# Patient Record
Sex: FEMALE | Race: WHITE | Employment: OTHER | URBAN - NONMETROPOLITAN AREA
[De-identification: names, ages, dates, MRNs, and addresses within clinical notes are randomized per-mention and may not be internally consistent; named-entity substitution may affect disease eponyms.]

---

## 2017-01-16 ENCOUNTER — OFFICE VISIT (OUTPATIENT)
Dept: PRIMARY CARE CLINIC | Age: 42
End: 2017-01-16
Payer: MEDICARE

## 2017-01-16 ENCOUNTER — HOSPITAL ENCOUNTER (OUTPATIENT)
Dept: GENERAL RADIOLOGY | Age: 42
Discharge: HOME | End: 2017-01-16
Payer: MEDICARE

## 2017-01-16 VITALS
OXYGEN SATURATION: 95 % | SYSTOLIC BLOOD PRESSURE: 110 MMHG | HEIGHT: 63 IN | TEMPERATURE: 97.2 F | DIASTOLIC BLOOD PRESSURE: 80 MMHG | HEART RATE: 78 BPM | WEIGHT: 125 LBS | BODY MASS INDEX: 22.15 KG/M2

## 2017-01-16 DIAGNOSIS — G89.29 CHRONIC PAIN OF RIGHT KNEE: ICD-10-CM

## 2017-01-16 DIAGNOSIS — F41.9 ANXIETY: ICD-10-CM

## 2017-01-16 DIAGNOSIS — M17.31 POST-TRAUMATIC OSTEOARTHRITIS OF RIGHT KNEE: ICD-10-CM

## 2017-01-16 DIAGNOSIS — M25.561 CHRONIC PAIN OF RIGHT KNEE: ICD-10-CM

## 2017-01-16 DIAGNOSIS — G81.90 HEMIPLEGIA (HCC): ICD-10-CM

## 2017-01-16 DIAGNOSIS — M19.112 POST-TRAUMATIC OSTEOARTHRITIS OF LEFT SHOULDER: ICD-10-CM

## 2017-01-16 DIAGNOSIS — R10.84 GENERALIZED ABDOMINAL PAIN: ICD-10-CM

## 2017-01-16 DIAGNOSIS — B37.31 YEAST VAGINITIS: Primary | ICD-10-CM

## 2017-01-16 DIAGNOSIS — H92.02 OTALGIA OF LEFT EAR: ICD-10-CM

## 2017-01-16 DIAGNOSIS — F51.01 PRIMARY INSOMNIA: ICD-10-CM

## 2017-01-16 PROCEDURE — G8484 FLU IMMUNIZE NO ADMIN: HCPCS | Performed by: PEDIATRICS

## 2017-01-16 PROCEDURE — G8420 CALC BMI NORM PARAMETERS: HCPCS | Performed by: PEDIATRICS

## 2017-01-16 PROCEDURE — 73560 X-RAY EXAM OF KNEE 1 OR 2: CPT

## 2017-01-16 PROCEDURE — G8427 DOCREV CUR MEDS BY ELIG CLIN: HCPCS | Performed by: PEDIATRICS

## 2017-01-16 PROCEDURE — 99214 OFFICE O/P EST MOD 30 MIN: CPT | Performed by: PEDIATRICS

## 2017-01-16 PROCEDURE — 1036F TOBACCO NON-USER: CPT | Performed by: PEDIATRICS

## 2017-01-16 RX ORDER — FLUCONAZOLE 150 MG/1
150 TABLET ORAL DAILY
Qty: 7 TABLET | Refills: 0 | Status: SHIPPED | OUTPATIENT
Start: 2017-01-16 | End: 2017-01-23

## 2017-01-16 RX ORDER — HYDROCODONE BITARTRATE AND ACETAMINOPHEN 7.5; 325 MG/1; MG/1
1 TABLET ORAL EVERY 6 HOURS PRN
COMMUNITY
End: 2017-01-16 | Stop reason: SDUPTHER

## 2017-01-16 RX ORDER — LORAZEPAM 1 MG/1
1 TABLET ORAL EVERY 8 HOURS PRN
Qty: 30 TABLET | Refills: 0 | Status: SHIPPED | OUTPATIENT
Start: 2017-01-16 | End: 2017-02-16 | Stop reason: SDUPTHER

## 2017-01-16 RX ORDER — HYDROCODONE BITARTRATE AND ACETAMINOPHEN 7.5; 325 MG/1; MG/1
1 TABLET ORAL EVERY 8 HOURS PRN
Qty: 90 TABLET | Refills: 0 | Status: SHIPPED | OUTPATIENT
Start: 2017-01-16 | End: 2017-02-16 | Stop reason: SDUPTHER

## 2017-01-16 RX ORDER — FLUCONAZOLE 100 MG/1
150 TABLET ORAL DAILY
Qty: 5 TABLET | Refills: 0 | Status: CANCELLED | OUTPATIENT
Start: 2017-01-16 | End: 2017-01-19

## 2017-01-16 ASSESSMENT — ENCOUNTER SYMPTOMS
ABDOMINAL PAIN: 1
COUGH: 0
WHEEZING: 0
SHORTNESS OF BREATH: 0
VOMITING: 0
CONSTIPATION: 0
VOICE CHANGE: 0
EYE DISCHARGE: 0
BACK PAIN: 0
EYE PAIN: 0
NAUSEA: 1
DIARRHEA: 0
SORE THROAT: 0
SINUS PRESSURE: 0

## 2017-01-17 ENCOUNTER — TELEPHONE (OUTPATIENT)
Dept: PRIMARY CARE CLINIC | Age: 42
End: 2017-01-17

## 2017-01-17 DIAGNOSIS — G89.29 CHRONIC KNEE PAIN, UNSPECIFIED LATERALITY: Primary | ICD-10-CM

## 2017-01-17 DIAGNOSIS — M25.569 CHRONIC KNEE PAIN, UNSPECIFIED LATERALITY: Primary | ICD-10-CM

## 2017-01-23 RX ORDER — TRIAMCINOLONE ACETONIDE 1 MG/G
CREAM TOPICAL
Qty: 90 G | Refills: 3 | Status: SHIPPED | OUTPATIENT
Start: 2017-01-23 | End: 2017-01-24 | Stop reason: SDUPTHER

## 2017-01-24 DIAGNOSIS — L30.4 INTERTRIGO: ICD-10-CM

## 2017-01-24 DIAGNOSIS — T78.2XXA ANAPHYLAXIS, INITIAL ENCOUNTER: ICD-10-CM

## 2017-01-24 DIAGNOSIS — L30.9 DERMATITIS: ICD-10-CM

## 2017-01-24 RX ORDER — OMEPRAZOLE 20 MG/1
20 CAPSULE, DELAYED RELEASE ORAL DAILY
Qty: 90 CAPSULE | Refills: 5
Start: 2017-01-24 | End: 2017-07-06 | Stop reason: SDUPTHER

## 2017-01-24 RX ORDER — POLYETHYLENE GLYCOL 3350 17 G/17G
17 POWDER, FOR SOLUTION ORAL DAILY
Qty: 527 G | Refills: 5
Start: 2017-01-24 | End: 2018-12-19

## 2017-01-24 RX ORDER — EPINEPHRINE 0.3 MG/.3ML
0.3 INJECTION SUBCUTANEOUS ONCE
Qty: 2 EACH | Refills: 5
Start: 2017-01-24 | End: 2020-12-21 | Stop reason: SDUPTHER

## 2017-01-24 RX ORDER — LAMOTRIGINE 200 MG/1
200 TABLET ORAL 2 TIMES DAILY
Qty: 180 TABLET | Refills: 5
Start: 2017-01-24 | End: 2017-06-14 | Stop reason: SDUPTHER

## 2017-01-24 RX ORDER — CLOTRIMAZOLE 1 %
CREAM (GRAM) TOPICAL 2 TIMES DAILY
Qty: 1 TUBE | Refills: 5
Start: 2017-01-24 | End: 2017-05-17 | Stop reason: ALTCHOICE

## 2017-01-24 RX ORDER — TRIAMCINOLONE ACETONIDE 1 MG/G
CREAM TOPICAL 2 TIMES DAILY
Qty: 90 G | Refills: 3
Start: 2017-01-24 | End: 2018-06-05

## 2017-01-24 RX ORDER — RANITIDINE 150 MG/1
150 TABLET ORAL 2 TIMES DAILY
Qty: 180 TABLET | Refills: 5
Start: 2017-01-24 | End: 2017-05-18 | Stop reason: SDUPTHER

## 2017-01-24 RX ORDER — CLOTRIMAZOLE AND BETAMETHASONE DIPROPIONATE 10; .64 MG/G; MG/G
CREAM TOPICAL 2 TIMES DAILY
Qty: 1 TUBE | Refills: 5
Start: 2017-01-24 | End: 2018-06-05

## 2017-02-02 ENCOUNTER — HOSPITAL ENCOUNTER (EMERGENCY)
Age: 42
Discharge: HOME | End: 2017-02-03
Payer: MEDICARE

## 2017-02-02 VITALS
SYSTOLIC BLOOD PRESSURE: 146 MMHG | HEART RATE: 102 BPM | HEIGHT: 64 IN | DIASTOLIC BLOOD PRESSURE: 90 MMHG | BODY MASS INDEX: 22.2 KG/M2 | OXYGEN SATURATION: 100 % | RESPIRATION RATE: 20 BRPM | WEIGHT: 130 LBS | TEMPERATURE: 98.6 F

## 2017-02-02 DIAGNOSIS — H92.02 OTALGIA, LEFT: Primary | ICD-10-CM

## 2017-02-02 PROCEDURE — 99282 EMERGENCY DEPT VISIT SF MDM: CPT

## 2017-02-02 PROCEDURE — 6370000000 HC RX 637 (ALT 250 FOR IP): Performed by: NURSE PRACTITIONER

## 2017-02-02 RX ORDER — IBUPROFEN 200 MG
400 TABLET ORAL ONCE
Status: COMPLETED | OUTPATIENT
Start: 2017-02-02 | End: 2017-02-02

## 2017-02-02 RX ADMIN — IBUPROFEN 400 MG: 200 TABLET, FILM COATED ORAL at 23:51

## 2017-02-02 ASSESSMENT — PAIN DESCRIPTION - LOCATION: LOCATION: EAR

## 2017-02-02 ASSESSMENT — PAIN DESCRIPTION - ORIENTATION: ORIENTATION: LEFT

## 2017-02-02 ASSESSMENT — PAIN SCALES - GENERAL: PAINLEVEL_OUTOF10: 8

## 2017-02-03 PROCEDURE — 99282 EMERGENCY DEPT VISIT SF MDM: CPT | Performed by: NURSE PRACTITIONER

## 2017-02-03 ASSESSMENT — ENCOUNTER SYMPTOMS
RHINORRHEA: 0
SORE THROAT: 0
COUGH: 0
TROUBLE SWALLOWING: 0

## 2017-02-06 RX ORDER — RANITIDINE 150 MG/1
150 TABLET ORAL 2 TIMES DAILY
Qty: 180 TABLET | Refills: 3 | Status: SHIPPED | OUTPATIENT
Start: 2017-02-06 | End: 2017-02-16

## 2017-02-16 ENCOUNTER — OFFICE VISIT (OUTPATIENT)
Dept: PRIMARY CARE CLINIC | Age: 42
End: 2017-02-16
Payer: MEDICARE

## 2017-02-16 VITALS
SYSTOLIC BLOOD PRESSURE: 102 MMHG | TEMPERATURE: 97 F | WEIGHT: 121 LBS | OXYGEN SATURATION: 98 % | BODY MASS INDEX: 20.77 KG/M2 | DIASTOLIC BLOOD PRESSURE: 70 MMHG | HEART RATE: 91 BPM

## 2017-02-16 DIAGNOSIS — M17.31 POST-TRAUMATIC OSTEOARTHRITIS OF RIGHT KNEE: ICD-10-CM

## 2017-02-16 DIAGNOSIS — F51.01 PRIMARY INSOMNIA: ICD-10-CM

## 2017-02-16 DIAGNOSIS — F41.9 ANXIETY: ICD-10-CM

## 2017-02-16 DIAGNOSIS — M19.112 POST-TRAUMATIC OSTEOARTHRITIS OF LEFT SHOULDER: ICD-10-CM

## 2017-02-16 PROCEDURE — G8484 FLU IMMUNIZE NO ADMIN: HCPCS | Performed by: PEDIATRICS

## 2017-02-16 PROCEDURE — 1036F TOBACCO NON-USER: CPT | Performed by: PEDIATRICS

## 2017-02-16 PROCEDURE — G8420 CALC BMI NORM PARAMETERS: HCPCS | Performed by: PEDIATRICS

## 2017-02-16 PROCEDURE — 99213 OFFICE O/P EST LOW 20 MIN: CPT | Performed by: PEDIATRICS

## 2017-02-16 PROCEDURE — G8427 DOCREV CUR MEDS BY ELIG CLIN: HCPCS | Performed by: PEDIATRICS

## 2017-02-16 RX ORDER — LORAZEPAM 1 MG/1
1 TABLET ORAL EVERY 8 HOURS PRN
Qty: 30 TABLET | Refills: 0 | Status: SHIPPED | OUTPATIENT
Start: 2017-02-16 | End: 2017-03-16 | Stop reason: SDUPTHER

## 2017-02-16 RX ORDER — HYDROCODONE BITARTRATE AND ACETAMINOPHEN 7.5; 325 MG/1; MG/1
1 TABLET ORAL EVERY 8 HOURS PRN
Qty: 90 TABLET | Refills: 0 | Status: SHIPPED | OUTPATIENT
Start: 2017-02-16 | End: 2017-03-16 | Stop reason: SDUPTHER

## 2017-02-16 ASSESSMENT — ENCOUNTER SYMPTOMS
SORE THROAT: 0
EYE PAIN: 0
EYE DISCHARGE: 0
DIARRHEA: 0
SHORTNESS OF BREATH: 0
SINUS PRESSURE: 0
WHEEZING: 0
CONSTIPATION: 0
VOMITING: 0
BACK PAIN: 0
ABDOMINAL PAIN: 0
NAUSEA: 0
VOICE CHANGE: 0
COUGH: 0

## 2017-02-28 ENCOUNTER — CARE COORDINATION (OUTPATIENT)
Dept: CARE COORDINATION | Age: 42
End: 2017-02-28

## 2017-03-16 ENCOUNTER — OFFICE VISIT (OUTPATIENT)
Dept: PRIMARY CARE CLINIC | Age: 42
End: 2017-03-16
Payer: MEDICARE

## 2017-03-16 VITALS
OXYGEN SATURATION: 95 % | WEIGHT: 136.4 LBS | HEART RATE: 85 BPM | BODY MASS INDEX: 21.92 KG/M2 | TEMPERATURE: 97.1 F | DIASTOLIC BLOOD PRESSURE: 80 MMHG | SYSTOLIC BLOOD PRESSURE: 118 MMHG | HEIGHT: 66 IN

## 2017-03-16 DIAGNOSIS — F41.9 ANXIETY: Primary | ICD-10-CM

## 2017-03-16 DIAGNOSIS — G81.90 HEMIPLEGIA (HCC): ICD-10-CM

## 2017-03-16 DIAGNOSIS — F51.01 PRIMARY INSOMNIA: ICD-10-CM

## 2017-03-16 DIAGNOSIS — M19.112 POST-TRAUMATIC OSTEOARTHRITIS OF LEFT SHOULDER: ICD-10-CM

## 2017-03-16 DIAGNOSIS — R21 FACIAL RASH: ICD-10-CM

## 2017-03-16 DIAGNOSIS — M17.31 POST-TRAUMATIC OSTEOARTHRITIS OF RIGHT KNEE: ICD-10-CM

## 2017-03-16 PROCEDURE — 1036F TOBACCO NON-USER: CPT | Performed by: PEDIATRICS

## 2017-03-16 PROCEDURE — G8420 CALC BMI NORM PARAMETERS: HCPCS | Performed by: PEDIATRICS

## 2017-03-16 PROCEDURE — G8427 DOCREV CUR MEDS BY ELIG CLIN: HCPCS | Performed by: PEDIATRICS

## 2017-03-16 PROCEDURE — G8484 FLU IMMUNIZE NO ADMIN: HCPCS | Performed by: PEDIATRICS

## 2017-03-16 PROCEDURE — 99213 OFFICE O/P EST LOW 20 MIN: CPT | Performed by: PEDIATRICS

## 2017-03-16 RX ORDER — LORAZEPAM 1 MG/1
1 TABLET ORAL EVERY 8 HOURS PRN
Qty: 30 TABLET | Refills: 0 | Status: SHIPPED | OUTPATIENT
Start: 2017-03-16 | End: 2017-06-14 | Stop reason: SDUPTHER

## 2017-03-16 RX ORDER — HYDROCODONE BITARTRATE AND ACETAMINOPHEN 7.5; 325 MG/1; MG/1
1 TABLET ORAL EVERY 8 HOURS PRN
Qty: 90 TABLET | Refills: 0 | Status: SHIPPED | OUTPATIENT
Start: 2017-03-16 | End: 2017-03-23

## 2017-03-16 ASSESSMENT — ENCOUNTER SYMPTOMS
DIARRHEA: 0
SORE THROAT: 0
COUGH: 0
EYE DISCHARGE: 0
SINUS PRESSURE: 0
WHEEZING: 0
VOICE CHANGE: 0
EYE PAIN: 0
CONSTIPATION: 0
SHORTNESS OF BREATH: 0
BACK PAIN: 0
NAUSEA: 0
VOMITING: 0
ABDOMINAL PAIN: 0

## 2017-03-17 DIAGNOSIS — Z99.3 WHEELCHAIR BOUND: Primary | ICD-10-CM

## 2017-03-20 ENCOUNTER — TELEPHONE (OUTPATIENT)
Dept: PRIMARY CARE CLINIC | Age: 42
End: 2017-03-20

## 2017-03-20 DIAGNOSIS — G81.90 HEMIPLEGIA (HCC): Primary | ICD-10-CM

## 2017-03-21 RX ORDER — CUSHION
EACH MISCELLANEOUS
Qty: 1 EACH | Refills: 0
Start: 2017-03-21 | End: 2018-06-05

## 2017-03-22 ENCOUNTER — TELEPHONE (OUTPATIENT)
Dept: PRIMARY CARE CLINIC | Age: 42
End: 2017-03-22

## 2017-04-13 ENCOUNTER — OFFICE VISIT (OUTPATIENT)
Dept: PRIMARY CARE CLINIC | Age: 42
End: 2017-04-13
Payer: MEDICARE

## 2017-04-13 VITALS
OXYGEN SATURATION: 95 % | HEART RATE: 72 BPM | WEIGHT: 133.8 LBS | HEIGHT: 63 IN | DIASTOLIC BLOOD PRESSURE: 72 MMHG | SYSTOLIC BLOOD PRESSURE: 110 MMHG | BODY MASS INDEX: 23.71 KG/M2 | TEMPERATURE: 97.9 F

## 2017-04-13 DIAGNOSIS — F51.01 PRIMARY INSOMNIA: ICD-10-CM

## 2017-04-13 DIAGNOSIS — K59.09 CHRONIC CONSTIPATION: ICD-10-CM

## 2017-04-13 DIAGNOSIS — F41.9 ANXIETY: ICD-10-CM

## 2017-04-13 DIAGNOSIS — L30.4 INTERTRIGO: ICD-10-CM

## 2017-04-13 DIAGNOSIS — R56.9 SEIZURES (HCC): ICD-10-CM

## 2017-04-13 DIAGNOSIS — L30.9 DERMATITIS: ICD-10-CM

## 2017-04-13 DIAGNOSIS — Z30.9 ENCOUNTER FOR CONTRACEPTIVE MANAGEMENT, UNSPECIFIED CONTRACEPTIVE ENCOUNTER TYPE: ICD-10-CM

## 2017-04-13 DIAGNOSIS — G89.4 CHRONIC PAIN SYNDROME: Primary | ICD-10-CM

## 2017-04-13 LAB
CONTROL: NORMAL
PREGNANCY TEST URINE, POC: NORMAL

## 2017-04-13 PROCEDURE — 1036F TOBACCO NON-USER: CPT | Performed by: PEDIATRICS

## 2017-04-13 PROCEDURE — 99214 OFFICE O/P EST MOD 30 MIN: CPT | Performed by: PEDIATRICS

## 2017-04-13 PROCEDURE — 96372 THER/PROPH/DIAG INJ SC/IM: CPT | Performed by: PEDIATRICS

## 2017-04-13 PROCEDURE — G8427 DOCREV CUR MEDS BY ELIG CLIN: HCPCS | Performed by: PEDIATRICS

## 2017-04-13 PROCEDURE — G8420 CALC BMI NORM PARAMETERS: HCPCS | Performed by: PEDIATRICS

## 2017-04-13 PROCEDURE — 81025 URINE PREGNANCY TEST: CPT | Performed by: PEDIATRICS

## 2017-04-13 RX ORDER — HYDROCODONE BITARTRATE AND ACETAMINOPHEN 7.5; 325 MG/1; MG/1
1 TABLET ORAL EVERY 6 HOURS PRN
Refills: 0 | Status: CANCELLED | OUTPATIENT
Start: 2017-04-13

## 2017-04-13 RX ORDER — LORAZEPAM 1 MG/1
1 TABLET ORAL EVERY 8 HOURS PRN
Qty: 30 TABLET | Refills: 0 | Status: CANCELLED | OUTPATIENT
Start: 2017-04-13

## 2017-04-13 RX ORDER — HYDROCODONE BITARTRATE AND ACETAMINOPHEN 7.5; 325 MG/1; MG/1
1 TABLET ORAL EVERY 6 HOURS PRN
COMMUNITY
End: 2017-04-13 | Stop reason: SDUPTHER

## 2017-04-13 RX ORDER — MEDROXYPROGESTERONE ACETATE 150 MG/ML
150 INJECTION, SUSPENSION INTRAMUSCULAR ONCE
Status: COMPLETED | OUTPATIENT
Start: 2017-04-13 | End: 2017-04-13

## 2017-04-13 RX ORDER — HYDROCODONE BITARTRATE AND ACETAMINOPHEN 7.5; 325 MG/1; MG/1
1 TABLET ORAL EVERY 6 HOURS PRN
Qty: 90 TABLET | Refills: 0 | Status: SHIPPED | OUTPATIENT
Start: 2017-04-13 | End: 2017-05-17 | Stop reason: SDUPTHER

## 2017-04-13 RX ADMIN — MEDROXYPROGESTERONE ACETATE 150 MG: 150 INJECTION, SUSPENSION INTRAMUSCULAR at 14:06

## 2017-04-13 ASSESSMENT — ENCOUNTER SYMPTOMS
ABDOMINAL PAIN: 0
SORE THROAT: 0
EYE DISCHARGE: 0
CONSTIPATION: 0
SINUS PRESSURE: 0
VOICE CHANGE: 0
DIARRHEA: 0
BACK PAIN: 0
COUGH: 0
NAUSEA: 0
SHORTNESS OF BREATH: 0
VOMITING: 0
EYE PAIN: 0
WHEEZING: 0

## 2017-05-17 ENCOUNTER — OFFICE VISIT (OUTPATIENT)
Dept: PRIMARY CARE CLINIC | Age: 42
End: 2017-05-17
Payer: MEDICARE

## 2017-05-17 VITALS
OXYGEN SATURATION: 96 % | DIASTOLIC BLOOD PRESSURE: 68 MMHG | BODY MASS INDEX: 22.1 KG/M2 | SYSTOLIC BLOOD PRESSURE: 130 MMHG | WEIGHT: 124.75 LBS | HEART RATE: 88 BPM | TEMPERATURE: 96.9 F

## 2017-05-17 DIAGNOSIS — M25.561 CHRONIC PAIN OF RIGHT KNEE: ICD-10-CM

## 2017-05-17 DIAGNOSIS — G89.29 CHRONIC PAIN OF RIGHT KNEE: ICD-10-CM

## 2017-05-17 DIAGNOSIS — G89.4 CHRONIC PAIN SYNDROME: ICD-10-CM

## 2017-05-17 DIAGNOSIS — J30.2 SEASONAL ALLERGIC RHINITIS, UNSPECIFIED ALLERGIC RHINITIS TRIGGER: Primary | ICD-10-CM

## 2017-05-17 DIAGNOSIS — H26.9 CATARACTS, BILATERAL: ICD-10-CM

## 2017-05-17 DIAGNOSIS — H92.03 OTALGIA OF BOTH EARS: ICD-10-CM

## 2017-05-17 DIAGNOSIS — F51.01 PRIMARY INSOMNIA: ICD-10-CM

## 2017-05-17 PROCEDURE — G8427 DOCREV CUR MEDS BY ELIG CLIN: HCPCS | Performed by: PEDIATRICS

## 2017-05-17 PROCEDURE — 99213 OFFICE O/P EST LOW 20 MIN: CPT | Performed by: PEDIATRICS

## 2017-05-17 PROCEDURE — G8420 CALC BMI NORM PARAMETERS: HCPCS | Performed by: PEDIATRICS

## 2017-05-17 PROCEDURE — 1036F TOBACCO NON-USER: CPT | Performed by: PEDIATRICS

## 2017-05-17 RX ORDER — FLUTICASONE PROPIONATE 50 MCG
2 SPRAY, SUSPENSION (ML) NASAL DAILY
Qty: 1 BOTTLE | Refills: 3 | Status: SHIPPED | OUTPATIENT
Start: 2017-05-17 | End: 2017-12-12 | Stop reason: SDUPTHER

## 2017-05-17 RX ORDER — LORAZEPAM 1 MG/1
1 TABLET ORAL EVERY 8 HOURS PRN
Qty: 30 TABLET | Refills: 0 | Status: CANCELLED | OUTPATIENT
Start: 2017-05-17

## 2017-05-17 RX ORDER — DESLORATADINE 5 MG/1
5 TABLET ORAL DAILY
Qty: 30 TABLET | Refills: 5 | Status: SHIPPED | OUTPATIENT
Start: 2017-05-17 | End: 2018-07-27 | Stop reason: SDUPTHER

## 2017-05-17 RX ORDER — AZITHROMYCIN 250 MG/1
TABLET, FILM COATED ORAL
Qty: 6 TABLET | Refills: 0 | Status: CANCELLED | OUTPATIENT
Start: 2017-05-17

## 2017-05-17 RX ORDER — HYDROCODONE BITARTRATE AND ACETAMINOPHEN 7.5; 325 MG/1; MG/1
1 TABLET ORAL EVERY 6 HOURS PRN
Qty: 90 TABLET | Refills: 0 | Status: SHIPPED | OUTPATIENT
Start: 2017-05-17 | End: 2017-06-14 | Stop reason: SDUPTHER

## 2017-05-17 ASSESSMENT — ENCOUNTER SYMPTOMS
DIARRHEA: 0
COUGH: 0
ABDOMINAL PAIN: 0
CONSTIPATION: 0
SORE THROAT: 0
VOMITING: 0
SINUS PRESSURE: 0
NAUSEA: 0
SHORTNESS OF BREATH: 0
VOICE CHANGE: 0
WHEEZING: 0
BACK PAIN: 0
EYE DISCHARGE: 0
EYE PAIN: 0

## 2017-05-19 RX ORDER — RANITIDINE 150 MG/1
TABLET ORAL
Qty: 180 TABLET | Refills: 3 | Status: SHIPPED | OUTPATIENT
Start: 2017-05-19 | End: 2018-11-07 | Stop reason: SDUPTHER

## 2017-05-26 RX ORDER — AZITHROMYCIN 250 MG/1
TABLET, FILM COATED ORAL
Qty: 1 PACKET | Refills: 0 | Status: SHIPPED | OUTPATIENT
Start: 2017-05-26 | End: 2017-06-05

## 2017-06-12 ENCOUNTER — ANESTHESIA EVENT (OUTPATIENT)
Dept: OPERATING ROOM | Age: 42
End: 2017-06-12

## 2017-06-14 ENCOUNTER — OFFICE VISIT (OUTPATIENT)
Dept: PRIMARY CARE CLINIC | Age: 42
End: 2017-06-14
Payer: MEDICARE

## 2017-06-14 VITALS
HEART RATE: 115 BPM | HEIGHT: 63 IN | DIASTOLIC BLOOD PRESSURE: 72 MMHG | TEMPERATURE: 98.7 F | OXYGEN SATURATION: 99 % | BODY MASS INDEX: 23.39 KG/M2 | WEIGHT: 132 LBS | SYSTOLIC BLOOD PRESSURE: 112 MMHG

## 2017-06-14 DIAGNOSIS — Z01.818 PRE-OPERATIVE CLEARANCE: Primary | ICD-10-CM

## 2017-06-14 DIAGNOSIS — F41.9 ANXIETY: ICD-10-CM

## 2017-06-14 DIAGNOSIS — H26.9 CATARACT: ICD-10-CM

## 2017-06-14 DIAGNOSIS — G89.4 CHRONIC PAIN SYNDROME: ICD-10-CM

## 2017-06-14 DIAGNOSIS — R56.9 SEIZURES (HCC): ICD-10-CM

## 2017-06-14 DIAGNOSIS — F51.01 PRIMARY INSOMNIA: ICD-10-CM

## 2017-06-14 PROCEDURE — G8427 DOCREV CUR MEDS BY ELIG CLIN: HCPCS | Performed by: PEDIATRICS

## 2017-06-14 PROCEDURE — 93000 ELECTROCARDIOGRAM COMPLETE: CPT | Performed by: PEDIATRICS

## 2017-06-14 PROCEDURE — 1036F TOBACCO NON-USER: CPT | Performed by: PEDIATRICS

## 2017-06-14 PROCEDURE — 99213 OFFICE O/P EST LOW 20 MIN: CPT | Performed by: PEDIATRICS

## 2017-06-14 PROCEDURE — G8420 CALC BMI NORM PARAMETERS: HCPCS | Performed by: PEDIATRICS

## 2017-06-14 RX ORDER — LORAZEPAM 1 MG/1
1 TABLET ORAL EVERY 8 HOURS PRN
Qty: 30 TABLET | Refills: 0 | Status: SHIPPED | OUTPATIENT
Start: 2017-06-14 | End: 2017-07-13 | Stop reason: SDUPTHER

## 2017-06-14 RX ORDER — LAMOTRIGINE 200 MG/1
200 TABLET ORAL 2 TIMES DAILY
Qty: 180 TABLET | Refills: 5 | Status: SHIPPED | OUTPATIENT
Start: 2017-06-14 | End: 2017-06-14 | Stop reason: SDUPTHER

## 2017-06-14 RX ORDER — HYDROCODONE BITARTRATE AND ACETAMINOPHEN 7.5; 325 MG/1; MG/1
1 TABLET ORAL EVERY 6 HOURS PRN
Qty: 90 TABLET | Refills: 0 | Status: SHIPPED | OUTPATIENT
Start: 2017-06-14 | End: 2017-07-13 | Stop reason: SDUPTHER

## 2017-06-14 RX ORDER — LAMOTRIGINE 200 MG/1
200 TABLET ORAL 2 TIMES DAILY
Qty: 180 TABLET | Refills: 5 | Status: SHIPPED | OUTPATIENT
Start: 2017-06-14 | End: 2018-06-30 | Stop reason: SDUPTHER

## 2017-06-14 ASSESSMENT — ENCOUNTER SYMPTOMS
ABDOMINAL DISTENTION: 0
VOMITING: 0
NAUSEA: 0
SHORTNESS OF BREATH: 0
COUGH: 0
ABDOMINAL PAIN: 0
CHOKING: 0
CHEST TIGHTNESS: 0
BACK PAIN: 1
SINUS PRESSURE: 0
TROUBLE SWALLOWING: 0
DIARRHEA: 0
SORE THROAT: 0
CONSTIPATION: 0
VOICE CHANGE: 0
WHEEZING: 0

## 2017-06-15 ENCOUNTER — HOSPITAL ENCOUNTER (OUTPATIENT)
Age: 42
Setting detail: OUTPATIENT SURGERY
Discharge: HOME | End: 2017-06-15
Attending: OPHTHALMOLOGY | Admitting: OPHTHALMOLOGY

## 2017-06-15 ENCOUNTER — ANESTHESIA (OUTPATIENT)
Dept: OPERATING ROOM | Age: 42
End: 2017-06-15
Payer: MEDICARE

## 2017-06-15 VITALS
HEART RATE: 79 BPM | SYSTOLIC BLOOD PRESSURE: 135 MMHG | BODY MASS INDEX: 23.21 KG/M2 | RESPIRATION RATE: 18 BRPM | OXYGEN SATURATION: 100 % | TEMPERATURE: 97 F | HEIGHT: 63 IN | WEIGHT: 131 LBS | DIASTOLIC BLOOD PRESSURE: 97 MMHG

## 2017-06-15 VITALS
RESPIRATION RATE: 1 BRPM | DIASTOLIC BLOOD PRESSURE: 83 MMHG | SYSTOLIC BLOOD PRESSURE: 123 MMHG | OXYGEN SATURATION: 99 %

## 2017-06-15 PROCEDURE — 66984 XCAPSL CTRC RMVL W/O ECP: CPT

## 2017-06-15 PROCEDURE — G8907 PT DOC NO EVENTS ON DISCHARG: HCPCS

## 2017-06-15 PROCEDURE — V2632 POST CHMBR INTRAOCULAR LENS: HCPCS | Performed by: OPHTHALMOLOGY

## 2017-06-15 PROCEDURE — 00142 ANES PX ON EYE LENS SURGERY: CPT | Performed by: NURSE ANESTHETIST, CERTIFIED REGISTERED

## 2017-06-15 PROCEDURE — G8918 PT W/O PREOP ORDER IV AB PRO: HCPCS

## 2017-06-15 DEVICE — IMPLANTABLE DEVICE: Type: IMPLANTABLE DEVICE | Site: EYE | Status: FUNCTIONAL

## 2017-06-15 RX ORDER — OXYCODONE HYDROCHLORIDE AND ACETAMINOPHEN 5; 325 MG/1; MG/1
2 TABLET ORAL PRN
Status: DISCONTINUED | OUTPATIENT
Start: 2017-06-15 | End: 2017-06-15 | Stop reason: HOSPADM

## 2017-06-15 RX ORDER — FENTANYL CITRATE 50 UG/ML
INJECTION, SOLUTION INTRAMUSCULAR; INTRAVENOUS PRN
Status: DISCONTINUED | OUTPATIENT
Start: 2017-06-15 | End: 2017-06-15 | Stop reason: SDUPTHER

## 2017-06-15 RX ORDER — LABETALOL HYDROCHLORIDE 5 MG/ML
5 INJECTION, SOLUTION INTRAVENOUS EVERY 10 MIN PRN
Status: DISCONTINUED | OUTPATIENT
Start: 2017-06-15 | End: 2017-06-15 | Stop reason: HOSPADM

## 2017-06-15 RX ORDER — ONDANSETRON 2 MG/ML
4 INJECTION INTRAMUSCULAR; INTRAVENOUS
Status: DISCONTINUED | OUTPATIENT
Start: 2017-06-15 | End: 2017-06-15 | Stop reason: HOSPADM

## 2017-06-15 RX ORDER — FENTANYL CITRATE 50 UG/ML
50 INJECTION, SOLUTION INTRAMUSCULAR; INTRAVENOUS EVERY 5 MIN PRN
Status: DISCONTINUED | OUTPATIENT
Start: 2017-06-15 | End: 2017-06-15 | Stop reason: HOSPADM

## 2017-06-15 RX ORDER — HYDROMORPHONE HCL 110MG/55ML
0.25 PATIENT CONTROLLED ANALGESIA SYRINGE INTRAVENOUS EVERY 5 MIN PRN
Status: DISCONTINUED | OUTPATIENT
Start: 2017-06-15 | End: 2017-06-15 | Stop reason: HOSPADM

## 2017-06-15 RX ORDER — DIPHENHYDRAMINE HYDROCHLORIDE 50 MG/ML
12.5 INJECTION INTRAMUSCULAR; INTRAVENOUS
Status: DISCONTINUED | OUTPATIENT
Start: 2017-06-15 | End: 2017-06-15 | Stop reason: HOSPADM

## 2017-06-15 RX ORDER — OXYCODONE HYDROCHLORIDE AND ACETAMINOPHEN 5; 325 MG/1; MG/1
1 TABLET ORAL PRN
Status: DISCONTINUED | OUTPATIENT
Start: 2017-06-15 | End: 2017-06-15 | Stop reason: HOSPADM

## 2017-06-15 RX ORDER — SODIUM CHLORIDE, SODIUM LACTATE, POTASSIUM CHLORIDE, CALCIUM CHLORIDE 600; 310; 30; 20 MG/100ML; MG/100ML; MG/100ML; MG/100ML
INJECTION, SOLUTION INTRAVENOUS CONTINUOUS
Status: DISCONTINUED | OUTPATIENT
Start: 2017-06-15 | End: 2017-06-15 | Stop reason: HOSPADM

## 2017-06-15 RX ORDER — HYDROMORPHONE HCL 110MG/55ML
0.5 PATIENT CONTROLLED ANALGESIA SYRINGE INTRAVENOUS EVERY 5 MIN PRN
Status: DISCONTINUED | OUTPATIENT
Start: 2017-06-15 | End: 2017-06-15 | Stop reason: HOSPADM

## 2017-06-15 RX ORDER — MEPERIDINE HYDROCHLORIDE 25 MG/ML
12.5 INJECTION INTRAMUSCULAR; INTRAVENOUS; SUBCUTANEOUS EVERY 5 MIN PRN
Status: DISCONTINUED | OUTPATIENT
Start: 2017-06-15 | End: 2017-06-15 | Stop reason: HOSPADM

## 2017-06-15 RX ORDER — PROPOFOL 10 MG/ML
INJECTION, EMULSION INTRAVENOUS PRN
Status: DISCONTINUED | OUTPATIENT
Start: 2017-06-15 | End: 2017-06-15 | Stop reason: SDUPTHER

## 2017-06-15 RX ORDER — PROMETHAZINE HYDROCHLORIDE 25 MG/ML
12.5 INJECTION, SOLUTION INTRAMUSCULAR; INTRAVENOUS
Status: DISCONTINUED | OUTPATIENT
Start: 2017-06-15 | End: 2017-06-15 | Stop reason: HOSPADM

## 2017-06-15 RX ORDER — TETRACAINE HYDROCHLORIDE 5 MG/ML
SOLUTION OPHTHALMIC PRN
Status: DISCONTINUED | OUTPATIENT
Start: 2017-06-15 | End: 2017-06-15 | Stop reason: HOSPADM

## 2017-06-15 RX ORDER — MIDAZOLAM HYDROCHLORIDE 1 MG/ML
INJECTION INTRAMUSCULAR; INTRAVENOUS PRN
Status: DISCONTINUED | OUTPATIENT
Start: 2017-06-15 | End: 2017-06-15 | Stop reason: SDUPTHER

## 2017-06-15 RX ORDER — HYDRALAZINE HYDROCHLORIDE 20 MG/ML
5 INJECTION INTRAMUSCULAR; INTRAVENOUS EVERY 10 MIN PRN
Status: DISCONTINUED | OUTPATIENT
Start: 2017-06-15 | End: 2017-06-15 | Stop reason: HOSPADM

## 2017-06-15 RX ADMIN — PROPOFOL 50 MG: 10 INJECTION, EMULSION INTRAVENOUS at 07:34

## 2017-06-15 RX ADMIN — FENTANYL CITRATE 50 MCG: 50 INJECTION, SOLUTION INTRAMUSCULAR; INTRAVENOUS at 07:30

## 2017-06-15 RX ADMIN — SODIUM CHLORIDE, SODIUM LACTATE, POTASSIUM CHLORIDE, CALCIUM CHLORIDE: 600; 310; 30; 20 INJECTION, SOLUTION INTRAVENOUS at 07:22

## 2017-06-15 RX ADMIN — MIDAZOLAM HYDROCHLORIDE 1 MG: 1 INJECTION INTRAMUSCULAR; INTRAVENOUS at 07:33

## 2017-06-15 RX ADMIN — PROPOFOL 100 MG: 10 INJECTION, EMULSION INTRAVENOUS at 07:35

## 2017-07-06 RX ORDER — OMEPRAZOLE 20 MG/1
20 CAPSULE, DELAYED RELEASE ORAL DAILY
Qty: 90 CAPSULE | Refills: 3 | OUTPATIENT
Start: 2017-07-06 | End: 2018-08-17 | Stop reason: SDUPTHER

## 2017-07-12 DIAGNOSIS — Z30.42 DEPOT CONTRACEPTION: ICD-10-CM

## 2017-07-13 ENCOUNTER — OFFICE VISIT (OUTPATIENT)
Dept: PRIMARY CARE CLINIC | Age: 42
End: 2017-07-13
Payer: MEDICARE

## 2017-07-13 VITALS
DIASTOLIC BLOOD PRESSURE: 76 MMHG | BODY MASS INDEX: 22.72 KG/M2 | OXYGEN SATURATION: 94 % | TEMPERATURE: 97.7 F | SYSTOLIC BLOOD PRESSURE: 118 MMHG | WEIGHT: 128.25 LBS | HEART RATE: 87 BPM

## 2017-07-13 DIAGNOSIS — M17.11 PRIMARY OSTEOARTHRITIS OF RIGHT KNEE: ICD-10-CM

## 2017-07-13 DIAGNOSIS — M54.50 CHRONIC MIDLINE LOW BACK PAIN WITHOUT SCIATICA: Primary | ICD-10-CM

## 2017-07-13 DIAGNOSIS — G89.29 CHRONIC MIDLINE LOW BACK PAIN WITHOUT SCIATICA: Primary | ICD-10-CM

## 2017-07-13 DIAGNOSIS — G89.4 CHRONIC PAIN SYNDROME: ICD-10-CM

## 2017-07-13 DIAGNOSIS — F51.01 PRIMARY INSOMNIA: ICD-10-CM

## 2017-07-13 DIAGNOSIS — M17.12 PRIMARY OSTEOARTHRITIS OF LEFT KNEE: ICD-10-CM

## 2017-07-13 DIAGNOSIS — F41.9 ANXIETY: ICD-10-CM

## 2017-07-13 PROCEDURE — 20610 DRAIN/INJ JOINT/BURSA W/O US: CPT | Performed by: PEDIATRICS

## 2017-07-13 PROCEDURE — 99214 OFFICE O/P EST MOD 30 MIN: CPT | Performed by: PEDIATRICS

## 2017-07-13 PROCEDURE — G8420 CALC BMI NORM PARAMETERS: HCPCS | Performed by: PEDIATRICS

## 2017-07-13 PROCEDURE — G8427 DOCREV CUR MEDS BY ELIG CLIN: HCPCS | Performed by: PEDIATRICS

## 2017-07-13 PROCEDURE — 1036F TOBACCO NON-USER: CPT | Performed by: PEDIATRICS

## 2017-07-13 RX ORDER — METHYLPREDNISOLONE ACETATE 80 MG/ML
80 INJECTION, SUSPENSION INTRA-ARTICULAR; INTRALESIONAL; INTRAMUSCULAR; SOFT TISSUE ONCE
Qty: 1 ML | Refills: 0
Start: 2017-07-13 | End: 2017-07-13

## 2017-07-13 RX ORDER — LORAZEPAM 1 MG/1
1 TABLET ORAL EVERY 8 HOURS PRN
Qty: 30 TABLET | Refills: 0 | Status: SHIPPED | OUTPATIENT
Start: 2017-07-13 | End: 2017-08-15 | Stop reason: SDUPTHER

## 2017-07-13 RX ORDER — HYDROCODONE BITARTRATE AND ACETAMINOPHEN 7.5; 325 MG/1; MG/1
1 TABLET ORAL EVERY 6 HOURS PRN
Qty: 90 TABLET | Refills: 0 | Status: SHIPPED | OUTPATIENT
Start: 2017-07-13 | End: 2017-08-15 | Stop reason: SDUPTHER

## 2017-07-13 RX ORDER — TRIAMCINOLONE ACETONIDE 40 MG/ML
40 INJECTION, SUSPENSION INTRA-ARTICULAR; INTRAMUSCULAR ONCE
Qty: 1 ML | Refills: 0
Start: 2017-07-13 | End: 2017-07-13

## 2017-07-13 RX ORDER — MEDROXYPROGESTERONE ACETATE 150 MG/ML
INJECTION, SUSPENSION INTRAMUSCULAR
Qty: 1 ML | Refills: 11 | Status: SHIPPED | OUTPATIENT
Start: 2017-07-13 | End: 2017-07-20 | Stop reason: SDUPTHER

## 2017-07-13 RX ORDER — MEDICAL SUPPLY, MISCELLANEOUS
EACH MISCELLANEOUS
Qty: 1 EACH | Refills: 0 | Status: SHIPPED | OUTPATIENT
Start: 2017-07-13 | End: 2018-06-05

## 2017-07-13 ASSESSMENT — ENCOUNTER SYMPTOMS
VOICE CHANGE: 0
SHORTNESS OF BREATH: 0
COUGH: 0
NAUSEA: 0
ABDOMINAL DISTENTION: 0
SORE THROAT: 0
SINUS PRESSURE: 0
DIARRHEA: 0
ABDOMINAL PAIN: 0
WHEEZING: 0
CONSTIPATION: 0
VOMITING: 0
CHEST TIGHTNESS: 0
CHOKING: 0
BACK PAIN: 0
TROUBLE SWALLOWING: 0

## 2017-07-20 DIAGNOSIS — Z30.42 DEPOT CONTRACEPTION: ICD-10-CM

## 2017-07-20 RX ORDER — MEDROXYPROGESTERONE ACETATE 150 MG/ML
150 INJECTION, SUSPENSION INTRAMUSCULAR ONCE
Qty: 1 ML | Refills: 3 | Status: SHIPPED | OUTPATIENT
Start: 2017-07-20 | End: 2018-02-13 | Stop reason: SDUPTHER

## 2017-07-26 ENCOUNTER — PROCEDURE VISIT (OUTPATIENT)
Dept: PRIMARY CARE CLINIC | Age: 42
End: 2017-07-26
Payer: MEDICARE

## 2017-07-26 DIAGNOSIS — Z30.8 ENCOUNTER FOR OTHER CONTRACEPTIVE MANAGEMENT: Primary | ICD-10-CM

## 2017-07-26 PROCEDURE — 96372 THER/PROPH/DIAG INJ SC/IM: CPT | Performed by: PEDIATRICS

## 2017-07-26 RX ORDER — MEDROXYPROGESTERONE ACETATE 150 MG/ML
150 INJECTION, SUSPENSION INTRAMUSCULAR ONCE
Status: COMPLETED | OUTPATIENT
Start: 2017-07-26 | End: 2017-07-26

## 2017-07-26 RX ADMIN — MEDROXYPROGESTERONE ACETATE 150 MG: 150 INJECTION, SUSPENSION INTRAMUSCULAR at 12:26

## 2017-07-28 ENCOUNTER — ANESTHESIA EVENT (OUTPATIENT)
Dept: OPERATING ROOM | Age: 42
End: 2017-07-28

## 2017-08-04 ENCOUNTER — ANESTHESIA (OUTPATIENT)
Dept: OPERATING ROOM | Age: 42
End: 2017-08-04
Payer: MEDICARE

## 2017-08-04 ENCOUNTER — HOSPITAL ENCOUNTER (OUTPATIENT)
Age: 42
Setting detail: OUTPATIENT SURGERY
Discharge: HOME | End: 2017-08-04
Attending: OPHTHALMOLOGY | Admitting: OPHTHALMOLOGY

## 2017-08-04 VITALS
SYSTOLIC BLOOD PRESSURE: 97 MMHG | RESPIRATION RATE: 1 BRPM | DIASTOLIC BLOOD PRESSURE: 56 MMHG | OXYGEN SATURATION: 98 %

## 2017-08-04 VITALS
DIASTOLIC BLOOD PRESSURE: 87 MMHG | TEMPERATURE: 96.8 F | OXYGEN SATURATION: 99 % | HEIGHT: 63 IN | SYSTOLIC BLOOD PRESSURE: 145 MMHG | HEART RATE: 96 BPM | RESPIRATION RATE: 18 BRPM | BODY MASS INDEX: 22.5 KG/M2 | WEIGHT: 127 LBS

## 2017-08-04 PROCEDURE — G8918 PT W/O PREOP ORDER IV AB PRO: HCPCS | Performed by: NURSE PRACTITIONER

## 2017-08-04 PROCEDURE — 00142 ANES PX ON EYE LENS SURGERY: CPT | Performed by: NURSE ANESTHETIST, CERTIFIED REGISTERED

## 2017-08-04 PROCEDURE — 66984 XCAPSL CTRC RMVL W/O ECP: CPT

## 2017-08-04 PROCEDURE — G8907 PT DOC NO EVENTS ON DISCHARG: HCPCS

## 2017-08-04 PROCEDURE — G8918 PT W/O PREOP ORDER IV AB PRO: HCPCS

## 2017-08-04 PROCEDURE — V2632 POST CHMBR INTRAOCULAR LENS: HCPCS | Performed by: OPHTHALMOLOGY

## 2017-08-04 PROCEDURE — G8907 PT DOC NO EVENTS ON DISCHARG: HCPCS | Performed by: NURSE PRACTITIONER

## 2017-08-04 DEVICE — IMPLANTABLE DEVICE: Type: IMPLANTABLE DEVICE | Status: FUNCTIONAL

## 2017-08-04 RX ORDER — PROMETHAZINE HYDROCHLORIDE 25 MG/ML
12.5 INJECTION, SOLUTION INTRAMUSCULAR; INTRAVENOUS
Status: DISCONTINUED | OUTPATIENT
Start: 2017-08-04 | End: 2017-08-04 | Stop reason: HOSPADM

## 2017-08-04 RX ORDER — MIDAZOLAM HYDROCHLORIDE 1 MG/ML
INJECTION INTRAMUSCULAR; INTRAVENOUS PRN
Status: DISCONTINUED | OUTPATIENT
Start: 2017-08-04 | End: 2017-08-04 | Stop reason: SDUPTHER

## 2017-08-04 RX ORDER — HYDRALAZINE HYDROCHLORIDE 20 MG/ML
5 INJECTION INTRAMUSCULAR; INTRAVENOUS EVERY 10 MIN PRN
Status: DISCONTINUED | OUTPATIENT
Start: 2017-08-04 | End: 2017-08-04 | Stop reason: HOSPADM

## 2017-08-04 RX ORDER — HYDROMORPHONE HCL 110MG/55ML
0.25 PATIENT CONTROLLED ANALGESIA SYRINGE INTRAVENOUS EVERY 5 MIN PRN
Status: DISCONTINUED | OUTPATIENT
Start: 2017-08-04 | End: 2017-08-04 | Stop reason: HOSPADM

## 2017-08-04 RX ORDER — SODIUM CHLORIDE, SODIUM LACTATE, POTASSIUM CHLORIDE, CALCIUM CHLORIDE 600; 310; 30; 20 MG/100ML; MG/100ML; MG/100ML; MG/100ML
INJECTION, SOLUTION INTRAVENOUS CONTINUOUS
Status: DISCONTINUED | OUTPATIENT
Start: 2017-08-04 | End: 2017-08-04 | Stop reason: HOSPADM

## 2017-08-04 RX ORDER — TETRACAINE HYDROCHLORIDE 5 MG/ML
SOLUTION OPHTHALMIC PRN
Status: DISCONTINUED | OUTPATIENT
Start: 2017-08-04 | End: 2017-08-04 | Stop reason: HOSPADM

## 2017-08-04 RX ORDER — HYDROMORPHONE HCL 110MG/55ML
0.5 PATIENT CONTROLLED ANALGESIA SYRINGE INTRAVENOUS EVERY 5 MIN PRN
Status: DISCONTINUED | OUTPATIENT
Start: 2017-08-04 | End: 2017-08-04 | Stop reason: HOSPADM

## 2017-08-04 RX ORDER — OXYCODONE HYDROCHLORIDE AND ACETAMINOPHEN 5; 325 MG/1; MG/1
2 TABLET ORAL PRN
Status: DISCONTINUED | OUTPATIENT
Start: 2017-08-04 | End: 2017-08-04 | Stop reason: HOSPADM

## 2017-08-04 RX ORDER — MEPERIDINE HYDROCHLORIDE 25 MG/ML
12.5 INJECTION INTRAMUSCULAR; INTRAVENOUS; SUBCUTANEOUS EVERY 5 MIN PRN
Status: DISCONTINUED | OUTPATIENT
Start: 2017-08-04 | End: 2017-08-04 | Stop reason: HOSPADM

## 2017-08-04 RX ORDER — ONDANSETRON 2 MG/ML
4 INJECTION INTRAMUSCULAR; INTRAVENOUS
Status: DISCONTINUED | OUTPATIENT
Start: 2017-08-04 | End: 2017-08-04 | Stop reason: HOSPADM

## 2017-08-04 RX ORDER — DIPHENHYDRAMINE HYDROCHLORIDE 50 MG/ML
12.5 INJECTION INTRAMUSCULAR; INTRAVENOUS
Status: DISCONTINUED | OUTPATIENT
Start: 2017-08-04 | End: 2017-08-04 | Stop reason: HOSPADM

## 2017-08-04 RX ORDER — FENTANYL CITRATE 50 UG/ML
INJECTION, SOLUTION INTRAMUSCULAR; INTRAVENOUS PRN
Status: DISCONTINUED | OUTPATIENT
Start: 2017-08-04 | End: 2017-08-04 | Stop reason: SDUPTHER

## 2017-08-04 RX ORDER — PREDNISOLONE ACETATE 10 MG/ML
SUSPENSION/ DROPS OPHTHALMIC PRN
Status: DISCONTINUED | OUTPATIENT
Start: 2017-08-04 | End: 2017-08-04 | Stop reason: HOSPADM

## 2017-08-04 RX ORDER — GLIMEPIRIDE 2 MG/1
TABLET ORAL PRN
Status: DISCONTINUED | OUTPATIENT
Start: 2017-08-04 | End: 2017-08-04 | Stop reason: HOSPADM

## 2017-08-04 RX ORDER — PROPOFOL 10 MG/ML
INJECTION, EMULSION INTRAVENOUS PRN
Status: DISCONTINUED | OUTPATIENT
Start: 2017-08-04 | End: 2017-08-04 | Stop reason: SDUPTHER

## 2017-08-04 RX ORDER — LABETALOL HYDROCHLORIDE 5 MG/ML
5 INJECTION, SOLUTION INTRAVENOUS EVERY 10 MIN PRN
Status: DISCONTINUED | OUTPATIENT
Start: 2017-08-04 | End: 2017-08-04 | Stop reason: HOSPADM

## 2017-08-04 RX ORDER — LIDOCAINE HYDROCHLORIDE 10 MG/ML
1 INJECTION, SOLUTION EPIDURAL; INFILTRATION; INTRACAUDAL; PERINEURAL
Status: DISCONTINUED | OUTPATIENT
Start: 2017-08-04 | End: 2017-08-04 | Stop reason: HOSPADM

## 2017-08-04 RX ORDER — TOBRAMYCIN AND DEXAMETHASONE 3; 1 MG/ML; MG/ML
SUSPENSION/ DROPS OPHTHALMIC PRN
Status: DISCONTINUED | OUTPATIENT
Start: 2017-08-04 | End: 2017-08-04 | Stop reason: HOSPADM

## 2017-08-04 RX ORDER — OXYCODONE HYDROCHLORIDE AND ACETAMINOPHEN 5; 325 MG/1; MG/1
1 TABLET ORAL PRN
Status: DISCONTINUED | OUTPATIENT
Start: 2017-08-04 | End: 2017-08-04 | Stop reason: HOSPADM

## 2017-08-04 RX ORDER — FENTANYL CITRATE 50 UG/ML
50 INJECTION, SOLUTION INTRAMUSCULAR; INTRAVENOUS EVERY 5 MIN PRN
Status: DISCONTINUED | OUTPATIENT
Start: 2017-08-04 | End: 2017-08-04 | Stop reason: HOSPADM

## 2017-08-04 RX ADMIN — FENTANYL CITRATE 50 MCG: 50 INJECTION, SOLUTION INTRAMUSCULAR; INTRAVENOUS at 07:15

## 2017-08-04 RX ADMIN — PROPOFOL 150 MG: 10 INJECTION, EMULSION INTRAVENOUS at 07:18

## 2017-08-04 RX ADMIN — MIDAZOLAM HYDROCHLORIDE 2 MG: 1 INJECTION INTRAMUSCULAR; INTRAVENOUS at 07:13

## 2017-08-04 RX ADMIN — SODIUM CHLORIDE, SODIUM LACTATE, POTASSIUM CHLORIDE, CALCIUM CHLORIDE: 600; 310; 30; 20 INJECTION, SOLUTION INTRAVENOUS at 07:13

## 2017-08-15 ENCOUNTER — TELEPHONE (OUTPATIENT)
Dept: PRIMARY CARE CLINIC | Age: 42
End: 2017-08-15

## 2017-08-15 ENCOUNTER — OFFICE VISIT (OUTPATIENT)
Dept: PRIMARY CARE CLINIC | Age: 42
End: 2017-08-15
Payer: MEDICARE

## 2017-08-15 VITALS
HEART RATE: 86 BPM | WEIGHT: 122 LBS | TEMPERATURE: 98.6 F | OXYGEN SATURATION: 94 % | SYSTOLIC BLOOD PRESSURE: 118 MMHG | BODY MASS INDEX: 21.61 KG/M2 | DIASTOLIC BLOOD PRESSURE: 80 MMHG

## 2017-08-15 DIAGNOSIS — F51.01 PRIMARY INSOMNIA: Primary | ICD-10-CM

## 2017-08-15 DIAGNOSIS — F41.9 ANXIETY: ICD-10-CM

## 2017-08-15 DIAGNOSIS — R23.3 EASY BRUISING: ICD-10-CM

## 2017-08-15 DIAGNOSIS — G89.4 CHRONIC PAIN SYNDROME: ICD-10-CM

## 2017-08-15 LAB
BASOPHILS ABSOLUTE: 0.1 K/UL (ref 0–0.2)
BASOPHILS RELATIVE PERCENT: 0.5 % (ref 0–1)
EOSINOPHILS ABSOLUTE: 0.1 K/UL (ref 0–0.6)
EOSINOPHILS RELATIVE PERCENT: 1.3 % (ref 0–5)
HCT VFR BLD CALC: 40.9 % (ref 37–47)
HEMOGLOBIN: 13.6 G/DL (ref 12–16)
LYMPHOCYTES ABSOLUTE: 2.8 K/UL (ref 1.1–4.5)
LYMPHOCYTES RELATIVE PERCENT: 29.8 % (ref 20–40)
MCH RBC QN AUTO: 34.5 PG (ref 27–31)
MCHC RBC AUTO-ENTMCNC: 33.3 G/DL (ref 33–37)
MCV RBC AUTO: 103.8 FL (ref 81–99)
MONOCYTES ABSOLUTE: 0.5 K/UL (ref 0–0.9)
MONOCYTES RELATIVE PERCENT: 4.7 % (ref 0–10)
NEUTROPHILS ABSOLUTE: 6 K/UL (ref 1.5–7.5)
NEUTROPHILS RELATIVE PERCENT: 62.8 % (ref 50–65)
PDW BLD-RTO: 14.2 % (ref 11.5–14.5)
PLATELET # BLD: 235 K/UL (ref 130–400)
PMV BLD AUTO: 9.4 FL (ref 9.4–12.3)
RBC # BLD: 3.94 M/UL (ref 4.2–5.4)
WBC # BLD: 9.5 K/UL (ref 4.8–10.8)

## 2017-08-15 PROCEDURE — G8427 DOCREV CUR MEDS BY ELIG CLIN: HCPCS | Performed by: NURSE PRACTITIONER

## 2017-08-15 PROCEDURE — 1036F TOBACCO NON-USER: CPT | Performed by: NURSE PRACTITIONER

## 2017-08-15 PROCEDURE — 99213 OFFICE O/P EST LOW 20 MIN: CPT | Performed by: NURSE PRACTITIONER

## 2017-08-15 PROCEDURE — G8420 CALC BMI NORM PARAMETERS: HCPCS | Performed by: NURSE PRACTITIONER

## 2017-08-15 RX ORDER — LORAZEPAM 1 MG/1
1 TABLET ORAL EVERY 8 HOURS PRN
Qty: 30 TABLET | Refills: 0 | Status: SHIPPED | OUTPATIENT
Start: 2017-08-15 | End: 2017-09-14 | Stop reason: SDUPTHER

## 2017-08-15 RX ORDER — HYDROCODONE BITARTRATE AND ACETAMINOPHEN 7.5; 325 MG/1; MG/1
1 TABLET ORAL EVERY 6 HOURS PRN
Qty: 90 TABLET | Refills: 0 | Status: SHIPPED | OUTPATIENT
Start: 2017-08-15 | End: 2017-09-14 | Stop reason: SDUPTHER

## 2017-08-15 ASSESSMENT — ENCOUNTER SYMPTOMS: ROS SKIN COMMENTS: BRUISING ON LEGS

## 2017-08-17 RX ORDER — ASPIRIN 81 MG
1 TABLET, DELAYED RELEASE (ENTERIC COATED) ORAL 2 TIMES DAILY
Qty: 60 TABLET | Refills: 11 | Status: SHIPPED | OUTPATIENT
Start: 2017-08-17 | End: 2018-09-05 | Stop reason: SDUPTHER

## 2017-09-14 ENCOUNTER — OFFICE VISIT (OUTPATIENT)
Dept: PRIMARY CARE CLINIC | Age: 42
End: 2017-09-14
Payer: MEDICARE

## 2017-09-14 VITALS
OXYGEN SATURATION: 91 % | TEMPERATURE: 96.9 F | HEIGHT: 63 IN | BODY MASS INDEX: 21.62 KG/M2 | SYSTOLIC BLOOD PRESSURE: 132 MMHG | DIASTOLIC BLOOD PRESSURE: 98 MMHG | WEIGHT: 122 LBS | HEART RATE: 87 BPM

## 2017-09-14 DIAGNOSIS — F41.9 ANXIETY: ICD-10-CM

## 2017-09-14 DIAGNOSIS — F51.01 PRIMARY INSOMNIA: ICD-10-CM

## 2017-09-14 DIAGNOSIS — T07.XXXA ABRASIONS OF MULTIPLE SITES: Primary | ICD-10-CM

## 2017-09-14 DIAGNOSIS — G89.4 CHRONIC PAIN SYNDROME: ICD-10-CM

## 2017-09-14 DIAGNOSIS — M15.9 PRIMARY OSTEOARTHRITIS INVOLVING MULTIPLE JOINTS: ICD-10-CM

## 2017-09-14 PROCEDURE — G8427 DOCREV CUR MEDS BY ELIG CLIN: HCPCS | Performed by: PEDIATRICS

## 2017-09-14 PROCEDURE — 1036F TOBACCO NON-USER: CPT | Performed by: PEDIATRICS

## 2017-09-14 PROCEDURE — G8420 CALC BMI NORM PARAMETERS: HCPCS | Performed by: PEDIATRICS

## 2017-09-14 PROCEDURE — 99214 OFFICE O/P EST MOD 30 MIN: CPT | Performed by: PEDIATRICS

## 2017-09-14 RX ORDER — LORAZEPAM 1 MG/1
1 TABLET ORAL EVERY 8 HOURS PRN
Qty: 30 TABLET | Refills: 0 | Status: SHIPPED | OUTPATIENT
Start: 2017-09-14 | End: 2017-10-17 | Stop reason: SDUPTHER

## 2017-09-14 RX ORDER — NAPROXEN 375 MG/1
375 TABLET ORAL 2 TIMES DAILY WITH MEALS
Qty: 60 TABLET | Refills: 3 | Status: SHIPPED | OUTPATIENT
Start: 2017-09-14 | End: 2018-01-07 | Stop reason: SDUPTHER

## 2017-09-14 RX ORDER — HYDROCODONE BITARTRATE AND ACETAMINOPHEN 7.5; 325 MG/1; MG/1
1 TABLET ORAL EVERY 6 HOURS PRN
Qty: 90 TABLET | Refills: 0 | Status: SHIPPED | OUTPATIENT
Start: 2017-09-14 | End: 2017-10-17 | Stop reason: SDUPTHER

## 2017-09-14 ASSESSMENT — ENCOUNTER SYMPTOMS
VOMITING: 0
DIARRHEA: 0
SHORTNESS OF BREATH: 0
SORE THROAT: 1
EYE PAIN: 0
TROUBLE SWALLOWING: 1
WHEEZING: 0
COUGH: 0
NAUSEA: 0
BACK PAIN: 0
SINUS PRESSURE: 0
ABDOMINAL PAIN: 0

## 2017-10-05 ENCOUNTER — TELEPHONE (OUTPATIENT)
Dept: PRIMARY CARE CLINIC | Age: 42
End: 2017-10-05

## 2017-10-05 NOTE — TELEPHONE ENCOUNTER
[facility] [address] office called, they need orders for incontience supplies. Depends, gloves, wipes, and bed pads.  Faxed to [phone] through [facility]

## 2017-10-09 ENCOUNTER — TELEPHONE (OUTPATIENT)
Dept: PRIMARY CARE CLINIC | Age: 42
End: 2017-10-09

## 2017-10-10 NOTE — TELEPHONE ENCOUNTER
Please fax BPT10 to the [address] in [address] [place] [address]  Fax [phone]

## 2017-10-15 DIAGNOSIS — R11.0 NAUSEA: ICD-10-CM

## 2017-10-15 RX ORDER — ONDANSETRON 4 MG/1
4 TABLET, ORALLY DISINTEGRATING ORAL EVERY 8 HOURS PRN
Qty: 20 TABLET | Refills: 0 | Status: SHIPPED | OUTPATIENT
Start: 2017-10-15 | End: 2018-02-22 | Stop reason: SDUPTHER

## 2017-10-17 ENCOUNTER — OFFICE VISIT (OUTPATIENT)
Dept: PRIMARY CARE CLINIC | Age: 42
End: 2017-10-17
Payer: MEDICARE

## 2017-10-17 VITALS
BODY MASS INDEX: 21.62 KG/M2 | DIASTOLIC BLOOD PRESSURE: 70 MMHG | OXYGEN SATURATION: 95 % | TEMPERATURE: 97.7 F | HEIGHT: 63 IN | SYSTOLIC BLOOD PRESSURE: 110 MMHG | HEART RATE: 100 BPM | WEIGHT: 122 LBS

## 2017-10-17 DIAGNOSIS — Z87.440 HISTORY OF UTI: ICD-10-CM

## 2017-10-17 DIAGNOSIS — Z23 NEED FOR INFLUENZA VACCINATION: ICD-10-CM

## 2017-10-17 DIAGNOSIS — R30.9 URINARY PAIN: ICD-10-CM

## 2017-10-17 DIAGNOSIS — F51.01 PRIMARY INSOMNIA: ICD-10-CM

## 2017-10-17 DIAGNOSIS — G89.4 CHRONIC PAIN SYNDROME: Primary | ICD-10-CM

## 2017-10-17 DIAGNOSIS — F41.9 ANXIETY: ICD-10-CM

## 2017-10-17 LAB
APPEARANCE FLUID: NORMAL
BILIRUBIN, POC: NORMAL
BLOOD URINE, POC: NORMAL
CLARITY, POC: CLEAR
COLOR, POC: YELLOW
GLUCOSE URINE, POC: NORMAL
KETONES, POC: NORMAL
LEUKOCYTE EST, POC: NORMAL
NITRITE, POC: NORMAL
PH, POC: 6.5
PROTEIN, POC: NORMAL
SPECIFIC GRAVITY, POC: 1.02
UROBILINOGEN, POC: 0.2

## 2017-10-17 PROCEDURE — 90686 IIV4 VACC NO PRSV 0.5 ML IM: CPT | Performed by: NURSE PRACTITIONER

## 2017-10-17 PROCEDURE — 81002 URINALYSIS NONAUTO W/O SCOPE: CPT | Performed by: NURSE PRACTITIONER

## 2017-10-17 PROCEDURE — G0008 ADMIN INFLUENZA VIRUS VAC: HCPCS | Performed by: NURSE PRACTITIONER

## 2017-10-17 PROCEDURE — G8427 DOCREV CUR MEDS BY ELIG CLIN: HCPCS | Performed by: NURSE PRACTITIONER

## 2017-10-17 PROCEDURE — 99213 OFFICE O/P EST LOW 20 MIN: CPT | Performed by: NURSE PRACTITIONER

## 2017-10-17 PROCEDURE — G8420 CALC BMI NORM PARAMETERS: HCPCS | Performed by: NURSE PRACTITIONER

## 2017-10-17 PROCEDURE — G8484 FLU IMMUNIZE NO ADMIN: HCPCS | Performed by: NURSE PRACTITIONER

## 2017-10-17 PROCEDURE — 1036F TOBACCO NON-USER: CPT | Performed by: NURSE PRACTITIONER

## 2017-10-17 RX ORDER — SULFAMETHOXAZOLE AND TRIMETHOPRIM 800; 160 MG/1; MG/1
TABLET ORAL
Refills: 0 | COMMUNITY
Start: 2017-10-11 | End: 2017-10-17 | Stop reason: SINTOL

## 2017-10-17 RX ORDER — HYDROCODONE BITARTRATE AND ACETAMINOPHEN 7.5; 325 MG/1; MG/1
1 TABLET ORAL EVERY 8 HOURS PRN
Qty: 90 TABLET | Refills: 0 | Status: SHIPPED | OUTPATIENT
Start: 2017-10-17 | End: 2017-11-15 | Stop reason: SDUPTHER

## 2017-10-17 RX ORDER — HYDROXYZINE HYDROCHLORIDE 25 MG/1
TABLET, FILM COATED ORAL
Refills: 3 | COMMUNITY
Start: 2017-08-09 | End: 2018-12-21 | Stop reason: SDUPTHER

## 2017-10-17 RX ORDER — LORAZEPAM 1 MG/1
1 TABLET ORAL EVERY 8 HOURS PRN
Qty: 30 TABLET | Refills: 0 | Status: SHIPPED | OUTPATIENT
Start: 2017-10-17 | End: 2017-11-15 | Stop reason: SDUPTHER

## 2017-10-17 ASSESSMENT — ENCOUNTER SYMPTOMS
ABDOMINAL PAIN: 0
SINUS PRESSURE: 0
RHINORRHEA: 0
TROUBLE SWALLOWING: 0
VOMITING: 0
SHORTNESS OF BREATH: 0
NAUSEA: 0
COUGH: 0
CONSTIPATION: 0
SORE THROAT: 0
DIARRHEA: 0

## 2017-10-17 NOTE — PROGRESS NOTES
After obtaining consent, and per orders of ANJANA MURRY, injection of FLUZONE given in Right arm by Liane Castro. Patient tolerated well.

## 2017-10-17 NOTE — PROGRESS NOTES
Monocytes # 08/15/2017 0.50     Eosinophils # 08/15/2017 0.10     Basophils # 08/15/2017 0.10      Copies of these are in the chart. Prior to Visit Medications    Medication Sig Taking? Authorizing Provider   HYDROcodone-acetaminophen (NORCO) 7.5-325 MG per tablet Take 1 tablet by mouth every 8 hours as needed for Pain . Yes Sallye Bence Fiessinger, APRN   LORazepam (ATIVAN) 1 MG tablet Take 1 tablet by mouth every 8 hours as needed for Anxiety Yes LOVELY Turner   ondansetron (ZOFRAN ODT) 4 MG disintegrating tablet Take 1 tablet by mouth every 8 hours as needed for Nausea or Vomiting Yes LOVELY Fischer   Incontinence Supplies MISC DEPENDS, WIPES, GLOVES, AND BED PADS. Disp 1month supply. DX: R32 Yes ALYSSA Davies DO   naproxen (NAPROSYN) 375 MG tablet Take 1 tablet by mouth 2 times daily (with meals) Yes ALYSSA Davies DO   SENNA PLUS 8.6-50 MG per tablet Take 1 tablet by mouth 2 times daily Yes ALYSSA Davies DO   Heating Pads (HEATING PAD MOIST/DRY) PADS Use as directed Yes ALYSSA Davies DO   Blood Pressure Monitoring (B-D ASSURE BPM/DELUXE ARM CUFF) MISC Take bp daily Yes ALYSSA Davies DO   omeprazole (PRILOSEC) 20 MG delayed release capsule Take 1 capsule by mouth Daily Yes ALYSSA Davies DO   LAMICTAL 200 MG tablet Take 1 tablet by mouth 2 times daily TAKE 1 TABLET BY MOUTH TWICE A DAY Yes ALYSSA Davies DO   ranitidine (ZANTAC) 150 MG tablet TAKE 1 TABLET TWICE A DAY FOR STOMACH-REFLUX TROUBLE Yes ALYSSA Davies DO   desloratadine (CLARINEX) 5 MG tablet Take 1 tablet by mouth daily Yes ALYSSA Davies DO   fluticasone (FLONASE) 50 MCG/ACT nasal spray 2 sprays by Nasal route daily Yes ALYSSA Davies DO   diclofenac sodium (VOLTAREN) 1 % GEL Apply 2 g topically 4 times daily Yes LOVELY Wolff   hydrocortisone 2.5 % cream Apply topically 2 times daily Apply topically 2 times daily.  Yes Cherelle Shirley, DO Misc. Devices (WHEELCHAIR CUSHION) MISC ROHO cushion DX:hemepligia Yes B Kathy Fraction, DO   triamcinolone (KENALOG) 0.1 % cream Apply topically 2 times daily Apply to affected area twice daily Yes B Kathy Fraction, DO   polyethylene glycol (GLYCOLAX) packet Take 17 g by mouth daily 1-2 capfuls per day titrate up or down to have a soft bowel movement daily. Yes B Kathy Fraction, DO   clotrimazole-betamethasone (LOTRISONE) 1-0.05 % cream Apply topically 2 times daily Apply topically 2 times daily. Yes B Kathy Fraction, DO   EPINEPHrine (EPIPEN 2-ADAN) 0.3 MG/0.3ML SOAJ injection Inject 0.3 mLs into the muscle once for 1 dose Use as directed for allergic reaction Yes B Kathy Fraction, DO   Incontinence Supplies MISC Incontinence supplies    Adult pull-ups Large  Wipes  Underpads, disposable and washable    DX: incontinence Yes B Kathy Fraction, DO   hydrOXYzine (ATARAX) 25 MG tablet TK 1 T PO TID PRF ITCHING  Historical Provider, MD   medroxyPROGESTERone (DEPO-PROVERA) 150 MG/ML injection Inject 150 mg into the muscle once for 1 dose  LOVELY Quintanilla       Allergies: Ciprofloxacin; Depakote [divalproex sodium]; Dilantin [phenytoin sodium extended]; Keflex [cephalexin]; Pcn [penicillins]; Primaxin [imipenem];  Unasyn [ampicillin-sulbactam sodium]; and Wasp venom    Past Medical History:   Diagnosis Date    Arthritis     GERD (gastroesophageal reflux disease)     History of blood transfusion     Incontinence     Seizures ([facility] [address]        Past Surgical History:   Procedure Laterality Date    APPENDECTOMY      CHOLECYSTECTOMY      CSF SHUNT Right     IL REMV CATARACT EXTRACAP,INSERT LENS Left 6/15/2017    EYE CATARACT EMULSIFICATION IOL IMPLANT performed by Osmin Mancilla MD at [facility]. [address] Right 8/4/2017    CATARCAT EXTRACTION EYE WITH IOL performed by Osmin Mancilla MD at [facility] 1390 History   Substance Use Topics    Smoking LORazepam (ATIVAN) 1 MG tablet   4. Urinary pain R30.9 788.1 POCT Urinalysis no Micro   5. History of UTI Z87.440 V13.02 POCT Urinalysis no Micro   6. Need for influenza vaccination Z23 V04.81 INFLUENZA, QUADV, 3 YRS AND OLDER, IM, PF, PREFILL SYR OR SDV, 0.5ML (FLUZONE QUADV, PF)         PLAN  1. Chronic pain syndrome  Continue current medication regimen.     - HYDROcodone-acetaminophen (NORCO) 7.5-325 MG per tablet; Take 1 tablet by mouth every 8 hours as needed for Pain . Dispense: 90 tablet; Refill: 0    2. Anxiety  Continue current medication regimen.     - LORazepam (ATIVAN) 1 MG tablet; Take 1 tablet by mouth every 8 hours as needed for Anxiety  Dispense: 30 tablet; Refill: 0    3. Primary insomnia  Continue current medication regimen.     - LORazepam (ATIVAN) 1 MG tablet; Take 1 tablet by mouth every 8 hours as needed for Anxiety  Dispense: 30 tablet; Refill: 0    4. Urinary pain    - POCT Urinalysis no Micro    5. History of UTI  Symptoms have improved. Encourage fluids  - POCT Urinalysis no Micro    6. Need for influenza vaccination    - INFLUENZA, QUADV, 3 YRS AND OLDER, IM, PF, PREFILL SYR OR SDV, 0.5ML (FLUZONE QUADV, PF)      Orders Placed This Encounter   Procedures    INFLUENZA, QUADV, 3 YRS AND OLDER, IM, PF, PREFILL SYR OR SDV, 0.5ML (FLUZONE QUADV, PF)    POCT Urinalysis no Micro        Return in about 4 weeks (around 11/14/2017). There are no Patient Instructions on file for this visit. Controlled Substances Monitoring: Attestation: The Prescription Monitoring Report for this patient was reviewed today.  (Braeden Hennessy, LOVELY)  Documentation: Possible medication side effects, risk of tolerance and/or dependence, and alternative treatments discussed., No signs of potential drug abuse or diversion identified. ([id number]) (Braeden Hennessy, APRN)        Additional Instructions: As always, patient is advised to bring in medication bottles in order to correctly reconcile with our current list.    Mikael Hester received counseling on the following healthy behaviors: medication adherence    Patient given educational materials on dx    I have instructed Mikael Hester to complete a self tracking handout on n/a and instructed them to bring it with them to her next appointment. Discussed use, benefit, and side effects of prescribed medications. Barriers to medication compliance addressed. All patient questions answered. Pt voiced understanding.      LOVELY Monroe

## 2017-11-14 ENCOUNTER — TELEPHONE (OUTPATIENT)
Dept: PRIMARY CARE CLINIC | Age: 42
End: 2017-11-14

## 2017-11-14 DIAGNOSIS — Z87.440 HISTORY OF UTI: Primary | ICD-10-CM

## 2017-11-14 RX ORDER — NITROFURANTOIN 25; 75 MG/1; MG/1
100 CAPSULE ORAL 2 TIMES DAILY
Qty: 20 CAPSULE | Refills: 0 | Status: SHIPPED | OUTPATIENT
Start: 2017-11-14 | End: 2017-11-24

## 2017-11-14 NOTE — TELEPHONE ENCOUNTER
Pt is seeing you tomorrow but father called saying pt has a UTI and in pain.  Wants to know if we can call in [facility] [address] and pyridium today instead of bringing her in

## 2017-11-14 NOTE — TELEPHONE ENCOUNTER
Call returned to pts dad to let him know that we will send rx to pharmacy for pt but need her to stop taking AZO and no pyridium. Will test urine at apt tomorrow. Pts dad aware.      Requested Prescriptions     Signed Prescriptions Disp Refills    nitrofurantoin, macrocrystal-monohydrate, (MACROBID) 100 MG capsule 20 capsule 0     Sig: Take 1 capsule by mouth 2 times daily for 10 days     Authorizing Provider: Samra Jordan     Ordering User: Rian Kussmaul

## 2017-11-15 ENCOUNTER — OFFICE VISIT (OUTPATIENT)
Dept: PRIMARY CARE CLINIC | Age: 42
End: 2017-11-15
Payer: MEDICARE

## 2017-11-15 VITALS
TEMPERATURE: 96.3 F | HEART RATE: 100 BPM | WEIGHT: 122.25 LBS | OXYGEN SATURATION: 98 % | SYSTOLIC BLOOD PRESSURE: 122 MMHG | DIASTOLIC BLOOD PRESSURE: 76 MMHG | BODY MASS INDEX: 21.66 KG/M2

## 2017-11-15 DIAGNOSIS — R30.0 DYSURIA: Primary | ICD-10-CM

## 2017-11-15 DIAGNOSIS — R30.0 DYSURIA: ICD-10-CM

## 2017-11-15 DIAGNOSIS — F51.01 PRIMARY INSOMNIA: ICD-10-CM

## 2017-11-15 DIAGNOSIS — Z30.9 ENCOUNTER FOR CONTRACEPTIVE MANAGEMENT, UNSPECIFIED TYPE: ICD-10-CM

## 2017-11-15 DIAGNOSIS — G89.4 CHRONIC PAIN SYNDROME: ICD-10-CM

## 2017-11-15 DIAGNOSIS — F41.9 ANXIETY: ICD-10-CM

## 2017-11-15 LAB
CONTROL: NORMAL
PREGNANCY TEST URINE, POC: NORMAL

## 2017-11-15 PROCEDURE — 99213 OFFICE O/P EST LOW 20 MIN: CPT | Performed by: NURSE PRACTITIONER

## 2017-11-15 PROCEDURE — G8427 DOCREV CUR MEDS BY ELIG CLIN: HCPCS | Performed by: NURSE PRACTITIONER

## 2017-11-15 PROCEDURE — G8420 CALC BMI NORM PARAMETERS: HCPCS | Performed by: NURSE PRACTITIONER

## 2017-11-15 PROCEDURE — G8484 FLU IMMUNIZE NO ADMIN: HCPCS | Performed by: NURSE PRACTITIONER

## 2017-11-15 PROCEDURE — 1036F TOBACCO NON-USER: CPT | Performed by: NURSE PRACTITIONER

## 2017-11-15 PROCEDURE — 81025 URINE PREGNANCY TEST: CPT | Performed by: NURSE PRACTITIONER

## 2017-11-15 RX ORDER — MEDROXYPROGESTERONE ACETATE 150 MG/ML
150 INJECTION, SUSPENSION INTRAMUSCULAR ONCE
Status: COMPLETED | OUTPATIENT
Start: 2017-11-15 | End: 2017-11-15

## 2017-11-15 RX ORDER — HYDROCODONE BITARTRATE AND ACETAMINOPHEN 7.5; 325 MG/1; MG/1
1 TABLET ORAL EVERY 8 HOURS PRN
Qty: 90 TABLET | Refills: 0 | Status: SHIPPED | OUTPATIENT
Start: 2017-11-15 | End: 2017-12-14 | Stop reason: SDUPTHER

## 2017-11-15 RX ORDER — LORAZEPAM 1 MG/1
1 TABLET ORAL EVERY 8 HOURS PRN
Qty: 30 TABLET | Refills: 0 | Status: SHIPPED | OUTPATIENT
Start: 2017-11-15 | End: 2017-12-14 | Stop reason: SDUPTHER

## 2017-11-15 RX ADMIN — MEDROXYPROGESTERONE ACETATE 150 MG: 150 INJECTION, SUSPENSION INTRAMUSCULAR at 10:51

## 2017-11-15 ASSESSMENT — ENCOUNTER SYMPTOMS
NAUSEA: 0
SINUS PRESSURE: 0
TROUBLE SWALLOWING: 0
SHORTNESS OF BREATH: 0
COUGH: 0
RHINORRHEA: 0
VOMITING: 0
DIARRHEA: 0
SORE THROAT: 0
ABDOMINAL PAIN: 0
CONSTIPATION: 0

## 2017-11-15 NOTE — PROGRESS NOTES
After obtaining consent from Michael Whittaker, gave patient medroxyprogesterone 150mg  injection in Left upper quad. gluteus, patient tolerated well. Medication was supplied by the patient.

## 2017-11-15 NOTE — PROGRESS NOTES
[address]  [place], [address]  Phone [phone]   Fax [phone]      OFFICE VISIT: 11/15/2017    Sandra Chase- : 1975        Reason For Visit:  Ashley Milan is a 38 y.o. female who is here for Follow-up (here for follow up. doing ok.); Injections (need depo injection); and Dysuria         HPI    Pt is here for f/u     Need depo shot    Dysuria  Onset 3-4 days. Been taking azo and started on macrobid yesterday. Pt states it isn't hurting anymore. No fever    Chronic pain  On norco 3 times a day  Pain is controlled on medicine. No signs of diversion    Anxiety  Takes as needed  Anxiety is controlled on medicine. weight is 122 lb 4 oz (55.5 kg). Her temporal temperature is 96.3 °F (35.7 °C). Her blood pressure is 122/76 and her pulse is 100. Her oxygen saturation is 98%. Body mass index is 21.66 kg/m². Results for orders placed or performed in visit on 11/15/17   POCT urine pregnancy   Result Value Ref Range    Preg Test, Ur neg     Control         I have reviewed the following with the Ms. Shah   Lab Review   Office Visit on 10/17/2017   Component Date Value    Color, UA 10/17/2017 yellow     Clarity, UA 10/17/2017 clear     Glucose, UA POC 10/17/2017 neg     Bilirubin, UA 10/17/2017 small     Ketones, UA 10/17/2017 neg     Spec Grav, UA 10/17/2017 1.025     Blood, UA POC 10/17/2017 small     pH, UA 10/17/2017 6.5     Protein, UA POC 10/17/2017 neg     Urobilinogen, UA 10/17/2017 0.2     Leukocytes, UA 10/17/2017 neg     Nitrite, UA 10/17/2017 neg    Orders Only on 08/15/2017   Component Date Value    WBC 08/15/2017 9.5     RBC 08/15/2017 3.94*    Hemoglobin 08/15/2017 13.6     Hematocrit 08/15/2017 40.9     MCV 08/15/2017 103.8*    MCH 08/15/2017 34.5*    MCHC 08/15/2017 33.3     RDW 08/15/2017 14.2     Platelets  235     MPV 08/15/2017 9.4     Neutrophils % 08/15/2017 62.8     Lymphocytes % 08/15/2017 29.8     Monocytes % 08/15/2017 4. 7     Eosinophils % 08/15/2017 1.3     Basophils % 08/15/2017 0.5     Neutrophils # 08/15/2017 6.0     Lymphocytes # 08/15/2017 2.8     Monocytes # 08/15/2017 0.50     Eosinophils # 08/15/2017 0.10     Basophils # 08/15/2017 0.10      Copies of these are in the chart. Prior to Visit Medications    Medication Sig Taking? Authorizing Provider   HYDROcodone-acetaminophen (NORCO) 7.5-325 MG per tablet Take 1 tablet by mouth every 8 hours as needed for Pain . Yes LOVELY Araiza   LORazepam (ATIVAN) 1 MG tablet Take 1 tablet by mouth every 8 hours as needed for Anxiety . Yes LOVELY Araiza   nitrofurantoin, macrocrystal-monohydrate, (MACROBID) 100 MG capsule Take 1 capsule by mouth 2 times daily for 10 days Yes LOVELY Santos   hydrOXYzine (ATARAX) 25 MG tablet TK 1 T PO TID PRF ITCHING Yes Historical Provider, MD   ondansetron (ZOFRAN ODT) 4 MG disintegrating tablet Take 1 tablet by mouth every 8 hours as needed for Nausea or Vomiting Yes LOVELY Denson   Incontinence Supplies MISC DEPENDS, WIPES, GLOVES, AND BED PADS. Disp 1month supply.  DX: R32 Yes ALYSSA Navas DO   naproxen (NAPROSYN) 375 MG tablet Take 1 tablet by mouth 2 times daily (with meals) Yes ALYSSA Navas DO   SENNA PLUS 8.6-50 MG per tablet Take 1 tablet by mouth 2 times daily Yes ALYSSA Navas DO   medroxyPROGESTERone (DEPO-PROVERA) 150 MG/ML injection Inject 150 mg into the muscle once for 1 dose Yes LOVELY Santos   Heating Pads (HEATING PAD MOIST/DRY) PADS Use as directed Yes ALYSSA Navas DO   Blood Pressure Monitoring (B-D ASSURE BPM/DELUXE ARM CUFF) MISC Take bp daily Yes ALYSSA Navas DO   omeprazole (PRILOSEC) 20 MG delayed release capsule Take 1 capsule by mouth Daily Yes ALYSSA Navas DO   LAMICTAL 200 MG tablet Take 1 tablet by mouth 2 times daily TAKE 1 TABLET BY MOUTH TWICE A DAY Yes ALYSSA Navas DO ranitidine (ZANTAC) 150 MG tablet TAKE 1 TABLET TWICE A DAY FOR STOMACH-REFLUX TROUBLE Yes ALYSSA Viera DO   desloratadine (CLARINEX) 5 MG tablet Take 1 tablet by mouth daily Yes ALYSSA Viera DO   fluticasone (FLONASE) 50 MCG/ACT nasal spray 2 sprays by Nasal route daily Yes ALYSSA Viera DO   diclofenac sodium (VOLTAREN) 1 % GEL Apply 2 g topically 4 times daily Yes LOVELY Wolff   hydrocortisone 2.5 % cream Apply topically 2 times daily Apply topically 2 times daily. Yes ALYSSA Viera DO   Misc. Devices (WHEELCHAIR CUSHION) MISC ROHO cushion DX:hemepligia Yes ALYSSA Viera DO   triamcinolone (KENALOG) 0.1 % cream Apply topically 2 times daily Apply to affected area twice daily Yes ALYSSA Viera DO   polyethylene glycol (GLYCOLAX) packet Take 17 g by mouth daily 1-2 capfuls per day titrate up or down to have a soft bowel movement daily. Yes ALYSSA Viera DO   clotrimazole-betamethasone (LOTRISONE) 1-0.05 % cream Apply topically 2 times daily Apply topically 2 times daily. Yes ALYSSA Viera, DO   EPINEPHrine (EPIPEN 2-ADAN) 0.3 MG/0.3ML SOAJ injection Inject 0.3 mLs into the muscle once for 1 dose Use as directed for allergic reaction Yes ALYSSA Viera DO   Incontinence Supplies MISC Incontinence supplies    Adult pull-ups Large  Wipes  Underpads, disposable and washable    DX: incontinence Yes ALYSSA Viera DO       Allergies: Ciprofloxacin; Depakote [divalproex sodium]; Dilantin [phenytoin sodium extended]; Keflex [cephalexin]; Pcn [penicillins]; Primaxin [imipenem];  Unasyn [ampicillin-sulbactam sodium]; and Wasp venom    Past Medical History:   Diagnosis Date    Arthritis     GERD (gastroesophageal reflux disease)     History of blood transfusion     Incontinence     Seizures ([facility] [address]        Past Surgical History:   Procedure Laterality Date    APPENDECTOMY      CHOLECYSTECTOMY      CSF SHUNT Right     FL REMV CATARACT EXTRACAP,INSERT LENS Left 6/15/2017    EYE CATARACT EMULSIFICATION IOL IMPLANT performed by Yury Eaton MD at [address] [place] [address] CATARACT EXTRACAP,INSERT LENS Right 8/4/2017    CATARCAT EXTRACTION EYE WITH IOL performed [facility]ury Eaton MD at [facility]       Social History   Substance Use Topics    Smoking status: Former Smoker     Packs/day: 1.00     Years: 19.00     Types: Cigarettes     Quit date: 2/14/2015    Smokeless tobacco: Never Used    Alcohol use No       Review of Systems   Constitutional: Negative for activity change, appetite change, fatigue, fever and unexpected weight change. HENT: Negative for congestion, hearing loss, rhinorrhea, sinus pressure, sore throat and trouble swallowing. Eyes: Negative for visual disturbance. Respiratory: Negative for cough and shortness of breath. Cardiovascular: Negative for chest pain, palpitations and leg swelling. Gastrointestinal: Negative for abdominal pain, constipation, diarrhea, nausea and vomiting. Endocrine: Negative for cold intolerance and heat intolerance. Genitourinary: Positive for dysuria. Negative for flank pain, menstrual problem, pelvic pain, urgency and vaginal discharge. Musculoskeletal: Negative for arthralgias. Skin: Negative for rash. Neurological: Negative for headaches. Psychiatric/Behavioral: Negative for dysphoric mood and sleep disturbance. The patient is not nervous/anxious. Physical Exam   Constitutional: She is oriented to person, place, and time. She appears well-developed and well-nourished. HENT:   Head: Normocephalic and atraumatic. Right Ear: Tympanic membrane, external ear and ear canal normal.   Left Ear: Tympanic membrane, external ear and ear canal normal.   Nose: Nose normal.   Mouth/Throat: Oropharynx is clear and moist and mucous membranes are normal.   Eyes: Conjunctivae are normal. Pupils are equal, round, and reactive to light. No scleral icterus.    Neck: Normal range of motion. Neck supple. Cardiovascular: Normal rate, regular rhythm, normal heart sounds and intact distal pulses. No murmur heard. Pulmonary/Chest: Effort normal and breath sounds normal.   Abdominal: Soft. Bowel sounds are normal. She exhibits no distension. There is tenderness in the suprapubic area. There is no rebound. Musculoskeletal: Normal range of motion. She exhibits no edema. Neurological: She is alert and oriented to person, place, and time. Skin: Skin is warm, dry and intact. No lesion and no rash noted. Psychiatric: She has a normal mood and affect. Her speech is normal and behavior is normal. Judgment and thought content normal.   Vitals reviewed. ASSESSMENT      ICD-10-CM ICD-9-CM    1. Dysuria R30.0 788. 1 Urine Culture      CANCELED: POCT Urinalysis no Micro   2. Encounter for contraceptive management, unspecified type Z30.9 V25.9 POCT urine pregnancy   3. Chronic pain syndrome G89.4 338.4 HYDROcodone-acetaminophen (NORCO) 7.5-325 MG per tablet   4. Anxiety F41.9 300.00 LORazepam (ATIVAN) 1 MG tablet   5. Primary insomnia F51.01 307.42 LORazepam (ATIVAN) 1 MG tablet         PLAN  1. Dysuria  Continue antibiotic until culture complete  Increase fluids    2. Encounter for contraceptive management, unspecified type    - POCT urine pregnancy    3. Chronic pain syndrome  The current medical regimen is effective;  continue present plan and medications.    - HYDROcodone-acetaminophen (NORCO) 7.5-325 MG per tablet; Take 1 tablet by mouth every 8 hours as needed for Pain . Dispense: 90 tablet; Refill: 0    4. Anxiety  The current medical regimen is effective;  continue present plan and medications. - LORazepam (ATIVAN) 1 MG tablet; Take 1 tablet by mouth every 8 hours as needed for Anxiety . Dispense: 30 tablet; Refill: 0    5. Primary insomnia  The current medical regimen is effective;  continue present plan and medications. - LORazepam (ATIVAN) 1 MG tablet;  Take 1 tablet by mouth every 8 hours as needed for Anxiety . Dispense: 30 tablet; Refill: 0      Orders Placed This Encounter   Procedures    Urine Culture    POCT urine pregnancy        No Follow-up on file. There are no Patient Instructions on file for this visit. Controlled Substances Monitoring: Attestation: The Prescription Monitoring Report for this patient was reviewed today. (Rakesh Rojas, LOVELY)  Documentation: Possible medication side effects, risk of tolerance and/or dependence, and alternative treatments discussed., No signs of potential drug abuse or diversion identified. ([id number]) Rakesh Rojas, LOVELY)        Additional Instructions: As always, patient is advised to bring in medication bottles in order to correctly reconcile with our current list.    Pushpa Shields received counseling on the following healthy behaviors: medication adherence    Patient given educational materials on dx    I have instructed Pushpa Shields to complete a self tracking handout on n/a and instructed them to bring it with them to her next appointment. Discussed use, benefit, and side effects of prescribed medications. Barriers to medication compliance addressed. All patient questions answered. Pt voiced understanding.      LOVELY Cuellar

## 2017-11-17 LAB — URINE CULTURE, ROUTINE: NORMAL

## 2017-11-20 ENCOUNTER — TELEPHONE (OUTPATIENT)
Dept: PRIMARY CARE CLINIC | Age: 42
End: 2017-11-20

## 2017-11-20 NOTE — TELEPHONE ENCOUNTER
----- Message from LOVELY Conner sent at 11/17/2017 12:03 PM CST -----  Please inform patient results are normal urine culture

## 2017-12-12 DIAGNOSIS — J30.2 SEASONAL ALLERGIC RHINITIS: ICD-10-CM

## 2017-12-12 RX ORDER — FLUTICASONE PROPIONATE 50 MCG
2 SPRAY, SUSPENSION (ML) NASAL DAILY
Qty: 1 BOTTLE | Refills: 5 | Status: SHIPPED | OUTPATIENT
Start: 2017-12-12 | End: 2018-06-11 | Stop reason: SDUPTHER

## 2017-12-12 NOTE — TELEPHONE ENCOUNTER
Pt seen 11/15/17      Requested Prescriptions     Pending Prescriptions Disp Refills    fluticasone (FLONASE) 50 MCG/ACT nasal spray [Pharmacy Med Name: FLUTICASONE 50MCG NASAL SP (120) RX]  0     Sig: INSTILL 2 SPRAYS IN EACH NOSTRIL EVERY DAY

## 2017-12-14 ENCOUNTER — OFFICE VISIT (OUTPATIENT)
Dept: PRIMARY CARE CLINIC | Age: 42
End: 2017-12-14
Payer: MEDICARE

## 2017-12-14 VITALS
TEMPERATURE: 97.7 F | BODY MASS INDEX: 21.61 KG/M2 | OXYGEN SATURATION: 96 % | DIASTOLIC BLOOD PRESSURE: 70 MMHG | HEART RATE: 90 BPM | WEIGHT: 122 LBS | SYSTOLIC BLOOD PRESSURE: 110 MMHG

## 2017-12-14 DIAGNOSIS — F41.9 ANXIETY: ICD-10-CM

## 2017-12-14 DIAGNOSIS — M17.12 PRIMARY OSTEOARTHRITIS OF LEFT KNEE: ICD-10-CM

## 2017-12-14 DIAGNOSIS — H60.392 OTHER INFECTIVE CHRONIC OTITIS EXTERNA OF LEFT EAR: Primary | ICD-10-CM

## 2017-12-14 DIAGNOSIS — G89.4 CHRONIC PAIN SYNDROME: ICD-10-CM

## 2017-12-14 DIAGNOSIS — F51.01 PRIMARY INSOMNIA: ICD-10-CM

## 2017-12-14 PROCEDURE — 99213 OFFICE O/P EST LOW 20 MIN: CPT | Performed by: PEDIATRICS

## 2017-12-14 PROCEDURE — G8427 DOCREV CUR MEDS BY ELIG CLIN: HCPCS | Performed by: PEDIATRICS

## 2017-12-14 PROCEDURE — G8484 FLU IMMUNIZE NO ADMIN: HCPCS | Performed by: PEDIATRICS

## 2017-12-14 PROCEDURE — 20610 DRAIN/INJ JOINT/BURSA W/O US: CPT | Performed by: PEDIATRICS

## 2017-12-14 PROCEDURE — 1036F TOBACCO NON-USER: CPT | Performed by: PEDIATRICS

## 2017-12-14 PROCEDURE — 4130F TOPICAL PREP RX AOE: CPT | Performed by: PEDIATRICS

## 2017-12-14 PROCEDURE — G8420 CALC BMI NORM PARAMETERS: HCPCS | Performed by: PEDIATRICS

## 2017-12-14 RX ORDER — TRIAMCINOLONE ACETONIDE 40 MG/ML
40 INJECTION, SUSPENSION INTRA-ARTICULAR; INTRAMUSCULAR ONCE
Qty: 1 ML | Refills: 0
Start: 2017-12-14 | End: 2017-12-14

## 2017-12-14 RX ORDER — LORAZEPAM 1 MG/1
1 TABLET ORAL EVERY 8 HOURS PRN
Qty: 30 TABLET | Refills: 0 | Status: SHIPPED | OUTPATIENT
Start: 2017-12-14 | End: 2018-01-29 | Stop reason: SDUPTHER

## 2017-12-14 RX ORDER — HYDROCODONE BITARTRATE AND ACETAMINOPHEN 7.5; 325 MG/1; MG/1
1 TABLET ORAL EVERY 8 HOURS PRN
Qty: 90 TABLET | Refills: 0 | Status: SHIPPED | OUTPATIENT
Start: 2017-12-14 | End: 2018-01-29 | Stop reason: SDUPTHER

## 2017-12-14 RX ORDER — METHYLPREDNISOLONE ACETATE 80 MG/ML
80 INJECTION, SUSPENSION INTRA-ARTICULAR; INTRALESIONAL; INTRAMUSCULAR; SOFT TISSUE ONCE
Qty: 1 ML | Refills: 0
Start: 2017-12-14 | End: 2017-12-14

## 2017-12-14 ASSESSMENT — ENCOUNTER SYMPTOMS
COUGH: 0
WHEEZING: 0
SHORTNESS OF BREATH: 0
EYE PAIN: 0
VOMITING: 0
NAUSEA: 1
BACK PAIN: 0
SORE THROAT: 0
ABDOMINAL PAIN: 0
DIARRHEA: 0
SINUS PRESSURE: 0

## 2017-12-14 NOTE — PROGRESS NOTES
10/17/2017 clear     Glucose, UA POC 10/17/2017 neg     Bilirubin, UA 10/17/2017 small     Ketones, UA 10/17/2017 neg     Spec Grav, UA 10/17/2017 1.025     Blood, UA POC 10/17/2017 small     pH, UA 10/17/2017 6.5     Protein, UA POC 10/17/2017 neg     Urobilinogen, UA 10/17/2017 0.2     Leukocytes, UA 10/17/2017 neg     Nitrite, UA 10/17/2017 neg    Orders Only on 08/15/2017   Component Date Value    WBC 08/15/2017 9.5     RBC 08/15/2017 3.94*    Hemoglobin 08/15/2017 13.6     Hematocrit 08/15/2017 40.9     MCV 08/15/2017 103.8*    MCH 08/15/2017 34.5*    MCHC 08/15/2017 33.3     RDW 08/15/2017 14.2     Platelets 23/67/4312 235     MPV 08/15/2017 9.4     Neutrophils % 08/15/2017 62.8     Lymphocytes % 08/15/2017 29.8     Monocytes % 08/15/2017 4.7     Eosinophils % 08/15/2017 1.3     Basophils % 08/15/2017 0.5     Neutrophils # 08/15/2017 6.0     Lymphocytes # 08/15/2017 2.8     Monocytes # 08/15/2017 0.50     Eosinophils # 08/15/2017 0.10     Basophils # 08/15/2017 0.10      Copies of these are in the chart. Current Outpatient Prescriptions   Medication Sig Dispense Refill    LORazepam (ATIVAN) 1 MG tablet Take 1 tablet by mouth every 8 hours as needed for Anxiety . 30 tablet 0    HYDROcodone-acetaminophen (NORCO) 7.5-325 MG per tablet Take 1 tablet by mouth every 8 hours as needed for Pain .  90 tablet 0    neomycin-polymyxin-hydrocortisone (CORTISPORIN) 3.5-67046-4 otic solution Place 3 drops in ear(s) 3 times daily for 10 days 1 each 0    triamcinolone acetonide (KENALOG-40) 40 MG/ML injection Inject 1 mL into the articular space once for 1 dose 1 mL 0    methylPREDNISolone acetate (DEPO-MEDROL) 80 MG/ML injection Inject 1 mL into the articular space once for 1 dose 1 mL 0    fluticasone (FLONASE) 50 MCG/ACT nasal spray 2 sprays by Nasal route daily 1 Bottle 5    hydrOXYzine (ATARAX) 25 MG tablet TK 1 T PO TID PRF ITCHING  3    ondansetron (ZOFRAN ODT) 4 MG disintegrating tablet Take 1 tablet by mouth every 8 hours as needed for Nausea or Vomiting 20 tablet 0    Incontinence Supplies MISC DEPENDS, WIPES, GLOVES, AND BED PADS. Disp 1month supply. DX: R32 100 each 11    naproxen (NAPROSYN) 375 MG tablet Take 1 tablet by mouth 2 times daily (with meals) 60 tablet 3    SENNA PLUS 8.6-50 MG per tablet Take 1 tablet by mouth 2 times daily 60 tablet 11    Heating Pads (HEATING PAD MOIST/DRY) PADS Use as directed 1 each 0    Blood Pressure Monitoring (B-D ASSURE BPM/DELUXE ARM CUFF) MISC Take bp daily 1 each 0    omeprazole (PRILOSEC) 20 MG delayed release capsule Take 1 capsule by mouth Daily 90 capsule 3    LAMICTAL 200 MG tablet Take 1 tablet by mouth 2 times daily TAKE 1 TABLET BY MOUTH TWICE A  tablet 5    ranitidine (ZANTAC) 150 MG tablet TAKE 1 TABLET TWICE A DAY FOR STOMACH-REFLUX TROUBLE 180 tablet 3    desloratadine (CLARINEX) 5 MG tablet Take 1 tablet by mouth daily 30 tablet 5    diclofenac sodium (VOLTAREN) 1 % GEL Apply 2 g topically 4 times daily 100 g 5    hydrocortisone 2.5 % cream Apply topically 2 times daily Apply topically 2 times daily. 28 g 5    Misc. Devices (WHEELCHAIR CUSHION) MISC ROHO cushion DX:hemepligia 1 each 0    triamcinolone (KENALOG) 0.1 % cream Apply topically 2 times daily Apply to affected area twice daily 90 g 3    polyethylene glycol (GLYCOLAX) packet Take 17 g by mouth daily 1-2 capfuls per day titrate up or down to have a soft bowel movement daily. 527 g 5    clotrimazole-betamethasone (LOTRISONE) 1-0.05 % cream Apply topically 2 times daily Apply topically 2 times daily.  1 Tube 5    Incontinence Supplies MISC Incontinence supplies    Adult pull-ups Large  Wipes  Underpads, disposable and washable    DX: incontinence 100 each 0    medroxyPROGESTERone (DEPO-PROVERA) 150 MG/ML injection Inject 150 mg into the muscle once for 1 dose 1 mL 3    EPINEPHrine (EPIPEN 2-ADAN) 0.3 MG/0.3ML SOAJ injection Inject 0.3 mLs into the muscle once for 1 dose Use as directed for allergic reaction 2 each 5     No current facility-administered medications for this visit. Allergies: Ciprofloxacin; Depakote [divalproex sodium]; Dilantin [phenytoin sodium extended]; Keflex [cephalexin]; Pcn [penicillins]; Primaxin [imipenem]; Unasyn [ampicillin-sulbactam sodium]; and Wasp venom    Past Medical History:   Diagnosis Date    Arthritis     GERD (gastroesophageal reflux disease)     History of blood transfusion     Incontinence     Seizures ([address]        Past Surgical History:   Procedure Laterality Date    APPENDECTOMY      CHOLECYSTECTOMY      CSF SHUNT Right     [place] REMV CATARACT EXTRACAP,INSERT LENS Left 6/15/2017    EYE CATARACT EMULSIFICATION IOL IMPLANT performed by Christopher Fields MD at [facility]. [address] Right 8/4/2017    CATARCAT EXTRACTION EYE WITH IOL performed by [facility] at [facility]       Social History   Substance Use Topics    Smoking status: Former Smoker     Packs/day: 1.00     Years: 19.00     Types: Cigarettes     Quit date: 2/14/2015    Smokeless tobacco: Never Used    Alcohol use No       Review of Systems   Constitutional: Negative for fatigue and unexpected weight change. HENT: Positive for ear pain. Negative for congestion, sinus pressure and sore throat. Eyes: Negative for pain and visual disturbance. Respiratory: Negative for cough, shortness of breath and wheezing. Cardiovascular: Negative for chest pain, palpitations and leg swelling. Gastrointestinal: Positive for nausea. Negative for abdominal pain, diarrhea and vomiting. Endocrine: Negative for polyuria. Genitourinary: Negative for dysuria, frequency, hematuria and urgency. Musculoskeletal: Negative for back pain and neck pain. Skin: Negative for rash. Neurological: Negative for dizziness and headaches. Psychiatric/Behavioral: Negative for self-injury. The patient is not nervous/anxious. Physical Exam   Constitutional: She is oriented to person, place, and time. She appears well-developed and well-nourished. She is cooperative. Non-toxic appearance. No distress. Body habitus is normal   HENT:   Head: Normocephalic and atraumatic. Right Ear: Hearing, tympanic membrane, external ear and ear canal normal.   Left Ear: Hearing, tympanic membrane, external ear and ear canal normal.   Nose: Nose normal.   Mouth/Throat: Mucous membranes are normal. Posterior oropharyngeal edema (with cobblestoning) and posterior oropharyngeal erythema present. Eyes: Conjunctivae, EOM and lids are normal. Pupils are equal, round, and reactive to light. Amblyoplia R eye (OD)   Neck: Phonation normal. Neck supple. No JVD present. Carotid bruit is not present. No thyromegaly present. Cardiovascular: Normal rate, regular rhythm and normal heart sounds. No extrasystoles are present. PMI is not displaced. Exam reveals no gallop and no friction rub. No murmur heard. Pulmonary/Chest: Effort normal and breath sounds normal. No respiratory distress. She has no wheezes. She has no rhonchi. She has no rales. Abdominal: Soft. Bowel sounds are normal. She exhibits no distension ( ) and no mass. There is no hepatosplenomegaly. There is no tenderness. There is no rebound, no guarding and no CVA tenderness. Genitourinary:   Genitourinary Comments: Examination deferred   Musculoskeletal: Normal range of motion. She exhibits edema (trace fluid in the R knee to ballot). She exhibits no tenderness. Joint examination reveals no acute arthritis or synovitis. Contracted L  UE  She has atrophy of her L LE with contracture of her knee as well. There is mild edema of Right knee and tenderness to palpation over knee. Lymphadenopathy:     She has no cervical adenopathy. Neurological: She is alert and oriented to person, place, and time. She has normal strength. She displays no atrophy and no tremor.  No cranial nerve deficit (by gross examination) or sensory deficit. Gait normal.   No focal deficits appreciated   Skin: Skin is warm and dry. No rash noted. Psychiatric: She has a normal mood and affect. Her speech is normal and behavior is normal.   Vitals reviewed. ASSESSMENT      ICD-10-CM ICD-9-CM    1. Other infective chronic otitis externa of left ear H60.392 380.16 neomycin-polymyxin-hydrocortisone (CORTISPORIN) 3.5-58384-6 otic solution   2. Anxiety F41.9 300.00 LORazepam (ATIVAN) 1 MG tablet   3. Primary insomnia F51.01 307.42 LORazepam (ATIVAN) 1 MG tablet   4. Chronic pain syndrome G89.4 338.4 HYDROcodone-acetaminophen (NORCO) 7.5-325 MG per tablet   5. Primary osteoarthritis of left knee M17.12 715.16 triamcinolone acetonide (KENALOG-40) 40 MG/ML injection      methylPREDNISolone acetate (DEPO-MEDROL) 80 MG/ML injection      IL TRIAMCINOLONE ACETONIDE INJ      IL METHYLPREDNISOLONE 80 MG INJ      IL DRAIN/INJECT LARGE JOINT/BURSA       PLAN      ICD-10-CM ICD-9-CM    1. Other infective chronic otitis externa of left ear H60.392 380.16 neomycin-polymyxin-hydrocortisone (CORTISPORIN) 3.5-37049-0 otic solution   2. Anxiety F41.9 300.00 LORazepam (ATIVAN) 1 MG tablet   3. Primary insomnia F51.01 307.42 LORazepam (ATIVAN) 1 MG tablet   4. Chronic pain syndrome G89.4 338.4 HYDROcodone-acetaminophen (NORCO) 7.5-325 MG per tablet   5. Primary osteoarthritis of left knee M17.12 715.16 triamcinolone acetonide (KENALOG-40) 40 MG/ML injection      methylPREDNISolone acetate (DEPO-MEDROL) 80 MG/ML injection      IL TRIAMCINOLONE ACETONIDE INJ      IL METHYLPREDNISOLONE 80 MG INJ      IL DRAIN/INJECT LARGE JOINT/BURSA       Orders Placed This Encounter   Procedures    IL TRIAMCINOLONE ACETONIDE INJ    IL METHYLPREDNISOLONE 80 MG INJ    [place] DRAIN/INJECT LARGE JOINT/BURSA        Return in about 1 month (around 1/14/2018). There are no Patient Instructions on file for this visit.                 Additional Instructions: As always, patient is advised to bring in medication bottles in order to correctly reconcile with our current list.    Ashlee Swanson received counseling on the following healthy behaviors: try to keep bowels moving. Patient given educational materials on safe opiate use. Discussed use, benefit, and side effects of prescribed medications. Barriers to medication compliance addressed. All patient questions answered. Pt voiced understanding. If you need to reach us for an appointment or any other urgent   scheduling issue,   please press the (*) sign at the beginning of the phone tree prompt after   Dialing the normal office number.     Aggie Romero, DO

## 2017-12-14 NOTE — PROGRESS NOTES
Knee Arthrocentesis with Injection Procedure Note    Pre-operative Diagnosis: left knee djd with pain    Post-operative Diagnosis: same    Indications: Symptom relief from osteoarthritis    Anesthesia: not required     Procedure Details     Verbal consent was obtained for the procedure. The joint was prepped with chlorhexadine. The area was sprayed with Gebauer's Ethyl Chloride as a topical anesthetic and then a 22 gauge needle was inserted into the superior aspect of the joint from a lateral approach. 2 ml 1% lidocaine and 2 ml of DepoMedrol (40mg/ml) and 1ml Kenalog (40mg/ml)  was then injected into the joint. The needle was removed and the area cleansed and dressed. Complications:  None; patient tolerated the procedure well.

## 2017-12-27 RX ORDER — ALBUTEROL SULFATE 2.5 MG/3ML
2.5 SOLUTION RESPIRATORY (INHALATION) EVERY 6 HOURS PRN
Qty: 120 EACH | Refills: 5 | Status: SHIPPED | OUTPATIENT
Start: 2017-12-27 | End: 2018-12-17 | Stop reason: SDUPTHER

## 2017-12-27 NOTE — TELEPHONE ENCOUNTER
Albuterol 0.83% nebulizer solution  Nebulizer treatment times 1 every 6 hours when necessary cough and wheezing  dispence #120 with 5 refills

## 2018-01-07 DIAGNOSIS — M15.9 PRIMARY OSTEOARTHRITIS INVOLVING MULTIPLE JOINTS: ICD-10-CM

## 2018-01-08 RX ORDER — NAPROXEN 375 MG/1
375 TABLET ORAL 2 TIMES DAILY WITH MEALS
Qty: 60 TABLET | Refills: 2 | Status: SHIPPED | OUTPATIENT
Start: 2018-01-08 | End: 2018-03-28

## 2018-01-08 NOTE — TELEPHONE ENCOUNTER
Pt seen 12/14/17      Requested Prescriptions     Pending Prescriptions Disp Refills    naproxen (NAPROSYN) 375 MG tablet [Pharmacy Med Name: NAPROXEN 375MG TABLETS] 60 tablet 0     Sig: TAKE 1 TABLET BY MOUTH TWICE DAILY WITH MEALS

## 2018-01-29 ENCOUNTER — OFFICE VISIT (OUTPATIENT)
Dept: PRIMARY CARE CLINIC | Age: 43
End: 2018-01-29
Payer: MEDICARE

## 2018-01-29 VITALS
HEART RATE: 79 BPM | OXYGEN SATURATION: 98 % | SYSTOLIC BLOOD PRESSURE: 104 MMHG | BODY MASS INDEX: 22.85 KG/M2 | DIASTOLIC BLOOD PRESSURE: 68 MMHG | TEMPERATURE: 97.7 F | WEIGHT: 129 LBS

## 2018-01-29 DIAGNOSIS — G81.94 HEMIPLEGIA OF LEFT NONDOMINANT SIDE DUE TO NONCEREBROVASCULAR ETIOLOGY, UNSPECIFIED HEMIPLEGIA TYPE (HCC): ICD-10-CM

## 2018-01-29 DIAGNOSIS — Z30.9 ENCOUNTER FOR CONTRACEPTIVE MANAGEMENT, UNSPECIFIED TYPE: ICD-10-CM

## 2018-01-29 DIAGNOSIS — G81.94 HEMIPLEGIA AFFECTING LEFT NONDOMINANT SIDE, UNSPECIFIED ETIOLOGY, UNSPECIFIED HEMIPLEGIA TYPE (HCC): ICD-10-CM

## 2018-01-29 DIAGNOSIS — Z30.42 ENCOUNTER FOR SURVEILLANCE OF INJECTABLE CONTRACEPTIVE: ICD-10-CM

## 2018-01-29 DIAGNOSIS — F51.01 PRIMARY INSOMNIA: ICD-10-CM

## 2018-01-29 DIAGNOSIS — F41.9 ANXIETY: ICD-10-CM

## 2018-01-29 DIAGNOSIS — R56.9 SEIZURE (HCC): ICD-10-CM

## 2018-01-29 DIAGNOSIS — G89.4 CHRONIC PAIN SYNDROME: Primary | ICD-10-CM

## 2018-01-29 PROCEDURE — 1036F TOBACCO NON-USER: CPT | Performed by: NURSE PRACTITIONER

## 2018-01-29 PROCEDURE — 81025 URINE PREGNANCY TEST: CPT | Performed by: NURSE PRACTITIONER

## 2018-01-29 PROCEDURE — G8427 DOCREV CUR MEDS BY ELIG CLIN: HCPCS | Performed by: NURSE PRACTITIONER

## 2018-01-29 PROCEDURE — 99213 OFFICE O/P EST LOW 20 MIN: CPT | Performed by: NURSE PRACTITIONER

## 2018-01-29 PROCEDURE — G8484 FLU IMMUNIZE NO ADMIN: HCPCS | Performed by: NURSE PRACTITIONER

## 2018-01-29 PROCEDURE — G8420 CALC BMI NORM PARAMETERS: HCPCS | Performed by: NURSE PRACTITIONER

## 2018-01-29 RX ORDER — MEDROXYPROGESTERONE ACETATE 150 MG/ML
150 INJECTION, SUSPENSION INTRAMUSCULAR ONCE
Status: COMPLETED | OUTPATIENT
Start: 2018-01-29 | End: 2018-01-29

## 2018-01-29 RX ORDER — HYDROCODONE BITARTRATE AND ACETAMINOPHEN 7.5; 325 MG/1; MG/1
1 TABLET ORAL EVERY 8 HOURS PRN
Qty: 90 TABLET | Refills: 0 | Status: SHIPPED | OUTPATIENT
Start: 2018-01-29 | End: 2018-02-13 | Stop reason: SDUPTHER

## 2018-01-29 RX ORDER — LORAZEPAM 1 MG/1
1 TABLET ORAL EVERY 8 HOURS PRN
Qty: 30 TABLET | Refills: 0 | Status: SHIPPED | OUTPATIENT
Start: 2018-01-29 | End: 2018-02-13 | Stop reason: SDUPTHER

## 2018-01-29 RX ADMIN — MEDROXYPROGESTERONE ACETATE 150 MG: 150 INJECTION, SUSPENSION INTRAMUSCULAR at 15:01

## 2018-01-29 ASSESSMENT — ENCOUNTER SYMPTOMS
ABDOMINAL PAIN: 0
VOMITING: 0
NAUSEA: 0
DIARRHEA: 0
RHINORRHEA: 0
TROUBLE SWALLOWING: 0
SORE THROAT: 0
SHORTNESS OF BREATH: 0
CONSTIPATION: 0
SINUS PRESSURE: 0
COUGH: 0

## 2018-01-29 NOTE — PATIENT INSTRUCTIONS
flexible. · Try heat, cold packs, and massage. · Get enough sleep. Chronic pain can make you tired and drain your energy. Talk with your doctor if you have trouble sleeping because of pain. · Think positive. Your thoughts can affect your pain level. Do things that you enjoy to distract yourself when you have pain instead of focusing on the pain. See a movie, read a book, listen to music, or spend time with a friend. · If you think you are depressed, talk to your doctor about treatment. · Keep a daily pain diary. Record how your moods, thoughts, sleep patterns, activities, and medicine affect your pain. You may find that your pain is worse during or after certain activities or when you are feeling a certain emotion. Having a record of your pain can help you and your doctor find the best ways to treat your pain. · Take pain medicines exactly as directed. ¨ If the doctor gave you a prescription medicine for pain, take it as prescribed. ¨ If you are not taking a prescription pain medicine, ask your doctor if you can take an over-the-counter medicine. Reducing constipation caused by pain medicine  · Include fruits, vegetables, beans, and whole grains in your diet each day. These foods are high in fiber. · Drink plenty of fluids, enough so that your urine is light yellow or clear like water. If you have kidney, heart, or liver disease and have to limit fluids, talk with your doctor before you increase the amount of fluids you drink. · If your doctor recommends it, get more exercise. Walking is a good choice. Bit by bit, increase the amount you walk every day. Try for at least 30 minutes on most days of the week. · Schedule time each day for a bowel movement. A daily routine may help. Take your time and do not strain when having a bowel movement. When should you call for help? Call your doctor now or seek immediate medical care if:  ? · Your pain gets worse or is out of control.    ? · You feel down or blue, or you do not enjoy things like you once did. You may be depressed, which is common in people with chronic pain. Depression can be treated. ? · You have vomiting or cramps for more than 2 hours. ? Watch closely for changes in your health, and be sure to contact your doctor if:  ? · You cannot sleep because of pain. ? · You are very worried or anxious about your pain. ? · You have trouble taking your pain medicine. ? · You have any concerns about your pain medicine. ? · You have trouble with bowel movements, such as:  ¨ No bowel movement in 3 days. ¨ Blood in the anal area, in your stool, or on the toilet paper. ¨ Diarrhea for more than 24 hours. Where can you learn more? Go to https://[employer]. org and sign in to your [employer] account. Enter N004 in the Yatedo box to learn more about \"Chronic Pain: Care Instructions. \"     If you do not have an account, please click on the \"Sign Up Now\" link. Current as of: October 14, 2016  Content Version: 11.5  © 3447-8965 [employer]. Care instructions adapted under license by [facility] ([facility]). If you have questions about a medical condition or this instruction, always ask your healthcare professional. Tai Jack any warranty or liability for your use of this information.

## 2018-01-29 NOTE — PROGRESS NOTES
[imipenem]; Unasyn [ampicillin-sulbactam sodium]; and Wasp venom    Past Medical History:   Diagnosis Date    Arthritis     GERD (gastroesophageal reflux disease)     History of blood transfusion     Incontinence     Seizures ([address]        Past Surgical History:   Procedure Laterality Date    APPENDECTOMY      CHOLECYSTECTOMY      CSF SHUNT Right     [place] REMV CATARACT EXTRACAP,INSERT LENS Left 6/15/2017    EYE CATARACT EMULSIFICATION IOL IMPLANT performed by Rosario Lora MD at [facility]. [address] Right 8/4/2017    CATARCAT EXTRACTION EYE WITH IOL performed by Rosario Lora MD at [facility]       Social History   Substance Use Topics    Smoking status: Former Smoker     Packs/day: 1.00     Years: 19.00     Types: Cigarettes     Quit date: 2/14/2015    Smokeless tobacco: Never Used    Alcohol use No       Review of Systems   Constitutional: Negative for activity change, appetite change, fatigue, fever and unexpected weight change. HENT: Negative for congestion, hearing loss, rhinorrhea, sinus pressure, sore throat and trouble swallowing. Eyes: Negative for visual disturbance. Respiratory: Negative for cough and shortness of breath. Cardiovascular: Negative for chest pain, palpitations and leg swelling. Gastrointestinal: Negative for abdominal pain, constipation, diarrhea, nausea and vomiting. Endocrine: Negative for cold intolerance and heat intolerance. Genitourinary: Negative for flank pain, menstrual problem, pelvic pain, urgency and vaginal discharge. Musculoskeletal: Negative for arthralgias. Chronic pain   Skin: Negative for rash. Neurological: Negative for headaches. Psychiatric/Behavioral: Negative for dysphoric mood and sleep disturbance. The patient is nervous/anxious. Physical Exam   Constitutional: She is oriented to person, place, and time. She appears well-developed and well-nourished.    HENT:   Head: Normocephalic and Anxiety for up to 30 days. Dispense: 30 tablet; Refill: 0    4. Encounter for surveillance of injectable contraceptive  The current medical regimen is effective;  continue present plan and medications. - POCT urine pregnancy      Orders Placed This Encounter   Procedures    POCT urine pregnancy        Return in about 4 weeks (around 2/26/2018). Patient Instructions       Patient Education        Chronic Pain: Care Instructions  Your Care Instructions    Chronic pain is pain that lasts a long time (months or even years) and may or may not have a clear cause. It is different from acute pain, which usually does have a clear cause-like an injury or illness-and gets better over time. Chronic pain:  · Lasts over time but may vary from day to day. · Does not go away despite efforts to end it. · May disrupt your sleep and lead to fatigue. · May cause depression or anxiety. · May make your muscles tense, causing more pain. · Can disrupt your work, hobbies, home life, and relationships with friends and family. Chronic pain is a very real condition. It is not just in your head. Treatment can help and usually includes several methods used together, such as medicines, physical therapy, exercise, and other treatments. Learning how to relax and changing negative thought patterns can also help you cope. Chronic pain is complex. Taking an active role in your treatment will help you better manage your pain. Tell your doctor if you have trouble dealing with your pain. You may have to try several things before you find what works best for you. Follow-up care is a key part of your treatment and safety. Be sure to make and go to all appointments, and call your doctor if you are having problems. It's also a good idea to know your test results and keep a list of the medicines you take. How can you care for yourself at home? · Pace yourself. Break up large jobs into smaller tasks.  Save harder tasks for days when you have

## 2018-02-08 ENCOUNTER — TELEPHONE (OUTPATIENT)
Dept: PRIMARY CARE CLINIC | Age: 43
End: 2018-02-08

## 2018-02-09 ENCOUNTER — OFFICE VISIT (OUTPATIENT)
Dept: PRIMARY CARE CLINIC | Age: 43
End: 2018-02-09
Payer: MEDICARE

## 2018-02-09 VITALS
HEART RATE: 80 BPM | SYSTOLIC BLOOD PRESSURE: 110 MMHG | OXYGEN SATURATION: 96 % | TEMPERATURE: 97.9 F | DIASTOLIC BLOOD PRESSURE: 80 MMHG

## 2018-02-09 DIAGNOSIS — G89.4 CHRONIC PAIN SYNDROME: ICD-10-CM

## 2018-02-09 DIAGNOSIS — R10.9 SIDE PAIN: ICD-10-CM

## 2018-02-09 DIAGNOSIS — K59.03 DRUG-INDUCED CONSTIPATION: ICD-10-CM

## 2018-02-09 DIAGNOSIS — S29.011A MUSCLE STRAIN OF CHEST WALL, INITIAL ENCOUNTER: Primary | ICD-10-CM

## 2018-02-09 LAB
APPEARANCE FLUID: NORMAL
BILIRUBIN, POC: NORMAL
BLOOD URINE, POC: NORMAL
CLARITY, POC: CLEAR
COLOR, POC: NORMAL
GLUCOSE URINE, POC: 100
KETONES, POC: NORMAL
LEUKOCYTE EST, POC: NORMAL
NITRITE, POC: NORMAL
PH, POC: 6
PROTEIN, POC: 100
SPECIFIC GRAVITY, POC: 1.02
UROBILINOGEN, POC: 8

## 2018-02-09 PROCEDURE — G8484 FLU IMMUNIZE NO ADMIN: HCPCS | Performed by: NURSE PRACTITIONER

## 2018-02-09 PROCEDURE — 1036F TOBACCO NON-USER: CPT | Performed by: NURSE PRACTITIONER

## 2018-02-09 PROCEDURE — G8427 DOCREV CUR MEDS BY ELIG CLIN: HCPCS | Performed by: NURSE PRACTITIONER

## 2018-02-09 PROCEDURE — G8420 CALC BMI NORM PARAMETERS: HCPCS | Performed by: NURSE PRACTITIONER

## 2018-02-09 PROCEDURE — 99213 OFFICE O/P EST LOW 20 MIN: CPT | Performed by: NURSE PRACTITIONER

## 2018-02-09 RX ORDER — NITROFURANTOIN 25; 75 MG/1; MG/1
CAPSULE ORAL
Refills: 0 | COMMUNITY
Start: 2018-01-14 | End: 2018-02-09

## 2018-02-09 RX ORDER — NITROFURANTOIN 25; 75 MG/1; MG/1
100 CAPSULE ORAL 2 TIMES DAILY
Qty: 20 CAPSULE | Refills: 0 | Status: SHIPPED | OUTPATIENT
Start: 2018-02-09 | End: 2018-02-19

## 2018-02-09 ASSESSMENT — ENCOUNTER SYMPTOMS
EYE REDNESS: 0
SHORTNESS OF BREATH: 0
RHINORRHEA: 0
DIARRHEA: 0
SORE THROAT: 0
COUGH: 0
CONSTIPATION: 1
VOMITING: 0

## 2018-02-11 LAB — URINE CULTURE, ROUTINE: NORMAL

## 2018-02-12 ENCOUNTER — TELEPHONE (OUTPATIENT)
Dept: PRIMARY CARE CLINIC | Age: 43
End: 2018-02-12

## 2018-02-13 ENCOUNTER — OFFICE VISIT (OUTPATIENT)
Dept: PRIMARY CARE CLINIC | Age: 43
End: 2018-02-13
Payer: MEDICARE

## 2018-02-13 VITALS
BODY MASS INDEX: 21.97 KG/M2 | TEMPERATURE: 96.6 F | HEART RATE: 64 BPM | DIASTOLIC BLOOD PRESSURE: 70 MMHG | OXYGEN SATURATION: 94 % | HEIGHT: 63 IN | WEIGHT: 124 LBS | SYSTOLIC BLOOD PRESSURE: 110 MMHG

## 2018-02-13 DIAGNOSIS — Z79.899 MEDICATION MANAGEMENT: Primary | ICD-10-CM

## 2018-02-13 DIAGNOSIS — Z30.42 DEPOT CONTRACEPTION: ICD-10-CM

## 2018-02-13 DIAGNOSIS — F51.01 PRIMARY INSOMNIA: ICD-10-CM

## 2018-02-13 DIAGNOSIS — Z98.2 S/P VENTRICULOPERITONEAL SHUNT: ICD-10-CM

## 2018-02-13 DIAGNOSIS — B37.0 ORAL THRUSH: ICD-10-CM

## 2018-02-13 DIAGNOSIS — G81.94 HEMIPLEGIA AFFECTING LEFT NONDOMINANT SIDE, UNSPECIFIED ETIOLOGY, UNSPECIFIED HEMIPLEGIA TYPE (HCC): ICD-10-CM

## 2018-02-13 DIAGNOSIS — H66.92 OTITIS OF LEFT EAR: ICD-10-CM

## 2018-02-13 DIAGNOSIS — Z96.89 S/P DEEP BRAIN STIMULATOR PLACEMENT: ICD-10-CM

## 2018-02-13 DIAGNOSIS — G44.89 OTHER HEADACHE SYNDROME: ICD-10-CM

## 2018-02-13 DIAGNOSIS — F41.9 ANXIETY: ICD-10-CM

## 2018-02-13 DIAGNOSIS — R56.9 SEIZURES (HCC): ICD-10-CM

## 2018-02-13 DIAGNOSIS — J02.9 PHARYNGITIS, UNSPECIFIED ETIOLOGY: ICD-10-CM

## 2018-02-13 DIAGNOSIS — G89.4 CHRONIC PAIN SYNDROME: ICD-10-CM

## 2018-02-13 PROCEDURE — G8484 FLU IMMUNIZE NO ADMIN: HCPCS | Performed by: PEDIATRICS

## 2018-02-13 PROCEDURE — G8420 CALC BMI NORM PARAMETERS: HCPCS | Performed by: PEDIATRICS

## 2018-02-13 PROCEDURE — 99214 OFFICE O/P EST MOD 30 MIN: CPT | Performed by: PEDIATRICS

## 2018-02-13 PROCEDURE — G8427 DOCREV CUR MEDS BY ELIG CLIN: HCPCS | Performed by: PEDIATRICS

## 2018-02-13 PROCEDURE — 1036F TOBACCO NON-USER: CPT | Performed by: PEDIATRICS

## 2018-02-13 RX ORDER — MEDROXYPROGESTERONE ACETATE 150 MG/ML
150 INJECTION, SUSPENSION INTRAMUSCULAR ONCE
Qty: 1 ML | Refills: 3 | Status: SHIPPED | OUTPATIENT
Start: 2018-02-13 | End: 2018-06-05

## 2018-02-13 RX ORDER — HYDROCODONE BITARTRATE AND ACETAMINOPHEN 7.5; 325 MG/1; MG/1
1 TABLET ORAL EVERY 8 HOURS PRN
Qty: 90 TABLET | Refills: 0 | Status: SHIPPED | OUTPATIENT
Start: 2018-02-13 | End: 2018-03-28 | Stop reason: SDUPTHER

## 2018-02-13 RX ORDER — LORAZEPAM 1 MG/1
1 TABLET ORAL EVERY 8 HOURS PRN
Qty: 30 TABLET | Refills: 0 | Status: SHIPPED | OUTPATIENT
Start: 2018-02-13 | End: 2018-03-28 | Stop reason: SDUPTHER

## 2018-02-13 ASSESSMENT — ENCOUNTER SYMPTOMS
ABDOMINAL PAIN: 0
VOMITING: 0
SINUS PRESSURE: 0
SORE THROAT: 1
WHEEZING: 0
BACK PAIN: 0
DIARRHEA: 0
COUGH: 0
NAUSEA: 0
SHORTNESS OF BREATH: 0
EYE PAIN: 0

## 2018-02-13 NOTE — PROGRESS NOTES
[address] [facility], [address]  Phone [phone]   Fax [phone]      OFFICE VISIT: 2018    Poppy Alexandra- : 1975      HPI  Reason For Visit:  King Leavitt is a 38 y.o. Health Maintenance utd  Date of Most Recent Physical:  Over a year  Medicare Health Risk Assessment Form completed and in chart?  no    The patient presents today for one month follow up  She also complains of sore throat and left ear pain  This has been a long term issue for her. She does have a shunt on that same side. She does have a chronic head pain on that side. She complains of some sores in her mouth that she would like looked at today. They also need a letter in regards to her overall health and course of events over the past yr. This is a requirement for the local  who is presently withholding her funds at his disgression. She needs a refill of her Depo-Provera. She will be due for her hydrocodone and lorazepam in approximately 2 weeks' time. RORY was reviewed today per office protocol. Report shows No discrepancies. Fill pattern is consistent from single provider(s) at single pharmacy(s). Request #[id number]  Controlled Substances Monitoring: The Prescription Monitoring Report for this patient was reviewed today. (ALYSSA Pyle DO)    Possible medication side effects, risk of tolerance/dependence & alternative treatments discussed., No signs of potential drug abuse or diversion identified. (ALYSSA Pyle DO)    Severe pain not adequately treated with lower dose. (ALYSSA Pyle DO)    Functional status reviewed - continues with improved or maintaining ADL's. (ALYSSA Pyle DO)    Existing medication contract. Raphael Pyle DO)         height is 5' 3\" (1.6 m) and weight is 124 lb (56.2 kg). Her temporal temperature is 96.6 °F (35.9 °C). Her blood pressure is 110/70 and her pulse is 64. Her oxygen saturation is 94%.      Body LAMICTAL 200 MG tablet Take 1 tablet by mouth 2 times daily TAKE 1 TABLET BY MOUTH TWICE A  tablet 5    ranitidine (ZANTAC) 150 MG tablet TAKE 1 TABLET TWICE A DAY FOR STOMACH-REFLUX TROUBLE 180 tablet 3    desloratadine (CLARINEX) 5 MG tablet Take 1 tablet by mouth daily 30 tablet 5    hydrocortisone 2.5 % cream Apply topically 2 times daily Apply topically 2 times daily. 28 g 5    Misc. Devices (WHEELCHAIR CUSHION) MISC ROHO cushion DX:hemepligia 1 each 0    triamcinolone (KENALOG) 0.1 % cream Apply topically 2 times daily Apply to affected area twice daily 90 g 3    polyethylene glycol (GLYCOLAX) packet Take 17 g by mouth daily 1-2 capfuls per day titrate up or down to have a soft bowel movement daily. 527 g 5    clotrimazole-betamethasone (LOTRISONE) 1-0.05 % cream Apply topically 2 times daily Apply topically 2 times daily. 1 Tube 5    Incontinence Supplies MISC Incontinence supplies    Adult pull-ups Large  Wipes  Underpads, disposable and washable    DX: incontinence 100 each 0    EPINEPHrine (EPIPEN 2-ADAN) 0.3 MG/0.3ML SOAJ injection Inject 0.3 mLs into the muscle once for 1 dose Use as directed for allergic reaction 2 each 5     No current facility-administered medications for this visit. Allergies: Ciprofloxacin; Depakote [divalproex sodium]; Dilantin [phenytoin sodium extended]; Keflex [cephalexin]; Pcn [penicillins]; Primaxin [imipenem];  Unasyn [ampicillin-sulbactam sodium]; and Wasp venom    Past Medical History:   Diagnosis Date    Arthritis     GERD (gastroesophageal reflux disease)     History of blood transfusion     Incontinence     Seizures ([facility] [address]        Past Surgical History:   Procedure Laterality Date    APPENDECTOMY      CHOLECYSTECTOMY      CSF SHUNT Right     PA REMV CATARACT EXTRACAP,INSERT LENS Left 6/15/2017    EYE CATARACT EMULSIFICATION IOL IMPLANT performed by Patt Martinez MD at [facility] [address] Right 8/4/2017 CATARCAT EXTRACTION EYE WITH IOL performed by Wilmer Jarquin MD at [facility]       Social History   Substance Use Topics    Smoking status: Former Smoker     Packs/day: 1.00     Years: 19.00     Types: Cigarettes     Quit date: 2/14/2015    Smokeless tobacco: Never Used    Alcohol use No       Review of Systems   Constitutional: Negative for fatigue and unexpected weight change. HENT: Positive for ear pain and sore throat. Negative for congestion and sinus pressure. Eyes: Negative for pain and visual disturbance. Respiratory: Negative for cough, shortness of breath and wheezing. Cardiovascular: Negative for chest pain, palpitations and leg swelling. Gastrointestinal: Negative for abdominal pain, diarrhea, nausea and vomiting. Endocrine: Negative for polyuria. Genitourinary: Negative for dysuria, frequency, hematuria and urgency. Musculoskeletal: Negative for back pain and neck pain. Skin: Negative for rash. Neurological: Negative for dizziness and headaches. Psychiatric/Behavioral: Negative for self-injury. The patient is not nervous/anxious. Physical Exam   Constitutional: She is oriented to person, place, and time. She appears well-developed and well-nourished. She is cooperative. Non-toxic appearance. No distress. Body habitus is normal   HENT:   Head: Normocephalic and atraumatic. Right Ear: Hearing, tympanic membrane, external ear and ear canal normal.   Left Ear: Hearing, tympanic membrane, external ear and ear canal normal.   Nose: Nose normal.   Mouth/Throat: Mucous membranes are normal. Posterior oropharyngeal edema (with cobblestoning) and posterior oropharyngeal erythema present. There is redness at the posterior margin of her dentures. Eyes: Conjunctivae, EOM and lids are normal. Pupils are equal, round, and reactive to light. Amblyoplia R eye (OD)   Neck: Phonation normal. Neck supple. No JVD present. Carotid bruit is not present. No thyromegaly present. medicine might cause. The doctor has checked you carefully, but problems can develop later. If you notice any problems or new symptoms,  get medical treatment right away. Follow-up care is a key part of your treatment and safety. Be sure to make and go to all appointments, and call your doctor if you are having problems. It's also a good idea to know your test results and keep a list of the medicines you take. How can you care for yourself at home? · If you need to take opioids to manage your pain, remember these safety tips. ¨ Follow directions carefully. It's easy to misuse opioids if you take a dose other than what's prescribed by your doctor. This can lead to overdose and even death. Even sharing them with someone they weren't meant for is misuse. ¨ Be cautious. Opioids may affect your judgment and decision making. Do not drive or operate machinery until you can think clearly. Talk with your doctor about when it is safe to drive. ¨ Reduce the risk of drug interactions. Opioids can be dangerous if you take them with alcohol or with certain drugs like sleeping pills and muscle relaxers. Make sure your doctor knows about all the other medicines you take, including over-the-counter medicines. Don't start any new medicines before you talk to your doctor or pharmacist.  Kamilah Copeland Keep others safe. Store opioids in a safe and secure place. Make sure that pets, children, friends, and family can't get to them. When you're done using opioids, make sure to properly dispose of them. You can either use a community drug take-back program or your drugstore's mail-back program. If one of these programs isn't available, you can flush opioid skin patches and unused opioid pills down the toilet. ¨ Reduce the risk of overdose. Misuse of opioids can be very dangerous. Protect yourself by asking your doctor about a naloxone rescue kit. It can help you-and even save your life-if you take too much of an opioid.   · Try other ways to reduce pain. ¨ Relax, and reduce stress. Relaxation techniques such as deep breathing or meditation can help. ¨ Keep moving. Gentle, daily exercise can help reduce pain over the long run. Try low- or no-impact exercises such as walking, swimming, and stationary biking. Do stretches to stay flexible. ¨ Try heat, cold packs, and massage. ¨ Get enough sleep. Pain can make you tired and drain your energy. Talk with your doctor if you have trouble sleeping because of pain. ¨ Think positive. Your thoughts can affect your pain level. Do things that you enjoy to distract yourself when you have pain instead of focusing on the pain. See a movie, read a book, listen to music, or spend time with a friend. · If you are not taking a prescription pain medicine, ask your doctor if you can take an over-the-counter medicine. When should you call for help? Call your doctor now or seek immediate medical care if:  ? · You have a new kind of pain. ? · You have new symptoms, such as a fever or rash, along with the pain. ? Watch closely for changes in your health, and be sure to contact your doctor if:  ? · You think you might be using too much pain medicine, and you need help to use less or stop. ? · Your pain gets worse. ? · You would like a referral to a doctor or clinic that specializes in pain management. Where can you learn more? Go to [email or web address] and sign in to your [employer] account. Enter R108 in the [facility] box to learn more about \"Safe Use of Opioid Pain Medicine: Care Instructions. \"     If you do not have an account, please click on the \"Sign Up Now\" link. Current as of: October 14, 2016  Content Version: 11.5  © 0913-2512 [employer], [employer]. Care instructions adapted under license by [facility] ([facility]).  If you have questions about a medical condition or this instruction, always ask your healthcare professional. Yulia Cuellar disclaims any warranty or

## 2018-02-13 NOTE — PATIENT INSTRUCTIONS
have questions about a medical condition or this instruction, always ask your healthcare professional. [place] [address] any warranty or liability for your use of this information.

## 2018-02-19 ENCOUNTER — TELEPHONE (OUTPATIENT)
Dept: PRIMARY CARE CLINIC | Age: 43
End: 2018-02-19

## 2018-02-22 DIAGNOSIS — R11.0 NAUSEA: ICD-10-CM

## 2018-02-22 RX ORDER — ONDANSETRON 4 MG/1
TABLET, ORALLY DISINTEGRATING ORAL
Qty: 20 TABLET | Refills: 0 | Status: SHIPPED | OUTPATIENT
Start: 2018-02-22 | End: 2018-11-07 | Stop reason: SDUPTHER

## 2018-02-27 ENCOUNTER — TELEPHONE (OUTPATIENT)
Dept: PRIMARY CARE CLINIC | Age: 43
End: 2018-02-27

## 2018-03-28 ENCOUNTER — OFFICE VISIT (OUTPATIENT)
Dept: PRIMARY CARE CLINIC | Age: 43
End: 2018-03-28
Payer: MEDICARE

## 2018-03-28 ENCOUNTER — TELEPHONE (OUTPATIENT)
Dept: PRIMARY CARE CLINIC | Age: 43
End: 2018-03-28

## 2018-03-28 VITALS
SYSTOLIC BLOOD PRESSURE: 110 MMHG | HEART RATE: 97 BPM | BODY MASS INDEX: 21.83 KG/M2 | OXYGEN SATURATION: 100 % | TEMPERATURE: 97.6 F | WEIGHT: 123.25 LBS | DIASTOLIC BLOOD PRESSURE: 72 MMHG

## 2018-03-28 DIAGNOSIS — F51.01 PRIMARY INSOMNIA: ICD-10-CM

## 2018-03-28 DIAGNOSIS — M17.11 PRIMARY OSTEOARTHRITIS OF RIGHT KNEE: ICD-10-CM

## 2018-03-28 DIAGNOSIS — M17.12 PRIMARY OSTEOARTHRITIS OF LEFT KNEE: Primary | ICD-10-CM

## 2018-03-28 DIAGNOSIS — F41.9 ANXIETY: ICD-10-CM

## 2018-03-28 DIAGNOSIS — R56.9 SEIZURES (HCC): ICD-10-CM

## 2018-03-28 DIAGNOSIS — G89.4 CHRONIC PAIN SYNDROME: ICD-10-CM

## 2018-03-28 DIAGNOSIS — Z79.899 MEDICATION MANAGEMENT: ICD-10-CM

## 2018-03-28 DIAGNOSIS — Z91.09 ENVIRONMENTAL ALLERGIES: ICD-10-CM

## 2018-03-28 PROCEDURE — 20610 DRAIN/INJ JOINT/BURSA W/O US: CPT | Performed by: PEDIATRICS

## 2018-03-28 PROCEDURE — G8482 FLU IMMUNIZE ORDER/ADMIN: HCPCS | Performed by: PEDIATRICS

## 2018-03-28 PROCEDURE — 99213 OFFICE O/P EST LOW 20 MIN: CPT | Performed by: PEDIATRICS

## 2018-03-28 PROCEDURE — G8427 DOCREV CUR MEDS BY ELIG CLIN: HCPCS | Performed by: PEDIATRICS

## 2018-03-28 PROCEDURE — G8420 CALC BMI NORM PARAMETERS: HCPCS | Performed by: PEDIATRICS

## 2018-03-28 PROCEDURE — 1036F TOBACCO NON-USER: CPT | Performed by: PEDIATRICS

## 2018-03-28 RX ORDER — METHYLPREDNISOLONE ACETATE 80 MG/ML
80 INJECTION, SUSPENSION INTRA-ARTICULAR; INTRALESIONAL; INTRAMUSCULAR; SOFT TISSUE ONCE
Status: COMPLETED | OUTPATIENT
Start: 2018-03-28 | End: 2018-03-28

## 2018-03-28 RX ORDER — TRIAMCINOLONE ACETONIDE 40 MG/ML
40 INJECTION, SUSPENSION INTRA-ARTICULAR; INTRAMUSCULAR ONCE
Status: COMPLETED | OUTPATIENT
Start: 2018-03-28 | End: 2018-03-28

## 2018-03-28 RX ORDER — LORATADINE 10 MG/1
10 CAPSULE, LIQUID FILLED ORAL DAILY
Qty: 30 CAPSULE | Refills: 11 | Status: SHIPPED | OUTPATIENT
Start: 2018-03-28 | End: 2018-07-27 | Stop reason: ALTCHOICE

## 2018-03-28 RX ORDER — HYDROCODONE BITARTRATE AND ACETAMINOPHEN 7.5; 325 MG/1; MG/1
1 TABLET ORAL EVERY 8 HOURS PRN
Qty: 90 TABLET | Refills: 0 | Status: SHIPPED | OUTPATIENT
Start: 2018-03-28 | End: 2018-04-24 | Stop reason: SDUPTHER

## 2018-03-28 RX ORDER — LORAZEPAM 1 MG/1
1 TABLET ORAL EVERY 8 HOURS PRN
Qty: 30 TABLET | Refills: 0 | Status: SHIPPED | OUTPATIENT
Start: 2018-03-28 | End: 2018-04-24 | Stop reason: SDUPTHER

## 2018-03-28 RX ORDER — NEOMYCIN SULFATE, POLYMYXIN B SULFATE, AND DEXAMETHASONE 3.5; 10000; 1 MG/G; [USP'U]/G; MG/G
OINTMENT OPHTHALMIC 2 TIMES DAILY
Refills: 0 | COMMUNITY
Start: 2018-03-21 | End: 2018-07-27 | Stop reason: ALTCHOICE

## 2018-03-28 RX ADMIN — TRIAMCINOLONE ACETONIDE 40 MG: 40 INJECTION, SUSPENSION INTRA-ARTICULAR; INTRAMUSCULAR at 12:09

## 2018-03-28 RX ADMIN — TRIAMCINOLONE ACETONIDE 40 MG: 40 INJECTION, SUSPENSION INTRA-ARTICULAR; INTRAMUSCULAR at 12:11

## 2018-03-28 RX ADMIN — METHYLPREDNISOLONE ACETATE 80 MG: 80 INJECTION, SUSPENSION INTRA-ARTICULAR; INTRALESIONAL; INTRAMUSCULAR; SOFT TISSUE at 12:10

## 2018-03-28 RX ADMIN — METHYLPREDNISOLONE ACETATE 80 MG: 80 INJECTION, SUSPENSION INTRA-ARTICULAR; INTRALESIONAL; INTRAMUSCULAR; SOFT TISSUE at 12:08

## 2018-03-28 ASSESSMENT — ENCOUNTER SYMPTOMS
SINUS PRESSURE: 0
VOMITING: 0
DIARRHEA: 0
EYE PAIN: 0
SHORTNESS OF BREATH: 0
WHEEZING: 0
COUGH: 0
NAUSEA: 0
BACK PAIN: 0
ABDOMINAL PAIN: 0
SORE THROAT: 1

## 2018-03-28 NOTE — PROGRESS NOTES
diclofenac sodium (VOLTAREN) 1 % GEL Apply 2 g topically 4 times daily 100 g 5    albuterol (PROVENTIL) (2.5 MG/3ML) 0.083% nebulizer solution Take 3 mLs by nebulization every 6 hours as needed for Wheezing (coughing) 120 each 5    fluticasone (FLONASE) 50 MCG/ACT nasal spray 2 sprays by Nasal route daily (Patient taking differently: 2 sprays by Nasal route daily as needed ) 1 Bottle 5    hydrOXYzine (ATARAX) 25 MG tablet TK 1 T PO TID PRF ITCHING PRN  3    Incontinence Supplies MISC DEPENDS, WIPES, GLOVES, AND BED PADS. Disp 1month supply. DX: R32 100 each 11    SENNA PLUS 8.6-50 MG per tablet Take 1 tablet by mouth 2 times daily 60 tablet 11    Heating Pads (HEATING PAD MOIST/DRY) PADS Use as directed 1 each 0    Blood Pressure Monitoring (B-D ASSURE BPM/DELUXE ARM CUFF) MISC Take bp daily 1 each 0    omeprazole (PRILOSEC) 20 MG delayed release capsule Take 1 capsule by mouth Daily 90 capsule 3    LAMICTAL 200 MG tablet Take 1 tablet by mouth 2 times daily TAKE 1 TABLET BY MOUTH TWICE A  tablet 5    ranitidine (ZANTAC) 150 MG tablet TAKE 1 TABLET TWICE A DAY FOR STOMACH-REFLUX TROUBLE 180 tablet 3    hydrocortisone 2.5 % cream Apply topically 2 times daily Apply topically 2 times daily. 28 g 5    Misc. Devices (WHEELCHAIR CUSHION) MISC ROHO cushion DX:hemepligia 1 each 0    triamcinolone (KENALOG) 0.1 % cream Apply topically 2 times daily Apply to affected area twice daily 90 g 3    polyethylene glycol (GLYCOLAX) packet Take 17 g by mouth daily 1-2 capfuls per day titrate up or down to have a soft bowel movement daily. 527 g 5    clotrimazole-betamethasone (LOTRISONE) 1-0.05 % cream Apply topically 2 times daily Apply topically 2 times daily.  1 Tube 5    EPINEPHrine (EPIPEN 2-ADAN) 0.3 MG/0.3ML SOAJ injection Inject 0.3 mLs into the muscle once for 1 dose Use as directed for allergic reaction 2 each 5    Incontinence Supplies MISC Incontinence supplies    Adult pull-ups Large  Wipes  Underpads, disposable and washable    DX: incontinence 100 each 0    medroxyPROGESTERone (DEPO-PROVERA) 150 MG/ML injection Inject 150 mg into the muscle once for 1 dose 1 mL 3    desloratadine (CLARINEX) 5 MG tablet Take 1 tablet by mouth daily 30 tablet 5     No current facility-administered medications for this visit. Allergies: Ciprofloxacin; Depakote [divalproex sodium]; Dilantin [phenytoin sodium extended]; Keflex [cephalexin]; Pcn [penicillins]; Primaxin [imipenem]; Unasyn [ampicillin-sulbactam sodium]; and Wasp venom     Past Medical History:   Diagnosis Date    Arthritis     GERD (gastroesophageal reflux disease)     History of blood transfusion     Incontinence     Seizures ([facility] [address]        Past Surgical History:   Procedure Laterality Date    APPENDECTOMY      CHOLECYSTECTOMY      CSF SHUNT Right     MD REMV CATARACT EXTRACAP,INSERT LENS Left 6/15/2017    EYE CATARACT EMULSIFICATION IOL IMPLANT performed by Ramiro Valenzuela MD at [facility]. [address] Right 8/4/2017    CATARCAT EXTRACTION EYE WITH IOL performed by Ramiro Valenzuela MD at [facility]       Social History   Substance Use Topics    Smoking status: Former Smoker     Packs/day: 1.00     Years: 19.00     Types: Cigarettes     Quit date: 2/14/2015    Smokeless tobacco: Never Used    Alcohol use No        Review of Systems   Constitutional: Negative for fatigue and unexpected weight change. HENT: Positive for ear pain and sore throat. Negative for congestion and sinus pressure. Eyes: Negative for pain and visual disturbance. Respiratory: Negative for cough, shortness of breath and wheezing. Cardiovascular: Negative for chest pain, palpitations and leg swelling. Gastrointestinal: Negative for abdominal pain, diarrhea, nausea and vomiting. Endocrine: Negative for polyuria. Genitourinary: Negative for dysuria, frequency, hematuria and urgency.    Musculoskeletal: Negative for back pain and neck pain. Skin: Negative for rash. Neurological: Negative for dizziness and headaches. Psychiatric/Behavioral: Negative for self-injury. The patient is not nervous/anxious. Physical Exam   Constitutional: She is oriented to person, place, and time. She appears well-developed and well-nourished. She is cooperative. Non-toxic appearance. No distress. Body habitus is normal   HENT:   Head: Normocephalic and atraumatic. Right Ear: Hearing, tympanic membrane, external ear and ear canal normal.   Left Ear: Hearing, tympanic membrane, external ear and ear canal normal.   Nose: Nose normal.   Mouth/Throat: Mucous membranes are normal. Posterior oropharyngeal edema (with cobblestoning) and posterior oropharyngeal erythema present. There is redness at the posterior margin of her dentures. Eyes: Conjunctivae, EOM and lids are normal. Pupils are equal, round, and reactive to light. Amblyoplia R eye (OD)   Neck: Phonation normal. Neck supple. No JVD present. Carotid bruit is not present. No thyromegaly present. Cardiovascular: Normal rate, regular rhythm and normal heart sounds. No extrasystoles are present. PMI is not displaced. Exam reveals no gallop and no friction rub. No murmur heard. Pulmonary/Chest: Effort normal and breath sounds normal. No respiratory distress. She has no wheezes. She has no rhonchi. She has no rales. Abdominal: Soft. Bowel sounds are normal. She exhibits no distension ( ) and no mass. There is no hepatosplenomegaly. There is no tenderness. There is no rebound, no guarding and no CVA tenderness. Genitourinary:   Genitourinary Comments: Examination deferred   Musculoskeletal: Normal range of motion. She exhibits edema (trace fluid in the R knee to ballot). She exhibits no tenderness. Joint examination reveals no acute arthritis or synovitis.     Contracted L  UE  She has atrophy of her L LE  There is mild edema of Left knee and tenderness to palpation

## 2018-03-29 ENCOUNTER — TELEPHONE (OUTPATIENT)
Dept: PRIMARY CARE CLINIC | Age: 43
End: 2018-03-29

## 2018-04-24 ENCOUNTER — OFFICE VISIT (OUTPATIENT)
Dept: PRIMARY CARE CLINIC | Age: 43
End: 2018-04-24
Payer: MEDICARE

## 2018-04-24 VITALS
HEIGHT: 63 IN | DIASTOLIC BLOOD PRESSURE: 74 MMHG | TEMPERATURE: 97.9 F | SYSTOLIC BLOOD PRESSURE: 122 MMHG | OXYGEN SATURATION: 98 % | HEART RATE: 92 BPM | BODY MASS INDEX: 20.94 KG/M2 | WEIGHT: 118.2 LBS

## 2018-04-24 DIAGNOSIS — G89.4 CHRONIC PAIN SYNDROME: ICD-10-CM

## 2018-04-24 DIAGNOSIS — F51.01 PRIMARY INSOMNIA: ICD-10-CM

## 2018-04-24 DIAGNOSIS — F41.9 ANXIETY: ICD-10-CM

## 2018-04-24 DIAGNOSIS — Z30.9 ENCOUNTER FOR CONTRACEPTIVE MANAGEMENT, UNSPECIFIED TYPE: ICD-10-CM

## 2018-04-24 DIAGNOSIS — Z79.899 MEDICATION MANAGEMENT: Primary | ICD-10-CM

## 2018-04-24 DIAGNOSIS — R56.9 SEIZURES (HCC): ICD-10-CM

## 2018-04-24 PROCEDURE — 1036F TOBACCO NON-USER: CPT | Performed by: PEDIATRICS

## 2018-04-24 PROCEDURE — G8427 DOCREV CUR MEDS BY ELIG CLIN: HCPCS | Performed by: PEDIATRICS

## 2018-04-24 PROCEDURE — 96372 THER/PROPH/DIAG INJ SC/IM: CPT | Performed by: PEDIATRICS

## 2018-04-24 PROCEDURE — G8420 CALC BMI NORM PARAMETERS: HCPCS | Performed by: PEDIATRICS

## 2018-04-24 PROCEDURE — 99213 OFFICE O/P EST LOW 20 MIN: CPT | Performed by: PEDIATRICS

## 2018-04-24 RX ORDER — MEDROXYPROGESTERONE ACETATE 150 MG/ML
150 INJECTION, SUSPENSION INTRAMUSCULAR ONCE
Status: COMPLETED | OUTPATIENT
Start: 2018-04-24 | End: 2018-04-24

## 2018-04-24 RX ORDER — LORAZEPAM 1 MG/1
1 TABLET ORAL EVERY 8 HOURS PRN
Qty: 30 TABLET | Refills: 0 | Status: SHIPPED | OUTPATIENT
Start: 2018-04-24 | End: 2018-05-31 | Stop reason: SDUPTHER

## 2018-04-24 RX ORDER — HYDROCODONE BITARTRATE AND ACETAMINOPHEN 7.5; 325 MG/1; MG/1
1 TABLET ORAL EVERY 8 HOURS PRN
Qty: 90 TABLET | Refills: 0 | Status: SHIPPED | OUTPATIENT
Start: 2018-04-24 | End: 2018-05-31 | Stop reason: SDUPTHER

## 2018-04-24 RX ADMIN — MEDROXYPROGESTERONE ACETATE 150 MG: 150 INJECTION, SUSPENSION INTRAMUSCULAR at 12:20

## 2018-04-24 ASSESSMENT — ENCOUNTER SYMPTOMS
EYE PAIN: 0
DIARRHEA: 0
ABDOMINAL PAIN: 0
WHEEZING: 0
NAUSEA: 0
SINUS PRESSURE: 0
VOMITING: 0
BACK PAIN: 0
COUGH: 0
SHORTNESS OF BREATH: 0
SORE THROAT: 1

## 2018-04-24 ASSESSMENT — PATIENT HEALTH QUESTIONNAIRE - PHQ9
2. FEELING DOWN, DEPRESSED OR HOPELESS: 1
SUM OF ALL RESPONSES TO PHQ QUESTIONS 1-9: 1
SUM OF ALL RESPONSES TO PHQ9 QUESTIONS 1 & 2: 1
1. LITTLE INTEREST OR PLEASURE IN DOING THINGS: 0

## 2018-05-01 ENCOUNTER — TELEPHONE (OUTPATIENT)
Dept: PRIMARY CARE CLINIC | Age: 43
End: 2018-05-01

## 2018-05-25 ENCOUNTER — TELEPHONE (OUTPATIENT)
Dept: PRIMARY CARE CLINIC | Age: 43
End: 2018-05-25

## 2018-05-27 DIAGNOSIS — M15.9 PRIMARY OSTEOARTHRITIS INVOLVING MULTIPLE JOINTS: ICD-10-CM

## 2018-05-29 RX ORDER — NAPROXEN 375 MG/1
TABLET ORAL
Qty: 60 TABLET | Refills: 0 | OUTPATIENT
Start: 2018-05-29

## 2018-05-31 ENCOUNTER — OFFICE VISIT (OUTPATIENT)
Dept: PRIMARY CARE CLINIC | Age: 43
End: 2018-05-31
Payer: MEDICARE

## 2018-05-31 VITALS
TEMPERATURE: 98 F | BODY MASS INDEX: 20.91 KG/M2 | HEIGHT: 63 IN | WEIGHT: 118 LBS | OXYGEN SATURATION: 97 % | HEART RATE: 116 BPM | DIASTOLIC BLOOD PRESSURE: 76 MMHG | SYSTOLIC BLOOD PRESSURE: 118 MMHG

## 2018-05-31 DIAGNOSIS — L30.4 INTERTRIGO: ICD-10-CM

## 2018-05-31 DIAGNOSIS — Z79.899 MEDICATION MANAGEMENT: Primary | ICD-10-CM

## 2018-05-31 DIAGNOSIS — F41.9 ANXIETY: ICD-10-CM

## 2018-05-31 DIAGNOSIS — M15.9 PRIMARY OSTEOARTHRITIS INVOLVING MULTIPLE JOINTS: ICD-10-CM

## 2018-05-31 DIAGNOSIS — R56.9 SEIZURES (HCC): ICD-10-CM

## 2018-05-31 DIAGNOSIS — F51.01 PRIMARY INSOMNIA: ICD-10-CM

## 2018-05-31 DIAGNOSIS — G81.94 HEMIPLEGIA AFFECTING LEFT NONDOMINANT SIDE, UNSPECIFIED ETIOLOGY, UNSPECIFIED HEMIPLEGIA TYPE (HCC): ICD-10-CM

## 2018-05-31 DIAGNOSIS — M24.50 FLEXION CONTRACTURES: ICD-10-CM

## 2018-05-31 DIAGNOSIS — G89.4 CHRONIC PAIN SYNDROME: ICD-10-CM

## 2018-05-31 PROCEDURE — G8427 DOCREV CUR MEDS BY ELIG CLIN: HCPCS | Performed by: PEDIATRICS

## 2018-05-31 PROCEDURE — 1036F TOBACCO NON-USER: CPT | Performed by: PEDIATRICS

## 2018-05-31 PROCEDURE — 99214 OFFICE O/P EST MOD 30 MIN: CPT | Performed by: PEDIATRICS

## 2018-05-31 PROCEDURE — G8420 CALC BMI NORM PARAMETERS: HCPCS | Performed by: PEDIATRICS

## 2018-05-31 RX ORDER — TRIAMCINOLONE ACETONIDE 1 MG/G
CREAM TOPICAL
Qty: 80 G | Refills: 5 | Status: SHIPPED | OUTPATIENT
Start: 2018-05-31 | End: 2018-06-05

## 2018-05-31 RX ORDER — CELECOXIB 200 MG/1
200 CAPSULE ORAL DAILY
Qty: 60 CAPSULE | Refills: 3 | Status: SHIPPED | OUTPATIENT
Start: 2018-05-31 | End: 2019-01-27 | Stop reason: SDUPTHER

## 2018-05-31 RX ORDER — LORAZEPAM 1 MG/1
1 TABLET ORAL EVERY 8 HOURS PRN
Qty: 30 TABLET | Refills: 0 | Status: SHIPPED | OUTPATIENT
Start: 2018-05-31 | End: 2018-06-20 | Stop reason: SDUPTHER

## 2018-05-31 RX ORDER — CLOTRIMAZOLE 1 %
CREAM (GRAM) TOPICAL
Qty: 30 G | Refills: 5 | Status: SHIPPED | OUTPATIENT
Start: 2018-05-31 | End: 2019-11-12 | Stop reason: SDUPTHER

## 2018-05-31 RX ORDER — HYDROCODONE BITARTRATE AND ACETAMINOPHEN 7.5; 325 MG/1; MG/1
1 TABLET ORAL EVERY 8 HOURS PRN
Qty: 90 TABLET | Refills: 0 | Status: SHIPPED | OUTPATIENT
Start: 2018-05-31 | End: 2018-06-20 | Stop reason: SDUPTHER

## 2018-05-31 ASSESSMENT — ENCOUNTER SYMPTOMS
COUGH: 0
SORE THROAT: 1
BACK PAIN: 0
VOMITING: 0
NAUSEA: 0
SHORTNESS OF BREATH: 0
DIARRHEA: 0
SINUS PRESSURE: 0
WHEEZING: 0
EYE PAIN: 0
ABDOMINAL PAIN: 0

## 2018-06-05 ENCOUNTER — OFFICE VISIT (OUTPATIENT)
Dept: PRIMARY CARE CLINIC | Age: 43
End: 2018-06-05
Payer: MEDICARE

## 2018-06-05 VITALS
SYSTOLIC BLOOD PRESSURE: 118 MMHG | TEMPERATURE: 97.5 F | WEIGHT: 151.4 LBS | OXYGEN SATURATION: 98 % | HEART RATE: 62 BPM | BODY MASS INDEX: 26.82 KG/M2 | DIASTOLIC BLOOD PRESSURE: 62 MMHG | HEIGHT: 63 IN

## 2018-06-05 DIAGNOSIS — B35.4 RINGWORM OF BODY: Primary | ICD-10-CM

## 2018-06-05 PROCEDURE — G8419 CALC BMI OUT NRM PARAM NOF/U: HCPCS | Performed by: NURSE PRACTITIONER

## 2018-06-05 PROCEDURE — G8427 DOCREV CUR MEDS BY ELIG CLIN: HCPCS | Performed by: NURSE PRACTITIONER

## 2018-06-05 PROCEDURE — 99213 OFFICE O/P EST LOW 20 MIN: CPT | Performed by: NURSE PRACTITIONER

## 2018-06-05 PROCEDURE — 1036F TOBACCO NON-USER: CPT | Performed by: NURSE PRACTITIONER

## 2018-06-05 RX ORDER — CLOTRIMAZOLE AND BETAMETHASONE DIPROPIONATE 10; .64 MG/G; MG/G
CREAM TOPICAL
Qty: 1 TUBE | Refills: 1 | Status: SHIPPED | OUTPATIENT
Start: 2018-06-05 | End: 2018-12-21 | Stop reason: ALTCHOICE

## 2018-06-05 ASSESSMENT — ENCOUNTER SYMPTOMS
DIARRHEA: 0
COUGH: 0
EYE REDNESS: 0
VOMITING: 0
SORE THROAT: 0
SHORTNESS OF BREATH: 0
RHINORRHEA: 0
CONSTIPATION: 0

## 2018-06-06 ENCOUNTER — OFFICE VISIT (OUTPATIENT)
Dept: PRIMARY CARE CLINIC | Age: 43
End: 2018-06-06
Payer: MEDICARE

## 2018-06-06 ENCOUNTER — HOSPITAL ENCOUNTER (OUTPATIENT)
Dept: GENERAL RADIOLOGY | Age: 43
Discharge: HOME | End: 2018-06-06
Payer: MEDICARE

## 2018-06-06 ENCOUNTER — TELEPHONE (OUTPATIENT)
Dept: PRIMARY CARE CLINIC | Age: 43
End: 2018-06-06

## 2018-06-06 VITALS
TEMPERATURE: 98.4 F | BODY MASS INDEX: 26.75 KG/M2 | HEIGHT: 63 IN | SYSTOLIC BLOOD PRESSURE: 106 MMHG | HEART RATE: 90 BPM | OXYGEN SATURATION: 98 % | DIASTOLIC BLOOD PRESSURE: 70 MMHG | WEIGHT: 151 LBS

## 2018-06-06 DIAGNOSIS — D72.829 LEUKOCYTOSIS, UNSPECIFIED TYPE: ICD-10-CM

## 2018-06-06 DIAGNOSIS — M25.562 LEFT KNEE PAIN, UNSPECIFIED CHRONICITY: Primary | ICD-10-CM

## 2018-06-06 DIAGNOSIS — R09.89 OTHER SPECIFIED SYMPTOMS AND SIGNS INVOLVING THE CIRCULATORY AND RESPIRATORY SYSTEMS: ICD-10-CM

## 2018-06-06 DIAGNOSIS — R23.0 PERIPHERAL CYANOSIS: ICD-10-CM

## 2018-06-06 DIAGNOSIS — M17.0 PRIMARY OSTEOARTHRITIS OF BOTH KNEES: Primary | ICD-10-CM

## 2018-06-06 DIAGNOSIS — M17.0 PRIMARY OSTEOARTHRITIS OF BOTH KNEES: ICD-10-CM

## 2018-06-06 DIAGNOSIS — R23.3 EASY BRUISING: ICD-10-CM

## 2018-06-06 DIAGNOSIS — M79.605 LEG PAIN, LEFT: ICD-10-CM

## 2018-06-06 DIAGNOSIS — M79.605 LEFT LEG PAIN: ICD-10-CM

## 2018-06-06 DIAGNOSIS — E87.6 LOW BLOOD POTASSIUM: Primary | ICD-10-CM

## 2018-06-06 LAB
ALBUMIN SERPL-MCNC: 4.8 G/DL (ref 3.5–5.2)
ALP BLD-CCNC: 150 U/L (ref 35–104)
ALT SERPL-CCNC: 24 U/L (ref 5–33)
ANION GAP SERPL CALCULATED.3IONS-SCNC: 22 MMOL/L (ref 7–19)
AST SERPL-CCNC: 12 U/L (ref 5–32)
BASOPHILS ABSOLUTE: 0.1 K/UL (ref 0–0.2)
BASOPHILS RELATIVE PERCENT: 0.7 % (ref 0–1)
BILIRUB SERPL-MCNC: 0.5 MG/DL (ref 0.2–1.2)
BUN BLDV-MCNC: 6 MG/DL (ref 6–20)
CALCIUM SERPL-MCNC: 10 MG/DL (ref 8.6–10)
CHLORIDE BLD-SCNC: 99 MMOL/L (ref 98–111)
CO2: 24 MMOL/L (ref 22–29)
CREAT SERPL-MCNC: 0.6 MG/DL (ref 0.5–0.9)
EOSINOPHILS ABSOLUTE: 0.1 K/UL (ref 0–0.6)
EOSINOPHILS RELATIVE PERCENT: 1 % (ref 0–5)
GFR NON-AFRICAN AMERICAN: >60
GLUCOSE BLD-MCNC: 84 MG/DL (ref 74–109)
HCT VFR BLD CALC: 43.9 % (ref 37–47)
HEMOGLOBIN: 14.2 G/DL (ref 12–16)
LYMPHOCYTES ABSOLUTE: 3.8 K/UL (ref 1.1–4.5)
LYMPHOCYTES RELATIVE PERCENT: 30.6 % (ref 20–40)
MCH RBC QN AUTO: 33.4 PG (ref 27–31)
MCHC RBC AUTO-ENTMCNC: 32.3 G/DL (ref 33–37)
MCV RBC AUTO: 103.3 FL (ref 81–99)
MONOCYTES ABSOLUTE: 0.5 K/UL (ref 0–0.9)
MONOCYTES RELATIVE PERCENT: 4.3 % (ref 0–10)
NEUTROPHILS ABSOLUTE: 7.8 K/UL (ref 1.5–7.5)
NEUTROPHILS RELATIVE PERCENT: 62.2 % (ref 50–65)
PDW BLD-RTO: 14.6 % (ref 11.5–14.5)
PLATELET # BLD: 317 K/UL (ref 130–400)
PMV BLD AUTO: 9 FL (ref 9.4–12.3)
POTASSIUM SERPL-SCNC: 2.9 MMOL/L (ref 3.5–5)
RBC # BLD: 4.25 M/UL (ref 4.2–5.4)
SODIUM BLD-SCNC: 145 MMOL/L (ref 136–145)
TOTAL PROTEIN: 8.5 G/DL (ref 6.6–8.7)
WBC # BLD: 12.5 K/UL (ref 4.8–10.8)

## 2018-06-06 PROCEDURE — G8419 CALC BMI OUT NRM PARAM NOF/U: HCPCS | Performed by: NURSE PRACTITIONER

## 2018-06-06 PROCEDURE — G8427 DOCREV CUR MEDS BY ELIG CLIN: HCPCS | Performed by: NURSE PRACTITIONER

## 2018-06-06 PROCEDURE — 93971 EXTREMITY STUDY: CPT

## 2018-06-06 PROCEDURE — 99213 OFFICE O/P EST LOW 20 MIN: CPT | Performed by: NURSE PRACTITIONER

## 2018-06-06 PROCEDURE — 1036F TOBACCO NON-USER: CPT | Performed by: NURSE PRACTITIONER

## 2018-06-06 PROCEDURE — 73562 X-RAY EXAM OF KNEE 3: CPT

## 2018-06-06 RX ORDER — POTASSIUM CHLORIDE 20 MEQ/1
40 TABLET, EXTENDED RELEASE ORAL DAILY
Qty: 8 TABLET | Refills: 0 | Status: SHIPPED | OUTPATIENT
Start: 2018-06-06 | End: 2018-06-26 | Stop reason: CLARIF

## 2018-06-06 ASSESSMENT — ENCOUNTER SYMPTOMS
SHORTNESS OF BREATH: 0
CONSTIPATION: 0
NAUSEA: 0
RHINORRHEA: 0
DIARRHEA: 0
COUGH: 0
VOMITING: 0
SINUS PRESSURE: 0
SORE THROAT: 0
ABDOMINAL PAIN: 0
TROUBLE SWALLOWING: 0

## 2018-06-08 ENCOUNTER — TELEPHONE (OUTPATIENT)
Dept: PRIMARY CARE CLINIC | Age: 43
End: 2018-06-08

## 2018-06-08 ENCOUNTER — HOSPITAL ENCOUNTER (OUTPATIENT)
Dept: VASCULAR LAB | Age: 43
Discharge: HOME | End: 2018-06-08
Payer: MEDICARE

## 2018-06-08 DIAGNOSIS — I73.9 PVD (PERIPHERAL VASCULAR DISEASE) (HCC): ICD-10-CM

## 2018-06-08 DIAGNOSIS — R23.0 CYANOSIS: ICD-10-CM

## 2018-06-08 DIAGNOSIS — I73.9 PVD (PERIPHERAL VASCULAR DISEASE) (HCC): Primary | ICD-10-CM

## 2018-06-08 PROCEDURE — 93923 UPR/LXTR ART STDY 3+ LVLS: CPT

## 2018-06-11 ENCOUNTER — TELEPHONE (OUTPATIENT)
Dept: PRIMARY CARE CLINIC | Age: 43
End: 2018-06-11

## 2018-06-11 DIAGNOSIS — I73.9 PAD (PERIPHERAL ARTERY DISEASE) (HCC): Primary | ICD-10-CM

## 2018-06-11 DIAGNOSIS — J30.2 SEASONAL ALLERGIC RHINITIS: ICD-10-CM

## 2018-06-11 RX ORDER — FLUTICASONE PROPIONATE 50 MCG
SPRAY, SUSPENSION (ML) NASAL
Qty: 1 BOTTLE | Refills: 11 | Status: SHIPPED | OUTPATIENT
Start: 2018-06-11 | End: 2018-12-19

## 2018-06-12 ENCOUNTER — TELEPHONE (OUTPATIENT)
Dept: PRIMARY CARE CLINIC | Age: 43
End: 2018-06-12

## 2018-06-12 DIAGNOSIS — D72.829 LEUKOCYTOSIS, UNSPECIFIED TYPE: ICD-10-CM

## 2018-06-12 DIAGNOSIS — E87.6 LOW BLOOD POTASSIUM: ICD-10-CM

## 2018-06-12 LAB
ANION GAP SERPL CALCULATED.3IONS-SCNC: 19 MMOL/L (ref 7–19)
BASOPHILS ABSOLUTE: 0.1 K/UL (ref 0–0.2)
BASOPHILS RELATIVE PERCENT: 0.8 % (ref 0–1)
BUN BLDV-MCNC: 14 MG/DL (ref 6–20)
CALCIUM SERPL-MCNC: 10 MG/DL (ref 8.6–10)
CHLORIDE BLD-SCNC: 98 MMOL/L (ref 98–111)
CO2: 24 MMOL/L (ref 22–29)
CREAT SERPL-MCNC: 0.7 MG/DL (ref 0.5–0.9)
EOSINOPHILS ABSOLUTE: 0.1 K/UL (ref 0–0.6)
EOSINOPHILS RELATIVE PERCENT: 0.8 % (ref 0–5)
GFR NON-AFRICAN AMERICAN: >60
GLUCOSE BLD-MCNC: 95 MG/DL (ref 74–109)
HCT VFR BLD CALC: 41 % (ref 37–47)
HEMOGLOBIN: 13.2 G/DL (ref 12–16)
LYMPHOCYTES ABSOLUTE: 3.1 K/UL (ref 1.1–4.5)
LYMPHOCYTES RELATIVE PERCENT: 26.3 % (ref 20–40)
MCH RBC QN AUTO: 33.7 PG (ref 27–31)
MCHC RBC AUTO-ENTMCNC: 32.2 G/DL (ref 33–37)
MCV RBC AUTO: 104.6 FL (ref 81–99)
MONOCYTES ABSOLUTE: 0.6 K/UL (ref 0–0.9)
MONOCYTES RELATIVE PERCENT: 4.7 % (ref 0–10)
NEUTROPHILS ABSOLUTE: 7.8 K/UL (ref 1.5–7.5)
NEUTROPHILS RELATIVE PERCENT: 66.3 % (ref 50–65)
PDW BLD-RTO: 14.7 % (ref 11.5–14.5)
PLATELET # BLD: 286 K/UL (ref 130–400)
PMV BLD AUTO: 9.2 FL (ref 9.4–12.3)
POTASSIUM SERPL-SCNC: 4.2 MMOL/L (ref 3.5–5)
RBC # BLD: 3.92 M/UL (ref 4.2–5.4)
SODIUM BLD-SCNC: 141 MMOL/L (ref 136–145)
WBC # BLD: 11.8 K/UL (ref 4.8–10.8)

## 2018-06-20 ENCOUNTER — OFFICE VISIT (OUTPATIENT)
Dept: PRIMARY CARE CLINIC | Age: 43
End: 2018-06-20
Payer: MEDICARE

## 2018-06-20 ENCOUNTER — HOSPITAL ENCOUNTER (OUTPATIENT)
Dept: GENERAL RADIOLOGY | Age: 43
Discharge: HOME | End: 2018-06-20
Payer: MEDICARE

## 2018-06-20 VITALS
HEIGHT: 63 IN | TEMPERATURE: 97.9 F | HEART RATE: 80 BPM | WEIGHT: 129.75 LBS | OXYGEN SATURATION: 95 % | SYSTOLIC BLOOD PRESSURE: 106 MMHG | BODY MASS INDEX: 22.99 KG/M2 | DIASTOLIC BLOOD PRESSURE: 70 MMHG

## 2018-06-20 DIAGNOSIS — H00.014 HORDEOLUM EXTERNUM OF LEFT UPPER EYELID: ICD-10-CM

## 2018-06-20 DIAGNOSIS — R10.9 CHRONIC ABDOMINAL PAIN: ICD-10-CM

## 2018-06-20 DIAGNOSIS — E78.2 MIXED HYPERLIPIDEMIA: ICD-10-CM

## 2018-06-20 DIAGNOSIS — G89.4 CHRONIC PAIN SYNDROME: ICD-10-CM

## 2018-06-20 DIAGNOSIS — M25.561 CHRONIC PAIN OF RIGHT KNEE: ICD-10-CM

## 2018-06-20 DIAGNOSIS — Z79.899 MEDICATION MANAGEMENT: Primary | ICD-10-CM

## 2018-06-20 DIAGNOSIS — H00.011 HORDEOLUM EXTERNUM OF RIGHT UPPER EYELID: ICD-10-CM

## 2018-06-20 DIAGNOSIS — I99.8 VASCULAR INSUFFICIENCY: ICD-10-CM

## 2018-06-20 DIAGNOSIS — L30.4 INTERTRIGO: ICD-10-CM

## 2018-06-20 DIAGNOSIS — G89.29 CHRONIC ABDOMINAL PAIN: ICD-10-CM

## 2018-06-20 DIAGNOSIS — F41.9 ANXIETY: ICD-10-CM

## 2018-06-20 DIAGNOSIS — L30.9 DERMATITIS: ICD-10-CM

## 2018-06-20 DIAGNOSIS — K21.9 GASTROESOPHAGEAL REFLUX DISEASE WITHOUT ESOPHAGITIS: ICD-10-CM

## 2018-06-20 DIAGNOSIS — G89.29 CHRONIC PAIN OF RIGHT KNEE: ICD-10-CM

## 2018-06-20 DIAGNOSIS — R10.9 ABDOMINAL WALL PAIN: ICD-10-CM

## 2018-06-20 DIAGNOSIS — D72.829 LEUKOCYTOSIS, UNSPECIFIED TYPE: ICD-10-CM

## 2018-06-20 DIAGNOSIS — D75.89 MACROCYTOSIS: ICD-10-CM

## 2018-06-20 PROCEDURE — 1036F TOBACCO NON-USER: CPT | Performed by: PEDIATRICS

## 2018-06-20 PROCEDURE — 74176 CT ABD & PELVIS W/O CONTRAST: CPT

## 2018-06-20 PROCEDURE — 99214 OFFICE O/P EST MOD 30 MIN: CPT | Performed by: PEDIATRICS

## 2018-06-20 PROCEDURE — G8420 CALC BMI NORM PARAMETERS: HCPCS | Performed by: PEDIATRICS

## 2018-06-20 PROCEDURE — G8427 DOCREV CUR MEDS BY ELIG CLIN: HCPCS | Performed by: PEDIATRICS

## 2018-06-20 RX ORDER — LORAZEPAM 1 MG/1
1 TABLET ORAL EVERY 8 HOURS PRN
Qty: 30 TABLET | Refills: 0 | Status: SHIPPED | OUTPATIENT
Start: 2018-06-20 | End: 2018-07-27 | Stop reason: SDUPTHER

## 2018-06-20 RX ORDER — HYDROCODONE BITARTRATE AND ACETAMINOPHEN 7.5; 325 MG/1; MG/1
1 TABLET ORAL EVERY 8 HOURS PRN
Qty: 90 TABLET | Refills: 0 | Status: SHIPPED | OUTPATIENT
Start: 2018-06-20 | End: 2018-07-27 | Stop reason: SDUPTHER

## 2018-06-20 ASSESSMENT — ENCOUNTER SYMPTOMS
EYE PAIN: 0
WHEEZING: 0
VOMITING: 0
BACK PAIN: 0
NAUSEA: 0
SORE THROAT: 1
DIARRHEA: 0
SHORTNESS OF BREATH: 0
COUGH: 0
SINUS PRESSURE: 0
ABDOMINAL PAIN: 0

## 2018-06-21 ENCOUNTER — TELEPHONE (OUTPATIENT)
Dept: PRIMARY CARE CLINIC | Age: 43
End: 2018-06-21

## 2018-06-22 ENCOUNTER — TELEPHONE (OUTPATIENT)
Dept: PRIMARY CARE CLINIC | Age: 43
End: 2018-06-22

## 2018-06-22 DIAGNOSIS — M87.052 AVASCULAR NECROSIS OF BONE OF LEFT HIP (HCC): Primary | ICD-10-CM

## 2018-06-25 RX ORDER — FLUCONAZOLE 150 MG/1
150 TABLET ORAL ONCE
Qty: 2 TABLET | Refills: 0 | Status: SHIPPED | OUTPATIENT
Start: 2018-06-25 | End: 2018-06-25

## 2018-06-25 RX ORDER — FLUCONAZOLE 100 MG/1
150 TABLET ORAL DAILY
Qty: 1 TABLET | Refills: 0 | Status: SHIPPED | OUTPATIENT
Start: 2018-06-25 | End: 2018-06-25

## 2018-06-26 ENCOUNTER — OFFICE VISIT (OUTPATIENT)
Dept: VASCULAR SURGERY | Age: 43
End: 2018-06-26
Payer: MEDICARE

## 2018-06-26 VITALS
RESPIRATION RATE: 18 BRPM | HEART RATE: 93 BPM | TEMPERATURE: 96.1 F | SYSTOLIC BLOOD PRESSURE: 109 MMHG | DIASTOLIC BLOOD PRESSURE: 78 MMHG

## 2018-06-26 DIAGNOSIS — M25.552 PAIN OF LEFT HIP JOINT: ICD-10-CM

## 2018-06-26 DIAGNOSIS — G81.94 HEMIPLEGIA AFFECTING LEFT NONDOMINANT SIDE, UNSPECIFIED ETIOLOGY, UNSPECIFIED HEMIPLEGIA TYPE (HCC): ICD-10-CM

## 2018-06-26 DIAGNOSIS — Z99.3 WHEELCHAIR BOUND: ICD-10-CM

## 2018-06-26 DIAGNOSIS — M25.562 CHRONIC PAIN OF LEFT KNEE: ICD-10-CM

## 2018-06-26 DIAGNOSIS — G89.29 CHRONIC PAIN OF LEFT KNEE: ICD-10-CM

## 2018-06-26 DIAGNOSIS — I73.9 PVD (PERIPHERAL VASCULAR DISEASE) (HCC): Primary | ICD-10-CM

## 2018-06-26 PROCEDURE — G8427 DOCREV CUR MEDS BY ELIG CLIN: HCPCS | Performed by: PHYSICIAN ASSISTANT

## 2018-06-26 PROCEDURE — 99203 OFFICE O/P NEW LOW 30 MIN: CPT | Performed by: PHYSICIAN ASSISTANT

## 2018-06-26 PROCEDURE — 1036F TOBACCO NON-USER: CPT | Performed by: PHYSICIAN ASSISTANT

## 2018-06-26 PROCEDURE — G8420 CALC BMI NORM PARAMETERS: HCPCS | Performed by: PHYSICIAN ASSISTANT

## 2018-06-29 PROBLEM — I73.9 PVD (PERIPHERAL VASCULAR DISEASE) (HCC): Status: ACTIVE | Noted: 2018-06-29

## 2018-06-30 DIAGNOSIS — R56.9 SEIZURES (HCC): ICD-10-CM

## 2018-07-02 ENCOUNTER — TELEPHONE (OUTPATIENT)
Dept: PRIMARY CARE CLINIC | Age: 43
End: 2018-07-02

## 2018-07-02 RX ORDER — AZITHROMYCIN 250 MG/1
TABLET, FILM COATED ORAL
Qty: 1 PACKET | Refills: 0 | Status: SHIPPED | OUTPATIENT
Start: 2018-07-02 | End: 2018-07-06

## 2018-07-02 RX ORDER — LAMOTRIGINE 200 MG/1
200 TABLET ORAL 2 TIMES DAILY
Qty: 180 TABLET | Refills: 3 | Status: SHIPPED | OUTPATIENT
Start: 2018-07-02 | End: 2019-07-09 | Stop reason: SDUPTHER

## 2018-07-02 NOTE — TELEPHONE ENCOUNTER
Pt went to eye doc today in [facility] for bilateral stye in eyes. They wanted to put her on bactrim even though she is allergic.  Mother req that you please change this

## 2018-07-02 NOTE — TELEPHONE ENCOUNTER
Pt mother aware and voiced understanding. Informed patient of any recommendations from providers. Will call with any further questions.

## 2018-07-27 ENCOUNTER — OFFICE VISIT (OUTPATIENT)
Dept: PRIMARY CARE CLINIC | Age: 43
End: 2018-07-27
Payer: MEDICARE

## 2018-07-27 VITALS
WEIGHT: 119.5 LBS | OXYGEN SATURATION: 95 % | HEIGHT: 63 IN | HEART RATE: 72 BPM | TEMPERATURE: 97.7 F | BODY MASS INDEX: 21.17 KG/M2 | SYSTOLIC BLOOD PRESSURE: 118 MMHG | DIASTOLIC BLOOD PRESSURE: 68 MMHG

## 2018-07-27 DIAGNOSIS — G81.94 HEMIPLEGIA AFFECTING LEFT NONDOMINANT SIDE, UNSPECIFIED ETIOLOGY, UNSPECIFIED HEMIPLEGIA TYPE (HCC): ICD-10-CM

## 2018-07-27 DIAGNOSIS — Z79.899 MEDICATION MANAGEMENT: ICD-10-CM

## 2018-07-27 DIAGNOSIS — G89.4 CHRONIC PAIN SYNDROME: Primary | ICD-10-CM

## 2018-07-27 DIAGNOSIS — J30.2 CHRONIC SEASONAL ALLERGIC RHINITIS, UNSPECIFIED TRIGGER: ICD-10-CM

## 2018-07-27 DIAGNOSIS — Z99.3 WHEELCHAIR BOUND: ICD-10-CM

## 2018-07-27 DIAGNOSIS — F41.9 ANXIETY: ICD-10-CM

## 2018-07-27 PROCEDURE — G8420 CALC BMI NORM PARAMETERS: HCPCS | Performed by: NURSE PRACTITIONER

## 2018-07-27 PROCEDURE — 96372 THER/PROPH/DIAG INJ SC/IM: CPT | Performed by: NURSE PRACTITIONER

## 2018-07-27 PROCEDURE — 1036F TOBACCO NON-USER: CPT | Performed by: NURSE PRACTITIONER

## 2018-07-27 PROCEDURE — G8427 DOCREV CUR MEDS BY ELIG CLIN: HCPCS | Performed by: NURSE PRACTITIONER

## 2018-07-27 PROCEDURE — 99214 OFFICE O/P EST MOD 30 MIN: CPT | Performed by: NURSE PRACTITIONER

## 2018-07-27 RX ORDER — DESLORATADINE 5 MG/1
5 TABLET ORAL DAILY
Qty: 30 TABLET | Refills: 5 | Status: SHIPPED | OUTPATIENT
Start: 2018-07-27 | End: 2018-12-21 | Stop reason: SDUPTHER

## 2018-07-27 RX ORDER — HYDROCODONE BITARTRATE AND ACETAMINOPHEN 7.5; 325 MG/1; MG/1
1 TABLET ORAL EVERY 8 HOURS PRN
Qty: 90 TABLET | Refills: 0 | Status: SHIPPED | OUTPATIENT
Start: 2018-07-27 | End: 2018-08-20 | Stop reason: SDUPTHER

## 2018-07-27 RX ORDER — MEDROXYPROGESTERONE ACETATE 150 MG/ML
150 INJECTION, SUSPENSION INTRAMUSCULAR ONCE
Status: COMPLETED | OUTPATIENT
Start: 2018-07-27 | End: 2018-07-27

## 2018-07-27 RX ORDER — LORAZEPAM 1 MG/1
1 TABLET ORAL EVERY 8 HOURS PRN
Qty: 30 TABLET | Refills: 0 | Status: SHIPPED | OUTPATIENT
Start: 2018-07-27 | End: 2018-08-20 | Stop reason: SDUPTHER

## 2018-07-27 RX ADMIN — MEDROXYPROGESTERONE ACETATE 150 MG: 150 INJECTION, SUSPENSION INTRAMUSCULAR at 11:13

## 2018-07-27 ASSESSMENT — ENCOUNTER SYMPTOMS
BACK PAIN: 1
SHORTNESS OF BREATH: 0
RHINORRHEA: 1
EYES NEGATIVE: 1
SORE THROAT: 0
COUGH: 0
TROUBLE SWALLOWING: 0
WHEEZING: 0

## 2018-07-27 NOTE — PROGRESS NOTES
Differential   Result Value Ref Range    WBC 11.8 (H) 4.8 - 10.8 K/uL    RBC 3.92 (L) 4.20 - 5.40 M/uL    Hemoglobin 13.2 12.0 - 16.0 g/dL    Hematocrit 41.0 37.0 - 47.0 %    .6 (H) 81.0 - 99.0 fL    MCH 33.7 (H) 27.0 - 31.0 pg    MCHC 32.2 (L) 33.0 - 37.0 g/dL    RDW 14.7 (H) 11.5 - 14.5 %    Platelets 596 974 - 741 K/uL    MPV 9.2 (L) 9.4 - 12.3 fL    Neutrophils % 66.3 (H) 50.0 - 65.0 %    Lymphocytes % 26.3 20.0 - 40.0 %    Monocytes % 4.7 0.0 - 10.0 %    Eosinophils % 0.8 0.0 - 5.0 %    Basophils % 0.8 0.0 - 1.0 %    Neutrophils # 7.8 (H) 1.5 - 7.5 K/uL    Lymphocytes # 3.1 1.1 - 4.5 K/uL    Monocytes # 0.60 0.00 - 0.90 K/uL    Eosinophils # 0.10 0.00 - 0.60 K/uL    Basophils # 0.10 0.00 - 0.20 K/uL       I have reviewed the following with the Ms. Shah   Lab Review   Orders Only on 06/12/2018   Component Date Value    Sodium 06/12/2018 141     Potassium 06/12/2018 4.2     Chloride 06/12/2018 98     CO2 06/12/2018 24     Anion Gap 06/12/2018 19     Glucose 06/12/2018 95     BUN 06/12/2018 14     CREATININE 06/12/2018 0.7     GFR Non- 06/12/2018 >60     Calcium 06/12/2018 10.0     WBC 06/12/2018 11.8*    RBC 06/12/2018 3.92*    Hemoglobin 06/12/2018 13.2     Hematocrit 06/12/2018 41.0     MCV 06/12/2018 104.6*    MCH 06/12/2018 33.7*    MCHC 06/12/2018 32.2*    RDW 06/12/2018 14.7*    Platelets 68/67/9009 286     MPV 06/12/2018 9.2*    Neutrophils % 06/12/2018 66.3*    Lymphocytes % 06/12/2018 26.3     Monocytes % 06/12/2018 4.7     Eosinophils % 06/12/2018 0.8     Basophils % 06/12/2018 0.8     Neutrophils # 06/12/2018 7.8*    Lymphocytes # 06/12/2018 3.1     Monocytes # 06/12/2018 0.60     Eosinophils # 06/12/2018 0.10     Basophils # 06/12/2018 0.10    Orders Only on 06/06/2018   Component Date Value    WBC 06/06/2018 12.5*    RBC 06/06/2018 4.25     Hemoglobin 06/06/2018 14.2     Hematocrit 06/06/2018 43.9     MCV 06/06/2018 103.3*    MCH 06/06/2018 33.4*    MCHC 06/06/2018 32.3*    RDW 06/06/2018 14.6*    Platelets 07/57/2269 317     MPV 06/06/2018 9.0*    Neutrophils % 06/06/2018 62.2     Lymphocytes % 06/06/2018 30.6     Monocytes % 06/06/2018 4.3     Eosinophils % 06/06/2018 1.0     Basophils % 06/06/2018 0.7     Neutrophils # 06/06/2018 7.8*    Lymphocytes # 06/06/2018 3.8     Monocytes # 06/06/2018 0.50     Eosinophils # 06/06/2018 0.10     Basophils # 06/06/2018 0.10     Sodium 06/06/2018 145     Potassium 06/06/2018 2.9*    Chloride 06/06/2018 99     CO2 06/06/2018 24     Anion Gap 06/06/2018 22*    Glucose 06/06/2018 84     BUN 06/06/2018 6     CREATININE 06/06/2018 0.6     GFR Non- 06/06/2018 >60     Calcium 06/06/2018 10.0     Total Protein 06/06/2018 8.5     Alb 06/06/2018 4.8     Total Bilirubin 06/06/2018 0.5     Alkaline Phosphatase 06/06/2018 150*    ALT 06/06/2018 24     AST 06/06/2018 12    Office Visit on 02/09/2018   Component Date Value    Urine Culture, Routine 02/09/2018 <50,000 CFU/ml mixed skin/urogenital jasper. No further workup     Color, UA 02/09/2018 orange     Clarity, UA 02/09/2018 clear     Glucose, UA POC 02/09/2018 100     Bilirubin, UA 02/09/2018 moderate     Ketones, UA 02/09/2018 trace     Spec Grav, UA 02/09/2018 1.020     Blood, UA POC 02/09/2018 neg     pH, UA 02/09/2018 6.0     Protein, UA POC 02/09/2018 100     Urobilinogen, UA 02/09/2018 8.0     Leukocytes, UA 02/09/2018 neg     Nitrite, UA 02/09/2018 pos      Copies of these are in the chart. Prior to Visit Medications    Medication Sig Taking? Authorizing Provider   HYDROcodone-acetaminophen (NORCO) 7.5-325 MG per tablet Take 1 tablet by mouth every 8 hours as needed for Pain for up to 30 days. Nicole Balloon, APRN   LORazepam (ATIVAN) 1 MG tablet Take 1 tablet by mouth every 8 hours as needed for Anxiety for up to 30 days. LOVELY Karimi   desloratadine (CLARINEX) 5 MG tablet Take 1 tablet by mouth daily Yes LOVELY Duran   LAMICTAL 200 MG tablet Take 1 tablet by mouth 2 times daily Yes B Daryle Chasten, DO   hydrocortisone 2.5 % cream APPLY EXTERNALLY TO THE AFFECTED AREA TWICE DAILY AS DIRECTED Yes LOVELY Wolff   fluticasone (FLONASE) 50 MCG/ACT nasal spray SHAKE LIQUID AND USE 2 SPRAYS IN EACH NOSTRIL DAILY Yes LOVELY Duran   clotrimazole-betamethasone (LOTRISONE) 1-0.05 % cream Apply topically 2 times daily. Yes LOVELY Wolff   celecoxib (CELEBREX) 200 MG capsule Take 1 capsule by mouth daily Yes B Daryle Chasten, DO   ondansetron (ZOFRAN-ODT) 4 MG disintegrating tablet DISSOLVE 1 TABLET ON THE TONGUE EVERY 8 HOURS AS NEEDED FOR NAUSEA OR VOMITING Yes LOVELY Duran   diclofenac sodium (VOLTAREN) 1 % GEL Apply 2 g topically 4 times daily Yes LOVELY Wolff   albuterol (PROVENTIL) (2.5 MG/3ML) 0.083% nebulizer solution Take 3 mLs by nebulization every 6 hours as needed for Wheezing (coughing) Yes B Daryle Chasten, DO   hydrOXYzine (ATARAX) 25 MG tablet TK 1 T PO TID PRF ITCHING PRN Yes Historical Provider, MD   SENNA PLUS 8.6-50 MG per tablet Take 1 tablet by mouth 2 times daily Yes B Daryle Chasten, DO   omeprazole (PRILOSEC) 20 MG delayed release capsule Take 1 capsule by mouth Daily Yes B Daryle Chasten, DO   ranitidine (ZANTAC) 150 MG tablet TAKE 1 TABLET TWICE A DAY FOR STOMACH-REFLUX TROUBLE Yes B Daryle Chasten, DO   polyethylene glycol (GLYCOLAX) packet Take 17 g by mouth daily 1-2 capfuls per day titrate up or down to have a soft bowel movement daily. Yes B Daryle Chasten, DO   EPINEPHrine (EPIPEN 2-ADAN) 0.3 MG/0.3ML SOAJ injection Inject 0.3 mLs into the muscle once for 1 dose Use as directed for allergic reaction Yes B Daryle Chasten, DO       Allergies: Bactrim [sulfamethoxazole-trimethoprim]; Ciprofloxacin; Ciprofloxacin hcl; Depakote [divalproex sodium];  Dilantin [phenytoin sodium extended]; Keflex [cephalexin]; Pcn [penicillins]; Primaxin [imipenem]; Unasyn [ampicillin-sulbactam sodium]; and Wasp venom    Past Medical History:   Diagnosis Date    Arthritis     GERD (gastroesophageal reflux disease)     History of blood transfusion     Incontinence     Seizures ([address]        Past Surgical History:   Procedure Laterality Date    APPENDECTOMY      CHOLECYSTECTOMY      CSF SHUNT Right     ND REMV CATARACT EXTRACAP,INSERT LENS Left 6/15/2017    EYE CATARACT EMULSIFICATION IOL IMPLANT performed by Gretchen Beltran MD at [facility]. [address] Right 8/4/2017    CATARCAT EXTRACTION EYE WITH IOL performed [facility]chen [facility]an [facility] at [facility]       Social History   Substance Use Topics    Smoking status: Former Smoker     Packs/day: 1.00     Years: 19.00     Types: Cigarettes     Quit date: 2/14/2015    Smokeless tobacco: Never Used      Comment: quit smoking  6 yrs ago    Alcohol use No       Review of Systems   Constitutional: Negative for activity change, appetite change, fatigue and fever. HENT: Positive for congestion, postnasal drip and rhinorrhea. Negative for sore throat and trouble swallowing. Eyes: Negative. Respiratory: Negative for cough, shortness of breath and wheezing. Genitourinary: Negative for difficulty urinating. Musculoskeletal: Positive for arthralgias (left hip worse ) and back pain. Norco tid as needed refill   Skin: Negative for rash. Neurological:        Quadraplasia     Psychiatric/Behavioral: The patient is nervous/anxious (ativan as needed). Physical Exam   Constitutional: She is oriented to person, place, and time. She appears well-developed and well-nourished. Wheelchair     HENT:   Head: Normocephalic. Left Ear: External ear normal.   Clear post nasal drip     Eyes: Right eye exhibits no discharge. Left eye exhibits no discharge.    Cardiovascular: Normal rate, regular rhythm, normal heart sounds and intact day.  · Does not go away despite efforts to end it. · May disrupt your sleep and lead to fatigue. · May cause depression or anxiety. · May make your muscles tense, causing more pain. · Can disrupt your work, hobbies, home life, and relationships with friends and family. Chronic pain is a very real condition. It is not just in your head. Treatment can help and usually includes several methods used together, such as medicines, physical therapy, exercise, and other treatments. Learning how to relax and changing negative thought patterns can also help you cope. Chronic pain is complex. Taking an active role in your treatment will help you better manage your pain. Tell your doctor if you have trouble dealing with your pain. You may have to try several things before you find what works best for you. Follow-up care is a key part of your treatment and safety. Be sure to make and go to all appointments, and call your doctor if you are having problems. It's also a good idea to know your test results and keep a list of the medicines you take. How can you care for yourself at home? · Pace yourself. Break up large jobs into smaller tasks. Save harder tasks for days when you have less pain, or go back and forth between hard tasks and easier ones. Take rest breaks. · Relax, and reduce stress. Relaxation techniques such as deep breathing or meditation can help. · Keep moving. Gentle, daily exercise can help reduce pain over the long run. Try low- or no-impact exercises such as walking, swimming, and stationary biking. Do stretches to stay flexible. · Try heat, cold packs, and massage. · Get enough sleep. Chronic pain can make you tired and drain your energy. Talk with your doctor if you have trouble sleeping because of pain. · Think positive. Your thoughts can affect your pain level. Do things that you enjoy to distract yourself when you have pain instead of focusing on the pain.  See a movie, read a book, listen to have trouble taking your pain medicine.     · You have any concerns about your pain medicine.     · You have trouble with bowel movements, such as:  ¨ No bowel movement in 3 days. ¨ Blood in the anal area, in your stool, or on the toilet paper. ¨ Diarrhea for more than 24 hours. Where can you learn more? Go to https://[email or web address].Lamppost. org and sign in to your AppSamet account. Enter N004 in the Sqrl box to learn more about \"Chronic Pain: Care Instructions. \"     If you do not have an account, please click on the \"Sign Up Now\" link. Current as of: October 9, 2017  Content Version: 11.6  © 5200-0238 [employer]. Care instructions adapted under license by [facility] [place] ([facility]). If you have questions about a medical condition or this instruction, always ask your healthcare professional. [place] [address] any warranty or liability for your use of this information. Controlled Substances Monitoring:     Attestation: The Prescription Monitoring Report for this patient was reviewed today. LOVELY Mcmahon)  Documentation: Obtaining appropriate analgesic effect of treatment., No signs of potential drug abuse or diversion identified. , Possible medication side effects, risk of tolerance/dependence & alternative treatments discussed. ([id number]) LOVELY Mcmahon)  Acute Pain Prescriptions: Severe pain not adequately treated with lower dose. LOVELY Mcmahon)  Chronic Pain: Treatment objectives documented - patient is progressing appropriately. , Functional status reviewed - continues with improved or maintaining ADL's. LOVELY Mcmahon)            Additional Instructions: As always, patient is advised to bring in medication bottles in order to correctly reconcile with our current list.      Latesha Neal received counseling on the following healthy behaviors: none    Patient given educational materials on plan of care    I have instructed Alicia Dean to complete a self tracking handout on  none and instructed them to bring it with them to her next appointment. Discussed use, benefit, and side effects of prescribed medications. Barriers to medication compliance addressed. All patient questions answered. Pt voiced understanding.      Anita Sierra, LOVELY

## 2018-07-27 NOTE — PATIENT INSTRUCTIONS
Patient Education        Seasonal Allergies: Care Instructions  Your Care Instructions  Allergies occur when your body's defense system (immune system) overreacts to certain substances. The immune system treats a harmless substance as if it were a harmful germ or virus. Many things can cause this to happen. Examples include pollens, medicine, food, dust, animal dander, and mold. Your allergies are seasonal if you have symptoms just at certain times of the year. In that case, you are probably allergic to pollens from certain trees, grasses, or weeds. Allergies can be mild or severe. Over-the-counter allergy medicine may help with some symptoms. Read and follow all instructions on the label. Managing your allergies is an important part of staying healthy. Your doctor may suggest that you have tests to help find the cause of your allergies. When you know what things trigger your symptoms, you can avoid them. This can prevent allergy symptoms and other health problems. In some cases, immunotherapy might help. For this treatment, you get shots or use pills that have a small amount of certain allergens in them. Your body \"gets used to\" the allergen, so you react less to it over time. This kind of treatment may help prevent or reduce some allergy symptoms. Follow-up care is a key part of your treatment and safety. Be sure to make and go to all appointments, and call your doctor if you are having problems. It's also a good idea to know your test results and keep a list of the medicines you take. How can you care for yourself at home? · Be safe with medicines. Take your medicines exactly as prescribed. Call your doctor if you think you are having a problem with your medicine. · During your allergy season, keep windows closed. If you need to use air-conditioning, change or clean all filters every month. Take a shower and change your clothes after you have been outside. · Stay inside when pollen counts are high. Vacuum once or twice a week. Use a vacuum  with a HEPA filter or a double-thickness filter. When should you call for help? Give an epinephrine shot if:    · You think you are having a severe allergic reaction.    After giving an epinephrine shot, call 911, even if you feel better.   Call 911 if:    · You have symptoms of a severe allergic reaction. These may include:  ¨ Sudden raised, red areas (hives) all over your body. ¨ Swelling of the throat, mouth, lips, or tongue. ¨ Trouble breathing. ¨ Passing out (losing consciousness). Or you may feel very lightheaded or suddenly feel weak, confused, or restless.     · You have been given an epinephrine shot, even if you feel better.    Call your doctor now or seek immediate medical care if:    · You have symptoms of an allergic reaction, such as:  ¨ A rash or hives (raised, red areas on the skin). ¨ Itching. ¨ Swelling. ¨ Belly pain, nausea, or vomiting.    Watch closely for changes in your health, and be sure to contact your doctor if:    · You do not get better as expected. Where can you learn more? Go to https://MogiMepeProteus Biomedical.Browster. org and sign in to your FiTeq account. Enter J912 in the [facility] box to learn more about \"Seasonal Allergies: Care Instructions. \"     If you do not have an account, please click on the \"Sign Up Now\" link. Current as of: October 6, 2017  Content Version: 11.6  © 6978-4160 [employer]. Care instructions adapted under license by [facility] ([facility]). If you have questions about a medical condition or this instruction, always ask your healthcare professional. [place] [address] any warranty or liability for your use of this information. Patient Education        Chronic Pain: Care Instructions  Your Care Instructions    Chronic pain is pain that lasts a long time (months or even years) and may or may not have a clear cause.  It is different from acute pain, which usually does have a clear cause-like an injury or illness-and gets better over time. Chronic pain:  · Lasts over time but may vary from day to day. · Does not go away despite efforts to end it. · May disrupt your sleep and lead to fatigue. · May cause depression or anxiety. · May make your muscles tense, causing more pain. · Can disrupt your work, hobbies, home life, and relationships with friends and family. Chronic pain is a very real condition. It is not just in your head. Treatment can help and usually includes several methods used together, such as medicines, physical therapy, exercise, and other treatments. Learning how to relax and changing negative thought patterns can also help you cope. Chronic pain is complex. Taking an active role in your treatment will help you better manage your pain. Tell your doctor if you have trouble dealing with your pain. You may have to try several things before you find what works best for you. Follow-up care is a key part of your treatment and safety. Be sure to make and go to all appointments, and call your doctor if you are having problems. It's also a good idea to know your test results and keep a list of the medicines you take. How can you care for yourself at home? · Pace yourself. Break up large jobs into smaller tasks. Save harder tasks for days when you have less pain, or go back and forth between hard tasks and easier ones. Take rest breaks. · Relax, and reduce stress. Relaxation techniques such as deep breathing or meditation can help. · Keep moving. Gentle, daily exercise can help reduce pain over the long run. Try low- or no-impact exercises such as walking, swimming, and stationary biking. Do stretches to stay flexible. · Try heat, cold packs, and massage. · Get enough sleep. Chronic pain can make you tired and drain your energy. Talk with your doctor if you have trouble sleeping because of pain. · Think positive. Your thoughts can affect your pain level.

## 2018-08-13 ENCOUNTER — TELEPHONE (OUTPATIENT)
Dept: PRIMARY CARE CLINIC | Age: 43
End: 2018-08-13

## 2018-08-17 ENCOUNTER — ANESTHESIA EVENT (OUTPATIENT)
Dept: OPERATING ROOM | Age: 43
End: 2018-08-17

## 2018-08-17 ENCOUNTER — TELEPHONE (OUTPATIENT)
Dept: PRIMARY CARE CLINIC | Age: 43
End: 2018-08-17

## 2018-08-17 DIAGNOSIS — K21.9 GASTROESOPHAGEAL REFLUX DISEASE WITHOUT ESOPHAGITIS: Primary | ICD-10-CM

## 2018-08-17 RX ORDER — FLUCONAZOLE 150 MG/1
150 TABLET ORAL DAILY
Qty: 3 TABLET | Refills: 0 | Status: SHIPPED | OUTPATIENT
Start: 2018-08-17 | End: 2018-09-27

## 2018-08-17 RX ORDER — OMEPRAZOLE 20 MG/1
20 CAPSULE, DELAYED RELEASE ORAL DAILY
Qty: 90 CAPSULE | Refills: 3 | Status: SHIPPED | OUTPATIENT
Start: 2018-08-17 | End: 2019-08-14 | Stop reason: SDUPTHER

## 2018-08-20 ENCOUNTER — HOSPITAL ENCOUNTER (OUTPATIENT)
Dept: PREADMISSION TESTING | Age: 43
Setting detail: OUTPATIENT SURGERY
Discharge: HOME | End: 2018-08-24

## 2018-08-20 ENCOUNTER — HOSPITAL ENCOUNTER (OUTPATIENT)
Dept: LAB | Age: 43
Discharge: HOME | End: 2018-08-20
Payer: MEDICARE

## 2018-08-20 ENCOUNTER — OFFICE VISIT (OUTPATIENT)
Dept: PRIMARY CARE CLINIC | Age: 43
End: 2018-08-20
Payer: MEDICARE

## 2018-08-20 VITALS
HEIGHT: 63 IN | OXYGEN SATURATION: 98 % | DIASTOLIC BLOOD PRESSURE: 72 MMHG | TEMPERATURE: 98 F | HEART RATE: 76 BPM | WEIGHT: 124.32 LBS | SYSTOLIC BLOOD PRESSURE: 114 MMHG | BODY MASS INDEX: 22.03 KG/M2

## 2018-08-20 DIAGNOSIS — G89.4 CHRONIC PAIN SYNDROME: Primary | ICD-10-CM

## 2018-08-20 DIAGNOSIS — M87.00 AVASCULAR NECROSIS (HCC): ICD-10-CM

## 2018-08-20 DIAGNOSIS — F41.9 ANXIETY: ICD-10-CM

## 2018-08-20 DIAGNOSIS — Z79.899 MEDICATION MANAGEMENT: ICD-10-CM

## 2018-08-20 DIAGNOSIS — M17.11 PRIMARY OSTEOARTHRITIS OF RIGHT KNEE: ICD-10-CM

## 2018-08-20 DIAGNOSIS — H00.011 HORDEOLUM EXTERNUM OF RIGHT UPPER EYELID: ICD-10-CM

## 2018-08-20 LAB
ANION GAP SERPL CALCULATED.3IONS-SCNC: 15 MMOL/L (ref 7–19)
BUN BLDV-MCNC: 10 MG/DL (ref 6–20)
CALCIUM SERPL-MCNC: 9.6 MG/DL (ref 8.6–10)
CHLORIDE BLD-SCNC: 102 MMOL/L (ref 98–111)
CO2: 25 MMOL/L (ref 22–29)
CREAT SERPL-MCNC: 0.6 MG/DL (ref 0.5–0.9)
GFR NON-AFRICAN AMERICAN: >60
GLUCOSE BLD-MCNC: 80 MG/DL (ref 74–109)
POTASSIUM SERPL-SCNC: 3.7 MMOL/L (ref 3.5–5)
SODIUM BLD-SCNC: 142 MMOL/L (ref 136–145)

## 2018-08-20 PROCEDURE — G8427 DOCREV CUR MEDS BY ELIG CLIN: HCPCS | Performed by: PEDIATRICS

## 2018-08-20 PROCEDURE — G8420 CALC BMI NORM PARAMETERS: HCPCS | Performed by: PEDIATRICS

## 2018-08-20 PROCEDURE — 1036F TOBACCO NON-USER: CPT | Performed by: PEDIATRICS

## 2018-08-20 PROCEDURE — 99214 OFFICE O/P EST MOD 30 MIN: CPT | Performed by: PEDIATRICS

## 2018-08-20 RX ORDER — HYDROCODONE BITARTRATE AND ACETAMINOPHEN 7.5; 325 MG/1; MG/1
1 TABLET ORAL EVERY 8 HOURS PRN
Qty: 90 TABLET | Refills: 0 | Status: SHIPPED | OUTPATIENT
Start: 2018-08-20 | End: 2018-09-27 | Stop reason: SDUPTHER

## 2018-08-20 RX ORDER — LORAZEPAM 1 MG/1
1 TABLET ORAL EVERY 8 HOURS PRN
Qty: 30 TABLET | Refills: 0 | Status: SHIPPED | OUTPATIENT
Start: 2018-08-20 | End: 2018-10-23 | Stop reason: SDUPTHER

## 2018-08-20 ASSESSMENT — ENCOUNTER SYMPTOMS
DIARRHEA: 0
SINUS PRESSURE: 0
NAUSEA: 0
WHEEZING: 0
SORE THROAT: 0
BACK PAIN: 0
ABDOMINAL PAIN: 0
SHORTNESS OF BREATH: 0
EYE PAIN: 0
VOMITING: 0
COUGH: 0

## 2018-08-20 NOTE — PROGRESS NOTES
# 06/06/2018 3.8     Monocytes # 06/06/2018 0.50     Eosinophils # 06/06/2018 0.10     Basophils # 06/06/2018 0.10     Sodium 06/06/2018 145     Potassium 06/06/2018 2.9*    Chloride 06/06/2018 99     CO2 06/06/2018 24     Anion Gap 06/06/2018 22*    Glucose 06/06/2018 84     BUN 06/06/2018 6     CREATININE 06/06/2018 0.6     GFR Non- 06/06/2018 >60     Calcium 06/06/2018 10.0     Total Protein 06/06/2018 8.5     Alb 06/06/2018 4.8     Total Bilirubin 06/06/2018 0.5     Alkaline Phosphatase 06/06/2018 150*    ALT 06/06/2018 24     AST 06/06/2018 12      Copies of these are in the chart. Current Outpatient Prescriptions   Medication Sig Dispense Refill    HYDROcodone-acetaminophen (NORCO) 7.5-325 MG per tablet Take 1 tablet by mouth every 8 hours as needed for Pain for up to 30 days. . 90 tablet 0    LORazepam (ATIVAN) 1 MG tablet Take 1 tablet by mouth every 8 hours as needed for Anxiety for up to 30 days. . 30 tablet 0    omeprazole (PRILOSEC) 20 MG delayed release capsule Take 1 capsule by mouth Daily 90 capsule 3    fluconazole (DIFLUCAN) 150 MG tablet Take 1 tablet by mouth daily 3 tablet 0    desloratadine (CLARINEX) 5 MG tablet Take 1 tablet by mouth daily 30 tablet 5    LAMICTAL 200 MG tablet Take 1 tablet by mouth 2 times daily 180 tablet 3    hydrocortisone 2.5 % cream APPLY EXTERNALLY TO THE AFFECTED AREA TWICE DAILY AS DIRECTED 30 g 0    fluticasone (FLONASE) 50 MCG/ACT nasal spray SHAKE LIQUID AND USE 2 SPRAYS IN EACH NOSTRIL DAILY 1 Bottle 11    clotrimazole-betamethasone (LOTRISONE) 1-0.05 % cream Apply topically 2 times daily.  1 Tube 1    celecoxib (CELEBREX) 200 MG capsule Take 1 capsule by mouth daily 60 capsule 3    ondansetron (ZOFRAN-ODT) 4 MG disintegrating tablet DISSOLVE 1 TABLET ON THE TONGUE EVERY 8 HOURS AS NEEDED FOR NAUSEA OR VOMITING 20 tablet 0    diclofenac sodium (VOLTAREN) 1 % GEL Apply 2 g topically 4 times daily 100 g 5    albuterol exhibits no distension ( ) and no mass. There is no hepatosplenomegaly. There is no tenderness. There is no rebound, no guarding and no CVA tenderness. Genitourinary:   Genitourinary Comments: Examination deferred   Musculoskeletal: Normal range of motion. She exhibits no edema or tenderness. Joint examination reveals no acute arthritis or synovitis. Contracted L  UE  She has atrophy of her L LE   Lymphadenopathy:     She has no cervical adenopathy. Neurological: She is alert and oriented to person, place, and time. She has normal strength. She displays no atrophy and no tremor. No cranial nerve deficit (by gross examination) or sensory deficit. Gait normal.   No focal deficits appreciated   Skin: Skin is warm and dry. No rash noted. Psychiatric: She has a normal mood and affect. Her speech is normal and behavior is normal.   Vitals reviewed. ASSESSMENT      ICD-10-CM ICD-9-CM    1. Chronic pain syndrome G89.4 338.4 HYDROcodone-acetaminophen (NORCO) 7.5-325 MG per tablet   2. Medication management Z79.899 V58.69 HYDROcodone-acetaminophen (NORCO) 7.5-325 MG per tablet      LORazepam (ATIVAN) 1 MG tablet   3. Anxiety F41.9 300.00 LORazepam (ATIVAN) 1 MG tablet   4. Avascular necrosis ([address] M87.00 733.40 External Referral To Orthopedic Surgery   5. Primary osteoarthritis of right knee M17.11 715.16    6. Hordeolum externum of right upper eyelid H00.011 373.11        PLAN      ICD-10-CM ICD-9-CM    1. Chronic pain syndrome G89.4 338.4 HYDROcodone-acetaminophen (NORCO) 7.5-325 MG per tablet   2. Medication management Z79.899 V58.69 HYDROcodone-acetaminophen (NORCO) 7.5-325 MG per tablet      LORazepam (ATIVAN) 1 MG tablet   3. Anxiety F41.9 300.00 LORazepam (ATIVAN) 1 MG tablet   4. Avascular necrosis ([address] M87.00 733.40 External Referral To Orthopedic Surgery   5. Primary osteoarthritis of right knee M17.11 715.16 Follow with ortho.    6. Hordeolum externum of right upper eyelid H00.011 373.11 This is going to be excised with opththalmology       Orders Placed This Encounter   Procedures    External Referral To Orthopedic Surgery        Return in about 1 month (around 9/20/2018).

## 2018-08-23 ENCOUNTER — HOSPITAL ENCOUNTER (OUTPATIENT)
Age: 43
Setting detail: OUTPATIENT SURGERY
Discharge: HOME | End: 2018-08-23
Attending: OPHTHALMOLOGY | Admitting: OPHTHALMOLOGY

## 2018-08-23 ENCOUNTER — ANESTHESIA (OUTPATIENT)
Dept: OPERATING ROOM | Age: 43
End: 2018-08-23

## 2018-08-23 VITALS
HEIGHT: 63 IN | DIASTOLIC BLOOD PRESSURE: 90 MMHG | HEART RATE: 108 BPM | OXYGEN SATURATION: 98 % | SYSTOLIC BLOOD PRESSURE: 122 MMHG | BODY MASS INDEX: 21.62 KG/M2 | WEIGHT: 122 LBS | RESPIRATION RATE: 16 BRPM

## 2018-08-23 VITALS
SYSTOLIC BLOOD PRESSURE: 85 MMHG | DIASTOLIC BLOOD PRESSURE: 54 MMHG | OXYGEN SATURATION: 97 % | RESPIRATION RATE: 8 BRPM

## 2018-08-23 PROCEDURE — G8907 PT DOC NO EVENTS ON DISCHARG: HCPCS

## 2018-08-23 PROCEDURE — 67800 REMOVE EYELID LESION: CPT

## 2018-08-23 PROCEDURE — G8918 PT W/O PREOP ORDER IV AB PRO: HCPCS

## 2018-08-23 RX ORDER — LABETALOL HYDROCHLORIDE 5 MG/ML
5 INJECTION, SOLUTION INTRAVENOUS EVERY 10 MIN PRN
Status: DISCONTINUED | OUTPATIENT
Start: 2018-08-23 | End: 2018-08-23 | Stop reason: HOSPADM

## 2018-08-23 RX ORDER — FENTANYL CITRATE 50 UG/ML
INJECTION, SOLUTION INTRAMUSCULAR; INTRAVENOUS PRN
Status: DISCONTINUED | OUTPATIENT
Start: 2018-08-23 | End: 2018-08-23 | Stop reason: SDUPTHER

## 2018-08-23 RX ORDER — PROPOFOL 10 MG/ML
INJECTION, EMULSION INTRAVENOUS PRN
Status: DISCONTINUED | OUTPATIENT
Start: 2018-08-23 | End: 2018-08-23 | Stop reason: SDUPTHER

## 2018-08-23 RX ORDER — DIPHENHYDRAMINE HYDROCHLORIDE 50 MG/ML
12.5 INJECTION INTRAMUSCULAR; INTRAVENOUS
Status: DISCONTINUED | OUTPATIENT
Start: 2018-08-23 | End: 2018-08-23 | Stop reason: HOSPADM

## 2018-08-23 RX ORDER — MIDAZOLAM HYDROCHLORIDE 1 MG/ML
INJECTION INTRAMUSCULAR; INTRAVENOUS PRN
Status: DISCONTINUED | OUTPATIENT
Start: 2018-08-23 | End: 2018-08-23 | Stop reason: SDUPTHER

## 2018-08-23 RX ORDER — PROMETHAZINE HYDROCHLORIDE 25 MG/ML
12.5 INJECTION, SOLUTION INTRAMUSCULAR; INTRAVENOUS
Status: DISCONTINUED | OUTPATIENT
Start: 2018-08-23 | End: 2018-08-23 | Stop reason: HOSPADM

## 2018-08-23 RX ORDER — MEPERIDINE HYDROCHLORIDE 25 MG/ML
12.5 INJECTION INTRAMUSCULAR; INTRAVENOUS; SUBCUTANEOUS EVERY 5 MIN PRN
Status: DISCONTINUED | OUTPATIENT
Start: 2018-08-23 | End: 2018-08-23 | Stop reason: HOSPADM

## 2018-08-23 RX ORDER — LIDOCAINE HYDROCHLORIDE AND EPINEPHRINE 5; 5 MG/ML; UG/ML
INJECTION, SOLUTION INFILTRATION; PERINEURAL PRN
Status: DISCONTINUED | OUTPATIENT
Start: 2018-08-23 | End: 2018-08-23 | Stop reason: HOSPADM

## 2018-08-23 RX ORDER — LIDOCAINE HYDROCHLORIDE 10 MG/ML
1 INJECTION, SOLUTION EPIDURAL; INFILTRATION; INTRACAUDAL; PERINEURAL
Status: DISCONTINUED | OUTPATIENT
Start: 2018-08-23 | End: 2018-08-23 | Stop reason: HOSPADM

## 2018-08-23 RX ORDER — FENTANYL CITRATE 50 UG/ML
50 INJECTION, SOLUTION INTRAMUSCULAR; INTRAVENOUS EVERY 5 MIN PRN
Status: DISCONTINUED | OUTPATIENT
Start: 2018-08-23 | End: 2018-08-23 | Stop reason: HOSPADM

## 2018-08-23 RX ORDER — ONDANSETRON 2 MG/ML
INJECTION INTRAMUSCULAR; INTRAVENOUS PRN
Status: DISCONTINUED | OUTPATIENT
Start: 2018-08-23 | End: 2018-08-23 | Stop reason: SDUPTHER

## 2018-08-23 RX ORDER — SODIUM CHLORIDE, SODIUM LACTATE, POTASSIUM CHLORIDE, CALCIUM CHLORIDE 600; 310; 30; 20 MG/100ML; MG/100ML; MG/100ML; MG/100ML
INJECTION, SOLUTION INTRAVENOUS CONTINUOUS
Status: DISCONTINUED | OUTPATIENT
Start: 2018-08-23 | End: 2018-08-23 | Stop reason: HOSPADM

## 2018-08-23 RX ORDER — HYDRALAZINE HYDROCHLORIDE 20 MG/ML
5 INJECTION INTRAMUSCULAR; INTRAVENOUS EVERY 10 MIN PRN
Status: DISCONTINUED | OUTPATIENT
Start: 2018-08-23 | End: 2018-08-23 | Stop reason: HOSPADM

## 2018-08-23 RX ORDER — ONDANSETRON 2 MG/ML
4 INJECTION INTRAMUSCULAR; INTRAVENOUS
Status: DISCONTINUED | OUTPATIENT
Start: 2018-08-23 | End: 2018-08-23 | Stop reason: HOSPADM

## 2018-08-23 RX ORDER — ERYTHROMYCIN 5 MG/G
OINTMENT OPHTHALMIC PRN
Status: DISCONTINUED | OUTPATIENT
Start: 2018-08-23 | End: 2018-08-23 | Stop reason: HOSPADM

## 2018-08-23 RX ADMIN — FENTANYL CITRATE 50 MCG: 50 INJECTION, SOLUTION INTRAMUSCULAR; INTRAVENOUS at 06:54

## 2018-08-23 RX ADMIN — ONDANSETRON 4 MG: 2 INJECTION INTRAMUSCULAR; INTRAVENOUS at 07:05

## 2018-08-23 RX ADMIN — MIDAZOLAM HYDROCHLORIDE 1 MG: 1 INJECTION INTRAMUSCULAR; INTRAVENOUS at 06:54

## 2018-08-23 RX ADMIN — PROPOFOL 70 MG: 10 INJECTION, EMULSION INTRAVENOUS at 06:55

## 2018-08-23 RX ADMIN — PROPOFOL 80 MG: 10 INJECTION, EMULSION INTRAVENOUS at 06:56

## 2018-08-23 RX ADMIN — SODIUM CHLORIDE, SODIUM LACTATE, POTASSIUM CHLORIDE, CALCIUM CHLORIDE: 600; 310; 30; 20 INJECTION, SOLUTION INTRAVENOUS at 06:32

## 2018-08-23 NOTE — H&P
Pt seen in preop area. No interval changes in history and physical. Plan to proceed with chalazion surgery of right upper eyelid.

## 2018-08-23 NOTE — ANESTHESIA POSTPROCEDURE EVALUATION
Department of Anesthesiology  Postprocedure Note    Patient: Akanksha Jaimes  MRN: [id number]  YOB: 1975  Date of evaluation: 8/23/2018  Time:  7:27 AM     Procedure Summary     Date:  08/23/18 Room / Location:  [facility] OR  / [facility] OR    Anesthesia Start:  4799 Anesthesia Stop:  7934    Procedure:  EYE CHALAZION EXCISION (Right Eye) Diagnosis:  ( )    Surgeon:  Benny Sung MD Responsible Provider: LOVELY Akhtar CRNA    Anesthesia Type:  general ASA Status:  3          Anesthesia Type: general    John Phase I: John Score: 8    John Phase II:      Last vitals: Reviewed and per EMR flowsheets.        Anesthesia Post Evaluation    Patient location during evaluation: PACU  Patient participation: waiting for patient participation  Level of consciousness: responsive to physical stimuli  Airway patency: patent  Nausea & Vomiting: no nausea  Complications: no  Cardiovascular status: blood pressure returned to baseline  Respiratory status: acceptable, face mask, spontaneous ventilation and oral airway  Hydration status: euvolemic

## 2018-08-23 NOTE — ANESTHESIA PRE PROCEDURE
GEL Apply 2 g topically 4 times daily 2/9/18  Yes LOVELY Wolff   albuterol (PROVENTIL) (2.5 MG/3ML) 0.083% nebulizer solution Take 3 mLs by nebulization every 6 hours as needed for Wheezing (coughing) 12/27/17  Yes ALYSSA Flor DO   hydrOXYzine (ATARAX) 25 MG tablet TK 1 T PO TID PRF ITCHING PRN 8/9/17  Yes Historical Provider, MD   SENNA PLUS 8.6-50 MG per tablet Take 1 tablet by mouth 2 times daily 8/17/17  Yes ALYSSA Flor DO   ranitidine (ZANTAC) 150 MG tablet TAKE 1 TABLET TWICE A DAY FOR STOMACH-REFLUX TROUBLE 5/19/17  Yes ALYSSA Flor DO   polyethylene glycol (GLYCOLAX) packet Take 17 g by mouth daily 1-2 capfuls per day titrate up or down to have a soft bowel movement daily. 1/24/17  Yes ALYSSA Flor DO   EPINEPHrine (EPIPEN 2-ADAN) 0.3 MG/0.3ML SOAJ injection Inject 0.3 mLs into the muscle once for 1 dose Use as directed for allergic reaction 1/24/17 8/20/18  ALYSSA Flor DO       Current medications:    Current Facility-Administered Medications   Medication Dose Route Frequency Provider Last Rate Last Dose    lactated ringers infusion   Intravenous Continuous LOVELY Gan CRNA        lidocaine PF 1 % injection 1 mL  1 mL Intradermal Once PRN LOVELY Gan CRNA           Allergies:     Allergies   Allergen Reactions    Bactrim [Sulfamethoxazole-Trimethoprim]     Ciprofloxacin     Ciprofloxacin Hcl     Depakote [Divalproex Sodium]     Dilantin [Phenytoin Sodium Extended]     Dye [Iodides] Other (See Comments)     Skin peeled off    Keflex [Cephalexin]     Pcn [Penicillins]     Primaxin [Imipenem]     Unasyn [Ampicillin-Sulbactam Sodium]     Wasp Venom Swelling       Problem List:    Patient Active Problem List   Diagnosis Code    Seizures (HCC) R56.9    GERD (gastroesophageal reflux disease) K21.9    Incontinence R32    Wheelchair bound Z99.3    Hemiplegia ([address] G81.90    Chronic pain syndrome G89.4    Anxiety F41.9    PVD (peripheral vascular disease) ([facility]) I73.9    Chronic seasonal allergic rhinitis J30.2    Avascular necrosis ([facility]) M87.00       Past Medical History:        Diagnosis Date    Arthritis     GERD (gastroesophageal reflux disease)     History of blood transfusion     Incontinence     Seizures ([address]        Past Surgical History:        Procedure Laterality Date    APPENDECTOMY      CHOLECYSTECTOMY      CSF SHUNT Right     DEEP BRAIN STIMULATOR PLACEMENT      tremor control it is off now    LA REMV CATARACT EXTRACAP,INSERT LENS Left 6/15/2017    EYE CATARACT EMULSIFICATION IOL IMPLANT performed by Lina Wharton MD at [facility]. [address] Right 8/4/2017    CATARCAT EXTRACTION EYE WITH IOL performed by Lina Wharton MD at [facility]       Social History:    Social History   Substance Use Topics    Smoking status: Former Smoker     Packs/day: 1.00     Years: 19.00     Types: Cigarettes     Quit date: 2/14/2015    Smokeless tobacco: Never Used      Comment: quit smoking  6 yrs ago    Alcohol use No                                Counseling given: Not Answered      Vital Signs (Current):   Vitals:    08/23/18 0625   BP: 121/76   Pulse: 87   Resp: 16   SpO2: 97%   Weight: 122 lb (55.3 kg)   Height: 5' 3\" (1.6 m)                                              BP Readings from Last 3 Encounters:   08/23/18 121/76   08/20/18 114/72   07/27/18 118/68       NPO Status: Time of last liquid consumption: 2300                        Time of last solid consumption: 2300                        Date of last liquid consumption: 08/22/18                        Date of last solid food consumption: 08/22/18    BMI:   Wt Readings from Last 3 Encounters:   08/23/18 122 lb (55.3 kg)   08/20/18 124 lb 5.1 oz (56.4 kg)   07/27/18 119 lb 8 oz (54.2 kg)     Body mass index is 21.61 kg/m².     CBC:   Lab Results   Component Value Date    WBC 11.8 06/12/2018    RBC 3.92 06/12/2018    HGB 13.2 06/12/2018

## 2018-08-30 DIAGNOSIS — M25.561 CHRONIC PAIN OF RIGHT KNEE: ICD-10-CM

## 2018-08-30 DIAGNOSIS — G89.29 CHRONIC PAIN OF RIGHT KNEE: ICD-10-CM

## 2018-08-30 NOTE — TELEPHONE ENCOUNTER
Received fax from pharmacy requesting refill on pts medication(s). Pt was last seen in office on 8/20/2018  and has a follow up scheduled for 9/27/2018. Will send request to  Dr. Aubrey Vega  for authorization.      Requested Prescriptions     Pending Prescriptions Disp Refills    VOLTAREN 1 % GEL [Pharmacy Med Name: VOLTAREN 1% GEL 100GM] 100 g 11     Sig: APPLY 2 GRAMS EXTERNALLY TO THE AFFECTED AREA TWICE DAILY

## 2018-09-06 RX ORDER — DOCUSATE SODIUM -SENNOSIDES 50; 8.6 MG/1; MG/1
TABLET, COATED ORAL
Qty: 60 TABLET | Refills: 11 | Status: SHIPPED | OUTPATIENT
Start: 2018-09-06 | End: 2019-09-17 | Stop reason: SDUPTHER

## 2018-09-12 ENCOUNTER — TELEPHONE (OUTPATIENT)
Dept: PRIMARY CARE CLINIC | Age: 43
End: 2018-09-12

## 2018-09-12 NOTE — TELEPHONE ENCOUNTER
Received a call from Iman Dunne from [facility] requesting a new hand written script for pts Adult pull-ups, gloves, wipes, bed pads and cleansing foam with Dx: Urinary Incontinence (R32). She said that mom is also requesting adult bibs as well cause when she eats she gets food everywhere all over her. Will send this to Dr Thanh Reis for authorization. This will need to be faxed back to [phone].

## 2018-09-18 ENCOUNTER — TELEPHONE (OUTPATIENT)
Dept: PRIMARY CARE CLINIC | Age: 43
End: 2018-09-18

## 2018-09-27 ENCOUNTER — OFFICE VISIT (OUTPATIENT)
Dept: PRIMARY CARE CLINIC | Age: 43
End: 2018-09-27
Payer: MEDICARE

## 2018-09-27 VITALS
BODY MASS INDEX: 21.4 KG/M2 | SYSTOLIC BLOOD PRESSURE: 118 MMHG | OXYGEN SATURATION: 96 % | HEIGHT: 63 IN | HEART RATE: 58 BPM | WEIGHT: 120.8 LBS | DIASTOLIC BLOOD PRESSURE: 68 MMHG | TEMPERATURE: 97.9 F

## 2018-09-27 DIAGNOSIS — Z23 NEED FOR INFLUENZA VACCINATION: ICD-10-CM

## 2018-09-27 DIAGNOSIS — M87.00 AVASCULAR NECROSIS (HCC): ICD-10-CM

## 2018-09-27 DIAGNOSIS — G89.29 CHRONIC MIDLINE LOW BACK PAIN WITHOUT SCIATICA: ICD-10-CM

## 2018-09-27 DIAGNOSIS — G81.94 HEMIPLEGIA AFFECTING LEFT NONDOMINANT SIDE, UNSPECIFIED ETIOLOGY, UNSPECIFIED HEMIPLEGIA TYPE (HCC): ICD-10-CM

## 2018-09-27 DIAGNOSIS — M15.9 PRIMARY OSTEOARTHRITIS INVOLVING MULTIPLE JOINTS: ICD-10-CM

## 2018-09-27 DIAGNOSIS — G89.29 CHRONIC LEFT-SIDED THORACIC BACK PAIN: ICD-10-CM

## 2018-09-27 DIAGNOSIS — M54.50 CHRONIC MIDLINE LOW BACK PAIN WITHOUT SCIATICA: ICD-10-CM

## 2018-09-27 DIAGNOSIS — M54.6 CHRONIC LEFT-SIDED THORACIC BACK PAIN: ICD-10-CM

## 2018-09-27 DIAGNOSIS — Z79.899 MEDICATION MANAGEMENT: Primary | ICD-10-CM

## 2018-09-27 DIAGNOSIS — Z99.3 WHEELCHAIR BOUND: ICD-10-CM

## 2018-09-27 DIAGNOSIS — G89.4 CHRONIC PAIN SYNDROME: ICD-10-CM

## 2018-09-27 PROCEDURE — G8420 CALC BMI NORM PARAMETERS: HCPCS | Performed by: PEDIATRICS

## 2018-09-27 PROCEDURE — G8427 DOCREV CUR MEDS BY ELIG CLIN: HCPCS | Performed by: PEDIATRICS

## 2018-09-27 PROCEDURE — G0008 ADMIN INFLUENZA VIRUS VAC: HCPCS | Performed by: PEDIATRICS

## 2018-09-27 PROCEDURE — 99213 OFFICE O/P EST LOW 20 MIN: CPT | Performed by: PEDIATRICS

## 2018-09-27 PROCEDURE — 90686 IIV4 VACC NO PRSV 0.5 ML IM: CPT | Performed by: PEDIATRICS

## 2018-09-27 PROCEDURE — 1036F TOBACCO NON-USER: CPT | Performed by: PEDIATRICS

## 2018-09-27 RX ORDER — HYDROCODONE BITARTRATE AND ACETAMINOPHEN 7.5; 325 MG/1; MG/1
1 TABLET ORAL EVERY 8 HOURS PRN
Qty: 90 TABLET | Refills: 0 | Status: SHIPPED | OUTPATIENT
Start: 2018-09-27 | End: 2018-10-23 | Stop reason: SDUPTHER

## 2018-09-27 ASSESSMENT — ENCOUNTER SYMPTOMS
SORE THROAT: 0
NAUSEA: 0
WHEEZING: 0
SINUS PRESSURE: 0
SHORTNESS OF BREATH: 0
VOMITING: 0
BACK PAIN: 0
COUGH: 0
DIARRHEA: 0
EYE PAIN: 0
ABDOMINAL PAIN: 0

## 2018-09-27 NOTE — PROGRESS NOTES
[facility], [address]  Phone [phone]   Fax [phone]      OFFICE VISIT: 2018    Brendon Kong- : 1975      HPI  Reason For Visit:  Drew Wen is a 41 y.o. Health Maintenance    1 Month Follow-Up (Patient is here for 1 molnth follow up and medication refill); Medication Refill (Timewell only today- Patient does not need Ativan ); Health Maintenance (Need for flu shot, Need for HIV screening/ Need for Lipid screening/ Need for cervical cancer screening); and Medicare AWV (SCHEDULE patient for Medicare AWV at next visit)      Arlene Hicks presents on follow-up for medication refill. She needs a refill of her Timewell today she states she does not need her lorazepam.  Last fill of Norco 7.5 mg on 2018 for #90 tablets. RORY was reviewed today per office protocol. Report shows No discrepancies. Fill pattern is consistent from single provider(s) at single pharmacy(s). Requests #[id number]    Controlled Substances Monitoring:     RX Monitoring 2018   Attestation The Prescription Monitoring Report for this patient was reviewed today. Documentation Obtaining appropriate analgesic effect of treatment. ;No signs of potential drug abuse or diversion identified. ;Possible medication side effects, risk of tolerance/dependence & alternative treatments discussed. Acute Pain Prescriptions Severe pain not adequately treated with lower dose. Chronic Pain Treatment objectives documented - patient is progressing appropriately. ;Functional status reviewed - continues with improved or maintaining ADL's. Medication Contracts -          height is 5' 3\" (1.6 m) and weight is 120 lb 12.8 oz (54.8 kg). Her temporal temperature is 97.9 °F (36.6 °C). Her blood pressure is 118/68 and her pulse is 58. Her oxygen saturation is 96%. Body mass index is 21.4 kg/m². I have reviewed the following with the Ms.  [address] Outpatient Visit on 06/06/2018 145     Potassium 06/06/2018 2.9*    Chloride 06/06/2018 99     CO2 06/06/2018 24     Anion Gap 06/06/2018 22*    Glucose 06/06/2018 84     BUN 06/06/2018 6     CREATININE 06/06/2018 0.6     GFR Non- 06/06/2018 >60     Calcium 06/06/2018 10.0     Total Protein 06/06/2018 8.5     Alb 06/06/2018 4.8     Total Bilirubin 06/06/2018 0.5     Alkaline Phosphatase 06/06/2018 150*    ALT 06/06/2018 24     AST 06/06/2018 12      Copies of these are in the chart. Current Outpatient Prescriptions   Medication Sig Dispense Refill    HYDROcodone-acetaminophen (NORCO) 7.5-325 MG per tablet Take 1 tablet by mouth every 8 hours as needed for Pain for up to 30 days. . 90 tablet 0    Misc. Devices MISC Power Wheelchair 1 Device 0    SENEXON-S 8.6-50 MG per tablet TAKE 1 TABLET BY MOUTH TWICE DAILY 60 tablet 11    LORazepam (ATIVAN) 1 MG tablet Take 1 tablet by mouth every 8 hours as needed for Anxiety for up to 30 days. . 30 tablet 0    Naproxen Sodium (ALEVE PO) Take by mouth      omeprazole (PRILOSEC) 20 MG delayed release capsule Take 1 capsule by mouth Daily 90 capsule 3    desloratadine (CLARINEX) 5 MG tablet Take 1 tablet by mouth daily 30 tablet 5    LAMICTAL 200 MG tablet Take 1 tablet by mouth 2 times daily 180 tablet 3    hydrocortisone 2.5 % cream APPLY EXTERNALLY TO THE AFFECTED AREA TWICE DAILY AS DIRECTED 30 g 0    fluticasone (FLONASE) 50 MCG/ACT nasal spray SHAKE LIQUID AND USE 2 SPRAYS IN EACH NOSTRIL DAILY 1 Bottle 11    clotrimazole-betamethasone (LOTRISONE) 1-0.05 % cream Apply topically 2 times daily.  1 Tube 1    celecoxib (CELEBREX) 200 MG capsule Take 1 capsule by mouth daily 60 capsule 3    diclofenac sodium (VOLTAREN) 1 % GEL Apply 2 g topically 4 times daily 100 g 5    albuterol (PROVENTIL) (2.5 MG/3ML) 0.083% nebulizer solution Take 3 mLs by nebulization every 6 hours as needed for Wheezing (coughing) 120 each 5    hydrOXYzine (ATARAX) 25 MG tablet TK heard.  Pulmonary/Chest: Effort normal and breath sounds normal. No respiratory distress. She has no wheezes. She has no rhonchi. She has no rales. Abdominal: Soft. Bowel sounds are normal. She exhibits no distension ( ) and no mass. There is no hepatosplenomegaly. There is no tenderness. There is no rebound, no guarding and no CVA tenderness. Genitourinary:   Genitourinary Comments: Examination deferred   Musculoskeletal: Normal range of motion. She exhibits no edema or tenderness. Joint examination reveals no acute arthritis or synovitis. Contracted L  UE  She has atrophy of her L LE   Lymphadenopathy:     She has no cervical adenopathy. Neurological: She is alert and oriented to person, place, and time. She has normal strength. She displays no atrophy and no tremor. No cranial nerve deficit (by gross examination) or sensory deficit. Gait normal.   No focal deficits appreciated   Skin: Skin is warm and dry. No rash noted. Psychiatric: She has a normal mood and affect. Her speech is normal and behavior is normal.   Vitals reviewed. ASSESSMENT      ICD-10-CM ICD-9-CM    1. Medication management Z79.899 V58.69 HYDROcodone-acetaminophen (NORCO) 7.5-325 MG per tablet   2. Chronic pain syndrome G89.4 338.4 HYDROcodone-acetaminophen (NORCO) 7.5-325 MG per tablet   3. Primary osteoarthritis involving multiple joints M15.0 715.09 HYDROcodone-acetaminophen (NORCO) 7.5-325 MG per tablet      Misc. Devices MISC   4. Avascular necrosis (HCC) M87.00 733.40 HYDROcodone-acetaminophen (NORCO) 7.5-325 MG per tablet      Misc. Devices MISC   5. Wheelchair bound Z99.3 V46.3 HYDROcodone-acetaminophen (NORCO) 7.5-325 MG per tablet      Misc. Devices MISC   6. Hemiplegia affecting left nondominant side, unspecified etiology, unspecified hemiplegia type (HCC) G81.94 342.92 HYDROcodone-acetaminophen (NORCO) 7.5-325 MG per tablet      Misc. Devices MISC       PLAN      ICD-10-CM ICD-9-CM    1.  Medication management

## 2018-09-27 NOTE — PROGRESS NOTES
After obtaining consent, and per orders of Dr. Lolis Camargo, injection of flu shot (Fluzone) was given in the right arm IM . Patient tolerated it well. Patient instructed to report any adverse reaction to me immediately.

## 2018-10-17 ENCOUNTER — TELEPHONE (OUTPATIENT)
Dept: PRIMARY CARE CLINIC | Age: 43
End: 2018-10-17

## 2018-10-23 ENCOUNTER — OFFICE VISIT (OUTPATIENT)
Dept: PRIMARY CARE CLINIC | Age: 43
End: 2018-10-23
Payer: MEDICARE

## 2018-10-23 VITALS
OXYGEN SATURATION: 99 % | HEIGHT: 63 IN | HEART RATE: 77 BPM | SYSTOLIC BLOOD PRESSURE: 102 MMHG | WEIGHT: 127 LBS | BODY MASS INDEX: 22.5 KG/M2 | DIASTOLIC BLOOD PRESSURE: 70 MMHG | TEMPERATURE: 96.6 F

## 2018-10-23 DIAGNOSIS — R10.2 PELVIC PAIN: ICD-10-CM

## 2018-10-23 DIAGNOSIS — Z99.3 WHEELCHAIR BOUND: ICD-10-CM

## 2018-10-23 DIAGNOSIS — F41.9 ANXIETY: ICD-10-CM

## 2018-10-23 DIAGNOSIS — G89.4 CHRONIC PAIN SYNDROME: ICD-10-CM

## 2018-10-23 DIAGNOSIS — G81.94 HEMIPLEGIA AFFECTING LEFT NONDOMINANT SIDE, UNSPECIFIED ETIOLOGY, UNSPECIFIED HEMIPLEGIA TYPE (HCC): ICD-10-CM

## 2018-10-23 DIAGNOSIS — M87.00 AVASCULAR NECROSIS (HCC): ICD-10-CM

## 2018-10-23 DIAGNOSIS — Z74.1 SELF-CARE DEFICIT FOR HYGIENE: Primary | ICD-10-CM

## 2018-10-23 DIAGNOSIS — Z79.899 MEDICATION MANAGEMENT: ICD-10-CM

## 2018-10-23 DIAGNOSIS — M15.9 PRIMARY OSTEOARTHRITIS INVOLVING MULTIPLE JOINTS: ICD-10-CM

## 2018-10-23 LAB
AMPHETAMINE SCREEN, URINE: NEGATIVE
BARBITURATE SCREEN, URINE: NEGATIVE
BENZODIAZEPINE SCREEN, URINE: NEGATIVE
BILIRUBIN, POC: NEGATIVE
BLOOD URINE, POC: NEGATIVE
BUPRENORPHINE URINE: NEGATIVE
CLARITY, POC: CLEAR
COCAINE METABOLITE SCREEN URINE: NEGATIVE
COLOR, POC: YELLOW
GABAPENTIN SCREEN, URINE: NEGATIVE
GLUCOSE URINE, POC: NEGATIVE
KETONES, POC: NEGATIVE
LEUKOCYTE EST, POC: NEGATIVE
METHADONE SCREEN, URINE: NEGATIVE
METHAMPHETAMINE, URINE: NEGATIVE
NITRITE, POC: NEGATIVE
OPIATE SCREEN URINE: NEGATIVE
OXYCODONE SCREEN URINE: NEGATIVE
PH, POC: 6.5
PHENCYCLIDINE SCREEN URINE: NEGATIVE
PROPOXYPHENE SCREEN, URINE: NEGATIVE
PROTEIN, POC: NEGATIVE
SPECIFIC GRAVITY, POC: >=1.03
THC SCREEN, URINE: NEGATIVE
TRICYCLIC ANTIDEPRESSANTS, UR: NEGATIVE
UROBILINOGEN, POC: 0.2

## 2018-10-23 PROCEDURE — 81002 URINALYSIS NONAUTO W/O SCOPE: CPT | Performed by: PEDIATRICS

## 2018-10-23 PROCEDURE — G8482 FLU IMMUNIZE ORDER/ADMIN: HCPCS | Performed by: PEDIATRICS

## 2018-10-23 PROCEDURE — 1036F TOBACCO NON-USER: CPT | Performed by: PEDIATRICS

## 2018-10-23 PROCEDURE — 80305 DRUG TEST PRSMV DIR OPT OBS: CPT | Performed by: PEDIATRICS

## 2018-10-23 PROCEDURE — G8427 DOCREV CUR MEDS BY ELIG CLIN: HCPCS | Performed by: PEDIATRICS

## 2018-10-23 PROCEDURE — G8420 CALC BMI NORM PARAMETERS: HCPCS | Performed by: PEDIATRICS

## 2018-10-23 PROCEDURE — 99214 OFFICE O/P EST MOD 30 MIN: CPT | Performed by: PEDIATRICS

## 2018-10-23 RX ORDER — MEDROXYPROGESTERONE ACETATE 150 MG/ML
150 INJECTION, SUSPENSION INTRAMUSCULAR
COMMUNITY
End: 2018-10-23 | Stop reason: SDUPTHER

## 2018-10-23 RX ORDER — MEDROXYPROGESTERONE ACETATE 150 MG/ML
150 INJECTION, SUSPENSION INTRAMUSCULAR
Qty: 1 ML | Refills: 0 | Status: SHIPPED | OUTPATIENT
Start: 2018-10-23 | End: 2019-07-16 | Stop reason: SDUPTHER

## 2018-10-23 RX ORDER — HYDROCODONE BITARTRATE AND ACETAMINOPHEN 7.5; 325 MG/1; MG/1
1 TABLET ORAL EVERY 8 HOURS PRN
Qty: 90 TABLET | Refills: 0 | Status: SHIPPED | OUTPATIENT
Start: 2018-10-23 | End: 2018-11-27 | Stop reason: SDUPTHER

## 2018-10-23 RX ORDER — LORAZEPAM 1 MG/1
1 TABLET ORAL EVERY 8 HOURS PRN
Qty: 30 TABLET | Refills: 0 | Status: SHIPPED | OUTPATIENT
Start: 2018-10-23 | End: 2019-04-29 | Stop reason: SDUPTHER

## 2018-10-23 ASSESSMENT — ENCOUNTER SYMPTOMS
SORE THROAT: 0
WHEEZING: 0
BACK PAIN: 1
VOMITING: 0
SHORTNESS OF BREATH: 0
CONSTIPATION: 1
NAUSEA: 0
ABDOMINAL PAIN: 1
SINUS PRESSURE: 0
COUGH: 0
EYE PAIN: 0
DIARRHEA: 0

## 2018-10-23 NOTE — PROGRESS NOTES
polyethylene glycol (GLYCOLAX) packet Take 17 g by mouth daily 1-2 capfuls per day titrate up or down to have a soft bowel movement daily. 527 g 5    EPINEPHrine (EPIPEN 2-ADAN) 0.3 MG/0.3ML SOAJ injection Inject 0.3 mLs into the muscle once for 1 dose Use as directed for allergic reaction 2 each 5    Misc. Devices MISC Power Wheelchair 1 Device 0     No current facility-administered medications for this visit. Allergies: Bactrim [sulfamethoxazole-trimethoprim]; Ciprofloxacin; Ciprofloxacin hcl; Depakote [divalproex sodium]; Dilantin [phenytoin sodium extended]; Dye [iodides]; Keflex [cephalexin]; Pcn [penicillins]; Primaxin [imipenem]; Unasyn [ampicillin-sulbactam sodium]; and Wasp venom     Past Medical History:   Diagnosis Date    Arthritis     GERD (gastroesophageal reflux disease)     History of blood transfusion     Incontinence     Seizures ([address]        Past Surgical History:   Procedure Laterality Date    APPENDECTOMY      CHOLECYSTECTOMY      CSF SHUNT Right     DEEP BRAIN STIMULATOR PLACEMENT      tremor control it is off now    ID EXCIS CHALAZION,GEN ANESTHESIA Right 8/23/2018    EYE CHALAZION EXCISION performed by Carmel Gonzales MD at [address] CATARACT EXTRACAP,INSERT LENS Left 6/15/2017    EYE CATARACT EMULSIFICATION IOL IMPLANT performed by Carmel Gonzales MD at [facility]. [address] Right 8/4/2017    CATARCAT EXTRACTION EYE WITH IOL performed by Carmel Gonzales MD at [facility]       Social History   Substance Use Topics    Smoking status: Former Smoker     Packs/day: 1.00     Years: 19.00     Types: Cigarettes     Quit date: 2/14/2015    Smokeless tobacco: Never Used      Comment: quit smoking  6 yrs ago    Alcohol use No        Review of Systems   Constitutional: Negative for fatigue and unexpected weight change. HENT: Negative for congestion, ear pain, sinus pressure and sore throat.     Eyes: Negative for pain and visual

## 2018-10-29 ENCOUNTER — HOSPITAL ENCOUNTER (OUTPATIENT)
Dept: GENERAL RADIOLOGY | Age: 43
Discharge: HOME | End: 2018-10-29
Payer: MEDICARE

## 2018-10-29 ENCOUNTER — PROCEDURE VISIT (OUTPATIENT)
Dept: PRIMARY CARE CLINIC | Age: 43
End: 2018-10-29
Payer: MEDICARE

## 2018-10-29 DIAGNOSIS — M54.6 CHRONIC LEFT-SIDED THORACIC BACK PAIN: ICD-10-CM

## 2018-10-29 DIAGNOSIS — M54.50 CHRONIC MIDLINE LOW BACK PAIN WITHOUT SCIATICA: ICD-10-CM

## 2018-10-29 DIAGNOSIS — G89.29 CHRONIC LEFT-SIDED THORACIC BACK PAIN: ICD-10-CM

## 2018-10-29 DIAGNOSIS — E78.2 MIXED HYPERLIPIDEMIA: ICD-10-CM

## 2018-10-29 DIAGNOSIS — Z30.9 ENCOUNTER FOR CONTRACEPTIVE MANAGEMENT, UNSPECIFIED TYPE: Primary | ICD-10-CM

## 2018-10-29 DIAGNOSIS — G89.29 CHRONIC MIDLINE LOW BACK PAIN WITHOUT SCIATICA: ICD-10-CM

## 2018-10-29 DIAGNOSIS — D75.89 MACROCYTOSIS: ICD-10-CM

## 2018-10-29 DIAGNOSIS — I99.8 VASCULAR INSUFFICIENCY: ICD-10-CM

## 2018-10-29 DIAGNOSIS — D72.829 LEUKOCYTOSIS, UNSPECIFIED TYPE: ICD-10-CM

## 2018-10-29 LAB
CONTROL: NORMAL
PREGNANCY TEST URINE, POC: NORMAL

## 2018-10-29 PROCEDURE — 81025 URINE PREGNANCY TEST: CPT | Performed by: PEDIATRICS

## 2018-10-29 PROCEDURE — 72072 X-RAY EXAM THORAC SPINE 3VWS: CPT

## 2018-10-29 PROCEDURE — 96372 THER/PROPH/DIAG INJ SC/IM: CPT | Performed by: PEDIATRICS

## 2018-10-29 PROCEDURE — 72100 X-RAY EXAM L-S SPINE 2/3 VWS: CPT

## 2018-10-29 RX ORDER — MEDROXYPROGESTERONE ACETATE 150 MG/ML
150 INJECTION, SUSPENSION INTRAMUSCULAR ONCE
Status: COMPLETED | OUTPATIENT
Start: 2018-10-29 | End: 2018-10-29

## 2018-10-29 RX ADMIN — MEDROXYPROGESTERONE ACETATE 150 MG: 150 INJECTION, SUSPENSION INTRAMUSCULAR at 12:45

## 2018-10-30 ENCOUNTER — TELEPHONE (OUTPATIENT)
Dept: PRIMARY CARE CLINIC | Age: 43
End: 2018-10-30

## 2018-10-30 NOTE — TELEPHONE ENCOUNTER
----- Message from [facility],[address], DO sent at 10/29/2018  7:39 PM CDT -----  Lumbar spine x-ray is normal

## 2018-11-07 ENCOUNTER — OFFICE VISIT (OUTPATIENT)
Dept: PRIMARY CARE CLINIC | Age: 43
End: 2018-11-07
Payer: MEDICARE

## 2018-11-07 VITALS
SYSTOLIC BLOOD PRESSURE: 120 MMHG | WEIGHT: 125.5 LBS | HEIGHT: 63 IN | TEMPERATURE: 97.6 F | HEART RATE: 98 BPM | OXYGEN SATURATION: 97 % | BODY MASS INDEX: 22.24 KG/M2 | DIASTOLIC BLOOD PRESSURE: 86 MMHG

## 2018-11-07 DIAGNOSIS — K21.9 GASTROESOPHAGEAL REFLUX DISEASE WITHOUT ESOPHAGITIS: Primary | ICD-10-CM

## 2018-11-07 DIAGNOSIS — R11.0 NAUSEA: ICD-10-CM

## 2018-11-07 DIAGNOSIS — R10.13 MIDEPIGASTRIC PAIN: ICD-10-CM

## 2018-11-07 PROCEDURE — G8420 CALC BMI NORM PARAMETERS: HCPCS | Performed by: NURSE PRACTITIONER

## 2018-11-07 PROCEDURE — G8482 FLU IMMUNIZE ORDER/ADMIN: HCPCS | Performed by: NURSE PRACTITIONER

## 2018-11-07 PROCEDURE — G8427 DOCREV CUR MEDS BY ELIG CLIN: HCPCS | Performed by: NURSE PRACTITIONER

## 2018-11-07 PROCEDURE — 1036F TOBACCO NON-USER: CPT | Performed by: NURSE PRACTITIONER

## 2018-11-07 PROCEDURE — 99213 OFFICE O/P EST LOW 20 MIN: CPT | Performed by: NURSE PRACTITIONER

## 2018-11-07 PROCEDURE — 1111F DSCHRG MED/CURRENT MED MERGE: CPT | Performed by: NURSE PRACTITIONER

## 2018-11-07 RX ORDER — RANITIDINE 150 MG/1
TABLET ORAL
Qty: 180 TABLET | Refills: 3 | Status: SHIPPED | OUTPATIENT
Start: 2018-11-07 | End: 2019-11-05 | Stop reason: SDUPTHER

## 2018-11-07 RX ORDER — ONDANSETRON 4 MG/1
4 TABLET, ORALLY DISINTEGRATING ORAL EVERY 8 HOURS PRN
Qty: 20 TABLET | Refills: 0 | Status: SHIPPED | OUTPATIENT
Start: 2018-11-07 | End: 2020-04-02 | Stop reason: SDUPTHER

## 2018-11-07 ASSESSMENT — ENCOUNTER SYMPTOMS
DIARRHEA: 0
COUGH: 0
ABDOMINAL PAIN: 0
EYE REDNESS: 0
SORE THROAT: 0
CONSTIPATION: 0
VOMITING: 0
SHORTNESS OF BREATH: 0
RHINORRHEA: 0

## 2018-11-07 NOTE — PROGRESS NOTES
[facility]  Phone [phone]   Fax [phone]      OFFICE VISIT: 2018    Kamila Canchola- : 1975    Chief Complaint:Laura is a 41 y.o. female who is here for Follow-Up from Hospital     HPI  The patient presents today for hospital follow-up. She went into the ED on 2018. She had abdominal pain at that time. CT scan of abdomen, 2018:  Two non-obstructing stones in the left kidney  Otherwise, WNL    UA: WNL    WBC: 8.7    Denies any further abdominal pain since discharge from the ED on . She had a BM this morning. height is 5' 3\" (1.6 m) and weight is 125 lb 8 oz (56.9 kg). Her temporal temperature is 97.6 °F (36.4 °C). Her blood pressure is 120/86 and her pulse is 98. Her oxygen saturation is 97%. Body mass index is 22.23 kg/m². Results for orders placed or performed in visit on 10/29/18   POCT urine pregnancy   Result Value Ref Range    Preg Test, Ur none detected     Control         I have reviewed the following with the Ms. Shah   Lab Review   Procedure visit on 10/29/2018   Component Date Value    Preg Test, Ur 10/29/2018 none detected    Office Visit on 10/23/2018   Component Date Value    Amphetamine Screen, Urine 10/23/2018 Negative     Barbiturate Screen, Urine 10/23/2018 Negative     Benzodiazepine Screen, U* 10/23/2018 Negative     Buprenorphine Urine 10/23/2018 Negative     Cocaine Metabolite Scree* 10/23/2018 Negative     Gabapentin Screen, Urine 10/23/2018 Negative     Methamphetamine, Urine 10/23/2018 Negative     Methadone Screen, Urine 10/23/2018 Negative     Opiate Scrn, Ur 10/23/2018 Negative     Oxycodone Screen, Ur 10/23/2018 Negative     PCP Screen, Urine 10/23/2018 Negative     Propoxyphene Screen, Uri* 10/23/2018 Negative     THC Screen, Urine 10/23/2018 Negative     Tricyclic Antidepressant*  Negative     Color, UA 10/23/2018 Yellow     Clarity, UA 10/23/2018 Clear     Glucose, UA POC 06/06/2018 317     MPV 06/06/2018 9.0*    Neutrophils % 06/06/2018 62.2     Lymphocytes % 06/06/2018 30.6     Monocytes % 06/06/2018 4.3     Eosinophils % 06/06/2018 1.0     Basophils % 06/06/2018 0.7     Neutrophils # 06/06/2018 7.8*    Lymphocytes # 06/06/2018 3.8     Monocytes # 06/06/2018 0.50     Eosinophils # 06/06/2018 0.10     Basophils # 06/06/2018 0.10     Sodium 06/06/2018 145     Potassium 06/06/2018 2.9*    Chloride 06/06/2018 99     CO2 06/06/2018 24     Anion Gap 06/06/2018 22*    Glucose 06/06/2018 84     BUN 06/06/2018 6     CREATININE 06/06/2018 0.6     GFR Non- 06/06/2018 >60     Calcium 06/06/2018 10.0     Total Protein 06/06/2018 8.5     Alb 06/06/2018 4.8     Total Bilirubin 06/06/2018 0.5     Alkaline Phosphatase 06/06/2018 150*    ALT 06/06/2018 24     AST 06/06/2018 12      Copies of these are in the chart. Prior to Visit Medications    Medication Sig Taking? Authorizing Provider   ondansetron (ZOFRAN-ODT) 4 MG disintegrating tablet Place 1 tablet under the tongue every 8 hours as needed for Nausea or Vomiting Yes LOVELY Wolff   ranitidine (ZANTAC) 150 MG tablet TAKE 1 TABLET TWICE A DAY FOR STOMACH-REFLUX TROUBLE Yes LOVELY Wolff   HYDROcodone-acetaminophen (NORCO) 7.5-325 MG per tablet Take 1 tablet by mouth every 8 hours as needed for Pain for up to 30 days. Lorin Jones, DO   LORazepam (ATIVAN) 1 MG tablet Take 1 tablet by mouth every 8 hours as needed for Anxiety for up to 30 days. Lorin Jones, DO   medroxyPROGESTERone (DEPO-PROVERA) 150 MG/ML injection Inject 1 mL into the muscle every 3 months Yes ALYSSA Rizvi, DO   Misc.  Devices MISC Power Wheelchair Yes ALYSSA Morley Camera, DO   SENEXON-S 8.6-50 MG per tablet TAKE 1 TABLET BY MOUTH TWICE DAILY Yes ALYSSA Rizvi, DO   Naproxen Sodium (ALEVE PO) Take 1 tablet by mouth as needed  Yes Historical Provider, MD   omeprazole (PRILOSEC) 20 MG behaviors: n/a    Patient given educational materials on dx    I have instructed Orpete Lion to complete a self tracking handout on n/a and instructed them to bring it with them to her next appointment. Discussed use, benefit, and side effects of prescribed medications. Barriers to medication compliance addressed. All patient questions answered. Pt voiced understanding.      Tito Fernandez, APRN

## 2018-11-27 ENCOUNTER — OFFICE VISIT (OUTPATIENT)
Dept: PRIMARY CARE CLINIC | Age: 43
End: 2018-11-27
Payer: MEDICARE

## 2018-11-27 VITALS
SYSTOLIC BLOOD PRESSURE: 104 MMHG | OXYGEN SATURATION: 97 % | WEIGHT: 128 LBS | HEART RATE: 76 BPM | BODY MASS INDEX: 22.67 KG/M2 | TEMPERATURE: 97.5 F | DIASTOLIC BLOOD PRESSURE: 66 MMHG

## 2018-11-27 DIAGNOSIS — G89.4 CHRONIC PAIN SYNDROME: ICD-10-CM

## 2018-11-27 DIAGNOSIS — Z99.3 WHEELCHAIR BOUND: ICD-10-CM

## 2018-11-27 DIAGNOSIS — Z79.899 MEDICATION MANAGEMENT: ICD-10-CM

## 2018-11-27 DIAGNOSIS — M87.00 AVASCULAR NECROSIS (HCC): ICD-10-CM

## 2018-11-27 DIAGNOSIS — M15.9 PRIMARY OSTEOARTHRITIS INVOLVING MULTIPLE JOINTS: ICD-10-CM

## 2018-11-27 DIAGNOSIS — K12.1 DENTURE SORE MOUTH: Primary | ICD-10-CM

## 2018-11-27 DIAGNOSIS — G81.94 HEMIPLEGIA AFFECTING LEFT NONDOMINANT SIDE, UNSPECIFIED ETIOLOGY, UNSPECIFIED HEMIPLEGIA TYPE (HCC): ICD-10-CM

## 2018-11-27 PROCEDURE — 99213 OFFICE O/P EST LOW 20 MIN: CPT | Performed by: PEDIATRICS

## 2018-11-27 PROCEDURE — G8427 DOCREV CUR MEDS BY ELIG CLIN: HCPCS | Performed by: PEDIATRICS

## 2018-11-27 PROCEDURE — 1036F TOBACCO NON-USER: CPT | Performed by: PEDIATRICS

## 2018-11-27 PROCEDURE — G8420 CALC BMI NORM PARAMETERS: HCPCS | Performed by: PEDIATRICS

## 2018-11-27 PROCEDURE — G8482 FLU IMMUNIZE ORDER/ADMIN: HCPCS | Performed by: PEDIATRICS

## 2018-11-27 RX ORDER — DIMETHICONE, OXYBENZONE, AND PADIMATE O 2; 2.5; 6.6 G/100G; G/100G; G/100G
STICK TOPICAL PRN
COMMUNITY
End: 2022-01-05

## 2018-11-27 RX ORDER — HYDROCODONE BITARTRATE AND ACETAMINOPHEN 7.5; 325 MG/1; MG/1
1 TABLET ORAL EVERY 8 HOURS PRN
Qty: 90 TABLET | Refills: 0 | Status: SHIPPED | OUTPATIENT
Start: 2018-11-27 | End: 2019-01-16 | Stop reason: SDUPTHER

## 2018-11-27 ASSESSMENT — ENCOUNTER SYMPTOMS
DIARRHEA: 0
SINUS PRESSURE: 0
SORE THROAT: 0
CONSTIPATION: 1
COUGH: 0
NAUSEA: 0
EYE PAIN: 0
WHEEZING: 0
ABDOMINAL PAIN: 1
SHORTNESS OF BREATH: 0
VOMITING: 0
BACK PAIN: 1

## 2018-11-27 NOTE — Clinical Note
We needed a letter on letter head stating that her condition is stable.  He also need a summary of her care which is previously documented and a letter and should be in her chart

## 2018-12-17 RX ORDER — ALBUTEROL SULFATE 2.5 MG/3ML
2.5 SOLUTION RESPIRATORY (INHALATION) EVERY 6 HOURS PRN
Qty: 120 EACH | Refills: 5 | Status: SHIPPED | OUTPATIENT
Start: 2018-12-17 | End: 2022-01-05

## 2018-12-19 ENCOUNTER — TELEPHONE (OUTPATIENT)
Dept: PRIMARY CARE CLINIC | Age: 43
End: 2018-12-19

## 2018-12-19 ENCOUNTER — OFFICE VISIT (OUTPATIENT)
Dept: PRIMARY CARE CLINIC | Age: 43
End: 2018-12-19
Payer: MEDICARE

## 2018-12-19 VITALS
OXYGEN SATURATION: 98 % | SYSTOLIC BLOOD PRESSURE: 127 MMHG | HEIGHT: 63 IN | DIASTOLIC BLOOD PRESSURE: 83 MMHG | HEART RATE: 103 BPM | TEMPERATURE: 97.3 F | BODY MASS INDEX: 30.65 KG/M2 | WEIGHT: 173 LBS

## 2018-12-19 DIAGNOSIS — R05.9 COUGH: ICD-10-CM

## 2018-12-19 DIAGNOSIS — J01.90 ACUTE NON-RECURRENT SINUSITIS, UNSPECIFIED LOCATION: Primary | ICD-10-CM

## 2018-12-19 PROCEDURE — 1036F TOBACCO NON-USER: CPT | Performed by: NURSE PRACTITIONER

## 2018-12-19 PROCEDURE — G8427 DOCREV CUR MEDS BY ELIG CLIN: HCPCS | Performed by: NURSE PRACTITIONER

## 2018-12-19 PROCEDURE — 99213 OFFICE O/P EST LOW 20 MIN: CPT | Performed by: NURSE PRACTITIONER

## 2018-12-19 PROCEDURE — G8482 FLU IMMUNIZE ORDER/ADMIN: HCPCS | Performed by: NURSE PRACTITIONER

## 2018-12-19 PROCEDURE — G8419 CALC BMI OUT NRM PARAM NOF/U: HCPCS | Performed by: NURSE PRACTITIONER

## 2018-12-19 RX ORDER — AZITHROMYCIN 250 MG/1
TABLET, FILM COATED ORAL
Qty: 6 TABLET | Refills: 0 | Status: SHIPPED | OUTPATIENT
Start: 2018-12-19 | End: 2018-12-24

## 2018-12-19 RX ORDER — GUAIFENESIN 600 MG/1
1200 TABLET, EXTENDED RELEASE ORAL 2 TIMES DAILY
Qty: 40 TABLET | Refills: 1 | Status: SHIPPED | OUTPATIENT
Start: 2018-12-19 | End: 2018-12-29

## 2018-12-19 ASSESSMENT — ENCOUNTER SYMPTOMS
SHORTNESS OF BREATH: 0
SORE THROAT: 0
VOMITING: 0
DIARRHEA: 0
RHINORRHEA: 1
SINUS PRESSURE: 1
CONSTIPATION: 0
COUGH: 1
EYE REDNESS: 0

## 2018-12-19 NOTE — PROGRESS NOTES
[facility]  Phone [phone]   Fax [phone]      OFFICE VISIT: 2018    Arturo Anderson- : 1975    Chief Complaint:Laura is a 41 y.o. female who is here for Cough and Headache     HPI  The patient presents today for evaluation of cough and cold. Denies a fever. Reports nasal congestion and drainage. Reports headache with sinus pressure  This started 4-5 days ago. They put Abdirizak's Vapor Rub on her chest and back. She started scratching herself and she got red. They washed the area and the rash resolved. height is 5' 3\" (1.6 m) and weight is 173 lb (78.5 kg). Her temporal temperature is 97.3 °F (36.3 °C). Her blood pressure is 127/83 and her pulse is 103. Her oxygen saturation is 98%. Body mass index is 30.65 kg/m². Results for orders placed or performed in visit on 10/29/18   POCT urine pregnancy   Result Value Ref Range    Preg Test, Ur none detected     Control         I have reviewed the following with the Ms. Shah   Lab Review   Procedure visit on 10/29/2018   Component Date Value    Preg Test, Ur 10/29/2018 none detected    Office Visit on 10/23/2018   Component Date Value    Amphetamine Screen, Urine 10/23/2018 Negative     Barbiturate Screen, Urine 10/23/2018 Negative     Benzodiazepine Screen, U* 10/23/2018 Negative     Buprenorphine Urine 10/23/2018 Negative     Cocaine Metabolite Scree* 10/23/2018 Negative     Gabapentin Screen, Urine 10/23/2018 Negative     Methamphetamine, Urine 10/23/2018 Negative     Methadone Screen, Urine 10/23/2018 Negative     Opiate Scrn, Ur 10/23/2018 Negative     Oxycodone Screen, Ur 10/23/2018 Negative     PCP Screen, Urine 10/23/2018 Negative     Propoxyphene Screen, Uri* 10/23/2018 Negative     THC Screen, Urine 10/23/2018 Negative     Tricyclic Antidepressant*  Negative     Color, UA 10/23/2018 Yellow     Clarity, UA 10/23/2018 Clear     Glucose, UA POC 10/23/2018 Negative    

## 2018-12-21 ENCOUNTER — OFFICE VISIT (OUTPATIENT)
Dept: PRIMARY CARE CLINIC | Age: 43
End: 2018-12-21
Payer: MEDICARE

## 2018-12-21 VITALS
BODY MASS INDEX: 20.75 KG/M2 | SYSTOLIC BLOOD PRESSURE: 120 MMHG | WEIGHT: 117.12 LBS | HEART RATE: 80 BPM | DIASTOLIC BLOOD PRESSURE: 72 MMHG | TEMPERATURE: 98.2 F | OXYGEN SATURATION: 98 % | HEIGHT: 63 IN

## 2018-12-21 DIAGNOSIS — M15.9 PRIMARY OSTEOARTHRITIS INVOLVING MULTIPLE JOINTS: ICD-10-CM

## 2018-12-21 DIAGNOSIS — Z99.3 WHEELCHAIR BOUND: ICD-10-CM

## 2018-12-21 DIAGNOSIS — Z79.899 MEDICATION MANAGEMENT: ICD-10-CM

## 2018-12-21 DIAGNOSIS — M87.00 AVASCULAR NECROSIS (HCC): ICD-10-CM

## 2018-12-21 DIAGNOSIS — G81.94 HEMIPLEGIA AFFECTING LEFT NONDOMINANT SIDE, UNSPECIFIED ETIOLOGY, UNSPECIFIED HEMIPLEGIA TYPE (HCC): ICD-10-CM

## 2018-12-21 DIAGNOSIS — F41.9 ANXIETY: ICD-10-CM

## 2018-12-21 DIAGNOSIS — Z91.09 ENVIRONMENTAL ALLERGIES: ICD-10-CM

## 2018-12-21 DIAGNOSIS — G89.4 CHRONIC PAIN SYNDROME: ICD-10-CM

## 2018-12-21 DIAGNOSIS — H92.03 OTALGIA OF BOTH EARS: Primary | ICD-10-CM

## 2018-12-21 PROCEDURE — G8427 DOCREV CUR MEDS BY ELIG CLIN: HCPCS | Performed by: PEDIATRICS

## 2018-12-21 PROCEDURE — 1036F TOBACCO NON-USER: CPT | Performed by: PEDIATRICS

## 2018-12-21 PROCEDURE — G8420 CALC BMI NORM PARAMETERS: HCPCS | Performed by: PEDIATRICS

## 2018-12-21 PROCEDURE — 99214 OFFICE O/P EST MOD 30 MIN: CPT | Performed by: PEDIATRICS

## 2018-12-21 PROCEDURE — G8482 FLU IMMUNIZE ORDER/ADMIN: HCPCS | Performed by: PEDIATRICS

## 2018-12-21 RX ORDER — HYDROXYZINE HYDROCHLORIDE 25 MG/1
25 TABLET, FILM COATED ORAL EVERY 8 HOURS PRN
Qty: 30 TABLET | Refills: 3 | Status: SHIPPED | OUTPATIENT
Start: 2018-12-21 | End: 2021-09-16

## 2018-12-21 RX ORDER — DESLORATADINE 5 MG/1
5 TABLET ORAL DAILY
Qty: 30 TABLET | Refills: 5 | Status: SHIPPED | OUTPATIENT
Start: 2018-12-21 | End: 2019-05-29 | Stop reason: SDUPTHER

## 2018-12-21 ASSESSMENT — ENCOUNTER SYMPTOMS
CONSTIPATION: 1
WHEEZING: 0
SHORTNESS OF BREATH: 0
DIARRHEA: 0
ABDOMINAL PAIN: 1
BACK PAIN: 1
SORE THROAT: 0
EYE PAIN: 0
NAUSEA: 0
VOMITING: 0
SINUS PRESSURE: 0
COUGH: 0

## 2019-01-15 DIAGNOSIS — G81.94 HEMIPLEGIA AFFECTING LEFT NONDOMINANT SIDE, UNSPECIFIED ETIOLOGY, UNSPECIFIED HEMIPLEGIA TYPE (HCC): ICD-10-CM

## 2019-01-15 DIAGNOSIS — M87.00 AVASCULAR NECROSIS (HCC): ICD-10-CM

## 2019-01-15 DIAGNOSIS — G89.4 CHRONIC PAIN SYNDROME: ICD-10-CM

## 2019-01-15 DIAGNOSIS — M15.9 PRIMARY OSTEOARTHRITIS INVOLVING MULTIPLE JOINTS: ICD-10-CM

## 2019-01-15 DIAGNOSIS — Z79.899 MEDICATION MANAGEMENT: ICD-10-CM

## 2019-01-15 DIAGNOSIS — Z99.3 WHEELCHAIR BOUND: ICD-10-CM

## 2019-01-15 RX ORDER — HYDROCODONE BITARTRATE AND ACETAMINOPHEN 7.5; 325 MG/1; MG/1
1 TABLET ORAL EVERY 8 HOURS PRN
Qty: 90 TABLET | Refills: 0 | OUTPATIENT
Start: 2019-01-15 | End: 2019-02-14

## 2019-01-16 ENCOUNTER — TELEPHONE (OUTPATIENT)
Dept: PRIMARY CARE CLINIC | Age: 44
End: 2019-01-16

## 2019-01-16 RX ORDER — HYDROCODONE BITARTRATE AND ACETAMINOPHEN 7.5; 325 MG/1; MG/1
1 TABLET ORAL EVERY 8 HOURS PRN
Qty: 24 TABLET | Refills: 0 | Status: SHIPPED | OUTPATIENT
Start: 2019-01-16 | End: 2019-01-24 | Stop reason: SDUPTHER

## 2019-01-17 RX ORDER — WHEELCHAIR
1 EACH MISCELLANEOUS DAILY
Qty: 1 EACH | Refills: 0 | Status: SHIPPED | OUTPATIENT
Start: 2019-01-17 | End: 2021-06-15

## 2019-01-24 ENCOUNTER — OFFICE VISIT (OUTPATIENT)
Dept: PRIMARY CARE CLINIC | Age: 44
End: 2019-01-24
Payer: MEDICARE

## 2019-01-24 VITALS
DIASTOLIC BLOOD PRESSURE: 68 MMHG | WEIGHT: 118.2 LBS | OXYGEN SATURATION: 97 % | TEMPERATURE: 98.4 F | HEART RATE: 78 BPM | BODY MASS INDEX: 20.94 KG/M2 | SYSTOLIC BLOOD PRESSURE: 122 MMHG | HEIGHT: 63 IN

## 2019-01-24 DIAGNOSIS — M15.9 PRIMARY OSTEOARTHRITIS INVOLVING MULTIPLE JOINTS: ICD-10-CM

## 2019-01-24 DIAGNOSIS — I73.9 PVD (PERIPHERAL VASCULAR DISEASE) (HCC): ICD-10-CM

## 2019-01-24 DIAGNOSIS — G81.94 HEMIPLEGIA AFFECTING LEFT NONDOMINANT SIDE, UNSPECIFIED ETIOLOGY, UNSPECIFIED HEMIPLEGIA TYPE (HCC): ICD-10-CM

## 2019-01-24 DIAGNOSIS — Z79.899 MEDICATION MANAGEMENT: ICD-10-CM

## 2019-01-24 DIAGNOSIS — Z99.3 WHEELCHAIR BOUND: ICD-10-CM

## 2019-01-24 DIAGNOSIS — M62.838 MUSCLE SPASM: Primary | ICD-10-CM

## 2019-01-24 DIAGNOSIS — M87.00 AVASCULAR NECROSIS (HCC): ICD-10-CM

## 2019-01-24 DIAGNOSIS — G89.4 CHRONIC PAIN SYNDROME: ICD-10-CM

## 2019-01-24 PROCEDURE — 1036F TOBACCO NON-USER: CPT | Performed by: PEDIATRICS

## 2019-01-24 PROCEDURE — G8427 DOCREV CUR MEDS BY ELIG CLIN: HCPCS | Performed by: PEDIATRICS

## 2019-01-24 PROCEDURE — G8420 CALC BMI NORM PARAMETERS: HCPCS | Performed by: PEDIATRICS

## 2019-01-24 PROCEDURE — G8482 FLU IMMUNIZE ORDER/ADMIN: HCPCS | Performed by: PEDIATRICS

## 2019-01-24 PROCEDURE — 99213 OFFICE O/P EST LOW 20 MIN: CPT | Performed by: PEDIATRICS

## 2019-01-24 RX ORDER — TIZANIDINE 4 MG/1
4 TABLET ORAL EVERY 8 HOURS PRN
Qty: 90 TABLET | Refills: 3 | Status: SHIPPED | OUTPATIENT
Start: 2019-01-24 | End: 2019-05-23 | Stop reason: SDUPTHER

## 2019-01-24 RX ORDER — HYDROCODONE BITARTRATE AND ACETAMINOPHEN 7.5; 325 MG/1; MG/1
1 TABLET ORAL EVERY 8 HOURS PRN
Qty: 24 TABLET | Refills: 0 | Status: SHIPPED | OUTPATIENT
Start: 2019-01-24 | End: 2019-03-08 | Stop reason: SDUPTHER

## 2019-01-24 ASSESSMENT — ENCOUNTER SYMPTOMS
SINUS PRESSURE: 0
SORE THROAT: 0
BACK PAIN: 1
SHORTNESS OF BREATH: 0
DIARRHEA: 0
ABDOMINAL PAIN: 1
COUGH: 0
NAUSEA: 0
CONSTIPATION: 1
VOMITING: 0
WHEEZING: 0
EYE PAIN: 0

## 2019-01-27 DIAGNOSIS — M15.9 PRIMARY OSTEOARTHRITIS INVOLVING MULTIPLE JOINTS: ICD-10-CM

## 2019-01-28 RX ORDER — CELECOXIB 200 MG/1
200 CAPSULE ORAL DAILY
Qty: 90 CAPSULE | Refills: 3 | Status: SHIPPED | OUTPATIENT
Start: 2019-01-28 | End: 2020-02-03

## 2019-01-31 ENCOUNTER — TELEPHONE (OUTPATIENT)
Dept: PRIMARY CARE CLINIC | Age: 44
End: 2019-01-31

## 2019-01-31 DIAGNOSIS — S91.209A TOENAIL AVULSION, INITIAL ENCOUNTER: Primary | ICD-10-CM

## 2019-02-01 DIAGNOSIS — L30.9 DERMATITIS: ICD-10-CM

## 2019-02-13 ENCOUNTER — TELEPHONE (OUTPATIENT)
Dept: PRIMARY CARE CLINIC | Age: 44
End: 2019-02-13

## 2019-03-08 ENCOUNTER — PROCEDURE VISIT (OUTPATIENT)
Dept: PRIMARY CARE CLINIC | Age: 44
End: 2019-03-08
Payer: MEDICARE

## 2019-03-08 ENCOUNTER — TELEPHONE (OUTPATIENT)
Dept: PRIMARY CARE CLINIC | Age: 44
End: 2019-03-08

## 2019-03-08 DIAGNOSIS — G89.4 CHRONIC PAIN SYNDROME: ICD-10-CM

## 2019-03-08 DIAGNOSIS — M87.00 AVASCULAR NECROSIS (HCC): ICD-10-CM

## 2019-03-08 DIAGNOSIS — M15.9 PRIMARY OSTEOARTHRITIS INVOLVING MULTIPLE JOINTS: ICD-10-CM

## 2019-03-08 DIAGNOSIS — Z99.3 WHEELCHAIR BOUND: ICD-10-CM

## 2019-03-08 DIAGNOSIS — Z79.899 MEDICATION MANAGEMENT: ICD-10-CM

## 2019-03-08 DIAGNOSIS — Z30.9 ENCOUNTER FOR CONTRACEPTIVE MANAGEMENT, UNSPECIFIED TYPE: Primary | ICD-10-CM

## 2019-03-08 DIAGNOSIS — G81.94 HEMIPLEGIA AFFECTING LEFT NONDOMINANT SIDE, UNSPECIFIED ETIOLOGY, UNSPECIFIED HEMIPLEGIA TYPE (HCC): ICD-10-CM

## 2019-03-08 PROCEDURE — 96372 THER/PROPH/DIAG INJ SC/IM: CPT | Performed by: NURSE PRACTITIONER

## 2019-03-08 RX ORDER — HYDROCODONE BITARTRATE AND ACETAMINOPHEN 7.5; 325 MG/1; MG/1
1 TABLET ORAL EVERY 8 HOURS PRN
Qty: 24 TABLET | Refills: 0 | Status: SHIPPED | OUTPATIENT
Start: 2019-03-08 | End: 2019-03-28 | Stop reason: SDUPTHER

## 2019-03-08 RX ORDER — MEDROXYPROGESTERONE ACETATE 150 MG/ML
150 INJECTION, SUSPENSION INTRAMUSCULAR ONCE
Status: COMPLETED | OUTPATIENT
Start: 2019-03-08 | End: 2019-03-08

## 2019-03-08 RX ADMIN — MEDROXYPROGESTERONE ACETATE 150 MG: 150 INJECTION, SUSPENSION INTRAMUSCULAR at 10:22

## 2019-03-28 ENCOUNTER — OFFICE VISIT (OUTPATIENT)
Dept: PRIMARY CARE CLINIC | Age: 44
End: 2019-03-28
Payer: MEDICARE

## 2019-03-28 VITALS
BODY MASS INDEX: 21.79 KG/M2 | WEIGHT: 123 LBS | DIASTOLIC BLOOD PRESSURE: 70 MMHG | HEART RATE: 78 BPM | TEMPERATURE: 98.2 F | SYSTOLIC BLOOD PRESSURE: 126 MMHG | HEIGHT: 63 IN | OXYGEN SATURATION: 97 %

## 2019-03-28 DIAGNOSIS — M15.9 PRIMARY OSTEOARTHRITIS INVOLVING MULTIPLE JOINTS: ICD-10-CM

## 2019-03-28 DIAGNOSIS — G89.4 CHRONIC PAIN SYNDROME: ICD-10-CM

## 2019-03-28 DIAGNOSIS — M87.00 AVASCULAR NECROSIS (HCC): ICD-10-CM

## 2019-03-28 DIAGNOSIS — Z79.899 MEDICATION MANAGEMENT: ICD-10-CM

## 2019-03-28 DIAGNOSIS — Z99.3 WHEELCHAIR BOUND: ICD-10-CM

## 2019-03-28 DIAGNOSIS — G81.94 HEMIPLEGIA AFFECTING LEFT NONDOMINANT SIDE, UNSPECIFIED ETIOLOGY, UNSPECIFIED HEMIPLEGIA TYPE (HCC): ICD-10-CM

## 2019-03-28 PROCEDURE — 1036F TOBACCO NON-USER: CPT | Performed by: PEDIATRICS

## 2019-03-28 PROCEDURE — G8427 DOCREV CUR MEDS BY ELIG CLIN: HCPCS | Performed by: PEDIATRICS

## 2019-03-28 PROCEDURE — G8420 CALC BMI NORM PARAMETERS: HCPCS | Performed by: PEDIATRICS

## 2019-03-28 PROCEDURE — G8482 FLU IMMUNIZE ORDER/ADMIN: HCPCS | Performed by: PEDIATRICS

## 2019-03-28 PROCEDURE — 99213 OFFICE O/P EST LOW 20 MIN: CPT | Performed by: PEDIATRICS

## 2019-03-28 RX ORDER — HYDROCODONE BITARTRATE AND ACETAMINOPHEN 7.5; 325 MG/1; MG/1
1 TABLET ORAL EVERY 8 HOURS PRN
Qty: 24 TABLET | Refills: 0 | Status: SHIPPED | OUTPATIENT
Start: 2019-03-28 | End: 2019-03-28 | Stop reason: SDUPTHER

## 2019-03-28 RX ORDER — NALOXONE HYDROCHLORIDE 4 MG/.1ML
1 SPRAY NASAL PRN
Qty: 1 EACH | Refills: 0 | Status: SHIPPED | OUTPATIENT
Start: 2019-03-28 | End: 2021-09-16 | Stop reason: SDUPTHER

## 2019-03-28 RX ORDER — LIDOCAINE 50 MG/G
1 PATCH TOPICAL DAILY
Qty: 30 PATCH | Refills: 0 | Status: SHIPPED | OUTPATIENT
Start: 2019-03-28 | End: 2019-04-27

## 2019-03-28 RX ORDER — HYDROCODONE BITARTRATE AND ACETAMINOPHEN 7.5; 325 MG/1; MG/1
1 TABLET ORAL EVERY 8 HOURS PRN
Qty: 60 TABLET | Refills: 0 | Status: SHIPPED | OUTPATIENT
Start: 2019-03-28 | End: 2019-04-29 | Stop reason: SDUPTHER

## 2019-03-28 ASSESSMENT — ENCOUNTER SYMPTOMS
VOMITING: 0
BACK PAIN: 1
DIARRHEA: 0
SINUS PRESSURE: 0
SHORTNESS OF BREATH: 0
SORE THROAT: 0
WHEEZING: 0
COUGH: 0
NAUSEA: 0
ABDOMINAL PAIN: 1
EYE PAIN: 0
CONSTIPATION: 1

## 2019-03-28 ASSESSMENT — PATIENT HEALTH QUESTIONNAIRE - PHQ9
1. LITTLE INTEREST OR PLEASURE IN DOING THINGS: 0
SUM OF ALL RESPONSES TO PHQ QUESTIONS 1-9: 0
2. FEELING DOWN, DEPRESSED OR HOPELESS: 0
SUM OF ALL RESPONSES TO PHQ QUESTIONS 1-9: 0
SUM OF ALL RESPONSES TO PHQ9 QUESTIONS 1 & 2: 0

## 2019-03-28 NOTE — TELEPHONE ENCOUNTER
Received a denial from [facility] [employer] on pts Lidocaine 5% patch. This medication cannot be approved because a the information we have about your case says you are being treated for pain not associated with neuropathic cancer pain, post-herpetic neuralgia or diabetic neuropathy. I will send this to provider for further recommendations.

## 2019-03-29 NOTE — TELEPHONE ENCOUNTER
Please put in referral for [address] to see what they can come up with topical pain medication

## 2019-04-02 ENCOUNTER — TELEPHONE (OUTPATIENT)
Dept: PRIMARY CARE CLINIC | Age: 44
End: 2019-04-02

## 2019-04-04 NOTE — TELEPHONE ENCOUNTER
Form sent to [facility] and they will notify patient.    Faxed to [phone]  Phone: [phone]

## 2019-04-22 DIAGNOSIS — Z30.42 DEPOT CONTRACEPTION: ICD-10-CM

## 2019-04-23 RX ORDER — MEDROXYPROGESTERONE ACETATE 150 MG/ML
INJECTION, SUSPENSION INTRAMUSCULAR
Qty: 1 ML | Refills: 3 | Status: SHIPPED | OUTPATIENT
Start: 2019-04-23 | End: 2020-04-21

## 2019-04-23 NOTE — TELEPHONE ENCOUNTER
Received fax from pharmacy requesting refill on pts medication(s). Pt was last seen in office on 3/28/2019  and has a follow up scheduled for 4/29/2019. Will send request to  Dr. Margarita Barraza  for patient.      Requested Prescriptions     Pending Prescriptions Disp Refills    medroxyPROGESTERone (DEPO-PROVERA) 150 MG/ML injection [Pharmacy Med Name: MEDROXYPROGESTERONE 150MG/ML PF SYR] 1 mL 3     Sig: ADMINISTER 1 ML IN THE MUSCLE 1 TIME FOR 1 DOSE

## 2019-04-29 ENCOUNTER — OFFICE VISIT (OUTPATIENT)
Dept: PRIMARY CARE CLINIC | Age: 44
End: 2019-04-29
Payer: MEDICARE

## 2019-04-29 VITALS
WEIGHT: 117.6 LBS | OXYGEN SATURATION: 97 % | HEART RATE: 100 BPM | DIASTOLIC BLOOD PRESSURE: 68 MMHG | TEMPERATURE: 98.8 F | HEIGHT: 63 IN | BODY MASS INDEX: 20.84 KG/M2 | SYSTOLIC BLOOD PRESSURE: 106 MMHG

## 2019-04-29 DIAGNOSIS — F41.9 ANXIETY: ICD-10-CM

## 2019-04-29 DIAGNOSIS — G89.4 CHRONIC PAIN SYNDROME: ICD-10-CM

## 2019-04-29 DIAGNOSIS — M15.9 PRIMARY OSTEOARTHRITIS INVOLVING MULTIPLE JOINTS: ICD-10-CM

## 2019-04-29 DIAGNOSIS — M87.00 AVASCULAR NECROSIS (HCC): ICD-10-CM

## 2019-04-29 DIAGNOSIS — Z99.3 WHEELCHAIR BOUND: ICD-10-CM

## 2019-04-29 DIAGNOSIS — G81.94 HEMIPLEGIA AFFECTING LEFT NONDOMINANT SIDE, UNSPECIFIED ETIOLOGY, UNSPECIFIED HEMIPLEGIA TYPE (HCC): ICD-10-CM

## 2019-04-29 DIAGNOSIS — Z79.899 MEDICATION MANAGEMENT: ICD-10-CM

## 2019-04-29 PROCEDURE — 1036F TOBACCO NON-USER: CPT | Performed by: PEDIATRICS

## 2019-04-29 PROCEDURE — G8420 CALC BMI NORM PARAMETERS: HCPCS | Performed by: PEDIATRICS

## 2019-04-29 PROCEDURE — G8427 DOCREV CUR MEDS BY ELIG CLIN: HCPCS | Performed by: PEDIATRICS

## 2019-04-29 PROCEDURE — 99213 OFFICE O/P EST LOW 20 MIN: CPT | Performed by: PEDIATRICS

## 2019-04-29 RX ORDER — LORAZEPAM 1 MG/1
1 TABLET ORAL EVERY 8 HOURS PRN
Qty: 30 TABLET | Refills: 0 | Status: SHIPPED | OUTPATIENT
Start: 2019-04-29 | End: 2019-06-26 | Stop reason: SDUPTHER

## 2019-04-29 RX ORDER — HYDROCODONE BITARTRATE AND ACETAMINOPHEN 7.5; 325 MG/1; MG/1
1 TABLET ORAL EVERY 8 HOURS PRN
Qty: 60 TABLET | Refills: 0 | Status: SHIPPED | OUTPATIENT
Start: 2019-04-29 | End: 2019-05-29 | Stop reason: SDUPTHER

## 2019-04-29 ASSESSMENT — ENCOUNTER SYMPTOMS
SORE THROAT: 0
EYE PAIN: 0
SINUS PRESSURE: 0
CONSTIPATION: 0
NAUSEA: 1
WHEEZING: 0
COUGH: 0
DIARRHEA: 1
SHORTNESS OF BREATH: 0
VOMITING: 1
ABDOMINAL PAIN: 1
BACK PAIN: 1

## 2019-04-29 NOTE — PROGRESS NOTES
[address] [facility], [address]  Phone [phone]   Fax [phone]      OFFICE VISIT: 2019    Tanvir Shah-: 1975      HPI  Reason For Visit:  Bekah Vogt is a 45 y.o. Health Maintenance    1 Month Follow-Up (Patient is here for 1 month follow up); Diarrhea (Several episodes of diarrhea since this am); Nausea & Vomiting (Patient vomitted once this am and feeling nauseated); and Medication Refill (Norco and Ativan)    Patient presents for refill of her anxiety and pain medications. She also presents with multiple episodes of nausea and vomiting and diarrhea onset this morning. She has had multiple episodes of both. She does have positive ill contact with her caregiver having vomiting and diarrhea in the recent past or  Her father gave her 4 Imodium tablets today. Last emesis: this morning  Last diarrhea: this morning    Anxiety:  She does take lorazepam on a rare instance to help with her anxiety. Last fill this prescription was on 10/23/2018 for #30 tablets. Chronic pain:  She typically takes hydrocodone 7.5 mg on a when necessary basis. Last fill this prescription was on 3/28/2019 for #24 tablets. 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  We are likely at the best that we can do and are attempting to address individual issues adjunctively    Pain Control: Average: 4/10             Best: 2-3/10                Worst: 8-9/10  (19)     Her pain level varies in severity and location        Activities of daily living: She is completely wheelchair bound and unable to ambulate at all. She needs assistance with all ADLs. Even going to the bathroom requires assistance.   She does have helped by her Outpatient Medications   Medication Sig Dispense Refill    HYDROcodone-acetaminophen (NORCO) 7.5-325 MG per tablet Take 1 tablet by mouth every 8 hours as needed for Pain for up to 8 days. 60 tablet 0    LORazepam (ATIVAN) 1 MG tablet Take 1 tablet by mouth every 8 hours as needed for Anxiety for up to 30 days. 30 tablet 0    medroxyPROGESTERone (DEPO-PROVERA) 150 MG/ML injection ADMINISTER 1 ML IN THE MUSCLE 1 TIME FOR 1 DOSE 1 mL 3    naloxone (NARCAN) 4 MG/0.1ML LIQD nasal spray 1 spray by Nasal route as needed for Opioid Reversal 1 each 0    hydrocortisone 2.5 % cream APPLY EXTERNALLY TO THE AFFECTED AREA TWICE DAILY AS DIRECTED 30 g 0    celecoxib (CELEBREX) 200 MG capsule TAKE 1 CAPSULE BY MOUTH DAILY 90 capsule 3    tiZANidine (ZANAFLEX) 4 MG tablet Take 1 tablet by mouth every 8 hours as needed (muscle spasm) 90 tablet 3    Misc.  Devices [facility]) MISC 1 Units by Enteral route daily With reclining back 1 each 0    hydrOXYzine (ATARAX) 25 MG tablet Take 1 tablet by mouth every 8 hours as needed for Anxiety 30 tablet 3    desloratadine (CLARINEX) 5 MG tablet Take 1 tablet by mouth daily 30 tablet 5    albuterol (PROVENTIL) (2.5 MG/3ML) 0.083% nebulizer solution Take 3 mLs by nebulization every 6 hours as needed for Wheezing (coughing) 120 each 5    medicated lip balm (BLISTEX/CARMEX) 2-2.5-6.6 % STCK Apply topically as needed for Dry Lips      ondansetron (ZOFRAN-ODT) 4 MG disintegrating tablet Place 1 tablet under the tongue every 8 hours as needed for Nausea or Vomiting 20 tablet 0    ranitidine (ZANTAC) 150 MG tablet TAKE 1 TABLET TWICE A DAY FOR STOMACH-REFLUX TROUBLE 180 tablet 3    medroxyPROGESTERone (DEPO-PROVERA) 150 MG/ML injection Inject 1 mL into the muscle every 3 months 1 mL 0    SENEXON-S 8.6-50 MG per tablet TAKE 1 TABLET BY MOUTH TWICE DAILY 60 tablet 11    omeprazole (PRILOSEC) 20 MG delayed release capsule Take 1 capsule by mouth Daily 90 capsule 3    LAMICTAL 200 MG tablet Take 1 tablet by mouth 2 times daily 180 tablet 3    diclofenac sodium (VOLTAREN) 1 % GEL Apply 2 g topically 4 times daily 100 g 5    EPINEPHrine (EPIPEN 2-ADAN) 0.3 MG/0.3ML SOAJ injection Inject 0.3 mLs into the muscle once for 1 dose Use as directed for allergic reaction 2 each 5     No current facility-administered medications for this visit. Allergies: Latex; Bactrim [sulfamethoxazole-trimethoprim]; Ciprofloxacin; Ciprofloxacin hcl; Depakote [divalproex sodium]; Dilantin [phenytoin sodium extended]; Dye [iodides]; Keflex [cephalexin]; Pcn [penicillins]; Primaxin [imipenem];  Unasyn [ampicillin-sulbactam sodium]; and Wasp venom     Past Medical History:   Diagnosis Date    Arthritis     GERD (gastroesophageal reflux disease)     History of blood transfusion     Incontinence     Seizures ([address]        Family History   Problem Relation Age of Onset    High Blood Pressure Mother     High Blood Pressure Father        Past Surgical History:   Procedure Laterality Date    APPENDECTOMY      CHOLECYSTECTOMY      CSF SHUNT Right     DEEP BRAIN STIMULATOR PLACEMENT      tremor control it is off now    NC EXCIS CHALAZION,GEN ANESTHESIA Right 2018    EYE CHALAZION EXCISION performed by Keyla Stacy MD at [facility]. [address] Left 6/15/2017    EYE CATARACT EMULSIFICATION IOL IMPLANT performed by Keyla Stacy MD at [facility]. [address] Right 2017    CATARCAT EXTRACTION EYE WITH IOL performed by Keyla Stacy MD at [facility]       Social History     Tobacco Use    Smoking status: Former Smoker     Packs/day: 1.00     Years: 19.00     Pack years: 19.00     Types: Cigarettes     Last attempt to quit: 2015     Years since quittin.2    Smokeless tobacco: Never Used    Tobacco comment: quit smoking  6 yrs ago   Substance Use Topics    Alcohol use: No        Review of Systems   Constitutional: Negative for fatigue and unexpected weight change. HENT: Negative for congestion, ear pain, sinus pressure and sore throat. Eyes: Negative for pain and visual disturbance. Respiratory: Negative for cough, shortness of breath and wheezing. Cardiovascular: Negative for chest pain, palpitations and leg swelling. Gastrointestinal: Positive for abdominal pain, diarrhea, nausea and vomiting. Negative for constipation. Endocrine: Negative for polyuria. Genitourinary: Positive for pelvic pain. Negative for dysuria, frequency, hematuria and urgency. Musculoskeletal: Positive for arthralgias (knees and hips bilaterally), back pain, joint swelling (left knee), myalgias and neck pain. Skin: Negative for rash. Neurological: Negative for dizziness and headaches. Psychiatric/Behavioral: Negative for self-injury. The patient is not nervous/anxious. Physical Exam   Constitutional: She is oriented to person, place, and time. She appears well-developed and well-nourished. She is cooperative. Non-toxic appearance. No distress. Body habitus is normal   HENT:   Head: Normocephalic and atraumatic. Right Ear: Hearing, tympanic membrane, external ear and ear canal normal.   Left Ear: Hearing, tympanic membrane, external ear and ear canal normal.   Nose: Nose normal.   Mouth/Throat: Mucous membranes are normal. Posterior oropharyngeal edema (with cobblestoning) and posterior oropharyngeal erythema present. There is redness at the posterior margin of her gums from her dentures. There is also some noted on anterior gums as well. Eyes: Pupils are equal, round, and reactive to light. Conjunctivae, EOM and lids are normal.   Amblyoplia R eye (OD)   Neck: Phonation normal. Neck supple. No JVD present. Carotid bruit is not present. No thyromegaly present. Cardiovascular: Normal rate, regular rhythm and normal heart sounds. No extrasystoles are present. PMI is not displaced. Exam reveals no gallop and no friction rub.    No murmur heard. Pulmonary/Chest: Effort normal and breath sounds normal. No respiratory distress. She has no wheezes. She has no rhonchi. She has no rales. Abdominal: Soft. Bowel sounds are normal. She exhibits no distension ( ) and no mass. There is no hepatosplenomegaly. There is no tenderness. There is no rebound, no guarding and no CVA tenderness. Genitourinary:   Genitourinary Comments: Examination deferred   Musculoskeletal: Normal range of motion. She exhibits no edema or tenderness. Joint examination reveals no acute arthritis or synovitis. Contracted L  UE  She has atrophy of her L LE   Lymphadenopathy:     She has no cervical adenopathy. Neurological: She is alert and oriented to person, place, and time. She has normal strength. She displays no atrophy and no tremor. No cranial nerve deficit (by gross examination) or sensory deficit. Gait normal.   She has very limited mobility of her entire left side. She has limited mobility of her right lower extremities as well and has generalized weakness with inability to withstand her weight   Skin: Skin is warm and dry. No rash noted. Psychiatric: She has a normal mood and affect. Her speech is normal and behavior is normal.   Vitals reviewed. ASSESSMENT      ICD-10-CM    1. Chronic pain syndrome G89.4 HYDROcodone-acetaminophen (NORCO) 7.5-325 MG per tablet   2. Medication management Z79.899 HYDROcodone-acetaminophen (NORCO) 7.5-325 MG per tablet     LORazepam (ATIVAN) 1 MG tablet   3. Primary osteoarthritis involving multiple joints M15.0 HYDROcodone-acetaminophen (NORCO) 7.5-325 MG per tablet   4. Avascular necrosis (HCC) M87.00 HYDROcodone-acetaminophen (NORCO) 7.5-325 MG per tablet   5. Wheelchair bound Z99.3 HYDROcodone-acetaminophen (NORCO) 7.5-325 MG per tablet   6. Hemiplegia affecting left nondominant side, unspecified etiology, unspecified hemiplegia type (HCC) G81.94 HYDROcodone-acetaminophen (NORCO) 7.5-325 MG per tablet   7.  Anxiety F41.9 LORazepam (ATIVAN) 1 MG tablet       PLAN      ICD-10-CM    1. Chronic pain syndrome G89.4 HYDROcodone-acetaminophen (NORCO) 7.5-325 MG per tablet   2. Medication management Z79.899 HYDROcodone-acetaminophen (NORCO) 7.5-325 MG per tablet     LORazepam (ATIVAN) 1 MG tablet   3. Primary osteoarthritis involving multiple joints M15.0 HYDROcodone-acetaminophen (NORCO) 7.5-325 MG per tablet   4. Avascular necrosis (HCC) M87.00 HYDROcodone-acetaminophen (NORCO) 7.5-325 MG per tablet   5. Wheelchair bound Z99.3 HYDROcodone-acetaminophen (NORCO) 7.5-325 MG per tablet   6. Hemiplegia affecting left nondominant side, unspecified etiology, unspecified hemiplegia type (HCC) G81.94 HYDROcodone-acetaminophen (NORCO) 7.5-325 MG per tablet   7. Anxiety F41.9 LORazepam (ATIVAN) 1 MG tablet       No orders of the defined types were placed in this encounter. Return in about 1 month (around 5/29/2019) for 15.

## 2019-05-15 RX ORDER — LIDOCAINE AND PRILOCAINE 25; 25 MG/G; MG/G
CREAM TOPICAL
Qty: 210 G | Refills: 0 | Status: SHIPPED | OUTPATIENT
Start: 2019-05-15 | End: 2019-06-08 | Stop reason: SDUPTHER

## 2019-05-15 NOTE — TELEPHONE ENCOUNTER
Received fax from pharmacy requesting refill on pts medication(s). Pt was last seen in office on 4/29/2019  and has a follow up scheduled for 5/29/2019. Will send request to  Dr. Yenny Stroud  for patient.      Requested Prescriptions     Pending Prescriptions Disp Refills    lidocaine-prilocaine (EMLA) 2.5-2.5 % cream [Pharmacy Med Name: LIDOCAINE-PRILOCAINE CREAM 2.5-2.5 CRM] 210 g 0     Sig: APPLY ONE PUMP (GRAM) TO AFFECTED AREA(S) THREE TO FOUR TIMES A DAY AS DIRECTED

## 2019-05-23 DIAGNOSIS — M62.838 MUSCLE SPASM: ICD-10-CM

## 2019-05-23 RX ORDER — TIZANIDINE 4 MG/1
4 TABLET ORAL EVERY 8 HOURS PRN
Qty: 90 TABLET | Refills: 3 | Status: SHIPPED | OUTPATIENT
Start: 2019-05-23 | End: 2019-09-17

## 2019-05-23 NOTE — TELEPHONE ENCOUNTER
Received fax from pharmacy requesting refill on pts medication(s). Pt was last seen in office on 4/29/2019  and has a follow up scheduled for 5/29/2019. Will send request to  Dr. Margarita Barraza  for patient.      Requested Prescriptions     Pending Prescriptions Disp Refills    tiZANidine (ZANAFLEX) 4 MG tablet [Pharmacy Med Name: TIZANIDINE 4MG TABLETS] 90 tablet 3     Sig: Take 1 tablet by mouth every 8 hours as needed (spasms)

## 2019-05-29 ENCOUNTER — OFFICE VISIT (OUTPATIENT)
Dept: PRIMARY CARE CLINIC | Age: 44
End: 2019-05-29
Payer: MEDICARE

## 2019-05-29 VITALS
TEMPERATURE: 98.8 F | HEIGHT: 63 IN | SYSTOLIC BLOOD PRESSURE: 102 MMHG | DIASTOLIC BLOOD PRESSURE: 70 MMHG | WEIGHT: 119.6 LBS | OXYGEN SATURATION: 98 % | HEART RATE: 102 BPM | BODY MASS INDEX: 21.19 KG/M2

## 2019-05-29 DIAGNOSIS — M87.00 AVASCULAR NECROSIS (HCC): ICD-10-CM

## 2019-05-29 DIAGNOSIS — M17.12 PRIMARY OSTEOARTHRITIS OF LEFT KNEE: Primary | ICD-10-CM

## 2019-05-29 DIAGNOSIS — Z79.899 MEDICATION MANAGEMENT: ICD-10-CM

## 2019-05-29 DIAGNOSIS — G89.4 CHRONIC PAIN SYNDROME: ICD-10-CM

## 2019-05-29 DIAGNOSIS — M17.11 PRIMARY OSTEOARTHRITIS OF RIGHT KNEE: ICD-10-CM

## 2019-05-29 DIAGNOSIS — Z99.3 WHEELCHAIR BOUND: ICD-10-CM

## 2019-05-29 DIAGNOSIS — G81.94 HEMIPLEGIA AFFECTING LEFT NONDOMINANT SIDE, UNSPECIFIED ETIOLOGY, UNSPECIFIED HEMIPLEGIA TYPE (HCC): ICD-10-CM

## 2019-05-29 DIAGNOSIS — Z91.09 ENVIRONMENTAL ALLERGIES: ICD-10-CM

## 2019-05-29 DIAGNOSIS — M15.9 PRIMARY OSTEOARTHRITIS INVOLVING MULTIPLE JOINTS: ICD-10-CM

## 2019-05-29 DIAGNOSIS — F41.9 ANXIETY: ICD-10-CM

## 2019-05-29 PROCEDURE — 1036F TOBACCO NON-USER: CPT | Performed by: PEDIATRICS

## 2019-05-29 PROCEDURE — G8427 DOCREV CUR MEDS BY ELIG CLIN: HCPCS | Performed by: PEDIATRICS

## 2019-05-29 PROCEDURE — 20610 DRAIN/INJ JOINT/BURSA W/O US: CPT | Performed by: PEDIATRICS

## 2019-05-29 PROCEDURE — 99214 OFFICE O/P EST MOD 30 MIN: CPT | Performed by: PEDIATRICS

## 2019-05-29 PROCEDURE — G8420 CALC BMI NORM PARAMETERS: HCPCS | Performed by: PEDIATRICS

## 2019-05-29 RX ORDER — HYDROCODONE BITARTRATE AND ACETAMINOPHEN 7.5; 325 MG/1; MG/1
1 TABLET ORAL EVERY 8 HOURS PRN
Qty: 60 TABLET | Refills: 0 | Status: SHIPPED | OUTPATIENT
Start: 2019-05-29 | End: 2019-06-26 | Stop reason: SDUPTHER

## 2019-05-29 RX ORDER — TRIAMCINOLONE ACETONIDE 40 MG/ML
40 INJECTION, SUSPENSION INTRA-ARTICULAR; INTRAMUSCULAR ONCE
Status: COMPLETED | OUTPATIENT
Start: 2019-05-29 | End: 2019-05-30

## 2019-05-29 RX ORDER — DESLORATADINE 5 MG/1
5 TABLET ORAL DAILY
Qty: 30 TABLET | Refills: 5 | Status: SHIPPED | OUTPATIENT
Start: 2019-05-29 | End: 2019-09-17 | Stop reason: SDUPTHER

## 2019-05-29 RX ORDER — METHYLPREDNISOLONE ACETATE 40 MG/ML
40 INJECTION, SUSPENSION INTRA-ARTICULAR; INTRALESIONAL; INTRAMUSCULAR; SOFT TISSUE ONCE
Status: COMPLETED | OUTPATIENT
Start: 2019-05-29 | End: 2019-05-30

## 2019-05-29 ASSESSMENT — ENCOUNTER SYMPTOMS
EYE PAIN: 0
VOMITING: 1
ABDOMINAL PAIN: 1
WHEEZING: 0
SHORTNESS OF BREATH: 0
BACK PAIN: 1
SORE THROAT: 0
COUGH: 0
CONSTIPATION: 1
SINUS PRESSURE: 0

## 2019-05-29 NOTE — PROGRESS NOTES
[address] [facility], [address]  Phone [phone]   Fax [phone]      OFFICE VISIT: 2019    Caridad Walshht-: 1975      HPI  Reason For Visit:  Mackenzie Flowers is a 45 y.o. Health Maintenance    1 Month Follow-Up (Patient is here for 1 month follow up); Medication Refill ([place] and Clarinex ); and Other (letter for seat for Yani will)    Patient presents for refill of her pain medication. Her pain has been exacerbated recently as she is suffering from recurrent injury secondary to transfers to and from the vehicle. This has resulted in significant increase in her overall pain. She does take hydrocodone for her pain. Last prescription of hydrocodone was on 2019 for 7.5 mg dose #60 tablets. We had been weaning down on her narcotic pain medications, but now her pain is increasing as noted above. This is also very taxing on her caregiver (father), as he is having greater difficulty manipulating [place] in and out of her vehicle seat. Chronic pain:  She typically takes hydrocodone 7.5 mg on a when necessary basis. Last fill this prescription was on 3/28/2019 for #24 tablets.     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  We are likely at the best that we can do and are attempting to address individual issues adjunctively     Pain Control: Average: 4/10             Best: 2-3/10                Worst: 8-9/10  (19)     Her pain level varies in severity and location        Activities of daily living: She is completely wheelchair bound and unable to ambulate at all. She needs assistance with all ADLs. Even going to the bathroom requires assistance.   She does have helped by her father as well as assistant who comes and meets her needs     Adverse effects: None  Aberrant behavior: None  Affect: Very good considering her multiple health issues     Alternative medications:              Celebrex 200 mg daily              Diclofenac gel 1% 2 g 4 times daily topically              Naproxen sodium 220 mg when necessary              She is intolerant of many medications.     Bowel regimen: She is not regularly adherent to her bowel regimen              We discussed this again and stressed the importance of this regimen.              Senokot S,  1 tablets twice daily              MiraLAX 17 g daily  Bowel function: Chronic constipation     RORY was reviewed today per office protocol. Report shows No discrepancies.  Fill pattern is consistent from single provider(s) at single pharmacy(s). Request #  [id number]     Active cumulative morphine equivalent score is 0  This is obviously incorrect     She does have Narcan in the household at home, and they are aware of its utilization     Controlled Substances Monitoring:      RX Monitoring 3/8/2019   Attestation The Prescription Monitoring Report for this patient was reviewed today. Acute Pain Prescriptions -   Chronic Pain Routine Monitoring Possible medication side effects, risk of tolerance/dependence & alternative treatments discussed. ;Obtaining appropriate analgesic effect of treatment. Chronic Pain > 80 MEDD -        She is wanting injections in bilateral knees today. height is 5' 3\" (1.6 m) and weight is 119 lb 9.6 oz (54.3 kg). Her temporal temperature is 98.8 °F (37.1 °C). Her blood pressure is 102/70 and her pulse is 102. Her oxygen saturation is 98%. Body mass index is 21.19 kg/m². I have reviewed the following with the MsGaby Alyssa   Lab Review  No visits with results within 6 Month(s) from this visit.    Latest known visit with results is:   Procedure visit on 10/29/2018   Component Date Value    Preg Test, Ur 10/29/2018 none detected      Copies of these are in the chart. Current Outpatient Medications   Medication Sig Dispense Refill    tiZANidine (ZANAFLEX) 4 MG tablet Take 1 tablet by mouth every 8 hours as needed (spasms) 90 tablet 3    lidocaine-prilocaine (EMLA) 2.5-2.5 % cream APPLY ONE PUMP (GRAM) TO AFFECTED AREA(S) THREE TO FOUR TIMES A DAY AS DIRECTED 210 g 0    LORazepam (ATIVAN) 1 MG tablet Take 1 tablet by mouth every 8 hours as needed for Anxiety for up to 30 days. 30 tablet 0    medroxyPROGESTERone (DEPO-PROVERA) 150 MG/ML injection ADMINISTER 1 ML IN THE MUSCLE 1 TIME FOR 1 DOSE 1 mL 3    naloxone (NARCAN) 4 MG/0.1ML LIQD nasal spray 1 spray by Nasal route as needed for Opioid Reversal 1 each 0    hydrocortisone 2.5 % cream APPLY EXTERNALLY TO THE AFFECTED AREA TWICE DAILY AS DIRECTED 30 g 0    celecoxib (CELEBREX) 200 MG capsule TAKE 1 CAPSULE BY MOUTH DAILY 90 capsule 3    Misc.  Devices [facility]) MISC 1 Units by Enteral route daily With reclining back 1 each 0    hydrOXYzine (ATARAX) 25 MG tablet Take 1 tablet by mouth every 8 hours as needed for Anxiety 30 tablet 3    desloratadine (CLARINEX) 5 MG tablet Take 1 tablet by mouth daily 30 tablet 5    albuterol (PROVENTIL) (2.5 MG/3ML) 0.083% nebulizer solution Take 3 mLs by nebulization every 6 hours as needed for Wheezing (coughing) 120 each 5    medicated lip balm (BLISTEX/CARMEX) 2-2.5-6.6 % STCK Apply topically as needed for Dry Lips      ondansetron (ZOFRAN-ODT) 4 MG disintegrating tablet Place 1 tablet under the tongue every 8 hours as needed for Nausea or Vomiting 20 tablet 0    ranitidine (ZANTAC) 150 MG tablet TAKE 1 TABLET TWICE A DAY FOR STOMACH-REFLUX TROUBLE 180 tablet 3    medroxyPROGESTERone (DEPO-PROVERA) 150 MG/ML injection Inject 1 mL into the muscle every 3 months 1 mL 0    SENEXON-S 8.6-50 MG per tablet TAKE 1 TABLET BY MOUTH TWICE DAILY 60 tablet 11    omeprazole (PRILOSEC) 20 MG delayed release capsule Take 1 capsule by mouth Daily 90 capsule 3    EXTRACAP,INSERT LENS Right 2017    CATARCAT EXTRACTION EYE WITH IOL [facility]indo [facility]       Social History     Tobacco Use    Smoking status: Former Smoker     Packs/day: 1.00     Years: 19.00     Pack years: 19.00     Types: Cigarettes     Last attempt to quit: 2015     Years since quittin.2    Smokeless tobacco: Never Used    Tobacco comment: quit smoking  6 yrs ago   Substance Use Topics    Alcohol use: No        Review of Systems   Constitutional: Negative for fatigue and unexpected weight change. HENT: Negative for congestion, ear pain, sinus pressure and sore throat. Eyes: Negative for pain and visual disturbance. Respiratory: Negative for cough, shortness of breath and wheezing. Cardiovascular: Negative for chest pain, palpitations and leg swelling. Gastrointestinal: Positive for abdominal pain, constipation (intermittently) and vomiting. Endocrine: Negative for polyuria. Genitourinary: Positive for pelvic pain. Negative for dysuria, frequency, hematuria and urgency. Musculoskeletal: Positive for arthralgias (knees and hips bilaterally), back pain, joint swelling (left knee), myalgias and neck pain. Skin: Negative for rash. Neurological: Negative for dizziness and headaches. Psychiatric/Behavioral: Negative for self-injury. The patient is not nervous/anxious. Physical Exam   Constitutional: She is oriented to person, place, and time. She appears well-developed and well-nourished. She is cooperative. Non-toxic appearance. No distress. Body habitus is normal   HENT:   Head: Normocephalic and atraumatic. Right Ear: Hearing, tympanic membrane, external ear and ear canal normal.   Left Ear: Hearing, tympanic membrane, external ear and ear canal normal.   Nose: Nose normal.   Mouth/Throat: Mucous membranes are normal.   Eyes: Pupils are equal, round, and reactive to light.  Conjunctivae, EOM and lids are normal.   Amblyoplia R eye (OD) Neck: Phonation normal. Neck supple. No JVD present. Carotid bruit is not present. No thyromegaly present. Cardiovascular: Normal rate, regular rhythm and normal heart sounds. No extrasystoles are present. PMI is not displaced. Exam reveals no gallop and no friction rub. No murmur heard. Pulmonary/Chest: Effort normal and breath sounds normal. No respiratory distress. She has no wheezes. She has no rhonchi. She has no rales. Abdominal: Soft. Bowel sounds are normal. She exhibits no distension ( ) and no mass. There is no hepatosplenomegaly. There is no tenderness. There is no rebound, no guarding and no CVA tenderness. Genitourinary:   Genitourinary Comments: Examination deferred   Musculoskeletal: Normal range of motion. She exhibits no edema or tenderness. Joint examination reveals no acute arthritis or synovitis. Contracted L  UE  She has atrophy of her L LE   Lymphadenopathy:     She has no cervical adenopathy. Neurological: She is alert and oriented to person, place, and time. She has normal strength. She displays no atrophy and no tremor. No cranial nerve deficit (by gross examination) or sensory deficit. Gait normal.   She has very limited mobility of her entire left side. She has limited mobility of her right lower extremities as well and has generalized weakness with inability to withstand her weight. She cannot transfer without being lifted. She does not contribute to any significant degree with mechanical transfers. Skin: Skin is warm and dry. No rash noted. Psychiatric: She has a normal mood and affect. Her speech is normal and behavior is normal.   Vitals reviewed. ASSESSMENT      ICD-10-CM    1. Primary osteoarthritis of left knee M17.12 triamcinolone acetonide (KENALOG-40) injection 40 mg     methylPREDNISolone acetate (DEPO-MEDROL) injection 40 mg     UT DRAIN/INJECT LARGE JOINT/BURSA   2.  Chronic pain syndrome G89.4 HYDROcodone-acetaminophen (NORCO) 7.5-325 MG per tablet   3. Medication management Z79.899 HYDROcodone-acetaminophen (NORCO) 7.5-325 MG per tablet   4. Primary osteoarthritis involving multiple joints M15.0 HYDROcodone-acetaminophen (NORCO) 7.5-325 MG per tablet   5. Avascular necrosis (HCC) M87.00 HYDROcodone-acetaminophen (NORCO) 7.5-325 MG per tablet   6. Wheelchair bound Z99.3 HYDROcodone-acetaminophen (NORCO) 7.5-325 MG per tablet   7. Hemiplegia affecting left nondominant side, unspecified etiology, unspecified hemiplegia type (HCC) G81.94 HYDROcodone-acetaminophen (NORCO) 7.5-325 MG per tablet   8. Anxiety F41.9    9. Environmental allergies Z91.09 desloratadine (CLARINEX) 5 MG tablet   10. Primary osteoarthritis of right knee M17.11 triamcinolone acetonide (KENALOG-40) injection 40 mg     methylPREDNISolone acetate (DEPO-MEDROL) injection 40 mg     CT DRAIN/INJECT LARGE JOINT/BURSA       PLAN      ICD-10-CM    1. Primary osteoarthritis of left knee M17.12 triamcinolone acetonide (KENALOG-40) injection 40 mg     methylPREDNISolone acetate (DEPO-MEDROL) injection 40 mg     CT DRAIN/INJECT LARGE JOINT/BURSA   2. Chronic pain syndrome G89.4 HYDROcodone-acetaminophen (NORCO) 7.5-325 MG per tablet   3. Medication management Z79.899 HYDROcodone-acetaminophen (NORCO) 7.5-325 MG per tablet   4. Primary osteoarthritis involving multiple joints M15.0 HYDROcodone-acetaminophen (NORCO) 7.5-325 MG per tablet   5. Avascular necrosis (HCC) M87.00 HYDROcodone-acetaminophen (NORCO) 7.5-325 MG per tablet   6. Wheelchair bound Z99.3 HYDROcodone-acetaminophen (NORCO) 7.5-325 MG per tablet   7. Hemiplegia affecting left nondominant side, unspecified etiology, unspecified hemiplegia type (HCC) G81.94 HYDROcodone-acetaminophen (NORCO) 7.5-325 MG per tablet   8. Anxiety F41.9 She is doing well on present medication regimen. 9. Environmental allergies Z91.09 desloratadine (CLARINEX) 5 MG tablet   10.  Primary osteoarthritis of right knee M17.11 triamcinolone acetonide (KENALOG-40) injection 40 mg     methylPREDNISolone acetate (DEPO-MEDROL) injection 40 mg     AZ DRAIN/INJECT LARGE JOINT/BURSA       Orders Placed This Encounter   Procedures    AZ DRAIN/INJECT LARGE JOINT/BURSA    [place] DRAIN/INJECT LARGE JOINT/BURSA        Return in about 1 month (around 6/29/2019) for 30. There are also requesting that a letter be written on her behalf for assisting with transfers by adding a seat to their [place] which will transition from inside the vehicle to outside the vehicle and vice versa. Presently, they are trying to lift her in and out of the vehicle which is resulting in pain under her arms and around her chest with lifting. This has also resulted in pain in her legs trying to lift them in separately causing rotational forces on her body due to the asymmetric movements        Knee Arthrocentesis with Injection Procedure Note    Pre-operative Diagnosis: left knee djd with pain    Post-operative Diagnosis: same    Indications: Symptom relief from osteoarthritis    Anesthesia: not required     Procedure Details     Verbal consent was obtained for the procedure. The joint was prepped with chlorhexadine. The area was sprayed with Gebauer's Ethyl Chloride as a topical anesthetic and then a 22 gauge needle was inserted into the superior aspect of the joint from a lateral approach. 2 ml 1% lidocaine and 2 ml of DepoMedrol (40mg/ml) and 1ml Kenalog (40mg/ml)  was then injected into the joint. The needle was removed and the area cleansed and dressed. Complications:  None; patient tolerated the procedure well. Knee Arthrocentesis with Injection Procedure Note    Pre-operative Diagnosis: right knee djd with pain    Post-operative Diagnosis: same    Indications: Symptom relief from osteoarthritis    Anesthesia: not required     Procedure Details     Verbal consent was obtained for the procedure. The joint was prepped with chlorhexadine.  The area was sprayed with Gebauer's Ethyl Chloride as a topical anesthetic and then a 22 gauge needle was inserted into the superior aspect of the joint from a lateral approach. 2 ml 1% lidocaine and 2 ml of DepoMedrol (40mg/ml) and 1ml Kenalog (40mg/ml)  was then injected into the joint. The needle was removed and the area cleansed and dressed. Complications:  None; patient tolerated the procedure well.        5/31/2019  Addendum to the above note:    Owen Red family has asked me to write a letter on her behalf regards to [employer] appealing for accommodations to be made for facilitation of transports by private vehicle. This should be on letterhead    If this is addressed to judge Wolff in [place], should be addressed as \"Your Honor, Sir:\"    Rika Nicolas is a 70-year-old female patient of mine who unfortunately suffers from spastic hemiplegia affecting her left side. She is non-mobile, and wheelchair dependent. In addition, she suffers from right sided weakness which renders her completely dependent on others for all transfers. She does not drive. She does she have the ability to operate a power wheelchair or any other motorized vehicle, or any other means of transportation. Her family transports her to wherever she needs to go. The active physically transferring her from wheelchair to vehicle seat and vice versa is a very painful experience for Miss Owen Red. She must be grasped under her arms, lifted from her seat, and then transferred to the vehicle seat. This Owen Red does not have the core strength to hold herself up in that seat, and frequently falls backwards over the side of the seat. This typically would require a 2nd person inside the vehicle to stabilize her when she is physically lifted from the wheelchair to the vehicle. When transferred from the vehicle to her wheelchair, her upper torso is pulled out of the vehicle and transferred to the wheelchair, dragging her lower extremities behind.  Once her upper torso is in the seat, her legs are then lifted causing rotational strain as they are put in place in front of her. In addition to the discomfort caused to Miss Owen Red, this is physically challenging for her aging caregivers. The Owen Red family does possess a vehicle, [place] to facilitate Miss Walshs engaging in more normal activities. This does provide some normals to her life and gives her the Freedom to engage in activities that she would not otherwise be able to participate in. Unfortunately, as noted above, this vehicle is not appropriately equipped for effective transfers to and from the vehicle and wheelchair. The vehicle is not equipped to handle and secure a wheelchair, nor does her wheelchair provide structural integrity to protect her in a potential collision scenario. The concept of public transportation was considered, however, with Miss Shah's incontinence of both bowel and bladder, this would potentially place her in a situation of tremendous embarrassment and humiliation. Private transport allows her to comfort and flexibility to discretely aggress these issues and spare that unnecessary humiliation. Miss Shah's caregivers, who are also her jury appointed legal guardians, presented an option of a power seat that would allow this transfer process to be much easier on both Miss Owen Red and her caregivers. This would require a modification to her present vehicle that is entirely feasible. There are apparently funds available for this vehicle adaptation, however they report some sort of blockade to this much needed service enhancement. It is my professional opinion, as [address] [facility] personal physician, that any denial of such and adaptation places her \"at risk\" of physical injury as well as psychological injury as mentioned above.   The potential trauma associated with this completely unnecessary blockade implicates the individuals imposing this limitation to liability, and that it places Miss Owen Red in harms way.  If such funds are available, it is my professional opinion that they should be appropriately utilized to for this particular vehicular adaptation. Another option would be to purchase a handicapped capable vehicle, or other modifications to secure a wheelchair that possesses structural integrity to appropriately restrained and protect her in the event of a collision. My request is that this power seat enhancement should be strongly considered in Geovanna's best interest for her personal physical and mental safety. This is especially true if funds are available for such a modification. It should also be considered appropriate that her legally appointed guardians should be considered competent, and supported in her efforts to provide services necessary for Miss Shah's well-being. Sincerely;      Haydee Huston D.O.   Board certified internal medicine  Board certified pediatrics

## 2019-05-30 RX ADMIN — METHYLPREDNISOLONE ACETATE 40 MG: 40 INJECTION, SUSPENSION INTRA-ARTICULAR; INTRALESIONAL; INTRAMUSCULAR; SOFT TISSUE at 19:32

## 2019-05-30 RX ADMIN — TRIAMCINOLONE ACETONIDE 40 MG: 40 INJECTION, SUSPENSION INTRA-ARTICULAR; INTRAMUSCULAR at 19:32

## 2019-05-30 RX ADMIN — METHYLPREDNISOLONE ACETATE 40 MG: 40 INJECTION, SUSPENSION INTRA-ARTICULAR; INTRALESIONAL; INTRAMUSCULAR; SOFT TISSUE at 19:31

## 2019-06-10 RX ORDER — LIDOCAINE AND PRILOCAINE 25; 25 MG/G; MG/G
CREAM TOPICAL
Qty: 210 G | Refills: 1 | Status: SHIPPED | OUTPATIENT
Start: 2019-06-10 | End: 2019-08-07 | Stop reason: SDUPTHER

## 2019-06-10 NOTE — TELEPHONE ENCOUNTER
Pt seen 5/29/19 with follow up on 6/26/19.       Requested Prescriptions     Pending Prescriptions Disp Refills    lidocaine-prilocaine (EMLA) 2.5-2.5 % cream [Pharmacy Med Name: LIDOCAINE-PRILOCAINE CREAM 2.5-2.5 CRM] 210 g 0     Sig: APPLY ONE PUMP (GRAM) TO AFFECTED AREA(S) THREE TO FOUR TIMES A DAY AS DIRECTED

## 2019-06-15 DIAGNOSIS — J30.2 SEASONAL ALLERGIC RHINITIS: ICD-10-CM

## 2019-06-17 RX ORDER — FLUTICASONE PROPIONATE 50 MCG
2 SPRAY, SUSPENSION (ML) NASAL DAILY
Qty: 3 BOTTLE | Refills: 3 | Status: SHIPPED | OUTPATIENT
Start: 2019-06-17 | End: 2019-09-17 | Stop reason: ALTCHOICE

## 2019-06-26 ENCOUNTER — OFFICE VISIT (OUTPATIENT)
Dept: PRIMARY CARE CLINIC | Age: 44
End: 2019-06-26
Payer: MEDICARE

## 2019-06-26 VITALS
BODY MASS INDEX: 20.37 KG/M2 | SYSTOLIC BLOOD PRESSURE: 121 MMHG | WEIGHT: 115 LBS | DIASTOLIC BLOOD PRESSURE: 83 MMHG | OXYGEN SATURATION: 96 % | TEMPERATURE: 98.4 F | HEART RATE: 74 BPM

## 2019-06-26 DIAGNOSIS — M87.00 AVASCULAR NECROSIS (HCC): ICD-10-CM

## 2019-06-26 DIAGNOSIS — G81.94 HEMIPLEGIA AFFECTING LEFT NONDOMINANT SIDE, UNSPECIFIED ETIOLOGY, UNSPECIFIED HEMIPLEGIA TYPE (HCC): ICD-10-CM

## 2019-06-26 DIAGNOSIS — G89.4 CHRONIC PAIN SYNDROME: ICD-10-CM

## 2019-06-26 DIAGNOSIS — F41.9 ANXIETY: ICD-10-CM

## 2019-06-26 DIAGNOSIS — M15.9 PRIMARY OSTEOARTHRITIS INVOLVING MULTIPLE JOINTS: ICD-10-CM

## 2019-06-26 DIAGNOSIS — Z99.3 WHEELCHAIR BOUND: ICD-10-CM

## 2019-06-26 DIAGNOSIS — Z79.899 MEDICATION MANAGEMENT: ICD-10-CM

## 2019-06-26 PROCEDURE — G8420 CALC BMI NORM PARAMETERS: HCPCS | Performed by: NURSE PRACTITIONER

## 2019-06-26 PROCEDURE — 1036F TOBACCO NON-USER: CPT | Performed by: NURSE PRACTITIONER

## 2019-06-26 PROCEDURE — 99213 OFFICE O/P EST LOW 20 MIN: CPT | Performed by: NURSE PRACTITIONER

## 2019-06-26 PROCEDURE — G8427 DOCREV CUR MEDS BY ELIG CLIN: HCPCS | Performed by: NURSE PRACTITIONER

## 2019-06-26 RX ORDER — HYDROCODONE BITARTRATE AND ACETAMINOPHEN 7.5; 325 MG/1; MG/1
1 TABLET ORAL EVERY 8 HOURS PRN
Qty: 60 TABLET | Refills: 0 | Status: SHIPPED | OUTPATIENT
Start: 2019-06-26 | End: 2019-08-15 | Stop reason: SDUPTHER

## 2019-06-26 RX ORDER — LORAZEPAM 1 MG/1
1 TABLET ORAL EVERY 8 HOURS PRN
Qty: 30 TABLET | Refills: 0 | Status: SHIPPED | OUTPATIENT
Start: 2019-06-26 | End: 2019-08-15 | Stop reason: SDUPTHER

## 2019-06-26 ASSESSMENT — ENCOUNTER SYMPTOMS: BACK PAIN: 1

## 2019-06-26 NOTE — PROGRESS NOTES
[address], [address]  Phone [phone]   Fax [phone]      OFFICE VISIT: 2019    Jake Casiano is a 45 y.o. female whopresents today for her medical conditions/complaints as noted below. Jake Found isc/o of Medication Refill        :     HPI  Here for medication refill    Chronic pain  She gets adequate relief from the medicine. No signs of diversion on vitaliy  She is wheelchair bound. 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      Past Medical History:   Diagnosis Date    Arthritis     GERD (gastroesophageal reflux disease)     History of blood transfusion     Incontinence     Seizures (HCC)       Past Surgical History:   Procedure Laterality Date    APPENDECTOMY      CHOLECYSTECTOMY      CSF SHUNT Right     DEEP BRAIN STIMULATOR PLACEMENT      tremor control it is off now    NE EXCIS CHALAZION,GEN ANESTHESIA Right 2018    EYE CHALAZION EXCISION performed by Rigo Loo MD at [address] CATARACT EXTRACAP,INSERT LENS Left 6/15/2017    EYE CATARACT EMULSIFICATION IOL IMPLANT performed by Rigo Loo MD at [facility]. [address] Right 2017    CATARCAT EXTRACTION EYE WITH IOL performed by Rigo Loo MD at [facility] OR       Family History   Problem Relation Age of Onset    High Blood Pressure Mother     High Blood Pressure Father        Social History     Tobacco Use    Smoking status: Former Smoker     Packs/day: 1.00     Years: 19.00     Pack years: 19.00     Types: Cigarettes     Last attempt to quit: 2015     Years since quittin.3    Smokeless tobacco: Never Used    Tobacco comment: quit smoking  6 yrs ago   Substance Use Topics    Alcohol use: No      Current Outpatient Medications   Medication Sig Dispense Refill    HYDROcodone-acetaminophen (NORCO) 7.5-325 MG per tablet Take 1 tablet by mouth every 8 hours as needed for Pain for up to 8 days. 60 tablet 0    LORazepam (ATIVAN) 1 MG tablet Take 1 tablet by mouth every 8 hours as needed for Anxiety for up to 30 days. 30 tablet 0    fluticasone (FLONASE) 50 MCG/ACT nasal spray 2 sprays by Nasal route daily 3 Bottle 3    lidocaine-prilocaine (EMLA) 2.5-2.5 % cream APPLY ONE PUMP (GRAM) TO AFFECTED AREA(S) THREE TO FOUR TIMES A DAY AS DIRECTED 210 g 1    desloratadine (CLARINEX) 5 MG tablet Take 1 tablet by mouth daily 30 tablet 5    tiZANidine (ZANAFLEX) 4 MG tablet Take 1 tablet by mouth every 8 hours as needed (spasms) 90 tablet 3    medroxyPROGESTERone (DEPO-PROVERA) 150 MG/ML injection ADMINISTER 1 ML IN THE MUSCLE 1 TIME FOR 1 DOSE 1 mL 3    naloxone (NARCAN) 4 MG/0.1ML LIQD nasal spray 1 spray by Nasal route as needed for Opioid Reversal 1 each 0    hydrocortisone 2.5 % cream APPLY EXTERNALLY TO THE AFFECTED AREA TWICE DAILY AS DIRECTED 30 g 0    celecoxib (CELEBREX) 200 MG capsule TAKE 1 CAPSULE BY MOUTH DAILY 90 capsule 3    Misc.  Devices [facility]) MISC 1 Units by Enteral route daily With reclining back 1 each 0    hydrOXYzine (ATARAX) 25 MG tablet Take 1 tablet by mouth every 8 hours as needed for Anxiety 30 tablet 3    albuterol (PROVENTIL) (2.5 MG/3ML) 0.083% nebulizer solution Take 3 mLs by nebulization every 6 hours as needed for Wheezing (coughing) 120 each 5    medicated lip balm (BLISTEX/CARMEX) 2-2.5-6.6 % STCK Apply topically as needed for Dry Lips      ondansetron (ZOFRAN-ODT) 4 MG disintegrating tablet Place 1 tablet under the tongue every 8 hours as needed for Nausea or Vomiting 20 tablet 0    ranitidine (ZANTAC) 150 MG tablet TAKE 1 TABLET TWICE A DAY FOR STOMACH-REFLUX TROUBLE 180 tablet 3    medroxyPROGESTERone (DEPO-PROVERA) 150 MG/ML injection Inject 1 mL into the muscle every 3 months 1 mL 0    SENEXON-S 8.6-50 MG per tablet TAKE 1 TABLET BY MOUTH TWICE DAILY 60 tablet 11    omeprazole (PRILOSEC) 20 MG delayed release capsule Take 1 capsule by mouth Daily 90 capsule 3    LAMICTAL 200 MG tablet Take 1 tablet by mouth 2 times daily 180 tablet 3    diclofenac sodium (VOLTAREN) 1 % GEL Apply 2 g topically 4 times daily 100 g 5    EPINEPHrine (EPIPEN 2-ADAN) 0.3 MG/0.3ML SOAJ injection Inject 0.3 mLs into the muscle once for 1 dose Use as directed for allergic reaction 2 each 5     No current facility-administered medications for this visit. Allergies   Allergen Reactions    Latex     Bactrim [Sulfamethoxazole-Trimethoprim]     Ciprofloxacin     Ciprofloxacin Hcl     Depakote [Divalproex Sodium]     Dilantin [Phenytoin Sodium Extended]     Dye [Iodides] Other (See Comments)     Skin peeled off    Keflex [Cephalexin]     Pcn [Penicillins]     Primaxin [Imipenem]     Unasyn [Ampicillin-Sulbactam Sodium]     Wasp Venom Swelling       Health Maintenance   Topic Date Due    HIV screen  03/12/1992    DTaP/Tdap/Td vaccine (1 - Tdap) 03/12/1996    Lipid screen  03/12/2017    Cervical cancer screen  05/20/2018    Flu vaccine  Completed    Pneumococcal 0-64 years Vaccine  Aged Out        :     Review of Systems   Musculoskeletal: Positive for arthralgias (hips and knees) and back pain. Psychiatric/Behavioral: The patient is nervous/anxious. All other systems reviewed and are negative.    :     Physical Exam   Constitutional: She is oriented to person, place, and time. Neck: Normal range of motion. Cardiovascular: Normal rate, regular rhythm, normal heart sounds and intact distal pulses. Pulmonary/Chest: Effort normal and breath sounds normal.   Abdominal: Soft. Neurological: She is alert and oriented to person, place, and time. She has very limited mobility of her entire left side.   She has limited mobility of her right lower extremities There are no Patient Instructions on file for this visit. RX Monitoring 6/26/2019   Attestation The Prescription Monitoring Report for this patient was reviewed today. Acute Pain Prescriptions -   Periodic Controlled Substance Monitoring Possible medication side effects, risk of tolerance/dependence & alternative treatments discussed. ;No signs of potential drug abuse or diversion identified.    Chronic Pain > 80 MEDD -       Electronically signed by LOVELY Handley on6/26/2019 at 11:09 AM

## 2019-07-03 DIAGNOSIS — L30.9 DERMATITIS: ICD-10-CM

## 2019-07-08 ENCOUNTER — TELEPHONE (OUTPATIENT)
Dept: PRIMARY CARE CLINIC | Age: 44
End: 2019-07-08

## 2019-07-09 DIAGNOSIS — R56.9 SEIZURES (HCC): ICD-10-CM

## 2019-07-09 RX ORDER — LAMOTRIGINE 200 MG/1
200 TABLET ORAL 2 TIMES DAILY
Qty: 60 TABLET | Refills: 11 | Status: SHIPPED | OUTPATIENT
Start: 2019-07-09 | End: 2020-02-17 | Stop reason: SDUPTHER

## 2019-07-16 ENCOUNTER — PROCEDURE VISIT (OUTPATIENT)
Dept: PRIMARY CARE CLINIC | Age: 44
End: 2019-07-16
Payer: MEDICARE

## 2019-07-16 PROCEDURE — 96372 THER/PROPH/DIAG INJ SC/IM: CPT | Performed by: PEDIATRICS

## 2019-07-16 RX ORDER — MEDROXYPROGESTERONE ACETATE 150 MG/ML
150 INJECTION, SUSPENSION INTRAMUSCULAR ONCE
Status: COMPLETED | OUTPATIENT
Start: 2019-07-16 | End: 2019-07-16

## 2019-07-16 RX ADMIN — MEDROXYPROGESTERONE ACETATE 150 MG: 150 INJECTION, SUSPENSION INTRAMUSCULAR at 09:53

## 2019-08-07 RX ORDER — LIDOCAINE AND PRILOCAINE 25; 25 MG/G; MG/G
CREAM TOPICAL
Qty: 210 G | Refills: 3 | Status: SHIPPED | OUTPATIENT
Start: 2019-08-07 | End: 2019-09-17 | Stop reason: SDUPTHER

## 2019-08-14 DIAGNOSIS — K21.9 GASTROESOPHAGEAL REFLUX DISEASE WITHOUT ESOPHAGITIS: ICD-10-CM

## 2019-08-14 RX ORDER — OMEPRAZOLE 20 MG/1
20 CAPSULE, DELAYED RELEASE ORAL DAILY
Qty: 90 CAPSULE | Refills: 3 | Status: SHIPPED | OUTPATIENT
Start: 2019-08-14 | End: 2020-11-06

## 2019-08-15 ENCOUNTER — OFFICE VISIT (OUTPATIENT)
Dept: PRIMARY CARE CLINIC | Age: 44
End: 2019-08-15
Payer: MEDICARE

## 2019-08-15 VITALS
BODY MASS INDEX: 21.79 KG/M2 | DIASTOLIC BLOOD PRESSURE: 74 MMHG | HEIGHT: 63 IN | WEIGHT: 123 LBS | SYSTOLIC BLOOD PRESSURE: 116 MMHG | TEMPERATURE: 98.2 F | OXYGEN SATURATION: 96 % | HEART RATE: 68 BPM

## 2019-08-15 DIAGNOSIS — Z99.3 WHEELCHAIR BOUND: ICD-10-CM

## 2019-08-15 DIAGNOSIS — M15.9 PRIMARY OSTEOARTHRITIS INVOLVING MULTIPLE JOINTS: ICD-10-CM

## 2019-08-15 DIAGNOSIS — F41.9 ANXIETY: ICD-10-CM

## 2019-08-15 DIAGNOSIS — M87.00 AVASCULAR NECROSIS (HCC): ICD-10-CM

## 2019-08-15 DIAGNOSIS — Z79.899 MEDICATION MANAGEMENT: ICD-10-CM

## 2019-08-15 DIAGNOSIS — G81.94 HEMIPLEGIA AFFECTING LEFT NONDOMINANT SIDE, UNSPECIFIED ETIOLOGY, UNSPECIFIED HEMIPLEGIA TYPE (HCC): ICD-10-CM

## 2019-08-15 DIAGNOSIS — G89.4 CHRONIC PAIN SYNDROME: ICD-10-CM

## 2019-08-15 PROCEDURE — 99213 OFFICE O/P EST LOW 20 MIN: CPT | Performed by: PEDIATRICS

## 2019-08-15 PROCEDURE — 1036F TOBACCO NON-USER: CPT | Performed by: PEDIATRICS

## 2019-08-15 PROCEDURE — G8427 DOCREV CUR MEDS BY ELIG CLIN: HCPCS | Performed by: PEDIATRICS

## 2019-08-15 PROCEDURE — G8420 CALC BMI NORM PARAMETERS: HCPCS | Performed by: PEDIATRICS

## 2019-08-15 RX ORDER — HYDROCODONE BITARTRATE AND ACETAMINOPHEN 7.5; 325 MG/1; MG/1
1 TABLET ORAL EVERY 8 HOURS PRN
Qty: 60 TABLET | Refills: 0 | Status: SHIPPED | OUTPATIENT
Start: 2019-08-15 | End: 2019-09-17 | Stop reason: SDUPTHER

## 2019-08-15 RX ORDER — LORAZEPAM 1 MG/1
1 TABLET ORAL EVERY 8 HOURS PRN
Qty: 30 TABLET | Refills: 0 | Status: SHIPPED | OUTPATIENT
Start: 2019-08-15 | End: 2019-09-14

## 2019-08-15 ASSESSMENT — ENCOUNTER SYMPTOMS
CONSTIPATION: 1
ABDOMINAL PAIN: 1
VOMITING: 1
SORE THROAT: 0
EYE PAIN: 0
SINUS PRESSURE: 0
BACK PAIN: 1
WHEEZING: 0
COUGH: 0
SHORTNESS OF BREATH: 0

## 2019-08-15 NOTE — PROGRESS NOTES
[address] [facility] Cassi Moder, [address]  Phone [phone]   Fax [phone]      OFFICE VISIT: 8/15/2019    [facility] in [place]-: 1975      HPI  Reason For Visit:  Iram Gutierrez is a 45 y.o. Health Maintenance    1 Month Follow-Up (Patient is here for 1 month follow up); Pain; and Medication Refill (Norco and Ativan)    Patient presents on 1 month follow-up for pain and anxiety. Anxiety:  She is requesting a refill of her lorazepam 1 mg. Last fill of this medication was on 2019 for number 30 tablets. She does take this on a as needed basis for her anxiety and it is very helpful. Adjunctive medications:   Lamictal 200 mg twice daily         Chronic pain:  She typically takes hydrocodone 7.5 mg on a when necessary basis. Last fill this prescription was on 3/28/2019 for #24 tablets. She is complaining of pain all over    She also has pain in her LLQ   She is having more regular BM. 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  When combined with lidocaine, it is pretty helpful    We are likely at the best that we can do and are attempting to address individual issues adjunctively     Pain Control: Average: 6/10             Best: 4/10                Worst: 10/10  She states that she is 8/10 today  (8/15/19)    Pain Interfering with life:  100%  Pain Interfering with general activity:  100%     Her pain level varies in severity and location      Activities of daily living: She is completely wheelchair bound and unable to ambulate at all. She needs assistance with all ADLs. Even going to the bathroom requires assistance.   She does have helped by her father as well as assistant who comes and meets topically as needed for Dry Lips      ondansetron (ZOFRAN-ODT) 4 MG disintegrating tablet Place 1 tablet under the tongue every 8 hours as needed for Nausea or Vomiting 20 tablet 0    ranitidine (ZANTAC) 150 MG tablet TAKE 1 TABLET TWICE A DAY FOR STOMACH-REFLUX TROUBLE 180 tablet 3    SENEXON-S 8.6-50 MG per tablet TAKE 1 TABLET BY MOUTH TWICE DAILY 60 tablet 11    diclofenac sodium (VOLTAREN) 1 % GEL Apply 2 g topically 4 times daily 100 g 5    EPINEPHrine (EPIPEN 2-ADAN) 0.3 MG/0.3ML SOAJ injection Inject 0.3 mLs into the muscle once for 1 dose Use as directed for allergic reaction 2 each 5     No current facility-administered medications for this visit. Allergies: Latex; Bactrim [sulfamethoxazole-trimethoprim]; Ciprofloxacin; Ciprofloxacin hcl; Depakote [divalproex sodium]; Dilantin [phenytoin sodium extended]; Dye [iodides]; Keflex [cephalexin]; Pcn [penicillins]; Primaxin [imipenem];  Unasyn [ampicillin-sulbactam sodium]; and Wasp venom     Past Medical History:   Diagnosis Date    Arthritis     GERD (gastroesophageal reflux disease)     History of blood transfusion     Incontinence     Seizures ([address]        Family History   Problem Relation Age of Onset    High Blood Pressure Mother     High Blood Pressure Father        Past Surgical History:   Procedure Laterality Date    APPENDECTOMY      CHOLECYSTECTOMY      CSF SHUNT Right     DEEP BRAIN STIMULATOR PLACEMENT      tremor control it is off now    [place] EXCIS CHALAZION,GEN ANESTHESIA Right 8/23/2018    EYE CHALAZION EXCISION performed by Ken Kelly MD at [address] [place] [address] Left 6/15/2017    EYE CATARACT EMULSIFICATION IOL IMPLANT performed by Ken Kelly MD at [address] [place] [address] Right 8/4/2017    CATARCAT EXTRACTION EYE WITH IOL performed by Ken Kelly MD at [facility]       Social History     Tobacco Use    Smoking status: Former Smoker     Packs/day: 1.00 Years: 19.00     Pack years: 19.00     Types: Cigarettes     Last attempt to quit: 2015     Years since quittin.5    Smokeless tobacco: Never Used    Tobacco comment: quit smoking  6 yrs ago   Substance Use Topics    Alcohol use: No        Review of Systems   Constitutional: Negative for fatigue and unexpected weight change. HENT: Negative for congestion, ear pain, sinus pressure and sore throat. Eyes: Negative for pain and visual disturbance. Respiratory: Negative for cough, shortness of breath and wheezing. Cardiovascular: Negative for chest pain, palpitations and leg swelling. Gastrointestinal: Positive for abdominal pain, constipation (intermittently) and vomiting. Endocrine: Negative for polyuria. Genitourinary: Negative for dysuria, frequency, hematuria, pelvic pain and urgency. Musculoskeletal: Positive for arthralgias (knees and hips bilaterally), back pain, joint swelling (left knee), myalgias and neck pain. Skin: Negative for rash. Neurological: Negative for dizziness and headaches. Psychiatric/Behavioral: Negative for self-injury. The patient is not nervous/anxious. Physical Exam   Constitutional: She is oriented to person, place, and time. She appears well-developed and well-nourished. She is cooperative. Non-toxic appearance. No distress. Body habitus is normal   HENT:   Head: Normocephalic and atraumatic. Right Ear: Hearing, tympanic membrane, external ear and ear canal normal.   Left Ear: Hearing, tympanic membrane, external ear and ear canal normal.   Nose: Nose normal.   Mouth/Throat: Mucous membranes are normal.   Eyes: Pupils are equal, round, and reactive to light. Conjunctivae, EOM and lids are normal.   Amblyoplia R eye (OD)   Neck: Phonation normal. Neck supple. No JVD present. Carotid bruit is not present. No thyromegaly present. Cardiovascular: Normal rate, regular rhythm and normal heart sounds. No extrasystoles are present.  PMI is not

## 2019-08-15 NOTE — PATIENT INSTRUCTIONS
Patient Education        Safe Use of Opioid Pain Medicine: Care Instructions  Your Care Instructions  Pain is your body's way of warning you that something is wrong. Pain feels different for everybody. Only you can describe your pain. A doctor can suggest or prescribe many types of medicines for pain. These range from over-the-counter medicines like acetaminophen (Tylenol) to powerful medicines called opioids. Examples of opioids are fentanyl, hydrocodone, morphine, and oxycodone. Heroin is an illegal opioid  Opioids are strong medicines. They can help you manage pain when you use them the right way. But if you misuse them, they can cause serious harm and even death. For these reasons, doctors are very careful about how they prescribe opioids. If you decide to take opioids, here are some things to remember. · Keep your doctor informed. You can develop opioid use disorder. Moderate to severe opioid use disorder is sometimes called addiction. The risk is higher if you have a history of substance use. Your doctor will monitor you closely for signs of opioid use disorder and to figure out when you no longer need to take opioids. · Make a treatment plan. The goal of your plan is to be able to function and do the things you need to do, even if you still have some pain. You might be able to manage your pain with other non-opioid options like physical therapy, relaxation, or over-the-counter pain medicines. · Be aware of the side effects. Opioids can cause serious side effects, such as constipation, dry mouth, and nausea. And over time, you may need a higher dose to get pain relief. This is called tolerance. Your body also gets used to opioids. This is called physical dependence. If you suddenly stop taking them, you may have withdrawal symptoms. The doctor carefully considered what pain medicine is right for you.  You may not have received opioids if your doctor was concerned about drug interactions or your safety, contact your doctor if:    · You think you might be using too much pain medicine, and you need help to use less or stop.     · Your pain gets worse.     · You would like a referral to a doctor or clinic that specializes in pain management. Where can you learn more? Go to [email or web address] and sign in to your [employer] account. Enter R108 in the Next[employer] box to learn more about \"Safe Use of Opioid Pain Medicine: Care Instructions. \"     If you do not have an account, please click on the \"Sign Up Now\" link. Current as of: March 28, 2019  Content Version: 12.1  © 3383-0114 [employer]wise, Incorporated. Care instructions adapted under license by [facility] ([facility]). If you have questions about a medical condition or this instruction, always ask your healthcare professional. [address] any warranty or liability for your use of this information.

## 2019-08-21 ENCOUNTER — TELEPHONE (OUTPATIENT)
Dept: VASCULAR SURGERY | Age: 44
End: 2019-08-21

## 2019-08-21 DIAGNOSIS — I73.9 PVD (PERIPHERAL VASCULAR DISEASE) (HCC): Primary | ICD-10-CM

## 2019-09-05 DIAGNOSIS — B37.0 ORAL THRUSH: ICD-10-CM

## 2019-09-17 ENCOUNTER — OFFICE VISIT (OUTPATIENT)
Dept: PRIMARY CARE CLINIC | Age: 44
End: 2019-09-17
Payer: MEDICARE

## 2019-09-17 VITALS
HEIGHT: 63 IN | BODY MASS INDEX: 21.3 KG/M2 | DIASTOLIC BLOOD PRESSURE: 70 MMHG | SYSTOLIC BLOOD PRESSURE: 110 MMHG | TEMPERATURE: 97.4 F | HEART RATE: 80 BPM | OXYGEN SATURATION: 97 % | WEIGHT: 120.19 LBS

## 2019-09-17 DIAGNOSIS — B37.0 ORAL THRUSH: ICD-10-CM

## 2019-09-17 DIAGNOSIS — Z91.09 ENVIRONMENTAL ALLERGIES: ICD-10-CM

## 2019-09-17 DIAGNOSIS — K59.09 CHRONIC CONSTIPATION: Primary | ICD-10-CM

## 2019-09-17 DIAGNOSIS — G81.94 HEMIPLEGIA AFFECTING LEFT NONDOMINANT SIDE, UNSPECIFIED ETIOLOGY, UNSPECIFIED HEMIPLEGIA TYPE (HCC): ICD-10-CM

## 2019-09-17 DIAGNOSIS — M87.00 AVASCULAR NECROSIS (HCC): ICD-10-CM

## 2019-09-17 DIAGNOSIS — Z79.899 MEDICATION MANAGEMENT: ICD-10-CM

## 2019-09-17 DIAGNOSIS — G89.4 CHRONIC PAIN SYNDROME: ICD-10-CM

## 2019-09-17 DIAGNOSIS — Z99.3 WHEELCHAIR BOUND: ICD-10-CM

## 2019-09-17 DIAGNOSIS — M15.9 PRIMARY OSTEOARTHRITIS INVOLVING MULTIPLE JOINTS: ICD-10-CM

## 2019-09-17 PROCEDURE — 1036F TOBACCO NON-USER: CPT | Performed by: PEDIATRICS

## 2019-09-17 PROCEDURE — 99214 OFFICE O/P EST MOD 30 MIN: CPT | Performed by: PEDIATRICS

## 2019-09-17 PROCEDURE — G8420 CALC BMI NORM PARAMETERS: HCPCS | Performed by: PEDIATRICS

## 2019-09-17 PROCEDURE — G8427 DOCREV CUR MEDS BY ELIG CLIN: HCPCS | Performed by: PEDIATRICS

## 2019-09-17 RX ORDER — AMOXICILLIN 250 MG
2 CAPSULE ORAL 2 TIMES DAILY
Qty: 120 TABLET | Refills: 11 | Status: SHIPPED | OUTPATIENT
Start: 2019-09-17 | End: 2019-11-19 | Stop reason: SDUPTHER

## 2019-09-17 RX ORDER — DESLORATADINE 5 MG/1
5 TABLET ORAL DAILY
Qty: 30 TABLET | Refills: 5 | Status: SHIPPED | OUTPATIENT
Start: 2019-09-17 | End: 2021-01-25 | Stop reason: SDUPTHER

## 2019-09-17 RX ORDER — HYDROCODONE BITARTRATE AND ACETAMINOPHEN 7.5; 325 MG/1; MG/1
1 TABLET ORAL EVERY 8 HOURS PRN
Qty: 60 TABLET | Refills: 0 | Status: SHIPPED | OUTPATIENT
Start: 2019-09-17 | End: 2019-10-17 | Stop reason: SDUPTHER

## 2019-09-17 RX ORDER — LIDOCAINE AND PRILOCAINE 25; 25 MG/G; MG/G
CREAM TOPICAL
Qty: 210 G | Refills: 3 | Status: SHIPPED | OUTPATIENT
Start: 2019-09-17 | End: 2020-09-18

## 2019-09-17 ASSESSMENT — ENCOUNTER SYMPTOMS
COUGH: 0
ABDOMINAL PAIN: 1
SINUS PRESSURE: 0
BACK PAIN: 1
WHEEZING: 0
SORE THROAT: 0
EYE PAIN: 0
SHORTNESS OF BREATH: 0
CONSTIPATION: 1
VOMITING: 1

## 2019-09-17 NOTE — PROGRESS NOTES
times daily topically              Naproxen sodium 220 mg when necessary              She is intolerant of many medications.     Urine Drug screen: 10/23/2018 and appropriate  Risk Factors:              Illegal Drug use: no              History of substance use disorder: no              Mental health conditions: stable depression and anxiety              Sleep disordered breathing: no              Concurrent Benzodiazepine use: yes     Bowel regimen: She is not regularly adherent to her bowel regimen              We discussed this again and stressed the importance of this regimen.              Senokot S,  1 tablets twice daily              MiraLAX 17 g daily     Bowel function: Chronic constipation     RORY was reviewed today per office protocol. Report shows No discrepancies.  Fill pattern is consistent from single provider(s) at single pharmacy(s). Request #[id number]     Active cumulative morphine equivalent score is 0  This is obviously incorrect     She does have Narcan at home, and they are aware of it's appropriate utilization     Controlled Substances Monitoring:      RX Monitoring 3/8/2019   Attestation The Prescription Monitoring Report for this patient was reviewed today. Acute Pain Prescriptions -   Chronic Pain Routine Monitoring Possible medication side effects, risk of tolerance/dependence & alternative treatments discussed. ;Obtaining appropriate analgesic effect of treatment. Chronic Pain > 80 MEDD -        She also needs a refill of her nystatin suspension for oral thrush. This is been helpful in controlling her symptoms. She is also needing a refill of her Clarinex. This has been very helpful in controlling her allergies. She is also requesting a refill of EMLA cream.       height is 5' 3\" (1.6 m) and weight is 120 lb 3 oz (54.5 kg). Her temporal temperature is 97.4 °F (36.3 °C). Her blood pressure is 110/70 and her pulse is 80. Her oxygen saturation is 97%.       Body mass index is 21.29 kg/m². I have reviewed the following with the Ms. Shah   Lab Review  No visits with results within 6 Month(s) from this visit. Latest known visit with results is:   Procedure visit on 10/29/2018   Component Date Value    Preg Test, Ur 10/29/2018 none detected      Copies of these are in the chart.     Current Outpatient Medications   Medication Sig Dispense Refill    nystatin (MYCOSTATIN) 914427 UNIT/ML suspension Take 5 mLs by mouth 4 times daily 473 mL 1    omeprazole (PRILOSEC) 20 MG delayed release capsule Take 1 capsule by mouth Daily 90 capsule 3    lidocaine-prilocaine (EMLA) 2.5-2.5 % cream APPLY ONE PUMP (GRAM) TO AFFECTED AREA(S) THREE TO FOUR TIMES A DAY AS DIRECTED 210 g 3    LAMICTAL 200 MG tablet Take 1 tablet by mouth 2 times daily 60 tablet 11    hydrocortisone 2.5 % cream APPLY EXTERNALLY TO THE AFFECTED AREA TWICE DAILY AS DIRECTED 30 g 1    desloratadine (CLARINEX) 5 MG tablet Take 1 tablet by mouth daily 30 tablet 5    medroxyPROGESTERone (DEPO-PROVERA) 150 MG/ML injection ADMINISTER 1 ML IN THE MUSCLE 1 TIME FOR 1 DOSE 1 mL 3    naloxone (NARCAN) 4 MG/0.1ML LIQD nasal spray 1 spray by Nasal route as needed for Opioid Reversal 1 each 0    celecoxib (CELEBREX) 200 MG capsule TAKE 1 CAPSULE BY MOUTH DAILY 90 capsule 3    hydrOXYzine (ATARAX) 25 MG tablet Take 1 tablet by mouth every 8 hours as needed for Anxiety (Patient taking differently: Take 25 mg by mouth as needed for Anxiety ) 30 tablet 3    albuterol (PROVENTIL) (2.5 MG/3ML) 0.083% nebulizer solution Take 3 mLs by nebulization every 6 hours as needed for Wheezing (coughing) 120 each 5    medicated lip balm (BLISTEX/CARMEX) 2-2.5-6.6 % STCK Apply topically as needed for Dry Lips      ondansetron (ZOFRAN-ODT) 4 MG disintegrating tablet Place 1 tablet under the tongue every 8 hours as needed for Nausea or Vomiting 20 tablet 0    ranitidine (ZANTAC) 150 MG tablet TAKE 1 TABLET TWICE A DAY FOR STOMACH-REFLUX TROUBLE 180 tablet 3    SENEXON-S 8.6-50 MG per tablet TAKE 1 TABLET BY MOUTH TWICE DAILY 60 tablet 11    diclofenac sodium (VOLTAREN) 1 % GEL Apply 2 g topically 4 times daily 100 g 5    Misc. Devices [facility]) MISC 1 Units by Enteral route daily With reclining back 1 each 0    EPINEPHrine (EPIPEN 2-ADAN) 0.3 MG/0.3ML SOAJ injection Inject 0.3 mLs into the muscle once for 1 dose Use as directed for allergic reaction 2 each 5     No current facility-administered medications for this visit. Allergies: Latex; Bactrim [sulfamethoxazole-trimethoprim]; Ciprofloxacin; Ciprofloxacin hcl; Depakote [divalproex sodium]; Dilantin [phenytoin sodium extended]; Dye [iodides]; Keflex [cephalexin]; Pcn [penicillins]; Primaxin [imipenem];  Unasyn [ampicillin-sulbactam sodium]; and Wasp venom     Past Medical History:   Diagnosis Date    Arthritis     GERD (gastroesophageal reflux disease)     History of blood transfusion     Incontinence     Seizures ([address]        Family History   Problem Relation Age of Onset    High Blood Pressure Mother     High Blood Pressure Father        Past Surgical History:   Procedure Laterality Date    APPENDECTOMY      CHOLECYSTECTOMY      CSF SHUNT Right     DEEP BRAIN STIMULATOR PLACEMENT      tremor control it is off now    NV EXCIS CHALAZION,GEN ANESTHESIA Right 2018    EYE CHALAZION EXCISION performed by Brendan Umana MD at [facility]. [address] Left 6/15/2017    EYE CATARACT EMULSIFICATION IOL IMPLANT performed by Brendan Umana MD at [facility]. [address] Right 2017    CATARCAT EXTRACTION EYE WITH IOL performed by Brendan Umana MD at [facility]       Social History     Tobacco Use    Smoking status: Former Smoker     Packs/day: 1.00     Years: 19.00     Pack years: 19.00     Types: Cigarettes     Last attempt to quit: 2015     Years since quittin.5    Smokeless tobacco: Never Used    Tobacco comment: quit smoking  6 yrs ago   Substance Use Topics    Alcohol use: No        Review of Systems   Constitutional: Negative for fatigue and unexpected weight change. HENT: Negative for congestion, ear pain, sinus pressure and sore throat. Eyes: Negative for pain and visual disturbance. Respiratory: Negative for cough, shortness of breath and wheezing. Cardiovascular: Negative for chest pain, palpitations and leg swelling. Gastrointestinal: Positive for abdominal pain, constipation (intermittently) and vomiting. Endocrine: Negative for polyuria. Genitourinary: Negative for dysuria, frequency, hematuria, pelvic pain and urgency. Musculoskeletal: Positive for arthralgias (knees and hips bilaterally), back pain, joint swelling (left knee), myalgias and neck pain. Skin: Negative for rash. Neurological: Negative for dizziness and headaches. Psychiatric/Behavioral: Negative for self-injury. The patient is not nervous/anxious. Physical Exam   Constitutional: She is oriented to person, place, and time. She appears well-developed and well-nourished. She is cooperative. Non-toxic appearance. No distress. Body habitus is normal   HENT:   Head: Normocephalic and atraumatic. Right Ear: Hearing, tympanic membrane, external ear and ear canal normal.   Left Ear: Hearing, tympanic membrane, external ear and ear canal normal.   Nose: Nose normal.   Mouth/Throat: Mucous membranes are normal.   Eyes: Pupils are equal, round, and reactive to light. Conjunctivae, EOM and lids are normal.   Amblyoplia R eye (OD)   Neck: Phonation normal. Neck supple. No JVD present. Carotid bruit is not present. No thyromegaly present. Cardiovascular: Normal rate, regular rhythm and normal heart sounds. No extrasystoles are present. PMI is not displaced. Exam reveals no gallop and no friction rub. No murmur heard. Pulmonary/Chest: Effort normal and breath sounds normal. No respiratory distress.  She has no

## 2019-09-26 DIAGNOSIS — M62.838 MUSCLE SPASM: ICD-10-CM

## 2019-09-26 RX ORDER — TIZANIDINE 4 MG/1
4 TABLET ORAL EVERY 8 HOURS PRN
Qty: 90 TABLET | Refills: 0 | Status: SHIPPED | OUTPATIENT
Start: 2019-09-26 | End: 2020-01-14 | Stop reason: SINTOL

## 2019-10-03 ENCOUNTER — TELEPHONE (OUTPATIENT)
Dept: PRIMARY CARE CLINIC | Age: 44
End: 2019-10-03

## 2019-10-03 DIAGNOSIS — G81.94 HEMIPLEGIA AFFECTING LEFT NONDOMINANT SIDE, UNSPECIFIED ETIOLOGY, UNSPECIFIED HEMIPLEGIA TYPE (HCC): ICD-10-CM

## 2019-10-03 DIAGNOSIS — Z99.3 WHEELCHAIR BOUND: Primary | ICD-10-CM

## 2019-10-03 DIAGNOSIS — R56.9 SEIZURES (HCC): ICD-10-CM

## 2019-10-17 ENCOUNTER — OFFICE VISIT (OUTPATIENT)
Dept: PRIMARY CARE CLINIC | Age: 44
End: 2019-10-17
Payer: MEDICARE

## 2019-10-17 VITALS
DIASTOLIC BLOOD PRESSURE: 78 MMHG | HEART RATE: 106 BPM | SYSTOLIC BLOOD PRESSURE: 102 MMHG | TEMPERATURE: 97 F | WEIGHT: 122 LBS | OXYGEN SATURATION: 92 % | BODY MASS INDEX: 21.61 KG/M2

## 2019-10-17 DIAGNOSIS — G89.4 CHRONIC PAIN SYNDROME: ICD-10-CM

## 2019-10-17 DIAGNOSIS — M87.00 AVASCULAR NECROSIS (HCC): ICD-10-CM

## 2019-10-17 DIAGNOSIS — Z79.899 MEDICATION MANAGEMENT: ICD-10-CM

## 2019-10-17 DIAGNOSIS — G81.94 HEMIPLEGIA AFFECTING LEFT NONDOMINANT SIDE, UNSPECIFIED ETIOLOGY, UNSPECIFIED HEMIPLEGIA TYPE (HCC): ICD-10-CM

## 2019-10-17 DIAGNOSIS — K59.04 CHRONIC IDIOPATHIC CONSTIPATION: Primary | ICD-10-CM

## 2019-10-17 DIAGNOSIS — Z99.3 WHEELCHAIR BOUND: ICD-10-CM

## 2019-10-17 DIAGNOSIS — M15.9 PRIMARY OSTEOARTHRITIS INVOLVING MULTIPLE JOINTS: ICD-10-CM

## 2019-10-17 PROCEDURE — 99214 OFFICE O/P EST MOD 30 MIN: CPT | Performed by: PEDIATRICS

## 2019-10-17 PROCEDURE — G8427 DOCREV CUR MEDS BY ELIG CLIN: HCPCS | Performed by: PEDIATRICS

## 2019-10-17 PROCEDURE — G8484 FLU IMMUNIZE NO ADMIN: HCPCS | Performed by: PEDIATRICS

## 2019-10-17 PROCEDURE — 1036F TOBACCO NON-USER: CPT | Performed by: PEDIATRICS

## 2019-10-17 PROCEDURE — G8420 CALC BMI NORM PARAMETERS: HCPCS | Performed by: PEDIATRICS

## 2019-10-17 RX ORDER — LUBIPROSTONE 8 UG/1
8 CAPSULE, GELATIN COATED ORAL DAILY
Qty: 30 CAPSULE | Refills: 3 | Status: SHIPPED | OUTPATIENT
Start: 2019-10-17 | End: 2019-11-19 | Stop reason: SDUPTHER

## 2019-10-17 RX ORDER — HYDROCODONE BITARTRATE AND ACETAMINOPHEN 7.5; 325 MG/1; MG/1
1 TABLET ORAL EVERY 8 HOURS PRN
Qty: 60 TABLET | Refills: 0 | Status: SHIPPED | OUTPATIENT
Start: 2019-10-17 | End: 2019-11-19 | Stop reason: SDUPTHER

## 2019-10-17 ASSESSMENT — ENCOUNTER SYMPTOMS
SORE THROAT: 0
WHEEZING: 0
VOMITING: 1
SHORTNESS OF BREATH: 0
CONSTIPATION: 1
ABDOMINAL PAIN: 1
BACK PAIN: 1
COUGH: 0
SINUS PRESSURE: 0
EYE PAIN: 0

## 2019-10-18 ENCOUNTER — PROCEDURE VISIT (OUTPATIENT)
Dept: PRIMARY CARE CLINIC | Age: 44
End: 2019-10-18
Payer: MEDICARE

## 2019-10-18 DIAGNOSIS — Z30.42 DEPOT CONTRACEPTION: ICD-10-CM

## 2019-10-18 DIAGNOSIS — Z30.9 ENCOUNTER FOR CONTRACEPTIVE MANAGEMENT, UNSPECIFIED TYPE: Primary | ICD-10-CM

## 2019-10-18 LAB
CONTROL: NORMAL
PREGNANCY TEST URINE, POC: NEGATIVE

## 2019-10-18 PROCEDURE — 96372 THER/PROPH/DIAG INJ SC/IM: CPT | Performed by: PEDIATRICS

## 2019-10-18 PROCEDURE — 81025 URINE PREGNANCY TEST: CPT | Performed by: PEDIATRICS

## 2019-10-18 RX ORDER — MEDROXYPROGESTERONE ACETATE 150 MG/ML
150 INJECTION, SUSPENSION INTRAMUSCULAR ONCE
Status: COMPLETED | OUTPATIENT
Start: 2019-10-18 | End: 2019-10-18

## 2019-10-18 RX ADMIN — MEDROXYPROGESTERONE ACETATE 150 MG: 150 INJECTION, SUSPENSION INTRAMUSCULAR at 11:27

## 2019-10-23 ENCOUNTER — OFFICE VISIT (OUTPATIENT)
Dept: VASCULAR SURGERY | Age: 44
End: 2019-10-23
Payer: MEDICARE

## 2019-10-23 VITALS
OXYGEN SATURATION: 96 % | TEMPERATURE: 96 F | RESPIRATION RATE: 18 BRPM | HEART RATE: 80 BPM | SYSTOLIC BLOOD PRESSURE: 110 MMHG | DIASTOLIC BLOOD PRESSURE: 76 MMHG

## 2019-10-23 DIAGNOSIS — I73.9 PVD (PERIPHERAL VASCULAR DISEASE) (HCC): Primary | ICD-10-CM

## 2019-10-23 PROCEDURE — 1036F TOBACCO NON-USER: CPT | Performed by: PHYSICIAN ASSISTANT

## 2019-10-23 PROCEDURE — 99212 OFFICE O/P EST SF 10 MIN: CPT | Performed by: PHYSICIAN ASSISTANT

## 2019-10-23 PROCEDURE — G8420 CALC BMI NORM PARAMETERS: HCPCS | Performed by: PHYSICIAN ASSISTANT

## 2019-10-23 PROCEDURE — G8484 FLU IMMUNIZE NO ADMIN: HCPCS | Performed by: PHYSICIAN ASSISTANT

## 2019-10-23 PROCEDURE — G8427 DOCREV CUR MEDS BY ELIG CLIN: HCPCS | Performed by: PHYSICIAN ASSISTANT

## 2019-10-25 ENCOUNTER — HOSPITAL ENCOUNTER (OUTPATIENT)
Dept: VASCULAR LAB | Age: 44
Discharge: HOME | End: 2019-10-25
Payer: MEDICARE

## 2019-10-25 ENCOUNTER — TELEPHONE (OUTPATIENT)
Dept: VASCULAR SURGERY | Age: 44
End: 2019-10-25

## 2019-10-25 DIAGNOSIS — I73.9 PVD (PERIPHERAL VASCULAR DISEASE) (HCC): ICD-10-CM

## 2019-10-25 DIAGNOSIS — I73.9 PVD (PERIPHERAL VASCULAR DISEASE) (HCC): Primary | ICD-10-CM

## 2019-10-25 PROCEDURE — 93923 UPR/LXTR ART STDY 3+ LVLS: CPT

## 2019-10-28 ENCOUNTER — TELEPHONE (OUTPATIENT)
Dept: PRIMARY CARE CLINIC | Age: 44
End: 2019-10-28

## 2019-10-29 RX ORDER — WHEELCHAIR
1 EACH MISCELLANEOUS DAILY
Qty: 1 EACH | Refills: 0 | Status: SHIPPED | OUTPATIENT
Start: 2019-10-29 | End: 2020-02-17

## 2019-11-05 DIAGNOSIS — R10.13 MIDEPIGASTRIC PAIN: ICD-10-CM

## 2019-11-05 DIAGNOSIS — K21.9 GASTROESOPHAGEAL REFLUX DISEASE WITHOUT ESOPHAGITIS: ICD-10-CM

## 2019-11-05 DIAGNOSIS — R11.0 NAUSEA: ICD-10-CM

## 2019-11-05 RX ORDER — RANITIDINE 150 MG/1
TABLET ORAL
Qty: 180 TABLET | Refills: 3 | Status: SHIPPED | OUTPATIENT
Start: 2019-11-05 | End: 2019-12-17

## 2019-11-06 ENCOUNTER — TELEPHONE (OUTPATIENT)
Dept: PRIMARY CARE CLINIC | Age: 44
End: 2019-11-06

## 2019-11-06 DIAGNOSIS — Z99.3 WHEELCHAIR BOUND: Primary | ICD-10-CM

## 2019-11-07 RX ORDER — CUSHION
EACH MISCELLANEOUS
Qty: 1 EACH | Refills: 0
Start: 2019-11-07 | End: 2021-06-15

## 2019-11-12 DIAGNOSIS — L30.4 INTERTRIGO: ICD-10-CM

## 2019-11-13 RX ORDER — CLOTRIMAZOLE 1 %
CREAM (GRAM) TOPICAL
Qty: 30 G | Refills: 0 | Status: SHIPPED | OUTPATIENT
Start: 2019-11-13 | End: 2022-01-05

## 2019-11-19 ENCOUNTER — OFFICE VISIT (OUTPATIENT)
Dept: PRIMARY CARE CLINIC | Age: 44
End: 2019-11-19
Payer: MEDICARE

## 2019-11-19 VITALS
SYSTOLIC BLOOD PRESSURE: 98 MMHG | TEMPERATURE: 97.1 F | HEART RATE: 70 BPM | OXYGEN SATURATION: 99 % | DIASTOLIC BLOOD PRESSURE: 78 MMHG

## 2019-11-19 DIAGNOSIS — M15.9 PRIMARY OSTEOARTHRITIS INVOLVING MULTIPLE JOINTS: ICD-10-CM

## 2019-11-19 DIAGNOSIS — M87.00 AVASCULAR NECROSIS (HCC): ICD-10-CM

## 2019-11-19 DIAGNOSIS — Z99.3 WHEELCHAIR BOUND: ICD-10-CM

## 2019-11-19 DIAGNOSIS — Z79.899 MEDICATION MANAGEMENT: ICD-10-CM

## 2019-11-19 DIAGNOSIS — M17.12 PRIMARY OSTEOARTHRITIS OF LEFT KNEE: ICD-10-CM

## 2019-11-19 DIAGNOSIS — G89.4 CHRONIC PAIN SYNDROME: ICD-10-CM

## 2019-11-19 DIAGNOSIS — K59.04 CHRONIC IDIOPATHIC CONSTIPATION: Primary | ICD-10-CM

## 2019-11-19 DIAGNOSIS — G81.94 HEMIPLEGIA AFFECTING LEFT NONDOMINANT SIDE, UNSPECIFIED ETIOLOGY, UNSPECIFIED HEMIPLEGIA TYPE (HCC): ICD-10-CM

## 2019-11-19 DIAGNOSIS — K59.09 CHRONIC CONSTIPATION: ICD-10-CM

## 2019-11-19 PROCEDURE — G8420 CALC BMI NORM PARAMETERS: HCPCS | Performed by: PEDIATRICS

## 2019-11-19 PROCEDURE — 1036F TOBACCO NON-USER: CPT | Performed by: PEDIATRICS

## 2019-11-19 PROCEDURE — G8427 DOCREV CUR MEDS BY ELIG CLIN: HCPCS | Performed by: PEDIATRICS

## 2019-11-19 PROCEDURE — G8484 FLU IMMUNIZE NO ADMIN: HCPCS | Performed by: PEDIATRICS

## 2019-11-19 PROCEDURE — 99213 OFFICE O/P EST LOW 20 MIN: CPT | Performed by: PEDIATRICS

## 2019-11-19 PROCEDURE — 20610 DRAIN/INJ JOINT/BURSA W/O US: CPT | Performed by: PEDIATRICS

## 2019-11-19 RX ORDER — HYDROCODONE BITARTRATE AND ACETAMINOPHEN 7.5; 325 MG/1; MG/1
1 TABLET ORAL EVERY 8 HOURS PRN
Qty: 60 TABLET | Refills: 0 | Status: SHIPPED | OUTPATIENT
Start: 2019-11-19 | End: 2019-12-17 | Stop reason: SDUPTHER

## 2019-11-19 RX ORDER — TRIAMCINOLONE ACETONIDE 40 MG/ML
40 INJECTION, SUSPENSION INTRA-ARTICULAR; INTRAMUSCULAR ONCE
Status: COMPLETED | OUTPATIENT
Start: 2019-11-19 | End: 2019-11-19

## 2019-11-19 RX ORDER — METHYLPREDNISOLONE ACETATE 80 MG/ML
80 INJECTION, SUSPENSION INTRA-ARTICULAR; INTRALESIONAL; INTRAMUSCULAR; SOFT TISSUE ONCE
Status: COMPLETED | OUTPATIENT
Start: 2019-11-19 | End: 2019-11-19

## 2019-11-19 RX ORDER — LUBIPROSTONE 24 UG/1
24 CAPSULE, GELATIN COATED ORAL 2 TIMES DAILY WITH MEALS
Qty: 60 CAPSULE | Refills: 11 | Status: SHIPPED | OUTPATIENT
Start: 2019-11-19 | End: 2020-01-14 | Stop reason: SINTOL

## 2019-11-19 RX ORDER — AMOXICILLIN 250 MG
2 CAPSULE ORAL 2 TIMES DAILY
Qty: 120 TABLET | Refills: 11 | Status: SHIPPED | OUTPATIENT
Start: 2019-11-19 | End: 2020-12-21 | Stop reason: SDUPTHER

## 2019-11-19 RX ADMIN — TRIAMCINOLONE ACETONIDE 40 MG: 40 INJECTION, SUSPENSION INTRA-ARTICULAR; INTRAMUSCULAR at 16:07

## 2019-11-19 RX ADMIN — METHYLPREDNISOLONE ACETATE 80 MG: 80 INJECTION, SUSPENSION INTRA-ARTICULAR; INTRALESIONAL; INTRAMUSCULAR; SOFT TISSUE at 16:07

## 2019-11-19 ASSESSMENT — ENCOUNTER SYMPTOMS
SHORTNESS OF BREATH: 0
SORE THROAT: 0
EYE PAIN: 0
COUGH: 0
ABDOMINAL PAIN: 1
SINUS PRESSURE: 0
VOMITING: 1
WHEEZING: 0
BACK PAIN: 1
CONSTIPATION: 1

## 2019-12-06 ENCOUNTER — TELEPHONE (OUTPATIENT)
Dept: PRIMARY CARE CLINIC | Age: 44
End: 2019-12-06

## 2019-12-06 DIAGNOSIS — G81.94 HEMIPLEGIA AFFECTING LEFT NONDOMINANT SIDE, UNSPECIFIED ETIOLOGY, UNSPECIFIED HEMIPLEGIA TYPE (HCC): Primary | ICD-10-CM

## 2019-12-06 DIAGNOSIS — Z99.3 WHEELCHAIR BOUND: ICD-10-CM

## 2019-12-06 DIAGNOSIS — M15.9 PRIMARY OSTEOARTHRITIS INVOLVING MULTIPLE JOINTS: ICD-10-CM

## 2019-12-17 ENCOUNTER — HOSPITAL ENCOUNTER (OUTPATIENT)
Dept: GENERAL RADIOLOGY | Age: 44
Discharge: HOME | End: 2019-12-17
Payer: MEDICARE

## 2019-12-17 ENCOUNTER — OFFICE VISIT (OUTPATIENT)
Dept: PRIMARY CARE CLINIC | Age: 44
End: 2019-12-17
Payer: MEDICARE

## 2019-12-17 ENCOUNTER — TELEPHONE (OUTPATIENT)
Dept: PRIMARY CARE CLINIC | Age: 44
End: 2019-12-17

## 2019-12-17 VITALS
HEART RATE: 95 BPM | WEIGHT: 117.71 LBS | HEIGHT: 63 IN | DIASTOLIC BLOOD PRESSURE: 78 MMHG | SYSTOLIC BLOOD PRESSURE: 116 MMHG | OXYGEN SATURATION: 96 % | TEMPERATURE: 97 F | BODY MASS INDEX: 20.86 KG/M2

## 2019-12-17 DIAGNOSIS — M54.42 CHRONIC LEFT-SIDED LOW BACK PAIN WITH LEFT-SIDED SCIATICA: ICD-10-CM

## 2019-12-17 DIAGNOSIS — M25.562 CHRONIC PAIN OF LEFT KNEE: Primary | ICD-10-CM

## 2019-12-17 DIAGNOSIS — M87.00 AVASCULAR NECROSIS (HCC): ICD-10-CM

## 2019-12-17 DIAGNOSIS — G81.94 HEMIPLEGIA AFFECTING LEFT NONDOMINANT SIDE, UNSPECIFIED ETIOLOGY, UNSPECIFIED HEMIPLEGIA TYPE (HCC): ICD-10-CM

## 2019-12-17 DIAGNOSIS — M25.562 CHRONIC PAIN OF LEFT KNEE: ICD-10-CM

## 2019-12-17 DIAGNOSIS — M25.552 LEFT HIP PAIN: ICD-10-CM

## 2019-12-17 DIAGNOSIS — G89.29 CHRONIC PAIN OF LEFT KNEE: Primary | ICD-10-CM

## 2019-12-17 DIAGNOSIS — Z99.3 WHEELCHAIR BOUND: ICD-10-CM

## 2019-12-17 DIAGNOSIS — G89.4 CHRONIC PAIN SYNDROME: ICD-10-CM

## 2019-12-17 DIAGNOSIS — G89.29 CHRONIC LEFT-SIDED LOW BACK PAIN WITH LEFT-SIDED SCIATICA: ICD-10-CM

## 2019-12-17 DIAGNOSIS — G89.29 CHRONIC PAIN OF LEFT KNEE: ICD-10-CM

## 2019-12-17 DIAGNOSIS — Z79.899 MEDICATION MANAGEMENT: ICD-10-CM

## 2019-12-17 DIAGNOSIS — M15.9 PRIMARY OSTEOARTHRITIS INVOLVING MULTIPLE JOINTS: ICD-10-CM

## 2019-12-17 PROCEDURE — 72100 X-RAY EXAM L-S SPINE 2/3 VWS: CPT

## 2019-12-17 PROCEDURE — 99214 OFFICE O/P EST MOD 30 MIN: CPT | Performed by: PEDIATRICS

## 2019-12-17 PROCEDURE — 1036F TOBACCO NON-USER: CPT | Performed by: PEDIATRICS

## 2019-12-17 PROCEDURE — 73562 X-RAY EXAM OF KNEE 3: CPT

## 2019-12-17 PROCEDURE — 73521 X-RAY EXAM HIPS BI 2 VIEWS: CPT

## 2019-12-17 PROCEDURE — G8484 FLU IMMUNIZE NO ADMIN: HCPCS | Performed by: PEDIATRICS

## 2019-12-17 PROCEDURE — G8420 CALC BMI NORM PARAMETERS: HCPCS | Performed by: PEDIATRICS

## 2019-12-17 PROCEDURE — G8427 DOCREV CUR MEDS BY ELIG CLIN: HCPCS | Performed by: PEDIATRICS

## 2019-12-17 RX ORDER — HYDROCODONE BITARTRATE AND ACETAMINOPHEN 7.5; 325 MG/1; MG/1
1 TABLET ORAL EVERY 8 HOURS PRN
Qty: 90 TABLET | Refills: 0 | Status: SHIPPED | OUTPATIENT
Start: 2019-12-17 | End: 2020-01-14 | Stop reason: SDUPTHER

## 2019-12-17 ASSESSMENT — ENCOUNTER SYMPTOMS
SORE THROAT: 0
EYE PAIN: 0
SHORTNESS OF BREATH: 0
SINUS PRESSURE: 0
COUGH: 0
WHEEZING: 0
BACK PAIN: 1
CONSTIPATION: 1
ABDOMINAL PAIN: 1
VOMITING: 1

## 2019-12-19 ENCOUNTER — TELEPHONE (OUTPATIENT)
Dept: PRIMARY CARE CLINIC | Age: 44
End: 2019-12-19

## 2019-12-19 DIAGNOSIS — M25.562 CHRONIC PAIN OF LEFT KNEE: Primary | ICD-10-CM

## 2019-12-19 DIAGNOSIS — G89.29 CHRONIC PAIN OF LEFT KNEE: Primary | ICD-10-CM

## 2019-12-19 DIAGNOSIS — M25.552 LEFT HIP PAIN: ICD-10-CM

## 2020-01-14 ENCOUNTER — OFFICE VISIT (OUTPATIENT)
Dept: PRIMARY CARE CLINIC | Age: 45
End: 2020-01-14
Payer: MEDICARE

## 2020-01-14 VITALS
OXYGEN SATURATION: 98 % | TEMPERATURE: 97 F | HEIGHT: 63 IN | BODY MASS INDEX: 20.85 KG/M2 | SYSTOLIC BLOOD PRESSURE: 114 MMHG | DIASTOLIC BLOOD PRESSURE: 80 MMHG | HEART RATE: 93 BPM

## 2020-01-14 PROCEDURE — G8427 DOCREV CUR MEDS BY ELIG CLIN: HCPCS | Performed by: PEDIATRICS

## 2020-01-14 PROCEDURE — G8420 CALC BMI NORM PARAMETERS: HCPCS | Performed by: PEDIATRICS

## 2020-01-14 PROCEDURE — 99213 OFFICE O/P EST LOW 20 MIN: CPT | Performed by: PEDIATRICS

## 2020-01-14 PROCEDURE — 1036F TOBACCO NON-USER: CPT | Performed by: PEDIATRICS

## 2020-01-14 PROCEDURE — G8484 FLU IMMUNIZE NO ADMIN: HCPCS | Performed by: PEDIATRICS

## 2020-01-14 RX ORDER — HYDROCODONE BITARTRATE AND ACETAMINOPHEN 7.5; 325 MG/1; MG/1
1 TABLET ORAL EVERY 8 HOURS PRN
Qty: 90 TABLET | Refills: 0 | Status: SHIPPED | OUTPATIENT
Start: 2020-01-14 | End: 2020-02-17 | Stop reason: SDUPTHER

## 2020-01-14 ASSESSMENT — ENCOUNTER SYMPTOMS
BACK PAIN: 1
EYE PAIN: 0
COUGH: 0
ABDOMINAL PAIN: 1
SORE THROAT: 0
SHORTNESS OF BREATH: 0
VOMITING: 1
WHEEZING: 0
CONSTIPATION: 1
SINUS PRESSURE: 0

## 2020-01-14 NOTE — PROGRESS NOTES
needs     Adverse effects: None  Aberrant behavior: None  Affect: Very good considering her multiple health issues     Alternative medications:              Celebrex 200 mg daily              Diclofenac gel 1% 2 g 4 times daily topically              Naproxen sodium 220 mg when necessary              She is intolerant of many medications.     Urine Drug screen: 10/23/2018 and appropriate              She is incontinent and unable to produce urine on demand. Risk Factors:              Illegal Drug use: no              History of substance use disorder: no              Mental health conditions: stable depression and anxiety              Sleep disordered breathing: no              Concurrent Benzodiazepine use: yes     Bowel regimen: She is not regularly adherent to her bowel regimen              We discussed this again and stressed the importance of this regimen.              Senokot S,  1 tablets twice daily              MiraLAX 17 g daily     Bowel function: Chronic constipation     RORY was reviewed today per office protocol. Report shows No discrepancies.  Fill pattern is consistent from single provider(s) at single pharmacy(s). Request # [id number]     Active cumulative morphine equivalent score is 40  This is obviously incorrect     She does have Narcan at home, and they are aware of it's appropriate utilization     Controlled Substances Monitoring:      RX Monitoring 3/8/2019   Attestation The Prescription Monitoring Report for this patient was reviewed today. Acute Pain Prescriptions -   Chronic Pain Routine Monitoring Possible medication side effects, risk of tolerance/dependence & alternative treatments discussed. ;Obtaining appropriate analgesic effect of treatment. Chronic Pain > 80 MEDD -                   height is 5' 3\" (1.6 m). Her temperature is 97 °F (36.1 °C). Her blood pressure is 114/80 and her pulse is 93. Her oxygen saturation is 98%. Body mass index is 20.85 kg/m².     I have reviewed the following with the Ms. Shah   Lab Review  Procedure visit on 10/18/2019   Component Date Value    Preg Test, Ur 10/18/2019 Negative      Copies of these are in the chart. Current Outpatient Medications   Medication Sig Dispense Refill    HYDROcodone-acetaminophen (NORCO) 7.5-325 MG per tablet Take 1 tablet by mouth every 8 hours as needed for Pain for up to 30 days. 90 tablet 0    senna-docusate (SENEXON-S) 8.6-50 MG per tablet Take 2 tablets by mouth 2 times daily 120 tablet 11    clotrimazole (LOTRIMIN) 1 % cream APPLY EXTERNALLY TO THE AFFECTED AREA TWICE DAILY 30 g 0    Misc. Devices (WHEELCHAIR CUSHION) MISC Thin roho cushion 1 each 0    Misc. Devices (WHEELCHAIR) MISC 1 Units by Does not apply route daily PLEASE SEND TO [facility]. 1 each 0    Incontinence Supplies MISC All incontinence supplies: diapers, wipes, chucks, gloves x 1 month and 11  each 0    nystatin (MYCOSTATIN) 817074 UNIT/ML suspension Take 5 mLs by mouth 4 times daily 473 mL 1    desloratadine (CLARINEX) 5 MG tablet Take 1 tablet by mouth daily 30 tablet 5    lidocaine-prilocaine (EMLA) 2.5-2.5 % cream APPLY ONE PUMP (GRAM) TO AFFECTED AREA(S) THREE TO FOUR TIMES A DAY AS DIRECTED 210 g 3    omeprazole (PRILOSEC) 20 MG delayed release capsule Take 1 capsule by mouth Daily 90 capsule 3    LAMICTAL 200 MG tablet Take 1 tablet by mouth 2 times daily 60 tablet 11    hydrocortisone 2.5 % cream APPLY EXTERNALLY TO THE AFFECTED AREA TWICE DAILY AS DIRECTED 30 g 1    medroxyPROGESTERone (DEPO-PROVERA) 150 MG/ML injection ADMINISTER 1 ML IN THE MUSCLE 1 TIME FOR 1 DOSE 1 mL 3    naloxone (NARCAN) 4 MG/0.1ML LIQD nasal spray 1 spray by Nasal route as needed for Opioid Reversal 1 each 0    celecoxib (CELEBREX) 200 MG capsule TAKE 1 CAPSULE BY MOUTH DAILY 90 capsule 3    Misc.  Devices [facility]) MISC 1 Units by Enteral route daily With reclining back 1 each 0    hydrOXYzine (ATARAX) 25 MG tablet ECP Right 2017    CATARCAT EXTRACTION EYE WITH IOL performed by Da Chen [facility] at [facility]       Social History     Tobacco Use    Smoking status: Former Smoker     Packs/day: 1.00     Years: 19.00     Pack years: 19.00     Types: Cigarettes     Last attempt to quit: 2015     Years since quittin.9    Smokeless tobacco: Never Used    Tobacco comment: quit smoking  6 yrs ago   Substance Use Topics    Alcohol use: No        Review of Systems   Constitutional: Negative for fatigue and unexpected weight change. HENT: Negative for congestion, ear pain, sinus pressure and sore throat. Eyes: Negative for pain and visual disturbance. Respiratory: Negative for cough, shortness of breath and wheezing. Cardiovascular: Negative for chest pain, palpitations and leg swelling. Gastrointestinal: Positive for abdominal pain, constipation (intermittently) and vomiting. Endocrine: Negative for polyuria. Genitourinary: Negative for dysuria, frequency, hematuria, pelvic pain and urgency. Musculoskeletal: Positive for arthralgias (knees and hips bilaterally), back pain, joint swelling (left knee), myalgias and neck pain. Skin: Negative for rash. Neurological: Negative for dizziness and headaches. Psychiatric/Behavioral: Negative for self-injury. The patient is not nervous/anxious. Physical Exam  Vitals signs reviewed. Constitutional:       General: She is not in acute distress. Appearance: She is well-developed. She is not toxic-appearing. Comments: Body habitus is normal   HENT:      Head: Normocephalic and atraumatic. Right Ear: Hearing, tympanic membrane, ear canal and external ear normal.      Left Ear: Hearing, tympanic membrane, ear canal and external ear normal.      Nose: Nose normal.   Eyes:      General: Lids are normal.      Conjunctiva/sclera: Conjunctivae normal.      Pupils: Pupils are equal, round, and reactive to light.       Comments: Amblyoplia R eye (OD)   Neck:      Musculoskeletal: Neck supple. Thyroid: No thyromegaly. Vascular: No carotid bruit or JVD. Trachea: Phonation normal.   Cardiovascular:      Rate and Rhythm: Normal rate and regular rhythm. No extrasystoles are present. Chest Wall: PMI is not displaced. Heart sounds: Normal heart sounds. No murmur. No friction rub. No gallop. Pulmonary:      Effort: Pulmonary effort is normal. No respiratory distress. Breath sounds: Normal breath sounds. No wheezing, rhonchi or rales. Abdominal:      General: Bowel sounds are normal. There is no distension ( ). Palpations: Abdomen is soft. There is no mass. Tenderness: There is no tenderness. There is no guarding or rebound. Genitourinary:     Comments: Examination deferred  Musculoskeletal: Normal range of motion. General: No tenderness. Comments: Joint examination reveals no acute arthritis or synovitis. Contracted L  UE  She has atrophy of her L LE   Lymphadenopathy:      Cervical: No cervical adenopathy. Skin:     General: Skin is warm and dry. Findings: No rash. Neurological:      Mental Status: She is alert and oriented to person, place, and time. Cranial Nerves: No cranial nerve deficit (by gross examination). Sensory: No sensory deficit. Motor: No tremor or atrophy. Gait: Gait normal.      Comments: She has very limited mobility of her entire left side. She has limited mobility of her right lower extremities as well and has generalized weakness with inability to withstand her weight. She cannot transfer without being lifted. She does not contribute to any significant degree with mechanical transfers. Psychiatric:         Speech: Speech normal.         Behavior: Behavior normal. Behavior is cooperative. ASSESSMENT      ICD-10-CM    1. Chronic pain syndrome G89.4 HYDROcodone-acetaminophen (NORCO) 7.5-325 MG per tablet   2.  Medication management P67.281

## 2020-01-14 NOTE — PATIENT INSTRUCTIONS
or if he or she had other concerns. It is best to have one doctor or clinic treat your pain. This way you will get the pain medicine that will help you the most. And a doctor will be able to watch for any problems that the medicine might cause. The doctor has checked you carefully, but problems can develop later. If you notice any problems or new symptoms,  get medical treatment right away. Follow-up care is a key part of your treatment and safety. Be sure to make and go to all appointments, and call your doctor if you are having problems. It's also a good idea to know your test results and keep a list of the medicines you take. How can you care for yourself at home? If you need to take opioids to manage your pain, remember these safety tips. · Follow directions carefully. It's easy to misuse opioids if you take a dose other than what's prescribed by your doctor. This can lead to overdose and even death. Even sharing them with someone they weren't meant for is misuse. · Be cautious. Opioids may affect your judgment and decision making. Do not drive or operate machinery until you can think clearly. Talk with your doctor about when it is safe to drive. · Reduce the risk of drug interactions. Opioids can be dangerous if you take them with alcohol or with certain drugs like sleeping pills and muscle relaxers. Make sure your doctor knows about all the other medicines you take, including over-the-counter medicines. Don't start any new medicines before you talk to your doctor or pharmacist.  · Safely store and dispose of opioids. Store opioids in a safe and secure place. Make sure that pets, children, friends, and family can't get to them. When you're done using opioids, make sure to dispose of them safely and as quickly as possible. The U.S. Food and Drug Administration (FDA) recommends these disposal options.   ? The best option is to take your medicine to a drop-off box or take-back program that is authorized by the [address] ([place]). ? If these programs aren't available in your area and your medicine doesn't have specific disposal instructions (such as flushing), you can throw them into your household trash if you follow the FDA's instructions. Visit fda.gov and search for \"unused medicine disposal.\"  ? If you have opioid patches (used or unused), your options are to take them to a [place]-authorized site or flush them down the toilet. Do not throw them in the trash. ? Only flush your medicine down the toilet if you can't get to a [facility]-approved site or your medicine instructions state clearly to flush them. · Reduce the risk of overdose. Misuse of opioids can be very dangerous. Protect yourself by asking your doctor about a naloxone rescue kit. It can help you--and even save your life--if you take too much of an opioid. Try other ways to reduce pain. · Relax, and reduce stress. Relaxation techniques such as deep breathing or meditation can help. · Keep moving. Gentle, daily exercise can help reduce pain over the long run. Try low- or no-impact exercises such as walking, swimming, and stationary biking. Do stretches to stay flexible. · Try heat, cold packs, and massage. · Get enough sleep. Pain can make you tired and drain your energy. Talk with your doctor if you have trouble sleeping because of pain. · Think positive. Your thoughts can affect your pain level. Do things that you enjoy to distract yourself when you have pain instead of focusing on the pain. See a movie, read a book, listen to music, or spend time with a friend. If you are not taking a prescription pain medicine, ask your doctor if you can take an over-the-counter medicine. When should you call for help?   Call your doctor now or seek immediate medical care if:    · You have a new kind of pain.     · You have new symptoms, such as a fever or rash, along with the pain.    Watch closely for changes in your health, and be sure to contact your doctor if:    · You think you might be using too much pain medicine, and you need help to use less or stop.     · Your pain gets worse.     · You would like a referral to a doctor or clinic that specializes in pain management. Where can you learn more? Go to [email or web address] and sign in to your [employer] account. Enter R108 in the [employer] box to learn more about \"Safe Use of Opioid Pain Medicine: Care Instructions. \"     If you do not have an account, please click on the \"Sign Up Now\" link. Current as of: March 28, 2019  Content Version: 12.3  © 4549-1282 Healthwise, Incorporated. Care instructions adapted under license by [facility] ([facility]). If you have questions about a medical condition or this instruction, always ask your healthcare professional. [address] any warranty or liability for your use of this information.

## 2020-01-27 ENCOUNTER — PROCEDURE VISIT (OUTPATIENT)
Dept: PRIMARY CARE CLINIC | Age: 45
End: 2020-01-27
Payer: MEDICARE

## 2020-01-27 ENCOUNTER — HOSPITAL ENCOUNTER (EMERGENCY)
Age: 45
Discharge: HOME | End: 2020-01-27
Attending: EMERGENCY MEDICINE
Payer: MEDICARE

## 2020-01-27 ENCOUNTER — APPOINTMENT (OUTPATIENT)
Dept: GENERAL RADIOLOGY | Age: 45
End: 2020-01-27
Payer: MEDICARE

## 2020-01-27 VITALS
WEIGHT: 120 LBS | SYSTOLIC BLOOD PRESSURE: 118 MMHG | DIASTOLIC BLOOD PRESSURE: 77 MMHG | HEART RATE: 88 BPM | OXYGEN SATURATION: 96 % | RESPIRATION RATE: 17 BRPM | TEMPERATURE: 98.4 F | BODY MASS INDEX: 21.26 KG/M2 | HEIGHT: 63 IN

## 2020-01-27 LAB
BACTERIA: ABNORMAL /HPF
BASOPHILS ABSOLUTE: 0.1 K/UL (ref 0–0.2)
BASOPHILS RELATIVE PERCENT: 1 % (ref 0–1)
BILIRUBIN URINE: ABNORMAL
BLOOD, URINE: NEGATIVE
CLARITY: ABNORMAL
COLOR: ABNORMAL
EOSINOPHILS ABSOLUTE: 0.3 K/UL (ref 0–0.6)
EOSINOPHILS RELATIVE PERCENT: 2.4 % (ref 0–5)
EPITHELIAL CELLS, UA: ABNORMAL /HPF
GLUCOSE URINE: NEGATIVE MG/DL
HCG(URINE) PREGNANCY TEST: NEGATIVE
HCT VFR BLD CALC: 38.6 % (ref 37–47)
HEMOGLOBIN: 12.7 G/DL (ref 12–16)
IMMATURE GRANULOCYTES #: 0 K/UL
KETONES, URINE: NEGATIVE MG/DL
LEUKOCYTE ESTERASE, URINE: ABNORMAL
LYMPHOCYTES ABSOLUTE: 4.1 K/UL (ref 1.1–4.5)
LYMPHOCYTES RELATIVE PERCENT: 39.1 % (ref 20–40)
MCH RBC QN AUTO: 34.3 PG (ref 27–31)
MCHC RBC AUTO-ENTMCNC: 32.9 G/DL (ref 33–37)
MCV RBC AUTO: 104.3 FL (ref 81–99)
MONOCYTES ABSOLUTE: 0.4 K/UL (ref 0–0.9)
MONOCYTES RELATIVE PERCENT: 4.1 % (ref 0–10)
NEUTROPHILS ABSOLUTE: 5.5 K/UL (ref 1.5–7.5)
NEUTROPHILS RELATIVE PERCENT: 53 % (ref 50–65)
NITRITE, URINE: POSITIVE
PDW BLD-RTO: 14.6 % (ref 11.5–14.5)
PH UA: 6 (ref 5–8)
PLATELET # BLD: 336 K/UL (ref 130–400)
PMV BLD AUTO: 9.5 FL (ref 9.4–12.3)
PROTEIN UA: ABNORMAL MG/DL
RBC # BLD: 3.7 M/UL (ref 4.2–5.4)
RBC UA: ABNORMAL /HPF (ref 0–2)
REASON FOR REJECTION: NORMAL
REJECTED TEST: NORMAL
SPECIFIC GRAVITY UA: >=1.03 (ref 1–1.03)
UROBILINOGEN, URINE: 1 E.U./DL
WBC # BLD: 10.5 K/UL (ref 4.8–10.8)
WBC UA: ABNORMAL /HPF (ref 0–5)

## 2020-01-27 PROCEDURE — 2580000003 HC RX 258: Performed by: EMERGENCY MEDICINE

## 2020-01-27 PROCEDURE — 96372 THER/PROPH/DIAG INJ SC/IM: CPT | Performed by: NURSE PRACTITIONER

## 2020-01-27 PROCEDURE — 36415 COLL VENOUS BLD VENIPUNCTURE: CPT

## 2020-01-27 PROCEDURE — 99999 PR OFFICE/OUTPT VISIT,PROCEDURE ONLY: CPT | Performed by: EMERGENCY MEDICINE

## 2020-01-27 PROCEDURE — 73502 X-RAY EXAM HIP UNI 2-3 VIEWS: CPT

## 2020-01-27 PROCEDURE — 87086 URINE CULTURE/COLONY COUNT: CPT

## 2020-01-27 PROCEDURE — 85025 COMPLETE CBC W/AUTO DIFF WBC: CPT

## 2020-01-27 PROCEDURE — 74022 RADEX COMPL AQT ABD SERIES: CPT

## 2020-01-27 PROCEDURE — 81001 URINALYSIS AUTO W/SCOPE: CPT

## 2020-01-27 PROCEDURE — 84703 CHORIONIC GONADOTROPIN ASSAY: CPT

## 2020-01-27 PROCEDURE — 96365 THER/PROPH/DIAG IV INF INIT: CPT

## 2020-01-27 PROCEDURE — 6360000002 HC RX W HCPCS: Performed by: EMERGENCY MEDICINE

## 2020-01-27 PROCEDURE — 99284 EMERGENCY DEPT VISIT MOD MDM: CPT

## 2020-01-27 RX ORDER — MEDROXYPROGESTERONE ACETATE 150 MG/ML
150 INJECTION, SUSPENSION INTRAMUSCULAR ONCE
Status: COMPLETED | OUTPATIENT
Start: 2020-01-27 | End: 2020-01-27

## 2020-01-27 RX ORDER — NITROFURANTOIN 25; 75 MG/1; MG/1
100 CAPSULE ORAL 2 TIMES DAILY
Qty: 20 CAPSULE | Refills: 0 | Status: SHIPPED | OUTPATIENT
Start: 2020-01-27 | End: 2020-02-06

## 2020-01-27 RX ADMIN — GENTAMICIN SULFATE 272 MG: 40 INJECTION, SOLUTION INTRAMUSCULAR; INTRAVENOUS at 21:50

## 2020-01-27 RX ADMIN — MEDROXYPROGESTERONE ACETATE 150 MG: 150 INJECTION, SUSPENSION INTRAMUSCULAR at 16:35

## 2020-01-27 ASSESSMENT — ENCOUNTER SYMPTOMS
EYE PAIN: 0
SHORTNESS OF BREATH: 0
COUGH: 0
BACK PAIN: 1
RHINORRHEA: 0
VOICE CHANGE: 0
EYE REDNESS: 0
DIARRHEA: 0
ABDOMINAL PAIN: 1
VOMITING: 0

## 2020-01-28 NOTE — ED PROVIDER NOTES
Negative. All other systems reviewed and are negative. A complete review of systems was performed and is negative except as noted above in the HPI. PAST MEDICAL HISTORY     Past Medical History:   Diagnosis Date    Arthritis     GERD (gastroesophageal reflux disease)     History of blood transfusion     Incontinence     Seizures ([address]          SURGICAL HISTORY       Past Surgical History:   Procedure Laterality Date    APPENDECTOMY      CHOLECYSTECTOMY      CSF SHUNT Right     DEEP BRAIN STIMULATOR PLACEMENT      tremor control it is off now    GA EXCIS CHALAZION,GEN ANESTHESIA Right 8/23/2018    EYE CHALAZION EXCISION performed by Mallika Worley MD at [facility] OR    GA XCAPSL CTR RMVL INSJ IO LENS PROSTH W/O ECP Left 6/15/2017    EYE CATARACT EMULSIFICATION IOL IMPLANT performed by Mallkia Worley MD at [facility]    GA XCAPSL CTR RMVL INSJ IO LENS PROSTH W/O ECP Right 8/4/2017    CATARCAT EXTRACTION EYE WITH IOL performed by Mallika Wroley MD at [address] [place] [address]       Previous Medications    ALBUTEROL (PROVENTIL) (2.5 MG/3ML) 0.083% NEBULIZER SOLUTION    Take 3 mLs by nebulization every 6 hours as needed for Wheezing (coughing)    CELECOXIB (CELEBREX) 200 MG CAPSULE    TAKE 1 CAPSULE BY MOUTH DAILY    CLOTRIMAZOLE (LOTRIMIN) 1 % CREAM    APPLY EXTERNALLY TO THE AFFECTED AREA TWICE DAILY    DESLORATADINE (CLARINEX) 5 MG TABLET    Take 1 tablet by mouth daily    DICLOFENAC SODIUM (VOLTAREN) 1 % GEL    Apply 2 g topically 4 times daily    EPINEPHRINE (EPIPEN 2-ADAN) 0.3 MG/0.3ML SOAJ INJECTION    Inject 0.3 mLs into the muscle once for 1 dose Use as directed for allergic reaction    HYDROCODONE-ACETAMINOPHEN (NORCO) 7.5-325 MG PER TABLET    Take 1 tablet by mouth every 8 hours as needed for Pain for up to 30 days.     HYDROCORTISONE 2.5 % CREAM    APPLY EXTERNALLY TO THE AFFECTED AREA TWICE DAILY AS DIRECTED    HYDROXYZINE (ATARAX) 25 MG TABLET    Take 1 tablet by There is no obstruction or free   air. There is mild constipation. 2. AVN with subchondral collapse of the left hip with secondary   osteoarthrosis. 3. Lungs are clear. Signed by Dr Jessica Dejesus on 1/27/2020 8:12 PM            ED BEDSIDE ULTRASOUND:   Performed by ED Physician - none    LABS:  Labs Reviewed   CBC WITH AUTO DIFFERENTIAL - Abnormal; Notable for the following components:       Result Value    RBC 3.70 (*)     .3 (*)     MCH 34.3 (*)     MCHC 32.9 (*)     RDW 14.6 (*)     All other components within normal limits   URINALYSIS - Abnormal; Notable for the following components:    Clarity, UA CLOUDY (*)     Bilirubin Urine SMALL (*)     Protein, UA TRACE (*)     Nitrite, Urine POSITIVE (*)     Leukocyte Esterase, Urine TRACE (*)     All other components within normal limits   MICROSCOPIC URINALYSIS - Abnormal; Notable for the following components:    WBC, UA 31-50 (*)     RBC, UA 6-10 (*)     All other components within normal limits   URINE CULTURE   SPECIMEN REJECTION   PREGNANCY, URINE   COMPREHENSIVE METABOLIC PANEL W/ REFLEX TO MG FOR LOW K   LIPASE       All other labs were within normal range or not returned as of this dictation. Medications   gentamicin (GARAMYCIN) 272 mg in dextrose 5 % 250 mL IVPB (0 mg/kg × 54.4 kg Intravenous Stopped 1/27/20 2309)       EMERGENCY DEPARTMENT COURSE and DIFFERENTIALDIAGNOSIS/MDM:   Vitals:    Vitals:    01/27/20 1853 01/27/20 2025 01/27/20 2237 01/27/20 2309   BP: 123/81 118/82 121/74 118/77   Pulse:  88 87 88   Resp:  17 17 17   Temp:    98.4 °F (36.9 °C)   SpO2:  96% 95% 96%   Weight:       Height:           MDM     Difficult to localize patient's pain. Patient has pain from the left hip, but unclear whether this is related to the rest of her pain or not. Abdominal exam is benign. Urinalysis shows obvious urinary tract infection.   Patient with multiple medication allergies to all standard UTI treatments with coverage for potential associated

## 2020-01-29 LAB — URINE CULTURE, ROUTINE: NORMAL

## 2020-02-03 RX ORDER — CELECOXIB 200 MG/1
200 CAPSULE ORAL DAILY
Qty: 90 CAPSULE | Refills: 3 | Status: SHIPPED | OUTPATIENT
Start: 2020-02-03 | End: 2020-11-06

## 2020-02-03 NOTE — TELEPHONE ENCOUNTER
Pt seen 1/14/20.       Requested Prescriptions     Pending Prescriptions Disp Refills    celecoxib (CELEBREX) 200 MG capsule [Pharmacy Med Name: CELECOXIB 200MG CAPSULES] 90 capsule 3     Sig: TAKE 1 CAPSULE BY MOUTH DAILY

## 2020-02-17 ENCOUNTER — OFFICE VISIT (OUTPATIENT)
Dept: PRIMARY CARE CLINIC | Age: 45
End: 2020-02-17
Payer: MEDICARE

## 2020-02-17 VITALS
WEIGHT: 117 LBS | HEIGHT: 63 IN | OXYGEN SATURATION: 98 % | HEART RATE: 90 BPM | BODY MASS INDEX: 20.73 KG/M2 | TEMPERATURE: 97.1 F | SYSTOLIC BLOOD PRESSURE: 119 MMHG | DIASTOLIC BLOOD PRESSURE: 74 MMHG

## 2020-02-17 PROCEDURE — 99214 OFFICE O/P EST MOD 30 MIN: CPT | Performed by: PEDIATRICS

## 2020-02-17 PROCEDURE — G8427 DOCREV CUR MEDS BY ELIG CLIN: HCPCS | Performed by: PEDIATRICS

## 2020-02-17 PROCEDURE — 1036F TOBACCO NON-USER: CPT | Performed by: PEDIATRICS

## 2020-02-17 PROCEDURE — G8420 CALC BMI NORM PARAMETERS: HCPCS | Performed by: PEDIATRICS

## 2020-02-17 PROCEDURE — G8484 FLU IMMUNIZE NO ADMIN: HCPCS | Performed by: PEDIATRICS

## 2020-02-17 RX ORDER — LAMOTRIGINE 200 MG/1
200 TABLET ORAL 2 TIMES DAILY
Qty: 60 TABLET | Refills: 11 | Status: SHIPPED | OUTPATIENT
Start: 2020-02-17 | End: 2020-02-19 | Stop reason: SDUPTHER

## 2020-02-17 RX ORDER — HYDROCODONE BITARTRATE AND ACETAMINOPHEN 7.5; 325 MG/1; MG/1
1 TABLET ORAL EVERY 8 HOURS PRN
Qty: 40 TABLET | Refills: 0 | Status: SHIPPED | OUTPATIENT
Start: 2020-02-17 | End: 2020-04-15 | Stop reason: SDUPTHER

## 2020-02-17 ASSESSMENT — ENCOUNTER SYMPTOMS
WHEEZING: 0
SINUS PRESSURE: 0
CONSTIPATION: 1
SORE THROAT: 0
EYE PAIN: 0
BACK PAIN: 1
SHORTNESS OF BREATH: 0
ABDOMINAL PAIN: 1
VOMITING: 1
COUGH: 0

## 2020-02-17 NOTE — PATIENT INSTRUCTIONS
Patient Education        Safe Use of Opioid Pain Medicine: Care Instructions  Your Care Instructions  Pain is your body's way of warning you that something is wrong. Pain feels different for everybody. Only you can describe your pain. A doctor can suggest or prescribe many types of medicines for pain. These range from over-the-counter medicines like acetaminophen (Tylenol) to powerful medicines called opioids. Examples of opioids are fentanyl, hydrocodone, morphine, and oxycodone. Heroin is an illegal opioid  Opioids are strong medicines. They can help you manage pain when you use them the right way. But if you misuse them, they can cause serious harm and even death. For these reasons, doctors are very careful about how they prescribe opioids. If you decide to take opioids, here are some things to remember. · Keep your doctor informed. You can develop opioid use disorder. Moderate to severe opioid use disorder is sometimes called addiction. The risk is higher if you have a history of substance use. Your doctor will monitor you closely for signs of opioid use disorder and to figure out when you no longer need to take opioids. · Make a treatment plan. The goal of your plan is to be able to function and do the things you need to do, even if you still have some pain. You might be able to manage your pain with other non-opioid options like physical therapy, relaxation, or over-the-counter pain medicines. · Be aware of the side effects. Opioids can cause serious side effects, such as constipation, dry mouth, and nausea. And over time, you may need a higher dose to get pain relief. This is called tolerance. Your body also gets used to opioids. This is called physical dependence. If you suddenly stop taking them, you may have withdrawal symptoms. The doctor carefully considered what pain medicine is right for you.  You may not have received opioids if your doctor was concerned about drug interactions or your safety, or if he or she had other concerns. It is best to have one doctor or clinic treat your pain. This way you will get the pain medicine that will help you the most. And a doctor will be able to watch for any problems that the medicine might cause. The doctor has checked you carefully, but problems can develop later. If you notice any problems or new symptoms,  get medical treatment right away. Follow-up care is a key part of your treatment and safety. Be sure to make and go to all appointments, and call your doctor if you are having problems. It's also a good idea to know your test results and keep a list of the medicines you take. How can you care for yourself at home? If you need to take opioids to manage your pain, remember these safety tips. · Follow directions carefully. It's easy to misuse opioids if you take a dose other than what's prescribed by your doctor. This can lead to overdose and even death. Even sharing them with someone they weren't meant for is misuse. · Be cautious. Opioids may affect your judgment and decision making. Do not drive or operate machinery until you can think clearly. Talk with your doctor about when it is safe to drive. · Reduce the risk of drug interactions. Opioids can be dangerous if you take them with alcohol or with certain drugs like sleeping pills and muscle relaxers. Make sure your doctor knows about all the other medicines you take, including over-the-counter medicines. Don't start any new medicines before you talk to your doctor or pharmacist.  · Safely store and dispose of opioids. Store opioids in a safe and secure place. Make sure that pets, children, friends, and family can't get to them. When you're done using opioids, make sure to dispose of them safely and as quickly as possible. The U.S. Food and Drug Administration (FDA) recommends these disposal options.   ? The best option is to take your medicine to a drop-off box or take-back program that is authorized by the [address] ([place]). ? If these programs aren't available in your area and your medicine doesn't have specific disposal instructions (such as flushing), you can throw them into your household trash if you follow the FDA's instructions. Visit fda.gov and search for \"unused medicine disposal.\"  ? If you have opioid patches (used or unused), your options are to take them to a [place]-authorized site or flush them down the toilet. Do not throw them in the trash. ? Only flush your medicine down the toilet if you can't get to a [place]-approved site or your medicine instructions state clearly to flush them. · Reduce the risk of overdose. Misuse of opioids can be very dangerous. Protect yourself by asking your doctor about a naloxone rescue kit. It can help you--and even save your life--if you take too much of an opioid. Try other ways to reduce pain. · Relax, and reduce stress. Relaxation techniques such as deep breathing or meditation can help. · Keep moving. Gentle, daily exercise can help reduce pain over the long run. Try low- or no-impact exercises such as walking, swimming, and stationary biking. Do stretches to stay flexible. · Try heat, cold packs, and massage. · Get enough sleep. Pain can make you tired and drain your energy. Talk with your doctor if you have trouble sleeping because of pain. · Think positive. Your thoughts can affect your pain level. Do things that you enjoy to distract yourself when you have pain instead of focusing on the pain. See a movie, read a book, listen to music, or spend time with a friend. If you are not taking a prescription pain medicine, ask your doctor if you can take an over-the-counter medicine. When should you call for help?   Call your doctor now or seek immediate medical care if:    · You have a new kind of pain.     · You have new symptoms, such as a fever or rash, along with the pain.    Watch closely for changes in your health, and be sure to

## 2020-02-17 NOTE — PROGRESS NOTES
Acute Pain Prescriptions -   Chronic Pain Routine Monitoring Possible medication side effects, risk of tolerance/dependence & alternative treatments discussed. ;Obtaining appropriate analgesic effect of treatment. Chronic Pain > 80 MEDD -           height is 5' 3\" (1.6 m) and weight is 117 lb (53.1 kg). Her temporal temperature is 97.1 °F (36.2 °C). Her blood pressure is 119/74 and her pulse is 90. Her oxygen saturation is 98%. Body mass index is 20.73 kg/m². I have reviewed the following with the Ms. Shah   Lab Review  Admission on 01/27/2020, Discharged on 01/27/2020   Component Date Value    WBC 01/27/2020 10.5     RBC 01/27/2020 3.70*    Hemoglobin 01/27/2020 12.7     Hematocrit 01/27/2020 38.6     MCV 01/27/2020 104.3*    MCH 01/27/2020 34.3*    MCHC 01/27/2020 32.9*    RDW 01/27/2020 14.6*    Platelets 77/06/8462 336     MPV 01/27/2020 9.5     Neutrophils % 01/27/2020 53.0     Lymphocytes % 01/27/2020 39.1     Monocytes % 01/27/2020 4.1     Eosinophils % 01/27/2020 2.4     Basophils % 01/27/2020 1.0     Neutrophils Absolute 01/27/2020 5.5     Immature Granulocytes # 01/27/2020 0.0     Lymphocytes Absolute 01/27/2020 4.1     Monocytes Absolute 01/27/2020 0.40     Eosinophils Absolute 01/27/2020 0.30     Basophils Absolute 01/27/2020 0.10     Rejected Test 01/27/2020 cmp     Reason for Rejection 01/27/2020 see below     Color, UA 01/27/2020 Normie Nico Clarity, UA 01/27/2020 CLOUDY*    Glucose, Ur 01/27/2020 Negative     Bilirubin Urine 01/27/2020 SMALL*    Ketones, Urine 01/27/2020 Negative     Specific Gravity, UA 01/27/2020 >=1.030     Blood, Urine 01/27/2020 Negative     pH, UA 01/27/2020 6.0     Protein, UA 01/27/2020 TRACE*    Urobilinogen, Urine 01/27/2020 1.0     Nitrite, Urine 01/27/2020 POSITIVE*    Leukocyte Esterase, Urine 01/27/2020 TRACE*    HCG(Urine) Pregnancy Test 01/27/2020 Negative     WBC, UA 01/27/2020 31-50*    RBC, UA 01/27/2020 6-10*    Epi Cells 01/27/2020 5-10     Bacteria, UA 01/27/2020 4+     Urine Culture, Routine 01/27/2020 >100,000 CFU/ml mixed skin/urogenital jasper. No further workup    Procedure visit on 10/18/2019   Component Date Value    Preg Test, Ur 10/18/2019 Negative      Copies of these are in the chart. Current Outpatient Medications   Medication Sig Dispense Refill    HYDROcodone-acetaminophen (NORCO) 7.5-325 MG per tablet Take 1 tablet by mouth every 8 hours as needed for Pain for up to 30 days. 40 tablet 0    LAMICTAL 200 MG tablet Take 1 tablet by mouth 2 times daily Brand necessary 60 tablet 11    celecoxib (CELEBREX) 200 MG capsule Take 1 capsule by mouth daily 90 capsule 3    senna-docusate (SENEXON-S) 8.6-50 MG per tablet Take 2 tablets by mouth 2 times daily 120 tablet 11    clotrimazole (LOTRIMIN) 1 % cream APPLY EXTERNALLY TO THE AFFECTED AREA TWICE DAILY 30 g 0    Misc. Devices (WHEELCHAIR CUSHION) MISC Thin roho cushion 1 each 0    Incontinence Supplies MISC All incontinence supplies: diapers, wipes, chucks, gloves x 1 month and 11  each 0    nystatin (MYCOSTATIN) 022683 UNIT/ML suspension Take 5 mLs by mouth 4 times daily 473 mL 1    desloratadine (CLARINEX) 5 MG tablet Take 1 tablet by mouth daily 30 tablet 5    lidocaine-prilocaine (EMLA) 2.5-2.5 % cream APPLY ONE PUMP (GRAM) TO AFFECTED AREA(S) THREE TO FOUR TIMES A DAY AS DIRECTED 210 g 3    omeprazole (PRILOSEC) 20 MG delayed release capsule Take 1 capsule by mouth Daily 90 capsule 3    hydrocortisone 2.5 % cream APPLY EXTERNALLY TO THE AFFECTED AREA TWICE DAILY AS DIRECTED 30 g 1    medroxyPROGESTERone (DEPO-PROVERA) 150 MG/ML injection ADMINISTER 1 ML IN THE MUSCLE 1 TIME FOR 1 DOSE 1 mL 3    naloxone (NARCAN) 4 MG/0.1ML LIQD nasal spray 1 spray by Nasal route as needed for Opioid Reversal 1 each 0    Misc.  Devices [facility]) MISC 1 Units by Enteral route daily With reclining back 1 each 0    hydrOXYzine (ATARAX) 25 MG tablet Take 1 tablet by mouth every 8 hours as needed for Anxiety (Patient taking differently: Take 25 mg by mouth as needed for Anxiety ) 30 tablet 3    albuterol (PROVENTIL) (2.5 MG/3ML) 0.083% nebulizer solution Take 3 mLs by nebulization every 6 hours as needed for Wheezing (coughing) 120 each 5    medicated lip balm (BLISTEX/CARMEX) 2-2.5-6.6 % STCK Apply topically as needed for Dry Lips      ondansetron (ZOFRAN-ODT) 4 MG disintegrating tablet Place 1 tablet under the tongue every 8 hours as needed for Nausea or Vomiting 20 tablet 0    diclofenac sodium (VOLTAREN) 1 % GEL Apply 2 g topically 4 times daily 100 g 5    EPINEPHrine (EPIPEN 2-ADAN) 0.3 MG/0.3ML SOAJ injection Inject 0.3 mLs into the muscle once for 1 dose Use as directed for allergic reaction 2 each 5     No current facility-administered medications for this visit. Allergies: Latex; Bactrim [sulfamethoxazole-trimethoprim]; Ciprofloxacin; Ciprofloxacin hcl; Depakote [divalproex sodium]; Dilantin [phenytoin sodium extended]; Dye [iodides]; Keflex [cephalexin]; Pcn [penicillins]; Primaxin [imipenem];  Unasyn [ampicillin-sulbactam sodium]; and Wasp venom     Past Medical History:   Diagnosis Date    Arthritis     GERD (gastroesophageal reflux disease)     History of blood transfusion     Incontinence     Seizures ([address]        Family History   Problem Relation Age of Onset    High Blood Pressure Mother     High Blood Pressure Father        Past Surgical History:   Procedure Laterality Date    APPENDECTOMY      CHOLECYSTECTOMY      CSF SHUNT Right     DEEP BRAIN STIMULATOR PLACEMENT      tremor control it is off now    WI EXCIS CHALAZION,GEN ANESTHESIA Right 8/23/2018    EYE CHALAZION EXCISION performed by Evan Quarles MD at [facility] OR    WI [facility] RMVL INSJ IO LENS PROSTH W/O ECP Left 6/15/2017    EYE CATARACT EMULSIFICATION IOL IMPLANT performed by Evan Quarles MD at [facility] OR    WI XCAPSL CTR RMVL INSJ IO LENS PROSTH W/O ECP Right 2017    CATARCAT EXTRACTION EYE WITH IOL performed [facility] Deepak Elder [facility] at [facility]       Social History     Tobacco Use    Smoking status: Former Smoker     Packs/day: 1.00     Years: 19.00     Pack years: 19.00     Types: Cigarettes     Last attempt to quit: 2015     Years since quittin.0    Smokeless tobacco: Never Used    Tobacco comment: quit smoking  6 yrs ago   Substance Use Topics    Alcohol use: No        Review of Systems   Constitutional: Negative for fatigue and unexpected weight change. HENT: Negative for congestion, ear pain, sinus pressure and sore throat. Eyes: Negative for pain and visual disturbance. Respiratory: Negative for cough, shortness of breath and wheezing. Cardiovascular: Negative for chest pain, palpitations and leg swelling. Gastrointestinal: Positive for abdominal pain, constipation (intermittently) and vomiting. Endocrine: Negative for polyuria. Genitourinary: Negative for dysuria, frequency, hematuria, pelvic pain and urgency. Musculoskeletal: Positive for arthralgias (knees and hips bilaterally), back pain, joint swelling (left knee), myalgias and neck pain. Skin: Negative for rash. Neurological: Negative for dizziness and headaches. Psychiatric/Behavioral: Negative for self-injury. The patient is not nervous/anxious. Physical Exam  Vitals signs reviewed. Constitutional:       General: She is not in acute distress. Appearance: She is well-developed. She is not toxic-appearing. Comments: Body habitus is normal   HENT:      Head: Normocephalic and atraumatic. Right Ear: Hearing, tympanic membrane, ear canal and external ear normal.      Left Ear: Hearing, tympanic membrane, ear canal and external ear normal.      Nose: Nose normal.   Eyes:      General: Lids are normal.      Conjunctiva/sclera: Conjunctivae normal.      Pupils: Pupils are equal, round, and reactive to light.       Comments: Amblyoplia R eye (OD) Neck:      Musculoskeletal: Neck supple. Thyroid: No thyromegaly. Vascular: No carotid bruit or JVD. Trachea: Phonation normal.   Cardiovascular:      Rate and Rhythm: Normal rate and regular rhythm. No extrasystoles are present. Chest Wall: PMI is not displaced. Heart sounds: Normal heart sounds. No murmur. No friction rub. No gallop. Pulmonary:      Effort: Pulmonary effort is normal. No respiratory distress. Breath sounds: Normal breath sounds. No wheezing, rhonchi or rales. Abdominal:      General: Bowel sounds are normal. There is no distension ( ). Palpations: Abdomen is soft. There is no mass. Tenderness: There is no abdominal tenderness. There is no guarding or rebound. Genitourinary:     Comments: Examination deferred  Musculoskeletal: Normal range of motion. General: No tenderness. Comments: Joint examination reveals no acute arthritis or synovitis. Contracted L  UE  She has atrophy of her L LE   Lymphadenopathy:      Cervical: No cervical adenopathy. Skin:     General: Skin is warm and dry. Findings: No rash. Neurological:      Mental Status: She is alert and oriented to person, place, and time. Cranial Nerves: No cranial nerve deficit (by gross examination). Sensory: No sensory deficit. Motor: No tremor or atrophy. Gait: Gait normal.      Comments: She has very limited mobility of her entire left side. She has limited mobility of her right lower extremities as well and has generalized weakness with inability to withstand her weight. She cannot transfer without being lifted. She does not contribute to any significant degree with mechanical transfers. Psychiatric:         Speech: Speech normal.         Behavior: Behavior normal. Behavior is cooperative. ASSESSMENT      ICD-10-CM    1. PVD (peripheral vascular disease) ([facility]) I73.9    2.  Chronic pain syndrome G89.4 HYDROcodone-acetaminophen (NORCO) 7.5-325 MG per tablet   3. Medication management Z79.899 HYDROcodone-acetaminophen (NORCO) 7.5-325 MG per tablet   4. Primary osteoarthritis involving multiple joints M15.0 HYDROcodone-acetaminophen (NORCO) 7.5-325 MG per tablet   5. Avascular necrosis ([facility]) M87.00 HYDROcodone-acetaminophen (NORCO) 7.5-325 MG per tablet   6. Wheelchair bound Z99.3 HYDROcodone-acetaminophen (NORCO) 7.5-325 MG per tablet   7. Hemiplegia affecting left nondominant side, unspecified etiology, unspecified hemiplegia type (HCC) G81.94 HYDROcodone-acetaminophen (NORCO) 7.5-325 MG per tablet   8. Seizures ([facility]) R56.9 LAMICTAL 200 MG tablet   9. Chronic idiopathic constipation K59.04          PLAN    1. Chronic pain syndrome  We will try to wean down on her hydrocodone volume  - HYDROcodone-acetaminophen (NORCO) 7.5-325 MG per tablet; Take 1 tablet by mouth every 8 hours as needed for Pain for up to 30 days. Dispense: 40 tablet; Refill: 0    2. Medication management    - HYDROcodone-acetaminophen (NORCO) 7.5-325 MG per tablet; Take 1 tablet by mouth every 8 hours as needed for Pain for up to 30 days. Dispense: 40 tablet; Refill: 0    3. Primary osteoarthritis involving multiple joints  Eventually she is going to have to follow-up with orthopedics. This office long as possible  - HYDROcodone-acetaminophen ([facility]) 7.5-325 MG per tablet; Take 1 tablet by mouth every 8 hours as needed for Pain for up to 30 days. Dispense: 40 tablet; Refill: 0    4. Avascular necrosis ([address]  She was given an intra-articular steroid injection in her left hip. She does already have avascular necrosis. I did inform him that this will likely result in progression of erosion of the femoral head. She is getting some relief from this treatment and will continue with it as long as she can.    - HYDROcodone-acetaminophen (NORCO) 7.5-325 MG per tablet; Take 1 tablet by mouth every 8 hours as needed for Pain for up to 30 days. Dispense: 40 tablet; Refill: 0    5. Wheelchair bound    - HYDROcodone-acetaminophen ([address]) 7.5-325 MG per tablet; Take 1 tablet by mouth every 8 hours as needed for Pain for up to 30 days. Dispense: 40 tablet; Refill: 0    6. Hemiplegia affecting left nondominant side, unspecified etiology, unspecified hemiplegia type (HCC)  Stable with baseline deficits  - HYDROcodone-acetaminophen (NORCO) 7.5-325 MG per tablet; Take 1 tablet by mouth every 8 hours as needed for Pain for up to 30 days. Dispense: 40 tablet; Refill: 0    7. PVD (peripheral vascular disease) ([facility]  This was recently evaluated in October by vascular surgery and has actually improved from last evaluation. 8. Seizures ([facility]  She has not had any seizures in a long time. She does need a refill of her lamotrigine and this needs to be brand necessary.  - LAMICTAL 200 MG tablet; Take 1 tablet by mouth 2 times daily Brand necessary  Dispense: 60 tablet; Refill: 11    9. Chronic idiopathic constipation  Recommend that she continue her medication regimen for this      No orders of the defined types were placed in this encounter. Return in about 1 month (around 3/17/2020) for 30.

## 2020-02-18 NOTE — TELEPHONE ENCOUNTER
If they will agree to it, we can try the generic lamotrigine. Her father states that she is unable to take the generic and requires brand necessary. This is a historical finding and predates my involvement in her care.   She has been on the brand medication to this point

## 2020-02-19 RX ORDER — LAMOTRIGINE 200 MG/1
200 TABLET ORAL 2 TIMES DAILY
Qty: 60 TABLET | Refills: 11 | Status: SHIPPED | OUTPATIENT
Start: 2020-02-19 | End: 2021-03-22 | Stop reason: SDUPTHER

## 2020-04-02 RX ORDER — ONDANSETRON 4 MG/1
4 TABLET, ORALLY DISINTEGRATING ORAL EVERY 8 HOURS PRN
Qty: 20 TABLET | Refills: 0 | Status: SHIPPED | OUTPATIENT
Start: 2020-04-02 | End: 2021-07-13

## 2020-04-15 ENCOUNTER — VIRTUAL VISIT (OUTPATIENT)
Dept: PRIMARY CARE CLINIC | Age: 45
End: 2020-04-15
Payer: MEDICARE

## 2020-04-15 PROCEDURE — 99442 PR PHYS/QHP TELEPHONE EVALUATION 11-20 MIN: CPT | Performed by: PEDIATRICS

## 2020-04-15 RX ORDER — HYDROCODONE BITARTRATE AND ACETAMINOPHEN 7.5; 325 MG/1; MG/1
1 TABLET ORAL EVERY 8 HOURS PRN
Qty: 40 TABLET | Refills: 0 | Status: SHIPPED | OUTPATIENT
Start: 2020-04-15 | End: 2020-05-14 | Stop reason: SDUPTHER

## 2020-04-15 ASSESSMENT — ENCOUNTER SYMPTOMS
WHEEZING: 0
VOMITING: 1
COUGH: 0
BACK PAIN: 1
SORE THROAT: 0
CONSTIPATION: 1
ABDOMINAL PAIN: 1
EYE PAIN: 0
SINUS PRESSURE: 0
SHORTNESS OF BREATH: 0

## 2020-04-15 NOTE — PROGRESS NOTES
Platelets 23/76/3822 336     MPV 01/27/2020 9.5     Neutrophils % 01/27/2020 53.0     Lymphocytes % 01/27/2020 39.1     Monocytes % 01/27/2020 4.1     Eosinophils % 01/27/2020 2.4     Basophils % 01/27/2020 1.0     Neutrophils Absolute 01/27/2020 5.5     Immature Granulocytes # 01/27/2020 0.0     Lymphocytes Absolute 01/27/2020 4.1     Monocytes Absolute 01/27/2020 0.40     Eosinophils Absolute 01/27/2020 0.30     Basophils Absolute 01/27/2020 0.10     Rejected Test 01/27/2020 cmp     Reason for Rejection 01/27/2020 see below     Color, UA 01/27/2020 Jackqueline Caper Clarity, UA 01/27/2020 CLOUDY*    Glucose, Ur 01/27/2020 Negative     Bilirubin Urine 01/27/2020 SMALL*    Ketones, Urine 01/27/2020 Negative     Specific Gravity, UA 01/27/2020 >=1.030     Blood, Urine 01/27/2020 Negative     pH, UA 01/27/2020 6.0     Protein, UA 01/27/2020 TRACE*    Urobilinogen, Urine 01/27/2020 1.0     Nitrite, Urine 01/27/2020 POSITIVE*    Leukocyte Esterase, Urine 01/27/2020 TRACE*    HCG(Urine) Pregnancy Test 01/27/2020 Negative     WBC, UA 01/27/2020 31-50*    RBC, UA 01/27/2020 6-10*    Epithelial Cells, UA 01/27/2020 5-10     Bacteria, UA 01/27/2020 4+     Urine Culture, Routine 01/27/2020 >100,000 CFU/ml mixed skin/urogenital jasper. No further workup    Procedure visit on 10/18/2019   Component Date Value    Preg Test, Ur 10/18/2019 Negative      Copies of these are in the chart. Current Outpatient Medications   Medication Sig Dispense Refill    HYDROcodone-acetaminophen (NORCO) 7.5-325 MG per tablet Take 1 tablet by mouth every 8 hours as needed for Pain for up to 30 days.  40 tablet 0    ondansetron (ZOFRAN-ODT) 4 MG disintegrating tablet Place 1 tablet under the tongue every 8 hours as needed for Nausea or Vomiting 20 tablet 0    lamoTRIgine (LAMICTAL) 200 MG tablet Take 1 tablet by mouth 2 times daily 60 tablet 11    celecoxib (CELEBREX) 200 MG capsule Take 1 capsule by mouth daily 90 capsule 3    senna-docusate (SENEXON-S) 8.6-50 MG per tablet Take 2 tablets by mouth 2 times daily 120 tablet 11    clotrimazole (LOTRIMIN) 1 % cream APPLY EXTERNALLY TO THE AFFECTED AREA TWICE DAILY 30 g 0    Misc. Devices (WHEELCHAIR CUSHION) MISC Thin roho cushion 1 each 0    Incontinence Supplies MISC All incontinence supplies: diapers, wipes, chucks, gloves x 1 month and 11  each 0    nystatin (MYCOSTATIN) 545699 UNIT/ML suspension Take 5 mLs by mouth 4 times daily 473 mL 1    desloratadine (CLARINEX) 5 MG tablet Take 1 tablet by mouth daily 30 tablet 5    lidocaine-prilocaine (EMLA) 2.5-2.5 % cream APPLY ONE PUMP (GRAM) TO AFFECTED AREA(S) THREE TO FOUR TIMES A DAY AS DIRECTED 210 g 3    omeprazole (PRILOSEC) 20 MG delayed release capsule Take 1 capsule by mouth Daily 90 capsule 3    hydrocortisone 2.5 % cream APPLY EXTERNALLY TO THE AFFECTED AREA TWICE DAILY AS DIRECTED 30 g 1    medroxyPROGESTERone (DEPO-PROVERA) 150 MG/ML injection ADMINISTER 1 ML IN THE MUSCLE 1 TIME FOR 1 DOSE 1 mL 3    naloxone (NARCAN) 4 MG/0.1ML LIQD nasal spray 1 spray by Nasal route as needed for Opioid Reversal 1 each 0    Misc.  Devices [facility]) MISC 1 Units by Enteral route daily With reclining back 1 each 0    hydrOXYzine (ATARAX) 25 MG tablet Take 1 tablet by mouth every 8 hours as needed for Anxiety (Patient taking differently: Take 25 mg by mouth as needed for Anxiety ) 30 tablet 3    albuterol (PROVENTIL) (2.5 MG/3ML) 0.083% nebulizer solution Take 3 mLs by nebulization every 6 hours as needed for Wheezing (coughing) 120 each 5    medicated lip balm (BLISTEX/CARMEX) 2-2.5-6.6 % STCK Apply topically as needed for Dry Lips      diclofenac sodium (VOLTAREN) 1 % GEL Apply 2 g topically 4 times daily 100 g 5    EPINEPHrine (EPIPEN 2-ADAN) 0.3 MG/0.3ML SOAJ injection Inject 0.3 mLs into the muscle once for 1 dose Use as directed for allergic reaction 2 each 5     No current facility-administered medications Positive for abdominal pain, constipation (intermittently) and vomiting. Endocrine: Negative for polyuria. Genitourinary: Negative for dysuria, frequency, hematuria, pelvic pain and urgency. Musculoskeletal: Positive for arthralgias (knees and hips bilaterally), back pain, joint swelling (left knee), myalgias and neck pain. Skin: Negative for rash. Neurological: Negative for dizziness and headaches. Psychiatric/Behavioral: Negative for self-injury. The patient is not nervous/anxious. Physical Exam  Physical exam was not performed as this was a telephone conference visit      ASSESSMENT      ICD-10-CM    1. Chronic pain syndrome G89.4 HYDROcodone-acetaminophen (NORCO) 7.5-325 MG per tablet   2. Medication management Z79.899 HYDROcodone-acetaminophen (NORCO) 7.5-325 MG per tablet   3. Primary osteoarthritis involving multiple joints M15.0 HYDROcodone-acetaminophen (NORCO) 7.5-325 MG per tablet   4. Avascular necrosis (HCC) M87.00 HYDROcodone-acetaminophen (NORCO) 7.5-325 MG per tablet   5. Wheelchair bound Z99.3 HYDROcodone-acetaminophen (NORCO) 7.5-325 MG per tablet   6. Hemiplegia affecting left nondominant side, unspecified etiology, unspecified hemiplegia type (HCC) G81.94 HYDROcodone-acetaminophen (NORCO) 7.5-325 MG per tablet         PLAN    1. Chronic pain syndrome  - HYDROcodone-acetaminophen (NORCO) 7.5-325 MG per tablet; Take 1 tablet by mouth every 8 hours as needed for Pain for up to 30 days. Dispense: 40 tablet; Refill: 0    2. Medication management  - HYDROcodone-acetaminophen (NORCO) 7.5-325 MG per tablet; Take 1 tablet by mouth every 8 hours as needed for Pain for up to 30 days. Dispense: 40 tablet; Refill: 0    3. Primary osteoarthritis involving multiple joints  - HYDROcodone-acetaminophen (NORCO) 7.5-325 MG per tablet; Take 1 tablet by mouth every 8 hours as needed for Pain for up to 30 days. Dispense: 40 tablet; Refill: 0    4.  Avascular necrosis ([facility] [address]  -

## 2020-04-21 RX ORDER — MEDROXYPROGESTERONE ACETATE 150 MG/ML
150 INJECTION, SUSPENSION INTRAMUSCULAR
Qty: 1 ML | Refills: 3 | Status: SHIPPED | OUTPATIENT
Start: 2020-04-21 | End: 2020-08-03 | Stop reason: SDUPTHER

## 2020-05-06 ENCOUNTER — PROCEDURE VISIT (OUTPATIENT)
Dept: PRIMARY CARE CLINIC | Age: 45
End: 2020-05-06
Payer: MEDICARE

## 2020-05-06 PROCEDURE — 96372 THER/PROPH/DIAG INJ SC/IM: CPT | Performed by: PEDIATRICS

## 2020-05-06 RX ORDER — MEDROXYPROGESTERONE ACETATE 150 MG/ML
150 INJECTION, SUSPENSION INTRAMUSCULAR ONCE
Status: COMPLETED | OUTPATIENT
Start: 2020-05-06 | End: 2020-05-06

## 2020-05-06 RX ADMIN — MEDROXYPROGESTERONE ACETATE 150 MG: 150 INJECTION, SUSPENSION INTRAMUSCULAR at 14:06

## 2020-05-07 ENCOUNTER — TELEPHONE (OUTPATIENT)
Dept: PRIMARY CARE CLINIC | Age: 45
End: 2020-05-07

## 2020-05-07 ENCOUNTER — TELEPHONE (OUTPATIENT)
Dept: ADMINISTRATIVE | Age: 45
End: 2020-05-07

## 2020-05-08 ENCOUNTER — TELEPHONE (OUTPATIENT)
Dept: ADMINISTRATIVE | Age: 45
End: 2020-05-08

## 2020-05-08 RX ORDER — FLUCONAZOLE 150 MG/1
150 TABLET ORAL DAILY
Qty: 3 TABLET | Refills: 0 | Status: SHIPPED | OUTPATIENT
Start: 2020-05-08 | End: 2021-06-15

## 2020-05-14 ENCOUNTER — VIRTUAL VISIT (OUTPATIENT)
Dept: PRIMARY CARE CLINIC | Age: 45
End: 2020-05-14
Payer: MEDICARE

## 2020-05-14 PROCEDURE — 99442 PR PHYS/QHP TELEPHONE EVALUATION 11-20 MIN: CPT | Performed by: PEDIATRICS

## 2020-05-14 RX ORDER — HYDROCODONE BITARTRATE AND ACETAMINOPHEN 7.5; 325 MG/1; MG/1
1 TABLET ORAL EVERY 8 HOURS PRN
Qty: 40 TABLET | Refills: 0 | Status: SHIPPED | OUTPATIENT
Start: 2020-05-14 | End: 2020-06-16 | Stop reason: SDUPTHER

## 2020-05-14 ASSESSMENT — ENCOUNTER SYMPTOMS
COUGH: 0
CONSTIPATION: 1
SORE THROAT: 0
SHORTNESS OF BREATH: 0
WHEEZING: 0
EYE PAIN: 0
BACK PAIN: 1
SINUS PRESSURE: 0
ABDOMINAL PAIN: 1
VOMITING: 1

## 2020-05-14 NOTE — PROGRESS NOTES
[facility] [facility], [address]  Phone [phone]   Fax [phone]      OFFICE VISIT: 2020    Colonel Clarissa Shah-: 1975      [facility]  Reason For Visit:  Chula Patel is a 39 y.o. Back Pain; Knee Pain; and Hip Pain    Patient presents via telephone conference with chronic pain and anxiety. They are trying hard to do social distancing.        Anxiety:  She does not presently need anything for her anxiety. Chronic pain:  She typically takes hydrocodone 10 mg on a when necessary basis. Last prescription for hydrocodone 10 mg was on 4/15/2020 for #40  She is complaining of pain all over     She also has pain in her LLQ   She is having more regular BM. 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  When combined with lidocaine, it is pretty helpful     We are likely at the best that we can do and are attempting to address individual issues adjunctively     Pain Control: Average: 6/10             Best: 4/10                Worst: 10/10  (2020)     Pain Interfering with life:  100%  Pain Interfering with general activity:  100%     Her pain level varies in severity and location      Activities of daily living: She is completely wheelchair bound and unable to ambulate at all. She needs assistance with all ADLs. Even going to the bathroom requires assistance.   She does have helped by her father as well as assistant who comes and meets her needs     Adverse effects: None  Aberrant behavior: None  Affect: Very good considering her multiple health issues     Alternative medications:              Celebrex 200 mg daily              Diclofenac gel 1% 2 g 4 times daily topically              Naproxen sodium 220 mg when necessary              She is intolerant of many medications.     Urine Drug screen: 10/23/2018 and appropriate              She is incontinent and unable to produce urine on demand. Risk Factors:              Illegal Drug use: no              History of substance use disorder: no              Mental health conditions: stable depression and anxiety              Sleep disordered breathing: no              Concurrent Benzodiazepine use: yes     Bowel regimen: She is not regularly adherent to her bowel regimen              We discussed this again and stressed the importance of this regimen.              Senokot S,  1 tablets twice daily              MiraLAX 17 g daily     Bowel function: Chronic constipation     RORY was reviewed today per office protocol. Report shows No discrepancies.  Fill pattern is consistent from single provider(s) at single pharmacy(s). Request # [id number]     Active cumulative morphine equivalent score is 10    She does have Narcan at home, and they are aware of it's appropriate utilization     Controlled Substances Monitoring:      RX Monitoring 3/8/2019   Attestation The Prescription Monitoring Report for this patient was reviewed today. Acute Pain Prescriptions -   Chronic Pain Routine Monitoring Possible medication side effects, risk of tolerance/dependence & alternative treatments discussed. ;Obtaining appropriate analgesic effect of treatment. Chronic Pain > 80 MEDD -              vitals were not taken for this visit. There is no height or weight on file to calculate BMI. I have reviewed the following with the Ms. Shah   Lab Review  Admission on 01/27/2020, Discharged on 01/27/2020   Component Date Value    WBC 01/27/2020 10.5     RBC 01/27/2020 3.70*    Hemoglobin 01/27/2020 12.7     Hematocrit 01/27/2020 38.6     MCV 01/27/2020 104.3*    MCH 01/27/2020 34.3*    MCHC 01/27/2020 32.9*    RDW 01/27/2020 14.6*    Platelets 06/41/7776 336     celecoxib (CELEBREX) 200 MG capsule Take 1 capsule by mouth daily 90 capsule 3    senna-docusate (SENEXON-S) 8.6-50 MG per tablet Take 2 tablets by mouth 2 times daily 120 tablet 11    clotrimazole (LOTRIMIN) 1 % cream APPLY EXTERNALLY TO THE AFFECTED AREA TWICE DAILY 30 g 0    Misc. Devices (WHEELCHAIR CUSHION) MISC Thin roho cushion 1 each 0    Incontinence Supplies MISC All incontinence supplies: diapers, wipes, chucks, gloves x 1 month and 11  each 0    nystatin (MYCOSTATIN) 505968 UNIT/ML suspension Take 5 mLs by mouth 4 times daily 473 mL 1    desloratadine (CLARINEX) 5 MG tablet Take 1 tablet by mouth daily 30 tablet 5    lidocaine-prilocaine (EMLA) 2.5-2.5 % cream APPLY ONE PUMP (GRAM) TO AFFECTED AREA(S) THREE TO FOUR TIMES A DAY AS DIRECTED 210 g 3    omeprazole (PRILOSEC) 20 MG delayed release capsule Take 1 capsule by mouth Daily 90 capsule 3    hydrocortisone 2.5 % cream APPLY EXTERNALLY TO THE AFFECTED AREA TWICE DAILY AS DIRECTED 30 g 1    naloxone (NARCAN) 4 MG/0.1ML LIQD nasal spray 1 spray by Nasal route as needed for Opioid Reversal 1 each 0    Misc. Devices [facility]) MISC 1 Units by Enteral route daily With reclining back 1 each 0    hydrOXYzine (ATARAX) 25 MG tablet Take 1 tablet by mouth every 8 hours as needed for Anxiety (Patient taking differently: Take 25 mg by mouth as needed for Anxiety ) 30 tablet 3    albuterol (PROVENTIL) (2.5 MG/3ML) 0.083% nebulizer solution Take 3 mLs by nebulization every 6 hours as needed for Wheezing (coughing) 120 each 5    medicated lip balm (BLISTEX/CARMEX) 2-2.5-6.6 % STCK Apply topically as needed for Dry Lips      diclofenac sodium (VOLTAREN) 1 % GEL Apply 2 g topically 4 times daily 100 g 5    EPINEPHrine (EPIPEN 2-ADAN) 0.3 MG/0.3ML SOAJ injection Inject 0.3 mLs into the muscle once for 1 dose Use as directed for allergic reaction 2 each 5     No current facility-administered medications for this visit.         Allergies: Latex; Bactrim [sulfamethoxazole-trimethoprim]; Ciprofloxacin; Ciprofloxacin hcl; Depakote [divalproex sodium]; Dilantin [phenytoin sodium extended]; Dye [iodides]; Keflex [cephalexin]; Pcn [penicillins]; Primaxin [imipenem]; Unasyn [ampicillin-sulbactam sodium]; and Wasp venom     Past Medical History:   Diagnosis Date    Arthritis     GERD (gastroesophageal reflux disease)     History of blood transfusion     Incontinence     Seizures ([address]        Family History   Problem Relation Age of Onset    High Blood Pressure Mother     High Blood Pressure Father        Past Surgical History:   Procedure Laterality Date    APPENDECTOMY      CHOLECYSTECTOMY      CSF SHUNT Right     DEEP BRAIN STIMULATOR PLACEMENT      tremor control it is off now    [place] EXCIS CHALAZION,GEN ANESTHESIA Right 2018    EYE CHALAZION EXCISION performed [facility] at [facility]    [place] XCAPSL CTR RMVL INSJ IO LENS PROSTH W/O ECP Left 6/15/2017    EYE CATARACT EMULSIFICATION IOL IMPLANT performed by Patricia Lutz MD at [facility] OR    [place] XCAPSL CTR RMVL INSJ IO LENS PROSTH W/O ECP Right 2017    CATARCAT EXTRACTION EYE WITH IOL performed by [facility] at [facility]       Social History     Tobacco Use    Smoking status: Former Smoker     Packs/day: 1.00     Years: 19.00     Pack years: 19.00     Types: Cigarettes     Last attempt to quit: 2015     Years since quittin.2    Smokeless tobacco: Never Used    Tobacco comment: quit smoking  6 yrs ago   Substance Use Topics    Alcohol use: No        Review of Systems   Constitutional: Negative for fatigue and unexpected weight change. HENT: Negative for congestion, ear pain, sinus pressure and sore throat. Eyes: Negative for pain and visual disturbance. Respiratory: Negative for cough, shortness of breath and wheezing. Cardiovascular: Negative for chest pain, palpitations and leg swelling.    Gastrointestinal: Positive for abdominal pain, constipation 7.5-325 MG per tablet; Take 1 tablet by mouth every 8 hours as needed for Pain for up to 30 days. Dispense: 40 tablet; Refill: 0    5. Wheelchair bound  - HYDROcodone-acetaminophen ([address]) 7.5-325 MG per tablet; Take 1 tablet by mouth every 8 hours as needed for Pain for up to 30 days. Dispense: 40 tablet; Refill: 0    6. Hemiplegia affecting left nondominant side, unspecified etiology, unspecified hemiplegia type ([facility] [address]  - DME Order for Patient Lift as OP      Orders Placed This Encounter   Procedures    DME Order for Patient Lift as OP        No follow-ups on file. Due to patients co-morbidities and risk for potential exposure of COVID 19 pandemic. Patient was contacted and agreed to proceed with a telephone virtual visit. The risks and benefits of converting to a telephone virtual visit were discussed in light of the current infectious disease epidemic. Patient also understood that insurance coverage and co-pays are up to their individual insurance plans. Provider performed history of present illness and review of systems. Diagnosis and treatment plan was discussed with patient. Pharmacy of choice was reviewed along with past medical history, medication allergies, and current medications. Education provided to patient or patient parents/guardian with current illness diagnosis as well as when to seek additional healthcare due to changing or for worsening symptoms. Patient voiced understanding. 12 minutes were spent on the phone with patient. Jennifer Phelps is a 39 y.o. female being evaluated by a Virtual Visit (telephone visit) encounter to address concerns as mentioned above. A caregiver was present when appropriate. Due to this being a TeleHealth encounter (During [facility]- public health emergency), evaluation of the following organ systems was limited: Vitals/Constitutional/EENT/Resp/CV/GI//MS/Neuro/Skin/Heme-Lymph-Imm.   Pursuant to the emergency declaration under the [address] and

## 2020-07-30 ENCOUNTER — TELEPHONE (OUTPATIENT)
Dept: PRIMARY CARE CLINIC | Age: 45
End: 2020-07-30

## 2020-07-30 NOTE — TELEPHONE ENCOUNTER
Sidra bartlett  requests that nurse return their call. The best time to reach her is Anytime. Thank you.

## 2020-08-03 ENCOUNTER — PROCEDURE VISIT (OUTPATIENT)
Dept: PRIMARY CARE CLINIC | Age: 45
End: 2020-08-03
Payer: MEDICARE

## 2020-08-03 ENCOUNTER — NURSE TRIAGE (OUTPATIENT)
Dept: OTHER | Facility: CLINIC | Age: 45
End: 2020-08-03

## 2020-08-03 LAB
CONTROL: NORMAL
PREGNANCY TEST URINE, POC: NEGATIVE

## 2020-08-03 PROCEDURE — 96372 THER/PROPH/DIAG INJ SC/IM: CPT | Performed by: PEDIATRICS

## 2020-08-03 PROCEDURE — 81025 URINE PREGNANCY TEST: CPT | Performed by: PEDIATRICS

## 2020-08-03 RX ORDER — MEDROXYPROGESTERONE ACETATE 150 MG/ML
150 INJECTION, SUSPENSION INTRAMUSCULAR ONCE
Status: COMPLETED | OUTPATIENT
Start: 2020-08-03 | End: 2020-08-03

## 2020-08-03 RX ADMIN — MEDROXYPROGESTERONE ACETATE 150 MG: 150 INJECTION, SUSPENSION INTRAMUSCULAR at 12:50

## 2020-08-03 NOTE — PROGRESS NOTES
After obtaining consent, a negative pregnancy test and per orders of Dr. Fanny Gleason, injection of Depo provera 150 mg given in Right deltoid by Dilip Gaona. Patient tolerated it well.     Results for orders placed or performed in visit on 08/03/20   POCT urine pregnancy   Result Value Ref Range    Preg Test, Ur Negative     Control

## 2020-08-03 NOTE — TELEPHONE ENCOUNTER
Returned call to patient and father after patient and father called [place] [facility]) to schedule appointment with red flag complaint; transferred to Nurse Access for triage by [facility] (agent's name) and left VM. No answer. Please do not respond to the triage nurse through this encounter. Any subsequent communication should be directly with the patient. Reason for Disposition   No answer. First attempt to contact caller. Follow-up call scheduled within 15 minutes.     Protocols used: NO CONTACT OR DUPLICATE CONTACT CALL-ADULT-OH Father staying at bedside with Jayden.

## 2020-08-18 ENCOUNTER — VIRTUAL VISIT (OUTPATIENT)
Dept: PRIMARY CARE CLINIC | Age: 45
End: 2020-08-18
Payer: MEDICARE

## 2020-08-18 PROCEDURE — 99213 OFFICE O/P EST LOW 20 MIN: CPT | Performed by: PEDIATRICS

## 2020-08-18 PROCEDURE — G8428 CUR MEDS NOT DOCUMENT: HCPCS | Performed by: PEDIATRICS

## 2020-08-18 RX ORDER — HYDROCODONE BITARTRATE AND ACETAMINOPHEN 7.5; 325 MG/1; MG/1
1 TABLET ORAL EVERY 8 HOURS PRN
Qty: 40 TABLET | Refills: 0 | Status: SHIPPED | OUTPATIENT
Start: 2020-08-18 | End: 2020-09-23 | Stop reason: SDUPTHER

## 2020-08-18 ASSESSMENT — ENCOUNTER SYMPTOMS
VOMITING: 0
ABDOMINAL PAIN: 0
SHORTNESS OF BREATH: 0
EYE PAIN: 0
COUGH: 0
BACK PAIN: 1
SORE THROAT: 0
WHEEZING: 0
SINUS PRESSURE: 0
CONSTIPATION: 1

## 2020-08-18 NOTE — PROGRESS NOTES
[facility] [facility], [address]  Phone [phone]   Fax [phone]      OFFICE VISIT: 2020    Anne Walshht-: 1975      HPI  Reason For Visit:  [facility] is a 39 y.o. Anxiety and Back Pain    She has pain in her ears and would like a refill of her ear drops. She also needs a refill of her pain medication today. Chronic pain:  She typically takes hydrocodone 10 mg on a when necessary basis. 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  When combined with lidocaine, it is pretty helpful     Pain Control: Average: 6/10             Best: 4/10                Worst: 10/10  (2020)     Pain Interfering with life:  100%  Pain Interfering with general activity:  100%     Her pain level varies in severity and location      Activities of daily living: She is completely wheelchair bound and unable to ambulate at all. She needs assistance with all ADLs. Even going to the bathroom requires assistance. She does have helped by her father as well as assistant who comes and meets her needs     Adverse effects: None  Aberrant behavior: None  Affect: Very good considering her multiple health issues     Alternative medications:              Celebrex 200 mg daily              Diclofenac gel 1% 2 g 4 times daily topically              Naproxen sodium 220 mg when necessary              She is intolerant of many medications.     Urine Drug screen: 10/23/2018 and appropriate              She is incontinent and unable to produce urine on demand.   Risk Factors:              Illegal Drug use: no              History of substance use disorder: no              Mental health conditions: stable depression and anxiety              Sleep disordered breathing: no              Concurrent Benzodiazepine use: yes     Bowel regimen: She is not regularly adherent to her bowel regimen              We discussed this again and stressed the importance of this regimen.              Senokot S,  1 tablets twice daily              MiraLAX 17 g daily     Bowel function: Chronic constipation     RORY was reviewed today per office protocol. Report shows No discrepancies.  Fill pattern is consistent from single provider(s) at single pharmacy(s). Request # [id number]     Active cumulative morphine equivalent score is 0  This is obviously incorrect  She does have Narcan at home, and they are aware of it's appropriate utilization    Controlled Substance Monitoring:    Acute and Chronic Pain Monitoring:   RX Monitoring 8/18/2020   Attestation The Prescription Monitoring Report for this patient was reviewed today. Acute Pain Prescriptions -   Periodic Controlled Substance Monitoring Possible medication side effects, risk of tolerance/dependence & alternative treatments discussed. ;No signs of potential drug abuse or diversion identified. ;Assessed functional status. ;Obtaining appropriate analgesic effect of treatment. Chronic Pain > 50 MEDD Re-evaluated the status of the patient's underlying condition causing pain. Chronic Pain > 80 MEDD -              vitals were not taken for this visit. There is no height or weight on file to calculate BMI. I have reviewed the following with the Ms. Shah   Lab Review  Procedure visit on 08/03/2020   Component Date Value    Preg Test, Ur 08/03/2020 Negative      Copies of these are in the chart.     Current Outpatient Medications   Medication Sig Dispense Refill    neomycin-polymyxin-hydrocortisone (CORTISPORIN) 3.5-49411-2 otic solution Place 4 drops into both ears 3 times daily for 10 days Instill into bilateral  Ear 1 Bottle 0    HYDROcodone-acetaminophen (NORCO) 7.5-325 MG per tablet Take 1 tablet by mouth every 8 hours as needed for Pain for up to 30 days. 40 tablet 0    diclofenac sodium (VOLTAREN) 1 % GEL Apply 2 g topically 4 times daily 100 g 5    fluconazole (DIFLUCAN) 150 MG tablet Take 1 tablet by mouth daily 3 tablet 0    ondansetron (ZOFRAN-ODT) 4 MG disintegrating tablet Place 1 tablet under the tongue every 8 hours as needed for Nausea or Vomiting 20 tablet 0    lamoTRIgine (LAMICTAL) 200 MG tablet Take 1 tablet by mouth 2 times daily 60 tablet 11    celecoxib (CELEBREX) 200 MG capsule Take 1 capsule by mouth daily 90 capsule 3    senna-docusate (SENEXON-S) 8.6-50 MG per tablet Take 2 tablets by mouth 2 times daily 120 tablet 11    clotrimazole (LOTRIMIN) 1 % cream APPLY EXTERNALLY TO THE AFFECTED AREA TWICE DAILY 30 g 0    Misc. Devices (WHEELCHAIR CUSHION) MISC Thin roho cushion 1 each 0    Incontinence Supplies MISC All incontinence supplies: diapers, wipes, chucks, gloves x 1 month and 11  each 0    nystatin (MYCOSTATIN) 197047 UNIT/ML suspension Take 5 mLs by mouth 4 times daily 473 mL 1    desloratadine (CLARINEX) 5 MG tablet Take 1 tablet by mouth daily 30 tablet 5    lidocaine-prilocaine (EMLA) 2.5-2.5 % cream APPLY ONE PUMP (GRAM) TO AFFECTED AREA(S) THREE TO FOUR TIMES A DAY AS DIRECTED 210 g 3    omeprazole (PRILOSEC) 20 MG delayed release capsule Take 1 capsule by mouth Daily 90 capsule 3    hydrocortisone 2.5 % cream APPLY EXTERNALLY TO THE AFFECTED AREA TWICE DAILY AS DIRECTED 30 g 1    naloxone (NARCAN) 4 MG/0.1ML LIQD nasal spray 1 spray by Nasal route as needed for Opioid Reversal 1 each 0    Misc.  Devices [facility]) MISC 1 Units by Enteral route daily With reclining back 1 each 0    hydrOXYzine (ATARAX) 25 MG tablet Take 1 tablet by mouth every 8 hours as needed for Anxiety (Patient taking differently: Take 25 mg by mouth as needed for Anxiety ) 30 tablet 3    albuterol (PROVENTIL) (2.5 MG/3ML) 0.083% nebulizer solution Take 3 mLs by nebulization every 6 hours as needed for Wheezing (coughing) 120 each 5    medicated lip balm (BLISTEX/CARMEX) 2-2.5-6.6 % STCK Apply topically as needed for Dry Lips      EPINEPHrine (EPIPEN 2-ADAN) 0.3 MG/0.3ML SOAJ injection Inject 0.3 mLs into the muscle once for 1 dose Use as directed for allergic reaction 2 each 5     No current facility-administered medications for this visit. Allergies: Latex; Bactrim [sulfamethoxazole-trimethoprim]; Ciprofloxacin; Ciprofloxacin hcl; Depakote [divalproex sodium]; Dilantin [phenytoin sodium extended]; Dye [iodides]; Keflex [cephalexin]; Pcn [penicillins]; Primaxin [imipenem];  Unasyn [ampicillin-sulbactam sodium]; and Wasp venom     Past Medical History:   Diagnosis Date    Arthritis     GERD (gastroesophageal reflux disease)     History of blood transfusion     Incontinence     Seizures ([address]        Family History   Problem Relation Age of Onset    High Blood Pressure Mother     High Blood Pressure Father        Past Surgical History:   Procedure Laterality Date    APPENDECTOMY      CHOLECYSTECTOMY      CSF SHUNT Right     DEEP BRAIN STIMULATOR PLACEMENT      tremor control it is off now    [place] EXCIS CHALAZION,GEN ANESTHESIA Right 2018    EYE CHALAZION EXCISION performed by Dolores Lorenzo MD at [facility] OR    [facility] XCAPSL CTRC RMVL INSJ IO LENS PROSTH W/O ECP Left 6/15/2017    EYE CATARACT EMULSIFICATION IOL IMPLANT performed by Dolores Lorenzo MD at [facility] OR    [facility]O ECP Right 2017    CATARCAT EXTRACTION EYE WITH IOL performed by [facility]ores [facility] MD at [facility]       Social History     Tobacco Use    Smoking status: Former Smoker     Packs/day: 1.00     Years: 19.00     Pack years: 19.00     Types: Cigarettes     Last attempt to quit: 2015     Years since quittin.5    Smokeless tobacco: Never Used    Tobacco comment: quit smoking  6 yrs ago 0    2. Medication management  As above  - HYDROcodone-acetaminophen (NORCO) 7.5-325 MG per tablet; Take 1 tablet by mouth every 8 hours as needed for Pain for up to 30 days. Dispense: 40 tablet; Refill: 0    3. Primary osteoarthritis involving multiple joints  - HYDROcodone-acetaminophen (NORCO) 7.5-325 MG per tablet; Take 1 tablet by mouth every 8 hours as needed for Pain for up to 30 days. Dispense: 40 tablet; Refill: 0    4. Avascular necrosis (HCC)  - HYDROcodone-acetaminophen (NORCO) 7.5-325 MG per tablet; Take 1 tablet by mouth every 8 hours as needed for Pain for up to 30 days. Dispense: 40 tablet; Refill: 0    5. Wheelchair bound  - HYDROcodone-acetaminophen ([employer]) 7.5-325 MG per tablet; Take 1 tablet by mouth every 8 hours as needed for Pain for up to 30 days. Dispense: 40 tablet; Refill: 0    6. Acute swimmer's ear of both sides  We will refill her Cortisporin otic solution  - neomycin-polymyxin-hydrocortisone (CORTISPORIN) 3.5-55629-9 otic solution; Place 4 drops into both ears 3 times daily for 10 days Instill into bilateral  Ear  Dispense: 1 Bottle; Refill: 0      No orders of the defined types were placed in this encounter. Return in about 1 month (around 9/18/2020) for 15. Due to patients co-morbidities and risk for potential exposure of COVID 19 pandemic. Patient was contacted and agreed to proceed with a telephone virtual visit. The risks and benefits of converting to a telephone virtual visit were discussed in light of the current infectious disease epidemic. Patient also understood that insurance coverage and co-pays are up to their individual insurance plans. Provider performed history of present illness and review of systems. Diagnosis and treatment plan was discussed with patient. Pharmacy of choice was reviewed along with past medical history, medication allergies, and current medications.  Education provided to patient or patient parents/guardian with current illness diagnosis as well as when to seek additional healthcare due to changing or for worsening symptoms. Patient voiced understanding. 15 minutes were spent on the phone with patient. Natanael Barbosa is a 39 y.o. female being evaluated by a Virtual Visit (Telephone visit) encounter to address concerns as mentioned above. A caregiver was present when appropriate. Due to this being a TeleHealth encounter (During [id number] public health emergency), evaluation of the following organ systems was limited: Vitals/Constitutional/EENT/Resp/CV/GI//MS/Neuro/Skin/Heme-Lymph-Imm. Pursuant to the emergency declaration under the [address] [place] [address], [address] [place] [address] authority and the [employer] Act, this Virtual Visit was conducted with patient's (and/or legal guardian's) consent, to reduce the patient's risk of exposure to COVID-19 and provide necessary medical care. The patient (and/or legal guardian) has also been advised to contact this office for worsening conditions or problems, and seek emergency medical treatment and/or call 911 if deemed necessary. Patient identification was verified at the start of the visit: Yes    Total time spent for this encounter: 15m    Services were provided through a video synchronous discussion virtually to substitute for in-person clinic visit. Patient and provider were located at their individual homes. --IRA Cassidy DO on 8/18/2020 at 5:17 PM    An electronic signature was used to authenticate this note.

## 2020-09-18 RX ORDER — LIDOCAINE AND PRILOCAINE 25; 25 MG/G; MG/G
CREAM TOPICAL
Qty: 210 G | Refills: 3 | Status: SHIPPED | OUTPATIENT
Start: 2020-09-18 | End: 2020-09-24 | Stop reason: SDUPTHER

## 2020-09-23 ENCOUNTER — VIRTUAL VISIT (OUTPATIENT)
Dept: PRIMARY CARE CLINIC | Age: 45
End: 2020-09-23
Payer: MEDICARE

## 2020-09-23 PROCEDURE — 99442 PR PHYS/QHP TELEPHONE EVALUATION 11-20 MIN: CPT | Performed by: PEDIATRICS

## 2020-09-23 RX ORDER — HYDROCODONE BITARTRATE AND ACETAMINOPHEN 7.5; 325 MG/1; MG/1
1 TABLET ORAL EVERY 8 HOURS PRN
Qty: 40 TABLET | Refills: 0 | Status: SHIPPED | OUTPATIENT
Start: 2020-09-23 | End: 2020-10-21 | Stop reason: SDUPTHER

## 2020-09-23 ASSESSMENT — ENCOUNTER SYMPTOMS
SHORTNESS OF BREATH: 0
VOMITING: 0
SINUS PRESSURE: 0
EYE PAIN: 0
WHEEZING: 0
SORE THROAT: 0
BACK PAIN: 1
COUGH: 0
CONSTIPATION: 1
ABDOMINAL PAIN: 0

## 2020-09-23 NOTE — PROGRESS NOTES
[address] [facility],  [address]  Phone [phone]   Fax [phone]      OFFICE VISIT: 2020    Jorge Luis Shah-: 1975      HPI  Reason For Visit:  Dakota King is a 39 y.o. Back Pain and Anxiety        Anxiety and Back Pain    She has pain in her ears and would like a refill of her ear drops.     She also needs a refill of her pain medication today. Chronic pain:  She typically takes hydrocodone 10 mg on a when necessary basis. 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  When combined with lidocaine, it is pretty helpful     Pain Control: Average: 6/10             Best: 4/10                Worst: 10/10  (2020)     Pain Interfering with life:  100%  Pain Interfering with general activity:  100%     Her pain level varies in severity and location      Activities of daily living: She is completely wheelchair bound and unable to ambulate at all. She needs assistance with all ADLs. Even going to the bathroom requires assistance. She does have helped by her father as well as assistant who comes and meets her needs     Adverse effects: None  Aberrant behavior: None  Affect: Very good considering her multiple health issues     Alternative medications:              Celebrex 200 mg daily              Diclofenac gel 1% 2 g 4 times daily topically              Naproxen sodium 220 mg when necessary              She is intolerant of many medications.     Urine Drug screen: 10/23/2018 and appropriate              She is incontinent and unable to produce urine on demand.   Risk Factors:              Illegal Drug use: no              History of substance use disorder: no              Mental health conditions: stable depression and anxiety              Sleep disordered breathing: no              Concurrent Benzodiazepine use: yes     Bowel regimen: She is not regularly adherent to her bowel regimen              We discussed this again and stressed the importance of this regimen.              Senokot S,  1 tablets twice daily              MiraLAX 17 g daily     Bowel function: Chronic constipation     RORY was reviewed today per office protocol. Report shows No discrepancies.  Fill pattern is consistent from single provider(s) at single pharmacy(s). Request # [id number]     Active cumulative morphine equivalent score is 0  This is obviously incorrect  She does have Narcan at home, and they are aware of it's appropriate utilization    Controlled Substance Monitoring:    Acute and Chronic Pain Monitoring:   RX Monitoring 9/23/2020   Attestation The Prescription Monitoring Report for this patient was reviewed today. Acute Pain Prescriptions -   Periodic Controlled Substance Monitoring Possible medication side effects, risk of tolerance/dependence & alternative treatments discussed. ;No signs of potential drug abuse or diversion identified. ;Assessed functional status. ;Obtaining appropriate analgesic effect of treatment. Chronic Pain > 50 MEDD Re-evaluated the status of the patient's underlying condition causing pain. Chronic Pain > 80 MEDD -          vitals were not taken for this visit. There is no height or weight on file to calculate BMI. I have reviewed the following with the Ms. Shah   Lab Review  Procedure visit on 08/03/2020   Component Date Value    Preg Test, Ur 08/03/2020 Negative      Copies of these are in the chart.     Current Outpatient Medications   Medication Sig Dispense Refill    diclofenac sodium (VOLTAREN) 1 % GEL Apply 2 g topically 4 times daily 100 g 5    HYDROcodone-acetaminophen (NORCO) 7.5-325 MG per tablet Take 1 tablet by mouth every 8 hours as needed for Pain for up to 30 days. 40 tablet 0    lidocaine-prilocaine (EMLA) 2.5-2.5 % cream APPLY ONE PUMP (GRAM) TO AFFECTED AREA(S) THREE TO FOUR TIMES A DAY AS DIRECTED 210 g 3    fluconazole (DIFLUCAN) 150 MG tablet Take 1 tablet by mouth daily 3 tablet 0    ondansetron (ZOFRAN-ODT) 4 MG disintegrating tablet Place 1 tablet under the tongue every 8 hours as needed for Nausea or Vomiting 20 tablet 0    lamoTRIgine (LAMICTAL) 200 MG tablet Take 1 tablet by mouth 2 times daily 60 tablet 11    celecoxib (CELEBREX) 200 MG capsule Take 1 capsule by mouth daily 90 capsule 3    senna-docusate (SENEXON-S) 8.6-50 MG per tablet Take 2 tablets by mouth 2 times daily 120 tablet 11    clotrimazole (LOTRIMIN) 1 % cream APPLY EXTERNALLY TO THE AFFECTED AREA TWICE DAILY 30 g 0    Misc. Devices (WHEELCHAIR CUSHION) MISC Thin roho cushion 1 each 0    Incontinence Supplies MISC All incontinence supplies: diapers, wipes, chucks, gloves x 1 month and 11  each 0    nystatin (MYCOSTATIN) 230168 UNIT/ML suspension Take 5 mLs by mouth 4 times daily 473 mL 1    desloratadine (CLARINEX) 5 MG tablet Take 1 tablet by mouth daily 30 tablet 5    omeprazole (PRILOSEC) 20 MG delayed release capsule Take 1 capsule by mouth Daily 90 capsule 3    hydrocortisone 2.5 % cream APPLY EXTERNALLY TO THE AFFECTED AREA TWICE DAILY AS DIRECTED 30 g 1    naloxone (NARCAN) 4 MG/0.1ML LIQD nasal spray 1 spray by Nasal route as needed for Opioid Reversal 1 each 0    Misc.  Devices [facility]) MISC 1 Units by Enteral route daily With reclining back 1 each 0    hydrOXYzine (ATARAX) 25 MG tablet Take 1 tablet by mouth every 8 hours as needed for Anxiety (Patient taking differently: Take 25 mg by mouth as needed for Anxiety ) 30 tablet 3    albuterol (PROVENTIL) (2.5 MG/3ML) 0.083% nebulizer solution Take 3 mLs by nebulization every 6 hours as needed for Wheezing (coughing) 120 each 5    medicated lip balm (BLISTEX/CARMEX) 2-2.5-6.6 % STCK Apply topically as needed for Dry Lips      EPINEPHrine (EPIPEN 2-ADAN) 0.3 MG/0.3ML SOAJ injection Inject 0.3 mLs into the muscle once for 1 dose Use as directed for allergic reaction 2 each 5     No current facility-administered medications for this visit. Allergies: Latex; Bactrim [sulfamethoxazole-trimethoprim]; Ciprofloxacin; Ciprofloxacin hcl; Depakote [divalproex sodium]; Dilantin [phenytoin sodium extended]; Dye [iodides]; Keflex [cephalexin]; Pcn [penicillins]; Primaxin [imipenem]; Unasyn [ampicillin-sulbactam sodium]; and Wasp venom     Past Medical History:   Diagnosis Date    Arthritis     GERD (gastroesophageal reflux disease)     History of blood transfusion     Incontinence     Seizures ([facility] [address]        Family History   Problem Relation Age of Onset    High Blood Pressure Mother     High Blood Pressure Father        Past Surgical History:   Procedure Laterality Date    APPENDECTOMY      CHOLECYSTECTOMY      CSF SHUNT Right     DEEP BRAIN STIMULATOR PLACEMENT      tremor control it is off now    MI EXCIS CHALAZION,GEN ANESTHESIA Right 2018    EYE CHALAZION EXCISION [facility]bs [facility] OR    MI XCAPSL CTRC RMVL INSJ IO LENS PROSTH W/O ECP Left 6/15/2017    EYE CATARACT EMULSIFICATION IOL IMPLANT performed [facility]bs [facility] at [facility] OR    MI XCAPSL CTRC RMVL INSJ IO LENS PROSTH W/O ECP Right 2017    CATARCAT EXTRACTION EYE WITH IOL performed by [facility] Hobbs MD at [facility]       Social History     Tobacco Use    Smoking status: Former Smoker     Packs/day: 1.00     Years: 19.00     Pack years: 19.00     Types: Cigarettes     Last attempt to quit: 2015     Years since quittin.6    Smokeless tobacco: Never Used    Tobacco comment: quit smoking  6 yrs ago   Substance Use Topics    Alcohol use: No        Review of Systems   Constitutional: Negative for fatigue and unexpected weight change.    HENT: Negative for voiced understanding. 15 minutes were spent on the phone with patient. Nelma Ormond is a 39 y.o. female being evaluated by a Virtual Visit (Telephone visit) encounter to address concerns as mentioned above. A caregiver was present when appropriate. Due to this being a TeleHealth encounter (During KFDKO-13 public health emergency), evaluation of the following organ systems was limited: Vitals/Constitutional/EENT/Resp/CV/GI//MS/Neuro/Skin/Heme-Lymph-Imm. Pursuant to the emergency declaration under the [address] [place] [address], [address] [place] [address] authority and the [employer] Act, this Virtual Visit was conducted with patient's (and/or legal guardian's) consent, to reduce the patient's risk of exposure to COVID-19 and provide necessary medical care. The patient (and/or legal guardian) has also been advised to contact this office for worsening conditions or problems, and seek emergency medical treatment and/or call 911 if deemed necessary. Patient identification was verified at the start of the visit: Yes    Total time spent for this encounter: 15m    Services were provided through a video synchronous discussion virtually to substitute for in-person clinic visit. Patient and provider were located at their individual homes. --IRA Saldana DO on 9/23/2020 at 5:04 PM    An electronic signature was used to authenticate this note.

## 2020-09-24 RX ORDER — LIDOCAINE AND PRILOCAINE 25; 25 MG/G; MG/G
CREAM TOPICAL
Qty: 210 G | Refills: 3 | Status: SHIPPED | OUTPATIENT
Start: 2020-09-24 | End: 2021-03-26 | Stop reason: SDUPTHER

## 2020-09-30 ENCOUNTER — TELEPHONE (OUTPATIENT)
Dept: PRIMARY CARE CLINIC | Age: 45
End: 2020-09-30

## 2020-09-30 RX ORDER — FLUCONAZOLE 150 MG/1
150 TABLET ORAL ONCE
Qty: 1 TABLET | Refills: 0 | Status: SHIPPED | OUTPATIENT
Start: 2020-09-30 | End: 2020-09-30

## 2020-09-30 NOTE — TELEPHONE ENCOUNTER
Patients mother called in stating she has a yeast infection, wants diflucan sent to [facility] in [facility].

## 2020-10-03 ENCOUNTER — VIRTUAL VISIT (OUTPATIENT)
Dept: PRIMARY CARE CLINIC | Age: 45
End: 2020-10-03
Payer: MEDICARE

## 2020-10-03 PROCEDURE — 99442 PR PHYS/QHP TELEPHONE EVALUATION 11-20 MIN: CPT | Performed by: PEDIATRICS

## 2020-10-03 RX ORDER — FLUCONAZOLE 150 MG/1
150 TABLET ORAL DAILY
Qty: 3 TABLET | Refills: 0 | Status: SHIPPED | OUTPATIENT
Start: 2020-10-03 | End: 2020-10-06

## 2020-10-03 ASSESSMENT — ENCOUNTER SYMPTOMS
RESPIRATORY NEGATIVE: 1
EYES NEGATIVE: 1
ALLERGIC/IMMUNOLOGIC NEGATIVE: 1

## 2020-10-03 NOTE — PROGRESS NOTES
[address] [facility], [address]  Phone [phone]   Fax [phone]      OFFICE VISIT: 10/3/2020    Oseas Shah-: 1975      HPI  Reason For Visit:  Adela Kenny is a 39 y.o. Vaginitis    Patient presents via telephone conference with complaints of vaginal irritation, itching, redness and white cheesy discharge. This is consistent with yeast infection, which she has had frequently in the past.  She has not been on any recent antibiotics, but she does have some ongoing leaking of urine. She has a history of yeast infections as noted above. In the past, she has responded to Diflucan as well as topical agents. They would like a refill of this treatment regimen today. vitals were not taken for this visit. There is no height or weight on file to calculate BMI. I have reviewed the following with the Ms. Shah   Lab Review  Procedure visit on 2020   Component Date Value    Preg Test, Ur 2020 Negative      Copies of these are in the chart. Current Outpatient Medications   Medication Sig Dispense Refill    fluconazole (DIFLUCAN) 150 MG tablet Take 1 tablet by mouth daily for 3 days 3 tablet 0    terconazole (TERAZOL 3) 0.8 % vaginal cream Place vaginally nightly. 1 Tube 1    lidocaine-prilocaine (EMLA) 2.5-2.5 % cream Apply topically as needed. 210 g 3    diclofenac sodium (VOLTAREN) 1 % GEL Apply 2 g topically 4 times daily 100 g 5    HYDROcodone-acetaminophen (NORCO) 7.5-325 MG per tablet Take 1 tablet by mouth every 8 hours as needed for Pain for up to 30 days.  40 tablet 0    fluconazole (DIFLUCAN) 150 MG tablet Take 1 tablet by mouth daily 3 tablet 0    ondansetron (ZOFRAN-ODT) 4 MG disintegrating tablet Place 1 tablet under the tongue every 8 hours as needed for Nausea or Vomiting 20 tablet 0    lamoTRIgine (LAMICTAL) 200 MG tablet Take 1 tablet by mouth 2 times daily 60 tablet 11    celecoxib (CELEBREX) 200 MG capsule Take 1 capsule by mouth daily 90 capsule 3    senna-docusate (SENEXON-S) 8.6-50 MG per tablet Take 2 tablets by mouth 2 times daily 120 tablet 11    clotrimazole (LOTRIMIN) 1 % cream APPLY EXTERNALLY TO THE AFFECTED AREA TWICE DAILY 30 g 0    Misc. Devices (WHEELCHAIR CUSHION) MISC Thin roho cushion 1 each 0    Incontinence Supplies MISC All incontinence supplies: diapers, wipes, chucks, gloves x 1 month and 11  each 0    nystatin (MYCOSTATIN) 137756 UNIT/ML suspension Take 5 mLs by mouth 4 times daily 473 mL 1    desloratadine (CLARINEX) 5 MG tablet Take 1 tablet by mouth daily 30 tablet 5    omeprazole (PRILOSEC) 20 MG delayed release capsule Take 1 capsule by mouth Daily 90 capsule 3    hydrocortisone 2.5 % cream APPLY EXTERNALLY TO THE AFFECTED AREA TWICE DAILY AS DIRECTED 30 g 1    naloxone (NARCAN) 4 MG/0.1ML LIQD nasal spray 1 spray by Nasal route as needed for Opioid Reversal 1 each 0    Misc. Devices [facility]) MISC 1 Units by Enteral route daily With reclining back 1 each 0    hydrOXYzine (ATARAX) 25 MG tablet Take 1 tablet by mouth every 8 hours as needed for Anxiety (Patient taking differently: Take 25 mg by mouth as needed for Anxiety ) 30 tablet 3    albuterol (PROVENTIL) (2.5 MG/3ML) 0.083% nebulizer solution Take 3 mLs by nebulization every 6 hours as needed for Wheezing (coughing) 120 each 5    medicated lip balm (BLISTEX/CARMEX) 2-2.5-6.6 % STCK Apply topically as needed for Dry Lips      EPINEPHrine (EPIPEN 2-ADAN) 0.3 MG/0.3ML SOAJ injection Inject 0.3 mLs into the muscle once for 1 dose Use as directed for allergic reaction 2 each 5     No current facility-administered medications for this visit. Allergies: Latex; Bactrim [sulfamethoxazole-trimethoprim]; Ciprofloxacin; Ciprofloxacin hcl; Depakote [divalproex sodium]; Dilantin [phenytoin sodium extended]; Dye [iodides]; Keflex [cephalexin]; Pcn [penicillins]; Primaxin [imipenem];  Unasyn [ampicillin-sulbactam sodium]; and Wasp venom     Past Medical History:   Diagnosis Date    Arthritis     GERD (gastroesophageal reflux disease)     History of blood transfusion     Incontinence     Seizures ([facility])        Family History   Problem Relation Age of Onset    High Blood Pressure Mother     High Blood Pressure Father        Past Surgical History:   Procedure Laterality Date    APPENDECTOMY      CHOLECYSTECTOMY      CSF SHUNT Right     DEEP BRAIN STIMULATOR PLACEMENT      tremor control it is off now    ND EXCIS CHALAZION,GEN ANESTHESIA Right 2018    EYE CHALAZION EXCISION performed by [facility]e Avery MD at [facility] OR    ND XCAPSL CTRC RMVL INSJ IO LENS PROSTH W/O ECP Left 6/15/2017    EYE CATARACT EMULSIFICATION IOL IMPLANT performed by Kylee Avery MD at [facility] OR    ND XCAPSL CTRC RMVL INSJ IO LENS PROSTH W/O ECP Right 2017    CATARCAT EXTRACTION EYE WITH IOL performed by [facility]e [facility] MD at [facility]       Social History     Tobacco Use    Smoking status: Former Smoker     Packs/day: 1.00     Years: 19.00     Pack years: 19.00     Types: Cigarettes     Last attempt to quit: 2015     Years since quittin.6    Smokeless tobacco: Never Used    Tobacco comment: quit smoking  6 yrs ago   Substance Use Topics    Alcohol use: No        Review of Systems   Constitutional: Negative. HENT: Negative. Eyes: Negative. Respiratory: Negative. Cardiovascular: Negative. Endocrine: Negative. Genitourinary: Positive for vaginal discharge and vaginal pain (irritation and itching). Musculoskeletal: Positive for arthralgias (knees (baseline)). Skin: Positive for rash (in vaginal area). Allergic/Immunologic: Negative. Neurological: Negative. Hematological: Negative.         Physical Exam  Physical exam was not performed as this was a telephone conference visit      ASSESSMENT      ICD-10-CM    1. Yeast vaginitis  B37.3 fluconazole (DIFLUCAN) 150 MG tablet     terconazole (TERAZOL 3) 0.8 % vaginal cream         PLAN    1. Yeast vaginitis  I will call in a prescription for Diflucan systemically as well as Terazol 3 vaginal cream which can be used for the next 3 nights. This should resolve any yeast infection. If the discharge and irritation continues, then a culture or evaluation may be required. - fluconazole (DIFLUCAN) 150 MG tablet; Take 1 tablet by mouth daily for 3 days  Dispense: 3 tablet; Refill: 0  - terconazole (TERAZOL 3) 0.8 % vaginal cream; Place vaginally nightly. Dispense: 1 Tube; Refill: 1      No orders of the defined types were placed in this encounter. Return if symptoms worsen or fail to improve, for and as scheduled. Due to patients co-morbidities and risk for potential exposure of COVID 19 pandemic. Patient was contacted and agreed to proceed with a telephone virtual visit. The risks and benefits of converting to a telephone virtual visit were discussed in light of the current infectious disease epidemic. Patient also understood that insurance coverage and co-pays are up to their individual insurance plans. Provider performed history of present illness and review of systems. Diagnosis and treatment plan was discussed with patient. Pharmacy of choice was reviewed along with past medical history, medication allergies, and current medications. Education provided to patient or patient parents/guardian with current illness diagnosis as well as when to seek additional healthcare due to changing or for worsening symptoms. Patient voiced understanding. 15 minutes were spent on the phone with patientGaby Peña is a 39 y.o. female being evaluated by a Virtual Visit (Telephone visit) encounter to address concerns as mentioned above. A caregiver was present when appropriate.  Due to this being a TeleHealth encounter (During [place] [address] public health emergency), evaluation of the following organ systems was limited: Vitals/Constitutional/EENT/Resp/CV/GI//MS/Neuro/Skin/Heme-Lymph-Imm. Pursuant to the emergency declaration under the [address] [place] [address], [address] [place] [address] and the [employer] Act, this Virtual Visit was conducted with patient's (and/or legal guardian's) consent, to reduce the patient's risk of exposure to COVID-19 and provide necessary medical care. The patient (and/or legal guardian) has also been advised to contact this office for worsening conditions or problems, and seek emergency medical treatment and/or call 911 if deemed necessary. Patient identification was verified at the start of the visit: Yes    Total time spent for this encounter: 15m    Services were provided through a video synchronous discussion virtually to substitute for in-person clinic visit. Patient and provider were located at their individual homes. --IRA Sanchez,  on 10/3/2020 at 9:51 AM    An electronic signature was used to authenticate this note.

## 2020-10-03 NOTE — PATIENT INSTRUCTIONS
Patient Education        Vaginal Yeast Infection: Care Instructions  Your Care Instructions     A vaginal yeast infection is caused by too many yeast cells in the vagina. This is common in women of all ages. Itching, vaginal discharge and irritation, and other symptoms can bother you. But yeast infections don't often cause other health problems. Some medicines can increase your risk of getting a yeast infection. These include antibiotics, birth control pills, hormones, and steroids. You may also be more likely to get a yeast infection if you are pregnant, have diabetes, douche, or wear tight clothes. With treatment, most yeast infections get better in 2 to 3 days. Follow-up care is a key part of your treatment and safety. Be sure to make and go to all appointments, and call your doctor if you are having problems. It's also a good idea to know your test results and keep a list of the medicines you take. How can you care for yourself at home? · Take your medicines exactly as prescribed. Call your doctor if you think you are having a problem with your medicine. · Ask your doctor about over-the-counter (OTC) medicines for yeast infections. They may cost less than prescription medicines. If you use an OTC treatment, read and follow all instructions on the label. · Do not use tampons while using a vaginal cream or suppository. The tampons can absorb the medicine. Use pads instead. · Wear loose cotton clothing. Do not wear nylon or other fabric that holds body heat and moisture close to the skin. · Try sleeping without underwear. · Do not scratch. Relieve itching with a cold pack or a cool bath. · Do not wash your vaginal area more than once a day. Use plain water or a mild, unscented soap. Air-dry the vaginal area. · Change out of wet swimsuits after swimming. · Do not have sex until you have finished your treatment. · Do not douche. When should you call for help?    Call your doctor now or seek immediate infections. They are most common when women take antibiotics. These infections can cause the vagina to itch and burn. They also cause white discharge that looks like cottage cheese. In rare cases, yeast infects the blood. This can cause serious disease. This kind of infection is treated with medicine given through a needle into a vein (IV). After you start treatment, a yeast infection usually goes away quickly. But if your immune system is weak, the infection may come back. Tell your doctor if you get yeast infections often. Follow-up care is a key part of your treatment and safety. Be sure to make and go to all appointments, and call your doctor if you are having problems. It's also a good idea to know your test results and keep a list of the medicines you take. How can you care for yourself at home? · Take your medicines exactly as prescribed. Call your doctor if you think you are having a problem with your medicine. · Use antibiotics only as directed by your doctor. · Eat yogurt with live cultures. It has bacteria called lactobacillus. It may help prevent some types of yeast infections. · Keep your skin clean and dry. Put powder on moist places. · If you are using a cream or suppository to treat a vaginal yeast infection, don't use condoms or a diaphragm. Use a different type of birth control. · Eat a healthy diet and get regular exercise. This will help keep your immune system strong. When should you call for help? Watch closely for changes in your health, and be sure to contact your doctor if:  · You do not get better as expected. Where can you learn more? Go to https://[employer]. org and sign in to your [employer] account. Enter B625 in the damntheradio box to learn more about \"Candidiasis: Care Instructions. \"     If you do not have an account, please click on the \"Sign Up Now\" link.   Current as of: November 8, 2019               Content Version: 12.5  © 2727-1908 [employer], [employer]. Care instructions adapted under license by [facility] ([facility]). If you have questions about a medical condition or this instruction, always ask your healthcare professional. [address] any warranty or liability for your use of this information.

## 2020-10-21 ENCOUNTER — VIRTUAL VISIT (OUTPATIENT)
Dept: PRIMARY CARE CLINIC | Age: 45
End: 2020-10-21
Payer: MEDICARE

## 2020-10-21 PROCEDURE — 1036F TOBACCO NON-USER: CPT | Performed by: PEDIATRICS

## 2020-10-21 PROCEDURE — G8420 CALC BMI NORM PARAMETERS: HCPCS | Performed by: PEDIATRICS

## 2020-10-21 PROCEDURE — 99213 OFFICE O/P EST LOW 20 MIN: CPT | Performed by: PEDIATRICS

## 2020-10-21 PROCEDURE — G8428 CUR MEDS NOT DOCUMENT: HCPCS | Performed by: PEDIATRICS

## 2020-10-21 PROCEDURE — G8484 FLU IMMUNIZE NO ADMIN: HCPCS | Performed by: PEDIATRICS

## 2020-10-21 RX ORDER — HYDROCODONE BITARTRATE AND ACETAMINOPHEN 7.5; 325 MG/1; MG/1
1 TABLET ORAL EVERY 8 HOURS PRN
Qty: 40 TABLET | Refills: 0 | Status: SHIPPED | OUTPATIENT
Start: 2020-10-21 | End: 2020-11-19 | Stop reason: SDUPTHER

## 2020-10-21 RX ORDER — ALBUTEROL SULFATE 90 UG/1
2 AEROSOL, METERED RESPIRATORY (INHALATION) 4 TIMES DAILY PRN
Qty: 1 INHALER | Refills: 5 | Status: SHIPPED | OUTPATIENT
Start: 2020-10-21 | End: 2021-05-04 | Stop reason: SDUPTHER

## 2020-10-21 ASSESSMENT — ENCOUNTER SYMPTOMS
EYES NEGATIVE: 1
ALLERGIC/IMMUNOLOGIC NEGATIVE: 1
RESPIRATORY NEGATIVE: 1

## 2020-10-21 NOTE — PROGRESS NOTES
[facility], [address]  Phone [phone]   Fax [phone]      OFFICE VISIT: 10/21/2020    Swati Shah-: 1975      [facility]  Reason For Visit:  Santi Loyd is a 39 y.o. Back Pain and Joint Pain    Patient presents via telephone conference on follow-up for her chronic pain and anxiety. She needs a refill of her pain medication today. Chronic pain:  She typically takes hydrocodone 10 mg on a when necessary basis. 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  When combined with lidocaine, it is pretty helpful     Pain Control: Average: 6/10             Best: 4/10                Worst: 10/10  (10/21/2020)     Pain Interfering with life:  100%  Pain Interfering with general activity:  100%     Her pain level varies in severity and location      Activities of daily living: She is completely wheelchair bound and unable to ambulate at all. She needs assistance with all ADLs. Even going to the bathroom requires assistance. She does have helped by her father as well as assistant who comes and meets her needs     Adverse effects: None  Aberrant behavior: None  Affect: Very good considering her multiple health issues     Alternative medications:              Celebrex 200 mg daily              Diclofenac gel 1% 2 g 4 times daily topically              Naproxen sodium 220 mg when necessary              She is intolerant of many medications.     Urine Drug screen: 10/23/2018 and appropriate              She is incontinent and unable to produce urine on demand.   Risk Factors:              Illegal Drug use: no              History of substance use disorder: HYDROcodone-acetaminophen (NORCO) 7.5-325 MG per tablet Take 1 tablet by mouth every 8 hours as needed for Pain for up to 30 days. 40 tablet 0    Incontinence Supplies MISC All incontinence supplies: diapers, wipes, chucks, gloves x 1 month and 11  each 0    lidocaine-prilocaine (EMLA) 2.5-2.5 % cream Apply topically as needed. 210 g 3    diclofenac sodium (VOLTAREN) 1 % GEL Apply 2 g topically 4 times daily 100 g 5    fluconazole (DIFLUCAN) 150 MG tablet Take 1 tablet by mouth daily 3 tablet 0    ondansetron (ZOFRAN-ODT) 4 MG disintegrating tablet Place 1 tablet under the tongue every 8 hours as needed for Nausea or Vomiting 20 tablet 0    lamoTRIgine (LAMICTAL) 200 MG tablet Take 1 tablet by mouth 2 times daily 60 tablet 11    celecoxib (CELEBREX) 200 MG capsule Take 1 capsule by mouth daily 90 capsule 3    senna-docusate (SENEXON-S) 8.6-50 MG per tablet Take 2 tablets by mouth 2 times daily 120 tablet 11    clotrimazole (LOTRIMIN) 1 % cream APPLY EXTERNALLY TO THE AFFECTED AREA TWICE DAILY 30 g 0    Misc. Devices (WHEELCHAIR CUSHION) MISC Thin roho cushion 1 each 0    nystatin (MYCOSTATIN) 640343 UNIT/ML suspension Take 5 mLs by mouth 4 times daily 473 mL 1    desloratadine (CLARINEX) 5 MG tablet Take 1 tablet by mouth daily 30 tablet 5    omeprazole (PRILOSEC) 20 MG delayed release capsule Take 1 capsule by mouth Daily 90 capsule 3    hydrocortisone 2.5 % cream APPLY EXTERNALLY TO THE AFFECTED AREA TWICE DAILY AS DIRECTED 30 g 1    naloxone (NARCAN) 4 MG/0.1ML LIQD nasal spray 1 spray by Nasal route as needed for Opioid Reversal 1 each 0    Misc.  Devices [facility]) MISC 1 Units by Enteral route daily With reclining back 1 each 0    hydrOXYzine (ATARAX) 25 MG tablet Take 1 tablet by mouth every 8 hours as needed for Anxiety (Patient taking differently: Take 25 mg by mouth as needed for Anxiety ) 30 tablet 3    albuterol (PROVENTIL) (2.5 MG/3ML) 0.083% nebulizer solution Take 3 mLs by (NORCO) 7.5-325 MG per tablet; Take 1 tablet by mouth every 8 hours as needed for Pain for up to 30 days. Dispense: 40 tablet; Refill: 0    4. Primary osteoarthritis involving multiple joints  As above  - HYDROcodone-acetaminophen (NORCO) 7.5-325 MG per tablet; Take 1 tablet by mouth every 8 hours as needed for Pain for up to 30 days. Dispense: 40 tablet; Refill: 0    5. Avascular necrosis [facility])  Surgery is not an option right now and so we will control this with medication  - HYDROcodone-acetaminophen (NORCO) 7.5-325 MG per tablet; Take 1 tablet by mouth every 8 hours as needed for Pain for up to 30 days. Dispense: 40 tablet; Refill: 0    6. Wheelchair bound  As above  - HYDROcodone-acetaminophen ([address]) 7.5-325 MG per tablet; Take 1 tablet by mouth every 8 hours as needed for Pain for up to 30 days. Dispense: 40 tablet; Refill: 0      No orders of the defined types were placed in this encounter. Return in about 1 month (around 11/21/2020) for 15. She did get a flu shot    Due to patients co-morbidities and risk for potential exposure of COVID 19 pandemic. Patient was contacted and agreed to proceed with a telephone virtual visit. The risks and benefits of converting to a telephone virtual visit were discussed in light of the current infectious disease epidemic. Patient also understood that insurance coverage and co-pays are up to their individual insurance plans. Provider performed history of present illness and review of systems. Diagnosis and treatment plan was discussed with patient. Pharmacy of choice was reviewed along with past medical history, medication allergies, and current medications. Education provided to patient or patient parents/guardian with current illness diagnosis as well as when to seek additional healthcare due to changing or for worsening symptoms. Patient voiced understanding. 15 minutes were spent on the phone with patient.       Ranjit Gilbert is a 39 y.o. female being evaluated by a Virtual Visit (Telephone visit) encounter to address concerns as mentioned above. A caregiver was present when appropriate. Due to this being a TeleHealth encounter (During VKLGB-00 public health emergency), evaluation of the following organ systems was limited: Vitals/Constitutional/EENT/Resp/CV/GI//MS/Neuro/Skin/Heme-Lymph-Imm. Pursuant to the emergency declaration under the [address] [place] [address] and the [employer] Act, this Virtual Visit was conducted with patient's (and/or legal guardian's) consent, to reduce the patient's risk of exposure to COVID-19 and provide necessary medical care. The patient (and/or legal guardian) has also been advised to contact this office for worsening conditions or problems, and seek emergency medical treatment and/or call 911 if deemed necessary. Patient identification was verified at the start of the visit: Yes    Total time spent for this encounter: 15m    Services were provided through a video synchronous discussion virtually to substitute for in-person clinic visit. Patient and provider were located at their individual homes. --IRA Ross DO on 10/21/2020 at 3:29 PM    An electronic signature was used to authenticate this note.

## 2020-10-28 ENCOUNTER — TELEPHONE (OUTPATIENT)
Dept: VASCULAR SURGERY | Age: 45
End: 2020-10-28

## 2020-10-28 NOTE — TELEPHONE ENCOUNTER
I left a vm letting the pt know her office appt with Carey Estrada tomorrow to a vv. I explained the doxy process in the vm. I asked for the pt to call our office if she has any issues logging on. The pt is to keep her MATT as scheduled. I asked for a returned call with any questions with this appt change.

## 2020-11-06 RX ORDER — OMEPRAZOLE 20 MG/1
20 CAPSULE, DELAYED RELEASE ORAL DAILY
Qty: 90 CAPSULE | Refills: 3 | Status: SHIPPED | OUTPATIENT
Start: 2020-11-06 | End: 2021-11-01

## 2020-11-06 RX ORDER — CELECOXIB 200 MG/1
200 CAPSULE ORAL DAILY
Qty: 90 CAPSULE | Refills: 3 | Status: SHIPPED | OUTPATIENT
Start: 2020-11-06 | End: 2021-11-18 | Stop reason: SDUPTHER

## 2020-11-19 ENCOUNTER — VIRTUAL VISIT (OUTPATIENT)
Dept: PRIMARY CARE CLINIC | Age: 45
End: 2020-11-19
Payer: MEDICARE

## 2020-11-19 PROCEDURE — 99442 PR PHYS/QHP TELEPHONE EVALUATION 11-20 MIN: CPT | Performed by: PEDIATRICS

## 2020-11-19 RX ORDER — HYDROCODONE BITARTRATE AND ACETAMINOPHEN 7.5; 325 MG/1; MG/1
1 TABLET ORAL EVERY 8 HOURS PRN
Qty: 40 TABLET | Refills: 0 | Status: SHIPPED | OUTPATIENT
Start: 2020-11-19 | End: 2020-12-21 | Stop reason: SDUPTHER

## 2020-11-19 ASSESSMENT — ENCOUNTER SYMPTOMS
RESPIRATORY NEGATIVE: 1
ALLERGIC/IMMUNOLOGIC NEGATIVE: 1
EYES NEGATIVE: 1

## 2020-11-19 NOTE — PATIENT INSTRUCTIONS
Patient Education        Safe Use of Opioid Pain Medicine: Care Instructions  Your Care Instructions  Pain is your body's way of warning you that something is wrong. Pain feels different for everybody. Only you can describe your pain. A doctor can suggest or prescribe many types of medicines for pain. These range from over-the-counter medicines like acetaminophen (Tylenol) to powerful medicines called opioids. Examples of opioids are fentanyl, hydrocodone, morphine, and oxycodone. Heroin is an illegal opioid  Opioids are strong medicines. They can help you manage pain when you use them the right way. But if you misuse them, they can cause serious harm and even death. For these reasons, doctors are very careful about how they prescribe opioids. If you decide to take opioids, here are some things to remember. · Keep your doctor informed. You can develop opioid use disorder. Moderate to severe opioid use disorder is sometimes called addiction. The risk is higher if you have a history of substance use. Your doctor will monitor you closely for signs of opioid use disorder and to figure out when you no longer need to take opioids. · Make a treatment plan. The goal of your plan is to be able to function and do the things you need to do, even if you still have some pain. You might be able to manage your pain with other non-opioid options like physical therapy, relaxation, or over-the-counter pain medicines. · Be aware of the side effects. Opioids can cause serious side effects, such as constipation, dry mouth, and nausea. And over time, you may need a higher dose to get pain relief. This is called tolerance. Your body also gets used to opioids. This is called physical dependence. If you suddenly stop taking them, you may have withdrawal symptoms. The doctor carefully considered what pain medicine is right for you.  You may not have received opioids if your doctor was concerned about drug interactions or your safety, or if he or she had other concerns. It is best to have one doctor or clinic treat your pain. This way you will get the pain medicine that will help you the most. And a doctor will be able to watch for any problems that the medicine might cause. The doctor has checked you carefully, but problems can develop later. If you notice any problems or new symptoms,  get medical treatment right away. Follow-up care is a key part of your treatment and safety. Be sure to make and go to all appointments, and call your doctor if you are having problems. It's also a good idea to know your test results and keep a list of the medicines you take. How can you care for yourself at home? If you need to take opioids to manage your pain, remember these safety tips. · Follow directions carefully. It's easy to misuse opioids if you take a dose other than what's prescribed by your doctor. This can lead to overdose and even death. Even sharing them with someone they weren't meant for is misuse. · Be cautious. Opioids may affect your judgment and decision making. Do not drive or operate machinery until you can think clearly. Talk with your doctor about when it is safe to drive. · Reduce the risk of drug interactions. Opioids can be dangerous if you take them with alcohol or with certain drugs like sleeping pills and muscle relaxers. Make sure your doctor knows about all the other medicines you take, including over-the-counter medicines. Don't start any new medicines before you talk to your doctor or pharmacist.  · Safely store and dispose of opioids. Store opioids in a safe and secure place. Make sure that pets, children, friends, and family can't get to them. When you're done using opioids, make sure to dispose of them safely and as quickly as possible. The U.S. Food and Drug Administration (FDA) recommends these disposal options.   ? The best option is to take your medicine to a drop-off box or take-back program that is authorized by throat, mouth, lips, or tongue. ? Trouble breathing. ? Passing out (losing consciousness). Or you may feel very lightheaded or suddenly feel weak, confused, or restless. · You have signs of an overdose. These include:  ? Cold, clammy skin. ? Confusion. ? Severe nervousness or restlessness. ? Severe dizziness, drowsiness, or weakness. ? Slow breathing. ? Seizures. Call your doctor now or seek immediate medical care if:  · You have symptoms of an allergic reaction, such as:  ? A rash or hives (raised, red areas on the skin). ? Itching. ? Swelling. ? Belly pain, nausea, or vomiting. Watch closely for changes in your health, and be sure to contact your doctor if:  · You think you might be taking too much pain medicine, and you need help to take less or stop. · Your medicine is not helping with the pain. · You are having side effects, such as constipation. Where can you learn more? Go to [email or web address]://[email or web address] org and sign in to your [email or web address] account. Enter R108 in the [employer] box to learn more about \"Safe Use of Opioid Pain Medicine: Care Instructions. \"     If you do not have an account, please click on the \"Sign Up Now\" link. Current as of: November 20, 2019               Content Version: 12.6  © 5853-1587 [employer]. Care instructions adapted under license by [facility] [place] ([facility]). If you have questions about a medical condition or this instruction, always ask your healthcare professional. [place] [address] any warranty or liability for your use of this information.

## 2020-11-19 NOTE — PROGRESS NOTES
[address] [facility], [address]  Phone [phone]   Fax [phone]      OFFICE VISIT: 2020    Ricardo Washburn Alyssa-: 1975      [facility]  Reason For Visit:  Alexandre Mendoza is a 39 y.o. Chronic Pain    Patient presents via telephone conference on follow-up for her chronic pain and anxiety. She needs a refill of her pain medication today.     Chronic pain:  She typically takes hydrocodone 10 mg on a when necessary basis. 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  When combined with lidocaine, it is pretty helpful     Pain Control: Average: 6/10             Best: 4/10                Worst: 10/10  (2020)     Pain Interfering with life:  100%  Pain Interfering with general activity:  100%     Her pain level varies in severity and location      Activities of daily living: She is completely wheelchair bound and unable to ambulate at all. She needs assistance with all ADLs. Even going to the bathroom requires assistance. She does have helped by her father as well as assistant who comes and meets her needs     Adverse effects: None  Aberrant behavior: None  Affect: Very good considering her multiple health issues     Alternative medications:              Celebrex 200 mg daily              Diclofenac gel 1% 2 g 4 times daily topically              Naproxen sodium 220 mg when necessary              She is intolerant of many medications.     Urine Drug screen: 10/23/2018 and appropriate              She is incontinent and unable to produce urine on demand.   Risk Factors:              Illegal Drug use: no              History of substance use disorder: no              Mental health conditions: stable depression and anxiety              Sleep disordered breathing: no              Concurrent Benzodiazepine use: yes     Bowel regimen: She is not regularly adherent to her bowel regimen              We discussed this again and stressed the importance of this regimen.              Senokot S,  1 tablets twice daily              MiraLAX 17 g daily     Bowel function: Chronic constipation     ORRY was reviewed today per office protocol. Report shows No discrepancies.  Fill pattern is consistent from single provider(s) at single pharmacy(s). Request #[id number]     Active cumulative morphine equivalent score is 0  This is obviously incorrect  She does have Narcan at home, and they are aware of it's appropriate utilization      vitals were not taken for this visit. There is no height or weight on file to calculate BMI. I have reviewed the following with the MsGaby Shah   Lab Review  Procedure visit on 08/03/2020   Component Date Value    Preg Test, Ur 08/03/2020 Negative      Copies of these are in the chart. Current Outpatient Medications   Medication Sig Dispense Refill    HYDROcodone-acetaminophen (NORCO) 7.5-325 MG per tablet Take 1 tablet by mouth every 8 hours as needed for Pain for up to 30 days. 40 tablet 0    celecoxib (CELEBREX) 200 MG capsule TAKE 1 CAPSULE BY MOUTH DAILY 90 capsule 3    omeprazole (PRILOSEC) 20 MG delayed release capsule TAKE 1 CAPSULE BY MOUTH DAILY 90 capsule 3    albuterol sulfate HFA (VENTOLIN HFA) 108 (90 Base) MCG/ACT inhaler Inhale 2 puffs into the lungs 4 times daily as needed for Wheezing 1 Inhaler 5    Incontinence Supplies MISC All incontinence supplies: diapers, wipes, chucks, gloves x 1 month and 11  each 0    lidocaine-prilocaine (EMLA) 2.5-2.5 % cream Apply topically as needed.  210 g 3    diclofenac sodium (VOLTAREN) 1 % GEL Apply 2 g topically 4 times daily 100 g 5    fluconazole (DIFLUCAN) 150 MG tablet Take 1 tablet by mouth daily 3 tablet 0    ondansetron (ZOFRAN-ODT) 4 MG disintegrating tablet Place 1 tablet under the tongue every 8 hours as needed for Nausea or Vomiting 20 tablet 0    lamoTRIgine (LAMICTAL) 200 MG tablet Take 1 tablet by mouth 2 times daily 60 tablet 11    senna-docusate (SENEXON-S) 8.6-50 MG per tablet Take 2 tablets by mouth 2 times daily 120 tablet 11    clotrimazole (LOTRIMIN) 1 % cream APPLY EXTERNALLY TO THE AFFECTED AREA TWICE DAILY 30 g 0    Misc. Devices (WHEELCHAIR CUSHION) MISC Thin roho cushion 1 each 0    nystatin (MYCOSTATIN) 363410 UNIT/ML suspension Take 5 mLs by mouth 4 times daily 473 mL 1    desloratadine (CLARINEX) 5 MG tablet Take 1 tablet by mouth daily 30 tablet 5    hydrocortisone 2.5 % cream APPLY EXTERNALLY TO THE AFFECTED AREA TWICE DAILY AS DIRECTED 30 g 1    naloxone (NARCAN) 4 MG/0.1ML LIQD nasal spray 1 spray by Nasal route as needed for Opioid Reversal 1 each 0    Misc. Devices [facility]) MISC 1 Units by Enteral route daily With reclining back 1 each 0    hydrOXYzine (ATARAX) 25 MG tablet Take 1 tablet by mouth every 8 hours as needed for Anxiety (Patient taking differently: Take 25 mg by mouth as needed for Anxiety ) 30 tablet 3    albuterol (PROVENTIL) (2.5 MG/3ML) 0.083% nebulizer solution Take 3 mLs by nebulization every 6 hours as needed for Wheezing (coughing) 120 each 5    medicated lip balm (BLISTEX/CARMEX) 2-2.5-6.6 % STCK Apply topically as needed for Dry Lips      EPINEPHrine (EPIPEN 2-ADAN) 0.3 MG/0.3ML SOAJ injection Inject 0.3 mLs into the muscle once for 1 dose Use as directed for allergic reaction 2 each 5     No current facility-administered medications for this visit. Allergies: Latex; Bactrim [sulfamethoxazole-trimethoprim]; Ciprofloxacin; Ciprofloxacin hcl; Depakote [divalproex sodium]; Dilantin [phenytoin sodium extended]; Dye [iodides]; Keflex [cephalexin]; Pcn [penicillins]; Primaxin [imipenem];  Unasyn [ampicillin-sulbactam sodium]; and Wasp venom     Past Medical History:   Diagnosis Date    Arthritis     GERD (gastroesophageal reflux disease)     History of blood transfusion     Incontinence     Seizures (HCC)        Family History   Problem Relation Age of Onset    High Blood Pressure Mother     High Blood Pressure Father        Past Surgical History:   Procedure Laterality Date    APPENDECTOMY      CHOLECYSTECTOMY      CSF SHUNT Right     DEEP BRAIN STIMULATOR PLACEMENT      tremor control it is off now    NE EXCIS CHALAZION,GEN ANESTHESIA Right 2018    EYE CHALAZION EXCISION performed by Jaylon Fregoso MD at [facility] OR    NE XCAPSL CTRC RMVL INSJ IO LENS PROSTH W/O ECP Left 6/15/2017    EYE CATARACT EMULSIFICATION IOL IMPLANT performed by Jaylon Fregoso MD at [facility] OR    NE XCAPSL CTRC RMVL INSJ IO LENS PROSTH W/O ECP Right 2017    CATARCAT EXTRACTION EYE WITH IOL performed [facility] at [facility]       Social History     Tobacco Use    Smoking status: Former Smoker     Packs/day: 1.00     Years: 19.00     Pack years: 19.00     Types: Cigarettes     Last attempt to quit: 2015     Years since quittin.7    Smokeless tobacco: Never Used    Tobacco comment: quit smoking  6 yrs ago   Substance Use Topics    Alcohol use: No        Review of Systems   Constitutional: Negative. HENT: Negative. Eyes: Negative. Respiratory: Negative. Cardiovascular: Negative. Endocrine: Negative. Genitourinary: Negative for vaginal discharge and vaginal pain. Musculoskeletal: Positive for arthralgias (knees (baseline)). Skin: Positive for rash (in vaginal area). Allergic/Immunologic: Negative. Neurological: Negative. Hematological: Negative. Physical Exam  PE was not performed today due to being a telephone visit. ASSESSMENT      ICD-10-CM    1. Chronic pain syndrome  G89.4 HYDROcodone-acetaminophen (NORCO) 7.5-325 MG per tablet   2. Medication management  Z79.899 HYDROcodone-acetaminophen (NORCO) 7.5-325 MG per tablet   3. Primary osteoarthritis involving multiple joints  M89.49 HYDROcodone-acetaminophen (NORCO) 7.5-325 MG per tablet   4. Avascular necrosis (HCC)  M87.00 HYDROcodone-acetaminophen (NORCO) 7.5-325 MG per tablet   5. Wheelchair bound  Z99.3 HYDROcodone-acetaminophen (NORCO) 7.5-325 MG per tablet         PLAN    1. Chronic pain syndrome  Continue present medication regimen as this is effective at managing her symptoms  - HYDROcodone-acetaminophen (NORCO) 7.5-325 MG per tablet; Take 1 tablet by mouth every 8 hours as needed for Pain for up to 30 days. Dispense: 40 tablet; Refill: 0    2. Medication management  As above  - HYDROcodone-acetaminophen (NORCO) 7.5-325 MG per tablet; Take 1 tablet by mouth every 8 hours as needed for Pain for up to 30 days. Dispense: 40 tablet; Refill: 0    3. Primary osteoarthritis involving multiple joints  Doing fairly well with medication augmenting serial injections in her knees  - HYDROcodone-acetaminophen (NORCO) 7.5-325 MG per tablet; Take 1 tablet by mouth every 8 hours as needed for Pain for up to 30 days. Dispense: 40 tablet; Refill: 0    4. Avascular necrosis ([address]  She does not want to undergo hip replacement surgery. Medication is helping her with her symptoms  - HYDROcodone-acetaminophen (NORCO) 7.5-325 MG per tablet; Take 1 tablet by mouth every 8 hours as needed for Pain for up to 30 days. Dispense: 40 tablet; Refill: 0    5. Wheelchair bound  She is very limited in what she can do due to her disability  - HYDROcodone-acetaminophen (NORCO) 7.5-325 MG per tablet; Take 1 tablet by mouth every 8 hours as needed for Pain for up to 30 days. Dispense: 40 tablet; Refill: 0      No orders of the defined types were placed in this encounter. Return in about 1 month (around 12/19/2020) for 15. Due to patients co-morbidities and risk for potential exposure of COVID 19 pandemic. Patient was contacted and agreed to proceed with a telephone virtual visit. The risks and benefits of converting to a telephone virtual visit were discussed in light of the current infectious disease epidemic. Patient also understood that insurance coverage and co-pays are up to their individual insurance plans. Provider performed history of present illness and review of systems. Diagnosis and treatment plan was discussed with patient. Pharmacy of choice was reviewed along with past medical history, medication allergies, and current medications. Education provided to patient or patient parents/guardian with current illness diagnosis as well as when to seek additional healthcare due to changing or for worsening symptoms. Patient voiced understanding. 15 minutes were spent on the phone with patient. Martha Varma is a 39 y.o. female being evaluated by a Virtual Visit (Telephone visit) encounter to address concerns as mentioned above. A caregiver was present when appropriate. Due to this being a TeleHealth encounter (During [facility] public health emergency), evaluation of the following organ systems was limited: Vitals/Constitutional/EENT/Resp/CV/GI//MS/Neuro/Skin/Heme-Lymph-Imm. Pursuant to the emergency declaration under the [facility], [address] [place] [address] authority and the [employer] and [employer] Act, this Virtual Visit was conducted with patient's (and/or legal guardian's) consent, to reduce the patient's risk of exposure to COVID-19 and provide necessary medical care. The patient (and/or legal guardian) has also been advised to contact this office for worsening conditions or problems, and seek emergency medical treatment and/or call 911 if deemed necessary.      Patient identification was verified at the start of the visit: Yes    Total time spent for this encounter: 15m    Services were provided through a video synchronous

## 2020-12-30 ENCOUNTER — TELEPHONE (OUTPATIENT)
Dept: PRIMARY CARE CLINIC | Age: 45
End: 2020-12-30

## 2020-12-30 RX ORDER — FLUCONAZOLE 150 MG/1
150 TABLET ORAL DAILY
Qty: 7 TABLET | Refills: 0 | Status: SHIPPED | OUTPATIENT
Start: 2020-12-30 | End: 2021-01-06

## 2021-01-05 ENCOUNTER — VIRTUAL VISIT (OUTPATIENT)
Dept: PRIMARY CARE CLINIC | Age: 46
End: 2021-01-05
Payer: MEDICARE

## 2021-01-05 DIAGNOSIS — Z30.42 DEPOT CONTRACEPTION: ICD-10-CM

## 2021-01-05 PROCEDURE — G8484 FLU IMMUNIZE NO ADMIN: HCPCS | Performed by: NURSE PRACTITIONER

## 2021-01-05 PROCEDURE — 99401 PREV MED CNSL INDIV APPRX 15: CPT | Performed by: NURSE PRACTITIONER

## 2021-01-05 PROCEDURE — 99442 PR PHYS/QHP TELEPHONE EVALUATION 11-20 MIN: CPT | Performed by: NURSE PRACTITIONER

## 2021-01-05 RX ORDER — MEDROXYPROGESTERONE ACETATE 150 MG/ML
150 INJECTION, SUSPENSION INTRAMUSCULAR
Qty: 1 ML | Refills: 3 | Status: SHIPPED | OUTPATIENT
Start: 2021-01-05 | End: 2021-12-28 | Stop reason: SDUPTHER

## 2021-01-05 ASSESSMENT — ENCOUNTER SYMPTOMS
BACK PAIN: 1
EYE REDNESS: 0
BLOOD IN STOOL: 0
EYE DISCHARGE: 0
COUGH: 0
SORE THROAT: 0
ABDOMINAL PAIN: 0
DIARRHEA: 0
CONSTIPATION: 0
RHINORRHEA: 0
WHEEZING: 0
TROUBLE SWALLOWING: 0

## 2021-01-05 NOTE — PROGRESS NOTES
Kristina Donovan (:  1975) is a 39 y.o. female,Established patient, here for evaluation of the following chief complaint(s): Contraception      ASSESSMENT/PLAN:  1. Depot contraception  -     medroxyPROGESTERone (DEPO-PROVERA) 150 MG/ML injection; Inject 150 mg into the muscle every 3 months, Disp-1 mL, R-3Normal  -     CO PREVENT ,INDIV,15 MIN    Kristina Donovan    1975   2021     The patient is in the office today, desires to go on hormonal type of birth control method. I have informed her about the benefits and risks of the particular method she has chosen. She understands that no birth control method is perfect and that some women have gotten pregnant while on the particular method (1 out of every 100 women during the first year of use). She understands the particular method will not protect her from sexually transmitted infections and that she needs to use condoms for protection from these infections. She understands that certain medicines may interact with the particular method to decrease the effectiveness. She knows it is important to tell all her health care providers that she is on the particular method.   She understands that when using the particular method, the chances of developing health problems increase with certain conditions such as:    #Cigarette smoking #High cholesterol #Age 35 or older #Diabetes  #High blood pressure She understands that it is important to tell me if she has ever had any of the following conditions before using the particular method:    #Blood clots in the lungs, legs, or brain      #Unexplained bleeding from the vagina    #Inflammation of the veins                          #Cancer of the breast or uterus  The side effects sometimes associated with the particular method include:    #Liver disease                                             #Heart disease or stroke #Nausea and vomiting                                              #Weight gain or loss    #Breast tenderness    #Spotting between periods    #Skin irritation  She understands to call me immediately if any of the following happening. Abdominal pains                                           Chest pains or shortness of breath    Headaches (severe), numbness, or dizziness   Eye problems such as blurred vision or double vision    Severe leg pain   She has had a chance to ask questions and has had all her questions answered. She has decided the particular method depo and prescription has been given to her. She is instructed how to use this particular method and when to be back for check up. No follow-ups on file. SUBJECTIVE/OBJECTIVE:  HPI   Contraceptive management  Uses depo last injection was over 3 mo ago. Let this slide and doesn't have a refill on medication. Review of Systems   Constitutional: Negative for appetite change and unexpected weight change. HENT: Negative for congestion, rhinorrhea, sore throat and trouble swallowing. Eyes: Negative for discharge and redness. Respiratory: Negative for cough and wheezing. Cardiovascular: Negative for chest pain. Gastrointestinal: Negative for abdominal pain, blood in stool, constipation and diarrhea. Genitourinary: Negative for dysuria. Musculoskeletal: Positive for back pain. Skin: Negative for rash. Neurological: Negative for weakness. Hematological: Negative for adenopathy. No flowsheet data found. Physical Exam  Telephone encounter. On this date 01/05/21 I have spent 20 minutes reviewing previous notes, test results and face to face with the patient discussing the diagnosis and importance of compliance with the treatment plan. Sandi Tolentino is a 39 y.o. female being evaluated by a Virtual Visit (video visit) encounter to address concerns as mentioned above. A caregiver was present when appropriate. Due to this being a TeleHealth encounter (During [facility]-11 public health emergency), evaluation of the following organ systems was limited: Vitals/Constitutional/EENT/Resp/CV/GI//MS/Neuro/Skin/Heme-Lymph-Imm. Pursuant to the emergency declaration under the [address] [place] [address] and the [employer] and [employer] Act, this Virtual Visit was conducted with patient's (and/or legal guardian's) consent, to reduce the patient's risk of exposure to COVID-19 and provide necessary medical care. The patient (and/or legal guardian) has also been advised to contact this office for worsening conditions or problems, and seek emergency medical treatment and/or call 911 if deemed necessary. Patient identification was verified at the start of the visit: Yes      Services were provided through a video synchronous discussion virtually to substitute for in-person clinic visit. Patient and provider were located at their individual homes. An electronic signature was used to authenticate this note.     --LOVELY Arellano

## 2021-01-06 ENCOUNTER — PROCEDURE VISIT (OUTPATIENT)
Dept: PRIMARY CARE CLINIC | Age: 46
End: 2021-01-06
Payer: MEDICARE

## 2021-01-06 DIAGNOSIS — Z30.42 DEPOT CONTRACEPTION: Primary | ICD-10-CM

## 2021-01-06 LAB
CONTROL: NORMAL
PREGNANCY TEST URINE, POC: NORMAL

## 2021-01-06 PROCEDURE — 96372 THER/PROPH/DIAG INJ SC/IM: CPT | Performed by: PEDIATRICS

## 2021-01-06 PROCEDURE — 81025 URINE PREGNANCY TEST: CPT | Performed by: PEDIATRICS

## 2021-01-06 RX ORDER — MEDROXYPROGESTERONE ACETATE 150 MG/ML
150 INJECTION, SUSPENSION INTRAMUSCULAR ONCE
Status: COMPLETED | OUTPATIENT
Start: 2021-01-06 | End: 2021-01-06

## 2021-01-06 RX ADMIN — MEDROXYPROGESTERONE ACETATE 150 MG: 150 INJECTION, SUSPENSION INTRAMUSCULAR at 10:57

## 2021-01-25 ENCOUNTER — VIRTUAL VISIT (OUTPATIENT)
Dept: PRIMARY CARE CLINIC | Age: 46
End: 2021-01-25
Payer: MEDICARE

## 2021-01-25 DIAGNOSIS — Z91.09 ENVIRONMENTAL ALLERGIES: ICD-10-CM

## 2021-01-25 DIAGNOSIS — H60.333 ACUTE SWIMMER'S EAR OF BOTH SIDES: ICD-10-CM

## 2021-01-25 DIAGNOSIS — Z79.899 MEDICATION MANAGEMENT: ICD-10-CM

## 2021-01-25 DIAGNOSIS — M87.00 AVASCULAR NECROSIS (HCC): ICD-10-CM

## 2021-01-25 DIAGNOSIS — Z99.3 WHEELCHAIR BOUND: ICD-10-CM

## 2021-01-25 DIAGNOSIS — R13.12 OROPHARYNGEAL DYSPHAGIA: ICD-10-CM

## 2021-01-25 DIAGNOSIS — G89.4 CHRONIC PAIN SYNDROME: Primary | ICD-10-CM

## 2021-01-25 DIAGNOSIS — M15.9 PRIMARY OSTEOARTHRITIS INVOLVING MULTIPLE JOINTS: ICD-10-CM

## 2021-01-25 PROCEDURE — 99443 PR PHYS/QHP TELEPHONE EVALUATION 21-30 MIN: CPT | Performed by: PEDIATRICS

## 2021-01-25 RX ORDER — HYDROCODONE BITARTRATE AND ACETAMINOPHEN 7.5; 325 MG/1; MG/1
1 TABLET ORAL EVERY 8 HOURS PRN
Qty: 40 TABLET | Refills: 0 | Status: SHIPPED | OUTPATIENT
Start: 2021-01-25 | End: 2021-02-15 | Stop reason: SDUPTHER

## 2021-01-25 RX ORDER — DESLORATADINE 5 MG/1
5 TABLET ORAL DAILY
Qty: 30 TABLET | Refills: 11 | Status: SHIPPED | OUTPATIENT
Start: 2021-01-25 | End: 2022-01-05

## 2021-01-25 ASSESSMENT — ENCOUNTER SYMPTOMS
EYES NEGATIVE: 1
RHINORRHEA: 1
ALLERGIC/IMMUNOLOGIC NEGATIVE: 1
RESPIRATORY NEGATIVE: 1

## 2021-01-25 NOTE — PROGRESS NOTES
[address] [facility], [address]  Phone [phone]   Fax [phone]      OFFICE VISIT: 2021    [facility]-: 1975      [facility]  Reason For Visit:  Angelia Magallanes is a 39 y.o. Chronic Pain    Patient presents via telephone conference on follow-up for her chronic pain. She needs a refill of her pain medication today.     She is having problems with ear pain   This is her left ear. She complains that it is painful at times  She would like some drops sent in. She has been on Cortisporin gtts in the past and they were effective. She is having some problems with allergies  She needs some Clarinex  This works well for her, typically. She needs a Rx for Thickit  She requires this for all liquids. She needs a written Rx for bedrails sent by mail. Hers have broken and they need new set mailed to them. She also needs her monthly pain medication called in. Chronic pain:  She typically takes hydrocodone 10 mg on a when necessary basis. 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  When combined with lidocaine, it is pretty helpful     Pain Control: Average: 6/10             Best: 4/10                Worst: 10/10  (2020)     Pain Interfering with life:  100%  Pain Interfering with general activity:  100%     Her pain level varies in severity and location      Activities of daily living: She is completely wheelchair bound and unable to ambulate at all. She needs assistance with all ADLs. Even going to the bathroom requires assistance.   She does have helped by her father as well as assistant who comes and meets her needs    Adverse effects: None  Aberrant behavior: None  Affect: Very good considering her multiple health issues     Alternative medications:              Celebrex 200 mg daily              Diclofenac gel 1% 2 g 4 times daily topically              Naproxen sodium 220 mg when necessary              She is intolerant of many medications.     Urine Drug screen: 10/23/2018 and appropriate              She is incontinent and unable to produce urine on demand. Risk Factors:              Illegal Drug use: no              History of substance use disorder: no              Mental health conditions: stable depression and anxiety              Sleep disordered breathing: no              Concurrent Benzodiazepine use: yes     Bowel regimen: She is not regularly adherent to her bowel regimen              We discussed this again and stressed the importance of this regimen.              Senokot S,  1 tablets twice daily              MiraLAX 17 g daily     Bowel function: Chronic constipation     RORY was reviewed today per office protocol. Report shows No discrepancies.  Fill pattern is consistent from single provider(s) at single pharmacy(s). Request #[id number]     Active cumulative morphine equivalent score is 0  This is obviously incorrect  She does have Narcan at home, and they are aware of it's appropriate utilization    Controlled Substance Monitoring:    Acute and Chronic Pain Monitoring:   RX Monitoring 1/25/2021   Attestation The Prescription Monitoring Report for this patient was reviewed today. Acute Pain Prescriptions -   Periodic Controlled Substance Monitoring Possible medication side effects, risk of tolerance/dependence & alternative treatments discussed. ;No signs of potential drug abuse or diversion identified. ;Assessed functional status. ;Obtaining appropriate analgesic effect of treatment. Chronic Pain > 50 MEDD Re-evaluated the status of the patient's underlying condition causing pain. Chronic Pain > 80 MEDD -           vitals were not taken for this visit. There is no height or weight on file to calculate BMI. I have reviewed the following with the Ms. Shah   Lab Review  Procedure visit on 01/06/2021   Component Date Value    Preg Test, Ur 01/06/2021 neg     Control 01/06/2021 pos    Procedure visit on 08/03/2020   Component Date Value    Preg Test, Ur 08/03/2020 Negative      Copies of these are in the chart. Current Outpatient Medications   Medication Sig Dispense Refill    HYDROcodone-acetaminophen (NORCO) 7.5-325 MG per tablet Take 1 tablet by mouth every 8 hours as needed for Pain for up to 30 days. 40 tablet 0    desloratadine (CLARINEX) 5 MG tablet Take 1 tablet by mouth daily 30 tablet 11    neomycin-polymyxin-hydrocortisone (CORTISPORIN) 3.5-35708-7 otic solution Place 4 drops into both ears 3 times daily for 10 days Instill into bilateral  Ear 1 Bottle 0    medroxyPROGESTERone (DEPO-PROVERA) 150 MG/ML injection Inject 150 mg into the muscle every 3 months 1 mL 3    hydrocortisone 2.5 % cream APPLY EXTERNALLY TO THE AFFECTED AREA TWICE DAILY AS DIRECTED 30 g 1    senna-docusate (SENEXON-S) 8.6-50 MG per tablet Take 2 tablets by mouth 2 times daily 120 tablet 11    EPINEPHrine (EPIPEN 2-ADAN) 0.3 MG/0.3ML SOAJ injection Inject 0.3 mLs into the muscle once for 1 dose Use as directed for allergic reaction 2 each 5    celecoxib (CELEBREX) 200 MG capsule TAKE 1 CAPSULE BY MOUTH DAILY 90 capsule 3    omeprazole (PRILOSEC) 20 MG delayed release capsule TAKE 1 CAPSULE BY MOUTH DAILY 90 capsule 3    albuterol sulfate HFA (VENTOLIN HFA) 108 (90 Base) MCG/ACT inhaler Inhale 2 puffs into the lungs 4 times daily as needed for Wheezing 1 Inhaler 5    Incontinence Supplies MISC All incontinence supplies: diapers, wipes, chucks, gloves x 1 month and 11  each 0    lidocaine-prilocaine (EMLA) 2.5-2.5 % cream Apply topically as needed.  210 g 3  diclofenac sodium (VOLTAREN) 1 % GEL Apply 2 g topically 4 times daily 100 g 5    fluconazole (DIFLUCAN) 150 MG tablet Take 1 tablet by mouth daily 3 tablet 0    ondansetron (ZOFRAN-ODT) 4 MG disintegrating tablet Place 1 tablet under the tongue every 8 hours as needed for Nausea or Vomiting 20 tablet 0    lamoTRIgine (LAMICTAL) 200 MG tablet Take 1 tablet by mouth 2 times daily 60 tablet 11    clotrimazole (LOTRIMIN) 1 % cream APPLY EXTERNALLY TO THE AFFECTED AREA TWICE DAILY 30 g 0    Misc. Devices (WHEELCHAIR CUSHION) MISC Thin roho cushion 1 each 0    nystatin (MYCOSTATIN) 484312 UNIT/ML suspension Take 5 mLs by mouth 4 times daily 473 mL 1    naloxone (NARCAN) 4 MG/0.1ML LIQD nasal spray 1 spray by Nasal route as needed for Opioid Reversal 1 each 0    Misc. Devices [facility]) MISC 1 Units by Enteral route daily With reclining back 1 each 0    hydrOXYzine (ATARAX) 25 MG tablet Take 1 tablet by mouth every 8 hours as needed for Anxiety (Patient taking differently: Take 25 mg by mouth as needed for Anxiety ) 30 tablet 3    albuterol (PROVENTIL) (2.5 MG/3ML) 0.083% nebulizer solution Take 3 mLs by nebulization every 6 hours as needed for Wheezing (coughing) 120 each 5    medicated lip balm (BLISTEX/CARMEX) 2-2.5-6.6 % STCK Apply topically as needed for Dry Lips       No current facility-administered medications for this visit.         Allergies: Latex, Bactrim [sulfamethoxazole-trimethoprim], Ciprofloxacin, Ciprofloxacin hcl, Depakote [divalproex sodium], Dilantin [phenytoin sodium extended], Dye [iodides], Keflex [cephalexin], Pcn [penicillins], Primaxin [imipenem], Unasyn [ampicillin-sulbactam sodium], and Wasp venom     Past Medical History:   Diagnosis Date    Arthritis     GERD (gastroesophageal reflux disease)     History of blood transfusion     Incontinence     Seizures ([facility] [address]        Family History   Problem Relation Age of Onset    High Blood Pressure Mother  High Blood Pressure Father        Past Surgical History:   Procedure Laterality Date    APPENDECTOMY      CHOLECYSTECTOMY      CSF SHUNT Right     DEEP BRAIN STIMULATOR PLACEMENT      tremor control it is off now    AZ EXCIS CHALAZION,GEN ANESTHESIA Right 2018    EYE CHALAZION EXCISION performed by Theron Goodpasture, MD at [facility] OR    AZ XCAPSL [facility] INSJ IO LENS PROSTH W/O ECP Left 6/15/2017    EYE CATARACT EMULSIFICATION IOL IMPLANT performed by Theron Goodpasture, MD at [facility]    [facility] Right 2017    CATARCAT EXTRACTION EYE WITH IOL [facility]on [facility]pasture[facility] at [facility]       Social History     Tobacco Use    Smoking status: Former Smoker     Packs/day: 1.00     Years: 19.00     Pack years: 19.00     Types: Cigarettes     Quit date: 2015     Years since quittin.9    Smokeless tobacco: Never Used    Tobacco comment: quit smoking  6 yrs ago   Substance Use Topics    Alcohol use: No        Review of Systems   Constitutional: Negative. HENT: Positive for congestion, ear pain (left ear canal pain), postnasal drip and rhinorrhea (thin, clear). Eyes: Negative. Respiratory: Negative. Cardiovascular: Negative. Endocrine: Negative. Musculoskeletal: Positive for arthralgias (knees (baseline)). Skin: Negative for rash. Allergic/Immunologic: Negative. Neurological: Negative. Hematological: Negative. Physical Exam  Physical exam was not performed as this was a video telephone conference visit       ASSESSMENT      ICD-10-CM    1. Chronic pain syndrome  G89.4 HYDROcodone-acetaminophen (NORCO) 7.5-325 MG per tablet   2. Medication management  Z79.899 HYDROcodone-acetaminophen (NORCO) 7.5-325 MG per tablet   3.  Primary osteoarthritis involving multiple joints  M89.49 HYDROcodone-acetaminophen (NORCO) 7.5-325 MG per tablet 4. Avascular necrosis (HCC)  M87.00 HYDROcodone-acetaminophen (NORCO) 7.5-325 MG per tablet   5. Wheelchair bound  Z99.3 HYDROcodone-acetaminophen (NORCO) 7.5-325 MG per tablet   6. Environmental allergies  Z91.09 desloratadine (CLARINEX) 5 MG tablet   7. Acute swimmer's ear of both sides  H60.333 neomycin-polymyxin-hydrocortisone (CORTISPORIN) 3.5-47813-3 otic solution   8. Oropharyngeal dysphagia  R13.12          PLAN    1. Chronic pain syndrome  The current medical regimen is effective;  continue present plan and medications.  - HYDROcodone-acetaminophen (NORCO) 7.5-325 MG per tablet; Take 1 tablet by mouth every 8 hours as needed for Pain for up to 30 days. Dispense: 40 tablet; Refill: 0    2. Medication management  The current medical regimen is effective;  continue present plan and medications.  - HYDROcodone-acetaminophen (NORCO) 7.5-325 MG per tablet; Take 1 tablet by mouth every 8 hours as needed for Pain for up to 30 days. Dispense: 40 tablet; Refill: 0    3. Primary osteoarthritis involving multiple joints  The current medical regimen is effective;  continue present plan and medications.  - HYDROcodone-acetaminophen (NORCO) 7.5-325 MG per tablet; Take 1 tablet by mouth every 8 hours as needed for Pain for up to 30 days. Dispense: 40 tablet; Refill: 0    4. Avascular necrosis (HCC)  The current medical regimen is effective;  continue present plan and medications.  - HYDROcodone-acetaminophen (NORCO) 7.5-325 MG per tablet; Take 1 tablet by mouth every 8 hours as needed for Pain for up to 30 days. Dispense: 40 tablet; Refill: 0    5. Wheelchair bound  The current medical regimen is effective;  continue present plan and medications.  - HYDROcodone-acetaminophen (NORCO) 7.5-325 MG per tablet; Take 1 tablet by mouth every 8 hours as needed for Pain for up to 30 days. Dispense: 40 tablet; Refill: 0    6. Environmental allergies  We will refill her Clarinex as she is now out of this. Her allergies are significantly exacerbated recently   - desloratadine (CLARINEX) 5 MG tablet; Take 1 tablet by mouth daily  Dispense: 30 tablet; Refill: 11    7. Acute swimmer's ear of both sides  She does have problems with recurrent otitis externa. She does use Cortisporin drops from time to time. She needs a refill of this as well  - neomycin-polymyxin-hydrocortisone (CORTISPORIN) 3.5-08257-2 otic solution; Place 4 drops into both ears 3 times daily for 10 days Instill into bilateral  Ear  Dispense: 1 Bottle; Refill: 0    8. Oropharyngeal dysphagia  She is requiring a refill of her Thick-It that she uses for her oropharyngeal dysphagia. She has not had any recent aspiration symptoms as long as she has this with thin liquids      No orders of the defined types were placed in this encounter. Return in about 1 month (around 2/25/2021) for 15. Due to patients co-morbidities and risk for potential exposure of COVID 19 pandemic. Patient was contacted and agreed to proceed with a telephone virtual visit. The risks and benefits of converting to a telephone virtual visit were discussed in light of the current infectious disease epidemic. Patient also understood that insurance coverage and co-pays are up to their individual insurance plans. Provider performed history of present illness and review of systems. Diagnosis and treatment plan was discussed with patient. Pharmacy of choice was reviewed along with past medical history, medication allergies, and current medications. Education provided to patient or patient parents/guardian with current illness diagnosis as well as when to seek additional healthcare due to changing or for worsening symptoms. Patient voiced understanding. 30 minutes were spent on the phone with patient. Torey Doshi is a 39 y.o. female being evaluated by a Virtual Visit (Telephone visit) encounter to address concerns as mentioned above. A caregiver was present when appropriate. Due to this being a TeleHealth encounter (During [facility] public health emergency), evaluation of the following organ systems was limited: Vitals/Constitutional/EENT/Resp/CV/GI//MS/Neuro/Skin/Heme-Lymph-Imm. Pursuant to the emergency declaration under the [address] [place] [address] and the [employer] Act, this Virtual Visit was conducted with patient's (and/or legal guardian's) consent, to reduce the patient's risk of exposure to COVID-19 and provide necessary medical care. The patient (and/or legal guardian) has also been advised to contact this office for worsening conditions or problems, and seek emergency medical treatment and/or call 911 if deemed necessary. Patient identification was verified at the start of the visit: Yes    Total time spent for this encounter: 30m    Services were provided through a video synchronous discussion virtually to substitute for in-person clinic visit. Patient and provider were located at their individual homes. --IRA Romano DO on 1/25/2021 at 8:05 AM    An electronic signature was used to authenticate this note.

## 2021-02-15 ENCOUNTER — VIRTUAL VISIT (OUTPATIENT)
Dept: PRIMARY CARE CLINIC | Age: 46
End: 2021-02-15
Payer: MEDICARE

## 2021-02-15 DIAGNOSIS — G89.4 CHRONIC PAIN SYNDROME: ICD-10-CM

## 2021-02-15 DIAGNOSIS — M15.9 PRIMARY OSTEOARTHRITIS INVOLVING MULTIPLE JOINTS: ICD-10-CM

## 2021-02-15 DIAGNOSIS — M87.00 AVASCULAR NECROSIS (HCC): ICD-10-CM

## 2021-02-15 DIAGNOSIS — Z79.899 MEDICATION MANAGEMENT: ICD-10-CM

## 2021-02-15 DIAGNOSIS — Z99.3 WHEELCHAIR BOUND: ICD-10-CM

## 2021-02-15 PROCEDURE — 99213 OFFICE O/P EST LOW 20 MIN: CPT | Performed by: PEDIATRICS

## 2021-02-15 PROCEDURE — G8428 CUR MEDS NOT DOCUMENT: HCPCS | Performed by: PEDIATRICS

## 2021-02-15 RX ORDER — HYDROCODONE BITARTRATE AND ACETAMINOPHEN 7.5; 325 MG/1; MG/1
1 TABLET ORAL EVERY 8 HOURS PRN
Qty: 40 TABLET | Refills: 0 | Status: SHIPPED | OUTPATIENT
Start: 2021-02-15 | End: 2021-03-15 | Stop reason: SDUPTHER

## 2021-02-15 ASSESSMENT — ENCOUNTER SYMPTOMS
RHINORRHEA: 1
ALLERGIC/IMMUNOLOGIC NEGATIVE: 1
EYES NEGATIVE: 1
RESPIRATORY NEGATIVE: 1

## 2021-02-15 NOTE — PROGRESS NOTES
health conditions: stable depression and anxiety              Sleep disordered breathing: no              Concurrent Benzodiazepine use: yes     Bowel regimen: She is not regularly adherent to her bowel regimen              We discussed this again and stressed the importance of this regimen.              Senokot S,  1 tablets twice daily              MiraLAX 17 g daily     Bowel function: Chronic constipation     RORY was unavailable at the time of evaluation     She does have Narcan at home, and they are aware of it's appropriate utilization     Controlled Substance Monitoring:    Acute and Chronic Pain Monitoring:   RX Monitoring 2/15/2021   Attestation The Prescription Monitoring Report was requested today but not available. Acute Pain Prescriptions -   Periodic Controlled Substance Monitoring Possible medication side effects, risk of tolerance/dependence & alternative treatments discussed. ;No signs of potential drug abuse or diversion identified. ;Assessed functional status. ;Obtaining appropriate analgesic effect of treatment. Chronic Pain > 50 MEDD Re-evaluated the status of the patient's underlying condition causing pain. Chronic Pain > 80 MEDD -          vitals were not taken for this visit. There is no height or weight on file to calculate BMI. I have reviewed the following with the [facility] Alyssa   Lab Review  Procedure visit on 01/06/2021   Component Date Value    Preg Test, Ur 01/06/2021 neg     Control 01/06/2021 pos      Copies of these are in the chart. Current Outpatient Medications   Medication Sig Dispense Refill    HYDROcodone-acetaminophen (NORCO) 7.5-325 MG per tablet Take 1 tablet by mouth every 8 hours as needed for Pain for up to 30 days.  40 tablet 0    desloratadine (CLARINEX) 5 MG tablet Take 1 tablet by mouth daily 30 tablet 11    medroxyPROGESTERone (DEPO-PROVERA) 150 MG/ML injection Inject 150 mg into the muscle every 3 months 1 mL 3    hydrocortisone 2.5 % cream APPLY EXTERNALLY TO THE AFFECTED AREA TWICE DAILY AS DIRECTED 30 g 1    senna-docusate (SENEXON-S) 8.6-50 MG per tablet Take 2 tablets by mouth 2 times daily 120 tablet 11    EPINEPHrine (EPIPEN 2-ADAN) 0.3 MG/0.3ML SOAJ injection Inject 0.3 mLs into the muscle once for 1 dose Use as directed for allergic reaction 2 each 5    celecoxib (CELEBREX) 200 MG capsule TAKE 1 CAPSULE BY MOUTH DAILY 90 capsule 3    omeprazole (PRILOSEC) 20 MG delayed release capsule TAKE 1 CAPSULE BY MOUTH DAILY 90 capsule 3    albuterol sulfate HFA (VENTOLIN HFA) 108 (90 Base) MCG/ACT inhaler Inhale 2 puffs into the lungs 4 times daily as needed for Wheezing 1 Inhaler 5    Incontinence Supplies MISC All incontinence supplies: diapers, wipes, chucks, gloves x 1 month and 11  each 0    lidocaine-prilocaine (EMLA) 2.5-2.5 % cream Apply topically as needed. 210 g 3    diclofenac sodium (VOLTAREN) 1 % GEL Apply 2 g topically 4 times daily 100 g 5    fluconazole (DIFLUCAN) 150 MG tablet Take 1 tablet by mouth daily 3 tablet 0    ondansetron (ZOFRAN-ODT) 4 MG disintegrating tablet Place 1 tablet under the tongue every 8 hours as needed for Nausea or Vomiting 20 tablet 0    lamoTRIgine (LAMICTAL) 200 MG tablet Take 1 tablet by mouth 2 times daily 60 tablet 11    clotrimazole (LOTRIMIN) 1 % cream APPLY EXTERNALLY TO THE AFFECTED AREA TWICE DAILY 30 g 0    Misc. Devices (WHEELCHAIR CUSHION) MISC Thin roho cushion 1 each 0    nystatin (MYCOSTATIN) 442692 UNIT/ML suspension Take 5 mLs by mouth 4 times daily 473 mL 1    naloxone (NARCAN) 4 MG/0.1ML LIQD nasal spray 1 spray by Nasal route as needed for Opioid Reversal 1 each 0    Misc.  Devices [facility]) MISC 1 Units by Enteral route daily With reclining back 1 each 0    hydrOXYzine (ATARAX) 25 MG tablet Take 1 tablet by mouth every 8 hours as needed for Anxiety (Patient taking differently: Take 25 mg by mouth as needed for Anxiety ) 30 tablet 3    albuterol (PROVENTIL) (2.5 MG/3ML) 0.083% nebulizer solution Take 3 mLs by nebulization every 6 hours as needed for Wheezing (coughing) 120 each 5    medicated lip balm (BLISTEX/CARMEX) 2-2.5-6.6 % STCK Apply topically as needed for Dry Lips       No current facility-administered medications for this visit. Allergies: Latex, Bactrim [sulfamethoxazole-trimethoprim], Ciprofloxacin, Ciprofloxacin hcl, Depakote [divalproex sodium], Dilantin [phenytoin sodium extended], Dye [iodides], Keflex [cephalexin], Pcn [penicillins], Primaxin [imipenem], Unasyn [ampicillin-sulbactam sodium], and Wasp venom     Past Medical History:   Diagnosis Date    Arthritis     GERD (gastroesophageal reflux disease)     History of blood transfusion     Incontinence     Seizures ([address]        Family History   Problem Relation Age of Onset    High Blood Pressure Mother     High Blood Pressure Father        Past Surgical History:   Procedure Laterality Date    APPENDECTOMY      CHOLECYSTECTOMY      CSF SHUNT Right     DEEP BRAIN STIMULATOR PLACEMENT      tremor control it is off now    DE EXCIS CHALAZION,GEN ANESTHESIA Right 2018    EYE CHALAZION EXCISION [facility] OR    DE XCAPSL CTRC RMVL INSJ IO LENS PROSTH W/O ECP Left 6/15/2017    EYE CATARACT EMULSIFICATION IOL IMPLANT performed by Mandie An MD at [facility] OR    DE XCAPSL CTRC RMVL INSJ IO LENS PROSTH W/O ECP Right 2017    CATARCAT EXTRACTION EYE WITH IOL performed by Mandie An MD at [facility] OR       Social History     Tobacco Use    Smoking status: Former Smoker     Packs/day: 1.00     Years: 19.00     Pack years: 19.00     Types: Cigarettes     Quit date: 2015     Years since quittin.0    Smokeless tobacco: Never Used    Tobacco comment: quit smoking  6 yrs ago   Substance Use Topics    Alcohol use: No        Review of Systems   Constitutional: Negative.     HENT: Positive for congestion, ear pain (left ear canal pain), postnasal drip and rhinorrhea (thin, clear). Eyes: Negative. Respiratory: Negative. Cardiovascular: Negative. Endocrine: Negative. Musculoskeletal: Positive for arthralgias (knees (baseline)). Skin: Negative for rash. Allergic/Immunologic: Negative. Neurological: Negative. Hematological: Negative. Physical Exam  PE was not performed today as this was a telephone visit. ASSESSMENT      ICD-10-CM    1. Chronic pain syndrome  G89.4 HYDROcodone-acetaminophen (NORCO) 7.5-325 MG per tablet   2. Medication management  Z79.899 HYDROcodone-acetaminophen (NORCO) 7.5-325 MG per tablet   3. Primary osteoarthritis involving multiple joints  M89.49 HYDROcodone-acetaminophen (NORCO) 7.5-325 MG per tablet   4. Avascular necrosis (HCC)  M87.00 HYDROcodone-acetaminophen (NORCO) 7.5-325 MG per tablet   5. Wheelchair bound  Z99.3 HYDROcodone-acetaminophen (NORCO) 7.5-325 MG per tablet         PLAN    1. Chronic pain syndrome  The current medical regimen is effective;  continue present plan and medications.  - HYDROcodone-acetaminophen (NORCO) 7.5-325 MG per tablet; Take 1 tablet by mouth every 8 hours as needed for Pain for up to 30 days. Dispense: 40 tablet; Refill: 0    2. Medication management  The current medical regimen is effective;  continue present plan and medications.  - HYDROcodone-acetaminophen (NORCO) 7.5-325 MG per tablet; Take 1 tablet by mouth every 8 hours as needed for Pain for up to 30 days. Dispense: 40 tablet; Refill: 0    3. Primary osteoarthritis involving multiple joints  The current medical regimen is effective;  continue present plan and medications.  - HYDROcodone-acetaminophen (NORCO) 7.5-325 MG per tablet; Take 1 tablet by mouth every 8 hours as needed for Pain for up to 30 days. Dispense: 40 tablet; Refill: 0    4.  Avascular necrosis (HCC)  The current medical regimen is effective;  continue present plan and medications.  - HYDROcodone-acetaminophen (NORCO) 7.5-325 MG per tablet; Take 1 tablet by mouth every 8 hours as needed for Pain for up to 30 days. Dispense: 40 tablet; Refill: 0    5. Wheelchair bound  The current medical regimen is effective;  continue present plan and medications.  - HYDROcodone-acetaminophen (NORCO) 7.5-325 MG per tablet; Take 1 tablet by mouth every 8 hours as needed for Pain for up to 30 days. Dispense: 40 tablet; Refill: 0      No orders of the defined types were placed in this encounter. Return in about 1 month (around 3/15/2021) for 15. Due to patients co-morbidities and risk for potential exposure of COVID 19 pandemic. Patient was contacted and agreed to proceed with a telephone virtual visit. The risks and benefits of converting to a telephone virtual visit were discussed in light of the current infectious disease epidemic. Patient also understood that insurance coverage and co-pays are up to their individual insurance plans. Provider performed history of present illness and review of systems. Diagnosis and treatment plan was discussed with patient. Pharmacy of choice was reviewed along with past medical history, medication allergies, and current medications. Education provided to patient or patient parents/guardian with current illness diagnosis as well as when to seek additional healthcare due to changing or for worsening symptoms. Patient voiced understanding. 20 minutes were spent on the phone with patient. Irish Hargrove is a 39 y.o. female being evaluated by a Virtual Visit (Telphone visit) encounter to address concerns as mentioned above. A caregiver was present when appropriate. Due to this being a TeleHealth encounter (During FJGUY-09 public health emergency), evaluation of the following organ systems was limited: Vitals/Constitutional/EENT/Resp/CV/GI//MS/Neuro/Skin/Heme-Lymph-Imm.   Pursuant to the emergency declaration under the [facility] waiver authority and the Coronavirus Preparedness and Response Supplemental Appropriations Act, this Virtual Visit was conducted with patient's (and/or legal guardian's) consent, to reduce the patient's risk of exposure to COVID-19 and provide necessary medical care. The patient (and/or legal guardian) has also been advised to contact this office for worsening conditions or problems, and seek emergency medical treatment and/or call 911 if deemed necessary. Patient identification was verified at the start of the visit: Yes    Total time spent for this encounter: 20m    Services were provided through a video synchronous discussion virtually to substitute for in-person clinic visit. Patient and provider were located at their individual homes. --IRA Vilchis DO on 2/15/2021 at 7:31 AM    An electronic signature was used to authenticate this note.

## 2021-02-15 NOTE — PATIENT INSTRUCTIONS
Patient Education        Safe Use of Opioid Pain Medicine: Care Instructions  Your Care Instructions  Pain is your body's way of warning you that something is wrong. Pain feels different for everybody. Only you can describe your pain. A doctor can suggest or prescribe many types of medicines for pain. These range from over-the-counter medicines like acetaminophen (Tylenol) to powerful medicines called opioids. Examples of opioids are fentanyl, hydrocodone, morphine, and oxycodone. Heroin is an illegal opioid  Opioids are strong medicines. They can help you manage pain when you use them the right way. But if you misuse them, they can cause serious harm and even death. For these reasons, doctors are very careful about how they prescribe opioids. If you decide to take opioids, here are some things to remember. · Keep your doctor informed. You can develop opioid use disorder. Moderate to severe opioid use disorder is sometimes called addiction. The risk is higher if you have a history of substance use. Your doctor will monitor you closely for signs of opioid use disorder and to figure out when you no longer need to take opioids. · Make a treatment plan. The goal of your plan is to be able to function and do the things you need to do, even if you still have some pain. You might be able to manage your pain with other non-opioid options like physical therapy, relaxation, or over-the-counter pain medicines. · Be aware of the side effects. Opioids can cause serious side effects, such as constipation, dry mouth, and nausea. And over time, you may need a higher dose to get pain relief. This is called tolerance. Your body also gets used to opioids. This is called physical dependence. If you suddenly stop taking them, you may have withdrawal symptoms. · Safely store and dispose of opioids. Store opioids in a safe and secure place. Make sure that pets, children, friends, and family can't get to them. When you're done using opioids, make sure to dispose of them safely and as quickly as possible. The U.S. Food and Drug Administration (FDA) recommends these disposal options. ? The best option is to take your medicine to a drop-off box or take-back program that is authorized by the [facility] ([facility]). ? If these programs aren't available in your area and your medicine doesn't have specific disposal instructions (such as flushing), you can throw them into your household trash if you follow the FDA's instructions. Visit fda.gov and search for \"unused medicine disposal.\"  ? If you have opioid patches (used or unused), your options are to take them to a [facility]-authorized site or flush them down the toilet. Do not throw them in the trash. ? Only flush your medicine down the toilet if you can't get to a [facility]-approved site or your medicine instructions state clearly to flush them. · Reduce the risk of overdose. Misuse of opioids can be very dangerous. Protect yourself by asking your doctor about a naloxone rescue kit. It can help youand even save your lifeif you take too much of an opioid. Try other ways to reduce pain. · Relax, and reduce stress. Relaxation techniques such as deep breathing or meditation can help. · Keep moving. Gentle, daily exercise can help reduce pain over the long run. Try low- or no-impact exercises such as walking, swimming, and stationary biking. Do stretches to stay flexible. · Try heat, cold packs, and massage. · Get enough sleep. Pain can make you tired and drain your energy. Talk with your doctor if you have trouble sleeping because of pain. · Think positive. Your thoughts can affect your pain level. Do things that you enjoy to distract yourself when you have pain instead of focusing on the pain. See a movie, read a book, listen to music, or spend time with a friend. If you are not taking a prescription pain medicine, ask your doctor if you can take an over-the-counter medicine. When should you call for help? Call 911 anytime you think you may need emergency care. For example, call if:  · You have symptoms of a severe allergic reaction. These may include:  ? Sudden raised, red areas (hives) all over your body. ? Swelling of the throat, mouth, lips, or tongue. ? Trouble breathing. ? Passing out (losing consciousness). Or you may feel very lightheaded or suddenly feel weak, confused, or restless. · You have signs of an overdose. These include:  ? Cold, clammy skin. ? Confusion. ? Severe nervousness or restlessness. ? Severe dizziness, drowsiness, or weakness. ? Slow breathing. ? Seizures. Call your doctor now or seek immediate medical care if:  · You have symptoms of an allergic reaction, such as:  ? A rash or hives (raised, red areas on the skin). ? Itching. ? Swelling. ? Belly pain, nausea, or vomiting. Watch closely for changes in your health, and be sure to contact your doctor if:  · You think you might be taking too much pain medicine, and you need help to take less or stop. · Your medicine is not helping with the pain. · You are having side effects, such as constipation. Where can you learn more? Go to https://[email or web address].[employer]. org and sign in to your [employer] account. Enter R108 in the [employer] box to learn more about \"Safe Use of Opioid Pain Medicine: Care Instructions. \"     If you do not have an account, please click on the \"Sign Up Now\" link. Current as of: November 20, 2019               Content Version: 12.6  © 4842-0957 [employer]. Care instructions adapted under license by [facility] ([facility]). If you have questions about a medical condition or this instruction, always ask your healthcare professional. [address] any warranty or liability for your use of this information.

## 2021-02-22 ENCOUNTER — TELEPHONE (OUTPATIENT)
Dept: PRIMARY CARE CLINIC | Age: 46
End: 2021-02-22

## 2021-02-23 RX ORDER — FLUCONAZOLE 150 MG/1
150 TABLET ORAL DAILY
Qty: 3 TABLET | Refills: 0 | Status: SHIPPED | OUTPATIENT
Start: 2021-02-23 | End: 2021-02-26

## 2021-02-23 NOTE — TELEPHONE ENCOUNTER
Diflucan 150 mg daily x3 days  Dispense #3 with no refills.   She has taken this before without problem

## 2021-03-09 ENCOUNTER — HOSPITAL ENCOUNTER (OUTPATIENT)
Dept: NON INVASIVE DIAGNOSTICS | Age: 46
Discharge: HOME | End: 2021-03-09
Payer: MEDICARE

## 2021-03-09 DIAGNOSIS — I73.9 PVD (PERIPHERAL VASCULAR DISEASE) (HCC): ICD-10-CM

## 2021-03-09 PROCEDURE — 93923 UPR/LXTR ART STDY 3+ LVLS: CPT

## 2021-03-10 ENCOUNTER — VIRTUAL VISIT (OUTPATIENT)
Dept: VASCULAR SURGERY | Age: 46
End: 2021-03-10
Payer: MEDICARE

## 2021-03-10 DIAGNOSIS — I73.9 PVD (PERIPHERAL VASCULAR DISEASE) (HCC): Primary | ICD-10-CM

## 2021-03-10 PROCEDURE — G8428 CUR MEDS NOT DOCUMENT: HCPCS | Performed by: PHYSICIAN ASSISTANT

## 2021-03-10 PROCEDURE — 1036F TOBACCO NON-USER: CPT | Performed by: PHYSICIAN ASSISTANT

## 2021-03-10 PROCEDURE — 99213 OFFICE O/P EST LOW 20 MIN: CPT | Performed by: PHYSICIAN ASSISTANT

## 2021-03-10 PROCEDURE — G8421 BMI NOT CALCULATED: HCPCS | Performed by: PHYSICIAN ASSISTANT

## 2021-03-10 PROCEDURE — G8484 FLU IMMUNIZE NO ADMIN: HCPCS | Performed by: PHYSICIAN ASSISTANT

## 2021-03-10 NOTE — PROGRESS NOTES
Torey Doshi (:  1975) is a 39 y.o. female,Established patient, here for evaluation of the following chief complaint(s): PVD    SUBJECTIVE/OBJECTIVE:  Nae Boyd has a history of peripheral vascular disease of the lower extremities. Today we are following for this. She reports left hip and bilateral knee Pain. She reports that she is in need of knee replacements. She does not walk much but reports no IRP nor non healing wounds. Torey Dsohi is a 39 y.o. female with the following history as recorded in [facility]:  Patient Active Problem List    Diagnosis Date Noted    Avascular necrosis ([facility] 2018    Chronic seasonal allergic rhinitis 2018    PVD (peripheral vascular disease) ([facility] 2018    Anxiety 2018    Chronic pain syndrome 2017    Hemiplegia ([facility] 2017    Wheelchair bound 2015    Seizures (HCC)     GERD (gastroesophageal reflux disease)     Incontinence      Current Outpatient Medications   Medication Sig Dispense Refill    HYDROcodone-acetaminophen (NORCO) 7.5-325 MG per tablet Take 1 tablet by mouth every 8 hours as needed for Pain for up to 30 days.  40 tablet 0    desloratadine (CLARINEX) 5 MG tablet Take 1 tablet by mouth daily 30 tablet 11    medroxyPROGESTERone (DEPO-PROVERA) 150 MG/ML injection Inject 150 mg into the muscle every 3 months 1 mL 3    hydrocortisone 2.5 % cream APPLY EXTERNALLY TO THE AFFECTED AREA TWICE DAILY AS DIRECTED 30 g 1    senna-docusate (SENEXON-S) 8.6-50 MG per tablet Take 2 tablets by mouth 2 times daily 120 tablet 11    EPINEPHrine (EPIPEN 2-ADAN) 0.3 MG/0.3ML SOAJ injection Inject 0.3 mLs into the muscle once for 1 dose Use as directed for allergic reaction 2 each 5    celecoxib (CELEBREX) 200 MG capsule TAKE 1 CAPSULE BY MOUTH DAILY 90 capsule 3    omeprazole (PRILOSEC) 20 MG delayed release capsule TAKE 1 CAPSULE BY MOUTH DAILY 90 capsule 3    albuterol sulfate HFA (VENTOLIN HFA) 108 (90 Base) MCG/ACT inhaler Inhale 2 puffs into the lungs 4 times daily as needed for Wheezing 1 Inhaler 5    Incontinence Supplies MISC All incontinence supplies: diapers, wipes, chucks, gloves x 1 month and 11  each 0    lidocaine-prilocaine (EMLA) 2.5-2.5 % cream Apply topically as needed. 210 g 3    diclofenac sodium (VOLTAREN) 1 % GEL Apply 2 g topically 4 times daily 100 g 5    fluconazole (DIFLUCAN) 150 MG tablet Take 1 tablet by mouth daily 3 tablet 0    ondansetron (ZOFRAN-ODT) 4 MG disintegrating tablet Place 1 tablet under the tongue every 8 hours as needed for Nausea or Vomiting 20 tablet 0    lamoTRIgine (LAMICTAL) 200 MG tablet Take 1 tablet by mouth 2 times daily 60 tablet 11    clotrimazole (LOTRIMIN) 1 % cream APPLY EXTERNALLY TO THE AFFECTED AREA TWICE DAILY 30 g 0    Misc. Devices (WHEELCHAIR CUSHION) MISC Thin roho cushion 1 each 0    nystatin (MYCOSTATIN) 915988 UNIT/ML suspension Take 5 mLs by mouth 4 times daily 473 mL 1    naloxone (NARCAN) 4 MG/0.1ML LIQD nasal spray 1 spray by Nasal route as needed for Opioid Reversal 1 each 0    Misc. Devices [facility]) MISC 1 Units by Enteral route daily With reclining back 1 each 0    hydrOXYzine (ATARAX) 25 MG tablet Take 1 tablet by mouth every 8 hours as needed for Anxiety (Patient taking differently: Take 25 mg by mouth as needed for Anxiety ) 30 tablet 3    albuterol (PROVENTIL) (2.5 MG/3ML) 0.083% nebulizer solution Take 3 mLs by nebulization every 6 hours as needed for Wheezing (coughing) 120 each 5    medicated lip balm (BLISTEX/CARMEX) 2-2.5-6.6 % STCK Apply topically as needed for Dry Lips       No current facility-administered medications for this visit.       Allergies: Latex, Bactrim [sulfamethoxazole-trimethoprim], Ciprofloxacin, Ciprofloxacin hcl, Depakote [divalproex sodium], Dilantin [phenytoin sodium extended], Dye [iodides], Keflex [cephalexin], Pcn [penicillins], Primaxin [imipenem], Unasyn [ampicillin-sulbactam sodium], and Wasp venom  Past Medical History:   Diagnosis Date    Arthritis     GERD (gastroesophageal reflux disease)     History of blood transfusion     Incontinence     Seizures (HCC)      Past Surgical History:   Procedure Laterality Date    APPENDECTOMY      CHOLECYSTECTOMY      CSF SHUNT Right     DEEP BRAIN STIMULATOR PLACEMENT      tremor control it is off now    MO EXCIS CHALAZION,GEN ANESTHESIA Right 2018    EYE CHALAZION EXCISION performed by [facility]ad Moreau [facility] at [facility] OR    MO XCAPSL CTRC RMVL INSJ IO LENS PROSTH W/O ECP Left 6/15/2017    EYE CATARACT EMULSIFICATION IOL IMPLANT performed by Saad Moreau MD at [facility] [address] RMVL INSJ IO LENS PROSTH W/O ECP Right 2017    CATARCAT EXTRACTION EYE WITH IOL performed by Saad Moreau MD at [facility] OR     Family History   Problem Relation Age of Onset    High Blood Pressure Mother     High Blood Pressure Father      Social History     Tobacco Use    Smoking status: Former Smoker     Packs/day: 1.00     Years: 19.00     Pack years: 19.00     Types: Cigarettes     Quit date: 2015     Years since quittin.0    Smokeless tobacco: Never Used    Tobacco comment: quit smoking  6 yrs ago   Substance Use Topics    Alcohol use: No       ROS  Eyes  no sudden vision change or amaurosis. Respiratory  no significant shortness of breath,  Cardiovascular  no chest pain or syncope. No  significant leg swelling. No claudication. Musculoskeletal  no gait disturbance  Skin  no new wound. Neurologic   No speech difficulty or lateralizing weakness. All other review of systems are negative. Physical Exam  Due to this being a TeleHealth encounter, evaluation of the following organ systems is limited:   Constitutional   No acute distress. Neurologic  alert and oriented X 3.     Psychiatric  mood, affect, and behavior appear normal.  Judgment and thought processes appear normal.    Risk factors for atherosclerosis of all vascular beds have been reviewed with the patient including:  Family history, tobacco abuse in all forms, elevated cholesterol, hyperlipidemia, and diabetes. Lower extremity arterial study:      Impression        Based on ankle-brachial indices and doppler waveforms, the patient has    relatively normal flow to the bilateral lower extremity arterial system at   [facility]        Signature        ----------------------------------------------------------------    Electronically signed by Sharon MendozaInterpreting    physician) on 03/09/2021 04:42 PM    ----------------------------------------------------------------       Velocities are measured in cm/s ; Diameters are measured in mm       Pressures   +--------------------------------------++--------+-----+----+--------+-----+   !                                      ! ! Right   !     !Left!        !     !   +--------------------------------------++--------+-----+----+--------+-----+   ! Location                              ! ! Pressure! Ratio!    !Pressure! Ratio! +--------------------------------------++--------+-----+----+--------+-----+   ! Lower Thigh                           !!145     !1.25 !    !150     !1.19 ! +--------------------------------------++--------+-----+----+--------+-----+   ! Calf                                  !!139     !1.2  !    !616     !1.15 !   +--------------------------------------++--------+-----+----+--------+-----+   ! Ankle PT                              !!142     !1.22 !    !133     !1.15 !   +--------------------------------------++--------+-----+----+--------+-----+   ! DP                                    !!124     !1.07 !    !123     !1.06 !   +--------------------------------------++--------+-----+----+--------+-----+   ! Great Toe                             !!116     !1    !    !111     !0.96 !   +--------------------------------------++--------+-----+----+--------+-----+         - Brachial Pressure:Right: 116.         - MIGUELANGEL:Right: 1.22. Left: 1.15.       Plethysmographic Digit Evaluation   +---------++--------+-----+---------------++--------+-----+----------------+   !         ! ! Right   !     ! Left           !!        !     !                !   +---------++--------+-----+---------------++--------+-----+----------------+   ! Location ! !Pressure! Ratio! PPG Wave Form  !!Pressure! Ratio! PPG Wave Form   !   +---------++--------+-----+---------------++--------+-----+----------------+   ! Great QZT!!310     !1    !               !!111     !0.96 !                !   +---------++--------+-----+---------------++--------+-----+----------------+         Individual films reviewed: Yes. These results were reviewed with the patient. Disease process is stable    Reviewed on this visit: prior studies including MATT      ASSESSMENT/PLAN:      1. PVD      Recommend ASA 81 mg daily  Recommend consider initiation of statin therapy  Recommend no smoking  Recommend good BP control  Follow up 12 months with a MATT or sooner if claudication worsens/develops, develops new wound or IRP. Bridget Razo is a 39 y.o. female being evaluated by a Virtual Visit (video visit) encounter to address concerns as mentioned above. A caregiver was present when appropriate. Due to this being a TeleHealth encounter (During HOFQP-46 public health emergency), evaluation of the following organ systems was limited: Vitals/Constitutional/EENT/Resp/CV/GI//MS/Neuro/Skin/Heme-Lymph-Imm. Pursuant to the emergency declaration under the [address] [place] [address], [address] [place] [address] authority and the [employer] Act, this Virtual Visit was conducted with patient's (and/or legal guardian's) consent, to reduce the patient's risk of exposure to COVID-19 and provide necessary medical care.   The patient (and/or legal guardian) has also been advised to contact this office for worsening conditions or problems, and seek emergency medical treatment and/or call 911 if deemed necessary. Patient identification was verified at the start of the visit: Yes      Services were provided through a video synchronous discussion virtually to substitute for in-person clinic visit. Patient and provider were located at their individual homes. An electronic signature was used to authenticate this note.     --Alma Rosa Simmons PA-C

## 2021-03-15 ENCOUNTER — VIRTUAL VISIT (OUTPATIENT)
Dept: PRIMARY CARE CLINIC | Age: 46
End: 2021-03-15
Payer: MEDICARE

## 2021-03-15 DIAGNOSIS — Z99.3 WHEELCHAIR BOUND: ICD-10-CM

## 2021-03-15 DIAGNOSIS — Z79.899 MEDICATION MANAGEMENT: ICD-10-CM

## 2021-03-15 DIAGNOSIS — G89.4 CHRONIC PAIN SYNDROME: ICD-10-CM

## 2021-03-15 DIAGNOSIS — M87.00 AVASCULAR NECROSIS (HCC): ICD-10-CM

## 2021-03-15 DIAGNOSIS — M15.9 PRIMARY OSTEOARTHRITIS INVOLVING MULTIPLE JOINTS: ICD-10-CM

## 2021-03-15 PROCEDURE — 99442 PR PHYS/QHP TELEPHONE EVALUATION 11-20 MIN: CPT | Performed by: PEDIATRICS

## 2021-03-15 RX ORDER — HYDROCODONE BITARTRATE AND ACETAMINOPHEN 7.5; 325 MG/1; MG/1
1 TABLET ORAL EVERY 8 HOURS PRN
Qty: 40 TABLET | Refills: 0 | Status: SHIPPED | OUTPATIENT
Start: 2021-03-15 | End: 2021-04-15 | Stop reason: SDUPTHER

## 2021-03-15 ASSESSMENT — ENCOUNTER SYMPTOMS
RHINORRHEA: 0
RESPIRATORY NEGATIVE: 1
ALLERGIC/IMMUNOLOGIC NEGATIVE: 1
EYES NEGATIVE: 1

## 2021-03-15 NOTE — PROGRESS NOTES
[address] [facility], [address]  Phone [phone]   Fax [phone]      OFFICE VISIT: 3/15/2021    Gregory Willian Shah-: 1975      [facility]  Reason For Visit:  John Crowley is a 42 y.o. Chronic Pain      Patient presents via telephone conference on follow-up for her chronic pain. She needs a refill of her pain medication today.     Chronic pain:  She typically takes hydrocodone 10 mg on a when necessary basis. 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  When combined with lidocaine, it is pretty helpful     Pain Control: Average: 6/10             Best: 4/10                Worst: 10/10  (3/15/2021)     Pain Interfering with life:  100%  Pain Interfering with general activity:  100%     Her pain level varies in severity and location      Activities of daily living: She is completely wheelchair bound and unable to ambulate at all. She needs assistance with all ADLs. Even going to the bathroom requires assistance. She does have helped by her father as well as assistant who comes and meets her needs     Adverse effects: None  Aberrant behavior: None  Affect: Very good considering her multiple health issues     Alternative medications:              Celebrex 200 mg daily              Diclofenac gel 1% 2 g 4 times daily topically              Naproxen sodium 220 mg when necessary              She is intolerant of many medications.     Urine Drug screen: 10/23/2018 and appropriate              She is incontinent and unable to produce urine on demand.   Risk Factors:              Illegal Drug use: no              History of substance use disorder: no             Mental health conditions: stable depression and anxiety              Sleep disordered breathing: no              Concurrent Benzodiazepine use: yes     Bowel regimen: She is not regularly adherent to her bowel regimen              We discussed this again and stressed the importance of this regimen.              Senokot S,  1 tablets twice daily              MiraLAX 17 g daily     Bowel function: Chronic constipation    RORY was reviewed today per office protocol. Report shows No discrepancies. Fill pattern is consistent from single provider(s) at single pharmacy(s). Request # [id number]      Controlled Substance Monitoring:    Acute and Chronic Pain Monitoring:   RX Monitoring 3/15/2021   Attestation The Prescription Monitoring Report for this patient was reviewed today. Acute Pain Prescriptions -   Periodic Controlled Substance Monitoring Possible medication side effects, risk of tolerance/dependence & alternative treatments discussed. ;No signs of potential drug abuse or diversion identified. ;Assessed functional status. ;Obtaining appropriate analgesic effect of treatment. Chronic Pain > 50 MEDD Re-evaluated the status of the patient's underlying condition causing pain. Chronic Pain > 80 MEDD -            vitals were not taken for this visit. There is no height or weight on file to calculate BMI. I have reviewed the following with the Ms. Shah   Lab Review  Procedure visit on 01/06/2021   Component Date Value    Preg Test, Ur 01/06/2021 neg     Control 01/06/2021 pos      Copies of these are in the chart. Current Outpatient Medications   Medication Sig Dispense Refill    HYDROcodone-acetaminophen (NORCO) 7.5-325 MG per tablet Take 1 tablet by mouth every 8 hours as needed for Pain for up to 30 days.  40 tablet 0    desloratadine (CLARINEX) 5 MG tablet Take 1 tablet by mouth daily 30 tablet 11    medroxyPROGESTERone (DEPO-PROVERA) 150 MG/ML injection Inject 150 mg into the muscle every 3 months 1 mL 3    hydrocortisone 2.5 % cream APPLY EXTERNALLY TO THE AFFECTED AREA TWICE DAILY AS DIRECTED 30 g 1    senna-docusate (SENEXON-S) 8.6-50 MG per tablet Take 2 tablets by mouth 2 times daily 120 tablet 11    EPINEPHrine (EPIPEN 2-ADAN) 0.3 MG/0.3ML SOAJ injection Inject 0.3 mLs into the muscle once for 1 dose Use as directed for allergic reaction 2 each 5    celecoxib (CELEBREX) 200 MG capsule TAKE 1 CAPSULE BY MOUTH DAILY 90 capsule 3    omeprazole (PRILOSEC) 20 MG delayed release capsule TAKE 1 CAPSULE BY MOUTH DAILY 90 capsule 3    albuterol sulfate HFA (VENTOLIN HFA) 108 (90 Base) MCG/ACT inhaler Inhale 2 puffs into the lungs 4 times daily as needed for Wheezing 1 Inhaler 5    Incontinence Supplies MISC All incontinence supplies: diapers, wipes, chucks, gloves x 1 month and 11  each 0    lidocaine-prilocaine (EMLA) 2.5-2.5 % cream Apply topically as needed. 210 g 3    diclofenac sodium (VOLTAREN) 1 % GEL Apply 2 g topically 4 times daily 100 g 5    fluconazole (DIFLUCAN) 150 MG tablet Take 1 tablet by mouth daily 3 tablet 0    ondansetron (ZOFRAN-ODT) 4 MG disintegrating tablet Place 1 tablet under the tongue every 8 hours as needed for Nausea or Vomiting 20 tablet 0    lamoTRIgine (LAMICTAL) 200 MG tablet Take 1 tablet by mouth 2 times daily 60 tablet 11    clotrimazole (LOTRIMIN) 1 % cream APPLY EXTERNALLY TO THE AFFECTED AREA TWICE DAILY 30 g 0    Misc. Devices (WHEELCHAIR CUSHION) MISC Thin roho cushion 1 each 0    nystatin (MYCOSTATIN) 580627 UNIT/ML suspension Take 5 mLs by mouth 4 times daily 473 mL 1    naloxone (NARCAN) 4 MG/0.1ML LIQD nasal spray 1 spray by Nasal route as needed for Opioid Reversal 1 each 0    Misc.  Devices [facility]) MISC 1 Units by Enteral route daily With reclining back 1 each 0    hydrOXYzine (ATARAX) 25 MG tablet Take 1 tablet by mouth every 8 hours as needed for Anxiety (Patient taking differently: Take 25 mg by mouth as needed for Anxiety ) 30 tablet 3    albuterol (PROVENTIL) (2.5 MG/3ML) 0.083% nebulizer solution Take 3 mLs by nebulization every 6 hours as needed for Wheezing (coughing) 120 each 5    medicated lip balm (BLISTEX/CARMEX) 2-2.5-6.6 % STCK Apply topically as needed for Dry Lips       No current facility-administered medications for this visit. Allergies: Latex, Bactrim [sulfamethoxazole-trimethoprim], Ciprofloxacin, Ciprofloxacin hcl, Depakote [divalproex sodium], Dilantin [phenytoin sodium extended], Dye [iodides], Keflex [cephalexin], Pcn [penicillins], Primaxin [imipenem], Unasyn [ampicillin-sulbactam sodium], and Wasp venom     Past Medical History:   Diagnosis Date    Arthritis     GERD (gastroesophageal reflux disease)     History of blood transfusion     Incontinence     Seizures ([facility]        Family History   Problem Relation Age of Onset    High Blood Pressure Mother     High Blood Pressure Father        Past Surgical History:   Procedure Laterality Date    APPENDECTOMY      CHOLECYSTECTOMY      CSF SHUNT Right     DEEP BRAIN STIMULATOR PLACEMENT      tremor control it is off now    NE EXCIS CHALAZION,GEN ANESTHESIA Right 2018    [facility]    NE XCAPSL CTR RMVL INSJ IO LENS PROSTH W/O ECP Left 6/15/2017    EYE CATARACT EMULSIFICATION IOL IMPLANT [facility]    NE XCAPSL CTRC RMVL INSJ IO LENS PROSTH W/O ECP Right 2017    CATARCAT EXTRACTION EYE WITH IOL performed by Galileo Gamble MD at [address] [place] [address] OR       Social History     Tobacco Use    Smoking status: Former Smoker     Packs/day: 1.00     Years: 19.00     Pack years: 19.00     Types: Cigarettes     Quit date: 2015     Years since quittin.0    Smokeless tobacco: Never Used    Tobacco comment: quit smoking  6 yrs ago   Substance Use Topics    Alcohol use: No        Review of Systems   Constitutional: Negative.     HENT: Negative for congestion, ear pain, postnasal drip and rhinorrhea. Eyes: Negative. Respiratory: Negative. Cardiovascular: Negative. Endocrine: Negative. Musculoskeletal: Positive for arthralgias (knees (baseline)). Skin: Negative for rash. Allergic/Immunologic: Negative. Neurological: Negative. Hematological: Negative. Physical Exam  PE was not performed today as this was an in-house visit. ASSESSMENT      ICD-10-CM    1. Chronic pain syndrome  G89.4 HYDROcodone-acetaminophen (NORCO) 7.5-325 MG per tablet   2. Medication management  Z79.899 HYDROcodone-acetaminophen (NORCO) 7.5-325 MG per tablet   3. Primary osteoarthritis involving multiple joints  M89.49 HYDROcodone-acetaminophen (NORCO) 7.5-325 MG per tablet   4. Avascular necrosis (HCC)  M87.00 HYDROcodone-acetaminophen (NORCO) 7.5-325 MG per tablet   5. Wheelchair bound  Z99.3 HYDROcodone-acetaminophen (NORCO) 7.5-325 MG per tablet         PLAN    1. Chronic pain syndrome  The current medical regimen is effective;  continue present plan and medications.  - HYDROcodone-acetaminophen (NORCO) 7.5-325 MG per tablet; Take 1 tablet by mouth every 8 hours as needed for Pain for up to 30 days. Dispense: 40 tablet; Refill: 0    2. Medication management  The current medical regimen is effective;  continue present plan and medications.  - HYDROcodone-acetaminophen (NORCO) 7.5-325 MG per tablet; Take 1 tablet by mouth every 8 hours as needed for Pain for up to 30 days. Dispense: 40 tablet; Refill: 0    3. Primary osteoarthritis involving multiple joints  The current medical regimen is effective;  continue present plan and medications.  - HYDROcodone-acetaminophen (NORCO) 7.5-325 MG per tablet; Take 1 tablet by mouth every 8 hours as needed for Pain for up to 30 days. Dispense: 40 tablet; Refill: 0    4.  Avascular necrosis (HCC)  The current medical regimen is effective;  continue present plan and medications.  - HYDROcodone-acetaminophen (NORCO) 7.5-325 MG per tablet; Take 1 tablet by mouth every 8 hours as needed for Pain for up to 30 days. Dispense: 40 tablet; Refill: 0    5. Wheelchair bound  The current medical regimen is effective;  continue present plan and medications.  - HYDROcodone-acetaminophen (NORCO) 7.5-325 MG per tablet; Take 1 tablet by mouth every 8 hours as needed for Pain for up to 30 days. Dispense: 40 tablet; Refill: 0      No orders of the defined types were placed in this encounter. Return in about 1 month (around 4/15/2021) for 15. Due to patients co-morbidities and risk for potential exposure of COVID 19 pandemic. Patient was contacted and agreed to proceed with a telephone virtual visit. The risks and benefits of converting to a telephone virtual visit were discussed in light of the current infectious disease epidemic. Patient also understood that insurance coverage and co-pays are up to their individual insurance plans. Provider performed history of present illness and review of systems. Diagnosis and treatment plan was discussed with patient. Pharmacy of choice was reviewed along with past medical history, medication allergies, and current medications. Education provided to patient or patient parents/guardian with current illness diagnosis as well as when to seek additional healthcare due to changing or for worsening symptoms. Patient voiced understanding. 20 minutes were spent on the phone with patient. Irish Hargrove, was evaluated through a synchronous (real-time) audio-video encounter. The patient (or guardian if applicable) is aware that this is a billable service. Verbal consent to proceed has been obtained within the past 12 months. The visit was conducted pursuant to the emergency declaration under the [facility], [address] [place] [address] authority and the [employer] Act.   Patient identification was verified, and a caregiver was present when appropriate. The patient was located in a state where the provider was credentialed to provide care. Total time spent for this encounter: 20m    --IRA Cadena DO on 3/15/2021 at 8:13 AM    An electronic signature was used to authenticate this note.

## 2021-03-15 NOTE — PATIENT INSTRUCTIONS
or if he or she had other concerns. It is best to have one doctor or clinic treat your pain. This way you will get the pain medicine that will help you the most. And a doctor will be able to watch for any problems that the medicine might cause. The doctor has checked you carefully, but problems can develop later. If you notice any problems or new symptoms,  get medical treatment right away. Follow-up care is a key part of your treatment and safety. Be sure to make and go to all appointments, and call your doctor if you are having problems. It's also a good idea to know your test results and keep a list of the medicines you take. How can you care for yourself at home? If you need to take opioids to manage your pain, remember these safety tips. · Follow directions carefully. It's easy to misuse opioids if you take a dose other than what's prescribed by your doctor. This can lead to overdose and even death. Even sharing them with someone they weren't meant for is misuse. · Be cautious. Opioids may affect your judgment and decision making. Do not drive or operate machinery until you can think clearly. Talk with your doctor about when it is safe to drive. · Reduce the risk of drug interactions. Opioids can be dangerous if you take them with alcohol or with certain drugs like sleeping pills and muscle relaxers. Make sure your doctor knows about all the other medicines you take, including over-the-counter medicines. Don't start any new medicines before you talk to your doctor or pharmacist.  · Safely store and dispose of opioids. Store opioids in a safe and secure place. Make sure that pets, children, friends, and family can't get to them. When you're done using opioids, make sure to dispose of them safely and as quickly as possible. The [place] Food and Drug Administration (FDA) recommends these disposal options.   ? The best option is to take your medicine to a drop-off box or take-back program that is authorized by the [address] ([facility]). ? If these programs aren't available in your area and your medicine doesn't have specific disposal instructions (such as flushing), you can throw them into your household trash if you follow the FDA's instructions. Visit fda.gov and search for \"unused medicine disposal.\"  ? If you have opioid patches (used or unused), your options are to take them to a [facility]-authorized site or flush them down the toilet. Do not throw them in the trash. ? Only flush your medicine down the toilet if you can't get to a [facility]-approved site or your medicine instructions state clearly to flush them. · Reduce the risk of overdose. Misuse of opioids can be very dangerous. Protect yourself by asking your doctor about a naloxone rescue kit. It can help youand even save your lifeif you take too much of an opioid. Try other ways to reduce pain. · Relax, and reduce stress. Relaxation techniques such as deep breathing or meditation can help. · Keep moving. Gentle, daily exercise can help reduce pain over the long run. Try low- or no-impact exercises such as walking, swimming, and stationary biking. Do stretches to stay flexible. · Try heat, cold packs, and massage. · Get enough sleep. Pain can make you tired and drain your energy. Talk with your doctor if you have trouble sleeping because of pain. · Think positive. Your thoughts can affect your pain level. Do things that you enjoy to distract yourself when you have pain instead of focusing on the pain. See a movie, read a book, listen to music, or spend time with a friend. If you are not taking a prescription pain medicine, ask your doctor if you can take an over-the-counter medicine. When should you call for help? Call 911 anytime you think you may need emergency care. For example, call if:  · You have symptoms of a severe allergic reaction. These may include:  ? Sudden raised, red areas (hives) all over your body. ?  Swelling of the throat, mouth, lips, or tongue. ? Trouble breathing. ? Passing out (losing consciousness). Or you may feel very lightheaded or suddenly feel weak, confused, or restless. · You have signs of an overdose. These include:  ? Cold, clammy skin. ? Confusion. ? Severe nervousness or restlessness. ? Severe dizziness, drowsiness, or weakness. ? Slow breathing. ? Seizures. Call your doctor now or seek immediate medical care if:  · You have symptoms of an allergic reaction, such as:  ? A rash or hives (raised, red areas on the skin). ? Itching. ? Swelling. ? Belly pain, nausea, or vomiting. Watch closely for changes in your health, and be sure to contact your doctor if:  · You think you might be taking too much pain medicine, and you need help to take less or stop. · Your medicine is not helping with the pain. · You are having side effects, such as constipation. Where can you learn more? Go to https://[employer]. org and sign in to your [facility] account. Enter R108 in the [employer] box to learn more about \"Safe Use of Opioid Pain Medicine: Care Instructions. \"     If you do not have an account, please click on the \"Sign Up Now\" link. Current as of: August 4, 2020               Content Version: 12.8  © 9746-5016 [employer]. Care instructions adapted under license by [facility] ([facility]). If you have questions about a medical condition or this instruction, always ask your healthcare professional. [place] [address] any warranty or liability for your use of this information.

## 2021-03-22 DIAGNOSIS — R56.9 SEIZURES (HCC): ICD-10-CM

## 2021-03-22 RX ORDER — LAMOTRIGINE 200 MG/1
200 TABLET ORAL 2 TIMES DAILY
Qty: 60 TABLET | Refills: 11 | Status: SHIPPED | OUTPATIENT
Start: 2021-03-22 | End: 2022-04-11

## 2021-03-22 NOTE — TELEPHONE ENCOUNTER
Received call from pts father requesting refill on pts medication(s). Pt was last seen in office on 3/15/2021  and has a follow up scheduled for 3/25/2021. Will send request to  Dr. Sue Young  for patient.      Requested Prescriptions     Pending Prescriptions Disp Refills    lamoTRIgine (LAMICTAL) 200 MG tablet 60 tablet 11     Sig: Take 1 tablet by mouth 2 times daily

## 2021-03-23 DIAGNOSIS — R56.9 SEIZURES (HCC): ICD-10-CM

## 2021-03-23 RX ORDER — LAMOTRIGINE 200 MG/1
TABLET ORAL
Qty: 60 TABLET | Refills: 11 | OUTPATIENT
Start: 2021-03-23

## 2021-03-26 ENCOUNTER — OFFICE VISIT (OUTPATIENT)
Dept: PRIMARY CARE CLINIC | Age: 46
End: 2021-03-26
Payer: MEDICARE

## 2021-03-26 VITALS
HEIGHT: 63 IN | WEIGHT: 123 LBS | OXYGEN SATURATION: 98 % | BODY MASS INDEX: 21.79 KG/M2 | HEART RATE: 84 BPM | TEMPERATURE: 96.4 F | DIASTOLIC BLOOD PRESSURE: 72 MMHG | SYSTOLIC BLOOD PRESSURE: 110 MMHG

## 2021-03-26 DIAGNOSIS — F41.9 ANXIETY: ICD-10-CM

## 2021-03-26 DIAGNOSIS — M87.00 AVASCULAR NECROSIS (HCC): ICD-10-CM

## 2021-03-26 DIAGNOSIS — K21.9 GASTROESOPHAGEAL REFLUX DISEASE WITHOUT ESOPHAGITIS: ICD-10-CM

## 2021-03-26 DIAGNOSIS — R56.9 SEIZURES (HCC): ICD-10-CM

## 2021-03-26 DIAGNOSIS — G89.4 CHRONIC PAIN SYNDROME: ICD-10-CM

## 2021-03-26 DIAGNOSIS — M15.9 PRIMARY OSTEOARTHRITIS INVOLVING MULTIPLE JOINTS: ICD-10-CM

## 2021-03-26 DIAGNOSIS — K59.04 CHRONIC IDIOPATHIC CONSTIPATION: ICD-10-CM

## 2021-03-26 DIAGNOSIS — I73.9 PVD (PERIPHERAL VASCULAR DISEASE) (HCC): ICD-10-CM

## 2021-03-26 DIAGNOSIS — R32 URINARY INCONTINENCE, UNSPECIFIED TYPE: ICD-10-CM

## 2021-03-26 DIAGNOSIS — G81.94 HEMIPLEGIA AFFECTING LEFT NONDOMINANT SIDE, UNSPECIFIED ETIOLOGY, UNSPECIFIED HEMIPLEGIA TYPE (HCC): Primary | ICD-10-CM

## 2021-03-26 DIAGNOSIS — Z99.3 WHEELCHAIR BOUND: ICD-10-CM

## 2021-03-26 DIAGNOSIS — Z00.00 ROUTINE GENERAL MEDICAL EXAMINATION AT A HEALTH CARE FACILITY: ICD-10-CM

## 2021-03-26 DIAGNOSIS — Z11.59 ENCOUNTER FOR HEPATITIS C SCREENING TEST FOR LOW RISK PATIENT: ICD-10-CM

## 2021-03-26 PROCEDURE — G0438 PPPS, INITIAL VISIT: HCPCS | Performed by: PEDIATRICS

## 2021-03-26 PROCEDURE — G8420 CALC BMI NORM PARAMETERS: HCPCS | Performed by: PEDIATRICS

## 2021-03-26 PROCEDURE — 1036F TOBACCO NON-USER: CPT | Performed by: PEDIATRICS

## 2021-03-26 PROCEDURE — G8484 FLU IMMUNIZE NO ADMIN: HCPCS | Performed by: PEDIATRICS

## 2021-03-26 PROCEDURE — 99214 OFFICE O/P EST MOD 30 MIN: CPT | Performed by: PEDIATRICS

## 2021-03-26 PROCEDURE — G8427 DOCREV CUR MEDS BY ELIG CLIN: HCPCS | Performed by: PEDIATRICS

## 2021-03-26 RX ORDER — LIDOCAINE AND PRILOCAINE 25; 25 MG/G; MG/G
CREAM TOPICAL
Qty: 210 G | Refills: 3 | Status: SHIPPED | OUTPATIENT
Start: 2021-03-26 | End: 2022-01-05

## 2021-03-26 SDOH — ECONOMIC STABILITY: INCOME INSECURITY: HOW HARD IS IT FOR YOU TO PAY FOR THE VERY BASICS LIKE FOOD, HOUSING, MEDICAL CARE, AND HEATING?: NOT HARD AT ALL

## 2021-03-26 SDOH — ECONOMIC STABILITY: TRANSPORTATION INSECURITY
IN THE PAST 12 MONTHS, HAS LACK OF TRANSPORTATION KEPT YOU FROM MEETINGS, WORK, OR FROM GETTING THINGS NEEDED FOR DAILY LIVING?: NO

## 2021-03-26 SDOH — ECONOMIC STABILITY: FOOD INSECURITY: WITHIN THE PAST 12 MONTHS, YOU WORRIED THAT YOUR FOOD WOULD RUN OUT BEFORE YOU GOT MONEY TO BUY MORE.: NEVER TRUE

## 2021-03-26 SDOH — ECONOMIC STABILITY: FOOD INSECURITY: WITHIN THE PAST 12 MONTHS, THE FOOD YOU BOUGHT JUST DIDN'T LAST AND YOU DIDN'T HAVE MONEY TO GET MORE.: NEVER TRUE

## 2021-03-26 ASSESSMENT — ENCOUNTER SYMPTOMS
ALLERGIC/IMMUNOLOGIC NEGATIVE: 1
RHINORRHEA: 0
RESPIRATORY NEGATIVE: 1
EYES NEGATIVE: 1

## 2021-03-26 ASSESSMENT — PATIENT HEALTH QUESTIONNAIRE - PHQ9
1. LITTLE INTEREST OR PLEASURE IN DOING THINGS: 0
SUM OF ALL RESPONSES TO PHQ QUESTIONS 1-9: 0
SUM OF ALL RESPONSES TO PHQ QUESTIONS 1-9: 0
2. FEELING DOWN, DEPRESSED OR HOPELESS: 0

## 2021-03-26 NOTE — PROGRESS NOTES
[address] [facility], [address]  Phone [phone]   Fax [phone]      OFFICE VISIT: 3/26/2021    [facility]-: 1975      HPI  Reason For Visit:  Angelia Magallanes is a 42 y.o. Medicare AWV, Knee Pain (knee pain in both knees, would like injections. ), and Health Maintenance ([facility]    Patient presents for routine annual wellness evaluation. She also presents with multiple other health issues. Present concerns:  She is having a significant amount of knee pain, bilaterally. She would like injections in her knees today. She has had her first Covid vaccine at [facility]. This was on 3/5/2021. This was the moderna vaccine  She is due for her second immunization on 2021. We are going to need to wait at least 2 weeks after this second shot prior to being able to inject her knees. This will would ensure appropriate antibody response to the vaccine. Would recommend inject her knees today she is 2 weeks after her second immunization. 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 She is confined to a wheelchair. Osteoarthritis:  Medication:   Celebrex 200 mg daily   Hydrocodone 1 tablet every 8 hours as needed (7.5-325 mg)  Symptoms: this is an ongoing issue for her. GERD:  Medication:   Omeprazole 20 mg daily  Symptoms: doing well from standpoint      Seizure disorder:  Medication:   Lamotrigine 200 mg twice daily  Symptoms: No recent seizures noted      Environmental allergies:  Medication:   Clarinex 5 mg daily  Symptoms: controlled. Chronic Constipation:  She is having some abdominal pain and back pain. This typically goes away when she has a BM. height is 5' 3\" (1.6 m) and weight is 123 lb (55.8 kg). Her temporal temperature is 96.4 °F (35.8 °C).  Her blood pressure is 110/72 and her pulse is 84. Her oxygen saturation is 98%. Body mass index is 21.79 kg/m². I have reviewed the following with the Ms. Shah   Lab Review  Procedure visit on 01/06/2021   Component Date Value    Preg Test, Ur 01/06/2021 neg     Control 01/06/2021 pos      Copies of these are in the chart. Current Outpatient Medications   Medication Sig Dispense Refill    lidocaine-prilocaine (EMLA) 2.5-2.5 % cream Apply topically as needed. 210 g 3    lamoTRIgine (LAMICTAL) 200 MG tablet Take 1 tablet by mouth 2 times daily 60 tablet 11    HYDROcodone-acetaminophen (NORCO) 7.5-325 MG per tablet Take 1 tablet by mouth every 8 hours as needed for Pain for up to 30 days.  40 tablet 0    desloratadine (CLARINEX) 5 MG tablet Take 1 tablet by mouth daily 30 tablet 11    medroxyPROGESTERone (DEPO-PROVERA) 150 MG/ML injection Inject 150 mg into the muscle every 3 months 1 mL 3    hydrocortisone 2.5 % cream APPLY EXTERNALLY TO THE AFFECTED AREA TWICE DAILY AS DIRECTED 30 g 1    senna-docusate (SENEXON-S) 8.6-50 MG per tablet Take 2 tablets by mouth 2 times daily 120 tablet 11    EPINEPHrine (EPIPEN 2-ADAN) 0.3 MG/0.3ML SOAJ injection Inject 0.3 mLs into the muscle once for 1 dose Use as directed for allergic reaction 2 each 5    celecoxib (CELEBREX) 200 MG capsule TAKE 1 CAPSULE BY MOUTH DAILY 90 capsule 3    omeprazole (PRILOSEC) 20 MG delayed release capsule TAKE 1 CAPSULE BY MOUTH DAILY 90 capsule 3    albuterol sulfate HFA (VENTOLIN HFA) 108 (90 Base) MCG/ACT inhaler Inhale 2 puffs into the lungs 4 times daily as needed for Wheezing 1 Inhaler 5    Incontinence Supplies MISC All incontinence supplies: diapers, wipes, chucks, gloves x 1 month and 11  each 0    diclofenac sodium (VOLTAREN) 1 % GEL Apply 2 g topically 4 times daily 100 g 5    fluconazole (DIFLUCAN) 150 MG tablet Take 1 tablet by mouth daily 3 tablet 0    ondansetron (ZOFRAN-ODT) 4 MG disintegrating tablet Place 1 tablet under the tongue every 8 hours as needed for Nausea or Vomiting 20 tablet 0    clotrimazole (LOTRIMIN) 1 % cream APPLY EXTERNALLY TO THE AFFECTED AREA TWICE DAILY 30 g 0    Misc. Devices (WHEELCHAIR CUSHION) MISC Thin roho cushion 1 each 0    nystatin (MYCOSTATIN) 920773 UNIT/ML suspension Take 5 mLs by mouth 4 times daily 473 mL 1    naloxone (NARCAN) 4 MG/0.1ML LIQD nasal spray 1 spray by Nasal route as needed for Opioid Reversal 1 each 0    Misc. Devices [facility]) MISC 1 Units by Enteral route daily With reclining back 1 each 0    hydrOXYzine (ATARAX) 25 MG tablet Take 1 tablet by mouth every 8 hours as needed for Anxiety (Patient taking differently: Take 25 mg by mouth as needed for Anxiety ) 30 tablet 3    albuterol (PROVENTIL) (2.5 MG/3ML) 0.083% nebulizer solution Take 3 mLs by nebulization every 6 hours as needed for Wheezing (coughing) 120 each 5    medicated lip balm (BLISTEX/CARMEX) 2-2.5-6.6 % STCK Apply topically as needed for Dry Lips       No current facility-administered medications for this visit.         Allergies: Latex, Bactrim [sulfamethoxazole-trimethoprim], Ciprofloxacin, Ciprofloxacin hcl, Depakote [divalproex sodium], Dilantin [phenytoin sodium extended], Dye [iodides], Keflex [cephalexin], Pcn [penicillins], Primaxin [imipenem], Unasyn [ampicillin-sulbactam sodium], and Wasp venom     Past Medical History:   Diagnosis Date    Arthritis     GERD (gastroesophageal reflux disease)     History of blood transfusion     Incontinence     Seizures ([facility]        Family History   Problem Relation Age of Onset    High Blood Pressure Mother     High Blood Pressure Father        Past Surgical History:   Procedure Laterality Date    APPENDECTOMY      CHOLECYSTECTOMY      CSF SHUNT Right     DEEP BRAIN STIMULATOR PLACEMENT      tremor control it is off now    ND EXCIS CHALAZION,GEN ANESTHESIA Right 8/23/2018    EYE CHALAZION EXCISION performed by Gucci Colunga MD at [address] [place] [address] [place] XCAPSL CTRC RMVL INSJ IO LENS PROSTH W/O ECP Left 6/15/2017    EYE CATARACT EMULSIFICATION IOL IMPLANT performed by Jessica Kwon MD at [facility] OR    [place] XCAPSL CTRC RMVL INSJ IO LENS PROSTH W/O ECP Right 2017    CATARCAT EXTRACTION EYE WITH IOL performed by Jessica Kwon MD at [facility]       Social History     Tobacco Use    Smoking status: Former Smoker     Packs/day: 1.00     Years: 19.00     Pack years: 19.00     Types: Cigarettes     Quit date: 2015     Years since quittin.1    Smokeless tobacco: Never Used    Tobacco comment: quit smoking  6 yrs ago   Substance Use Topics    Alcohol use: No        Review of Systems   Constitutional: Negative. HENT: Negative for congestion, ear pain, postnasal drip and rhinorrhea. Eyes: Negative. Respiratory: Negative. Cardiovascular: Negative. Endocrine: Negative. Musculoskeletal: Positive for arthralgias (knees (baseline)). Skin: Negative for rash. Allergic/Immunologic: Negative. Neurological: Negative. Hematological: Negative. Physical Exam  Vitals signs reviewed. Constitutional:       General: She is not in acute distress. Appearance: She is well-developed. She is not toxic-appearing. Comments: Body habitus is normal   HENT:      Head: Normocephalic and atraumatic. Right Ear: Hearing, tympanic membrane, ear canal and external ear normal.      Left Ear: Hearing, tympanic membrane, ear canal and external ear normal.      Nose: Nose normal.   Eyes:      General: Lids are normal.      Conjunctiva/sclera: Conjunctivae normal.      Pupils: Pupils are equal, round, and reactive to light. Comments: Amblyoplia R eye (OD)   Neck:      Musculoskeletal: Neck supple. Thyroid: No thyromegaly. Vascular: No carotid bruit or JVD. Trachea: Phonation normal.   Cardiovascular:      Rate and Rhythm: Normal rate and regular rhythm. No extrasystoles are present. Chest Wall: PMI is not displaced. Heart sounds: Normal heart sounds. No murmur. No friction rub. No gallop. Pulmonary:      Effort: Pulmonary effort is normal. No respiratory distress. Breath sounds: Normal breath sounds. No wheezing, rhonchi or rales. Abdominal:      General: Bowel sounds are normal. There is no distension ( ). Palpations: Abdomen is soft. There is no mass. Tenderness: There is no abdominal tenderness. There is no guarding or rebound. Genitourinary:     Comments: Examination deferred  Musculoskeletal: Normal range of motion. General: No tenderness. Comments: Joint examination reveals no acute arthritis or synovitis. Contracted L  UE  She has atrophy of her L LE   Lymphadenopathy:      Cervical: No cervical adenopathy. Skin:     General: Skin is warm and dry. Findings: No rash. Neurological:      Mental Status: She is alert and oriented to person, place, and time. Cranial Nerves: No cranial nerve deficit (by gross examination). Sensory: No sensory deficit. Motor: No tremor or atrophy. Gait: Gait normal.      Comments: She has very limited mobility of her entire left side. She has limited mobility of her right lower extremities as well and has generalized weakness with inability to withstand her weight. She cannot transfer without being lifted. She does not contribute to any significant degree with mechanical transfers. Psychiatric:         Speech: Speech normal.         Behavior: Behavior normal. Behavior is cooperative. ASSESSMENT      ICD-10-CM    1. Hemiplegia affecting left nondominant side, unspecified etiology, unspecified hemiplegia type ([facility]  G81.94    2. Primary osteoarthritis involving multiple joints  M89.49 CBC Auto Differential     Comprehensive Metabolic Panel     lidocaine-prilocaine (EMLA) 2.5-2.5 % cream   3. PVD (peripheral vascular disease) (HCC)  I73.9    4.  Gastroesophageal reflux disease without esophagitis  K21.9    5. Urinary incontinence, unspecified type  R32    6. Seizures (HCC)  R56.9 CBC Auto Differential     Comprehensive Metabolic Panel   7. Anxiety  F41.9    8. Chronic pain syndrome  G89.4    9. Wheelchair bound  Z99.3    10. Avascular necrosis ([address]  M87.00    11. Routine general medical examination at a health care facility  Z00.00 CBC Auto Differential     Comprehensive Metabolic Panel     Lipid Panel     Microalbumin / Creatinine Urine Ratio     T4, Free     TSH without Reflex     Hepatitis C Antibody     lidocaine-prilocaine (EMLA) 2.5-2.5 % cream   12. Encounter for hepatitis C screening test for low risk patient  Z11.59 Hepatitis C Antibody   13. Chronic idiopathic constipation  K59.04          PLAN    1. Primary osteoarthritis involving multiple joints  We are going to have to wait on injections today. - CBC Auto Differential; Future  - Comprehensive Metabolic Panel; Future  - lidocaine-prilocaine (EMLA) 2.5-2.5 % cream; Apply topically as needed. Dispense: 210 g; Refill: 3    2. Hemiplegia affecting left nondominant side, unspecified etiology, unspecified hemiplegia type ([address]  This is baseline    3. PVD (peripheral vascular disease) ([address]  Following serially    4. Gastroesophageal reflux disease without esophagitis  The current medical regimen is effective;  continue present plan and medications. 5. Urinary incontinence, unspecified type  She is doing well in general    6. Seizures ([address]  No recent seizure activity  - CBC Auto Differential; Future  - Comprehensive Metabolic Panel; Future    7. Anxiety  Stable on medications. 8. Chronic pain syndrome  Will not fill her norco today. 9. Wheelchair bound  She may need new arm pieces for her wheelchair. 10. Avascular necrosis (HCC)  Trying to avoid surgery    11. Routine general medical examination at a health care facility  Routine age-appropriate anticipatory guidance was provided.   Annual wellness visit was performed in a separate note and issues were addressed as identified.     - CBC Auto Differential; Future  - Comprehensive Metabolic Panel; Future  - Lipid Panel; Future  - Microalbumin / Creatinine Urine Ratio; Future  - T4, Free; Future  - TSH without Reflex; Future  - Hepatitis C Antibody; Future  - lidocaine-prilocaine (EMLA) 2.5-2.5 % cream; Apply topically as needed. Dispense: 210 g; Refill: 3    12. Encounter for hepatitis C screening test for low risk patient  Screening eval  - Hepatitis C Antibody; Future    13. Chronic idiopathic constipation  Try to treat with daily regimen. Orders Placed This Encounter   Procedures    CBC Auto Differential    Comprehensive Metabolic Panel    Lipid Panel    Microalbumin / Creatinine Urine Ratio    T4, Free    TSH without Reflex    Hepatitis C Antibody        Return in 1 month (on 2021) for Medicare Annual Wellness Visit in 1 year, 27. Medicare Annual Wellness Visit  Name: Jamie Mo Date: 3/26/2021   MRN: [id number] Sex: Female   Age: 42 y.o. Ethnicity: Non-/Non    : 1975 Race: Malick Salazar is here for Medicare AWV, Knee Pain (knee pain in both knees, would like injections. ), and Health Maintenance (flu shot at [facility], [address] [place] [address])    Screenings for behavioral, psychosocial and functional/safety risks, and cognitive dysfunction are all negative except as indicated below. These results, as well as other patient data from the [address] form, are documented in Flowsheets linked to this Encounter.     Allergies   Allergen Reactions    Latex     Bactrim [Sulfamethoxazole-Trimethoprim]     Ciprofloxacin     Ciprofloxacin Hcl     Depakote [Divalproex Sodium]     Dilantin [Phenytoin Sodium Extended]     Dye [Iodides] Other (See Comments)     Skin peeled off    Keflex [Cephalexin]     Pcn [Penicillins]     Primaxin [Imipenem]     Unasyn [Ampicillin-Sulbactam Sodium]     Wasp Venom Swelling Prior to Visit Medications    Medication Sig Taking? Authorizing Provider   lidocaine-prilocaine (EMLA) 2.5-2.5 % cream Apply topically as needed. Yes ALYSSA Romano, DO   lamoTRIgine (LAMICTAL) 200 MG tablet Take 1 tablet by mouth 2 times daily Yes ALYSSA Romano, DO   HYDROcodone-acetaminophen (NORCO) 7.5-325 MG per tablet Take 1 tablet by mouth every 8 hours as needed for Pain for up to 30 days.  Yes ALYSSA Romano, DO   desloratadine (CLARINEX) 5 MG tablet Take 1 tablet by mouth daily Yes ALYSSA Romano, DO   medroxyPROGESTERone (DEPO-PROVERA) 150 MG/ML injection Inject 150 mg into the muscle every 3 months Yes LOVELY Orta   hydrocortisone 2.5 % cream APPLY EXTERNALLY TO THE AFFECTED AREA TWICE DAILY AS DIRECTED Yes ALYSSA Romano, DO   senna-docusate (SENEXON-S) 8.6-50 MG per tablet Take 2 tablets by mouth 2 times daily Yes ALYSSA Romano, DO   EPINEPHrine (EPIPEN 2-ADAN) 0.3 MG/0.3ML SOAJ injection Inject 0.3 mLs into the muscle once for 1 dose Use as directed for allergic reaction Yes ALYSSA Romano, DO   celecoxib (CELEBREX) 200 MG capsule TAKE 1 CAPSULE BY MOUTH DAILY Yes ALYSSA Romano, DO   omeprazole (PRILOSEC) 20 MG delayed release capsule TAKE 1 CAPSULE BY MOUTH DAILY Yes LOVELY Wolff   albuterol sulfate HFA (VENTOLIN HFA) 108 (90 Base) MCG/ACT inhaler Inhale 2 puffs into the lungs 4 times daily as needed for Wheezing Yes ALYSSA Romano, DO   Incontinence Supplies MISC All incontinence supplies: diapers, wipes, chucks, gloves x 1 month and 11 RF Yes ALYSSA Romano, DO   diclofenac sodium (VOLTAREN) 1 % GEL Apply 2 g topically 4 times daily Yes ALYSSA Romano, DO   fluconazole (DIFLUCAN) 150 MG tablet Take 1 tablet by mouth daily Yes ALYSSA Romano, DO   ondansetron (ZOFRAN-ODT) 4 MG disintegrating tablet Place 1 tablet under the tongue every 8 hours as needed for Nausea or Vomiting Yes LOVELY Wolff   clotrimazole (LOTRIMIN) 1 % cream APPLY EXTERNALLY TO THE AFFECTED AREA TWICE DAILY Yes LOVELY Lima   Misc. Devices (WHEELCHAIR CUSHION) MISC Thin roho cushion Yes ALYSSA Wheatley Party, DO   nystatin (MYCOSTATIN) 238808 UNIT/ML suspension Take 5 mLs by mouth 4 times daily Yes ALYSSA Diaz, DO   naloxone (NARCAN) 4 MG/0.1ML LIQD nasal spray 1 spray by Nasal route as needed for Opioid Reversal Yes ALYSSA Diaz, DO   Misc.  Devices [facility]) MISC 1 Units by Enteral route daily With reclining back Yes ALYSSA Wheatley Party, DO   hydrOXYzine (ATARAX) 25 MG tablet Take 1 tablet by mouth every 8 hours as needed for Anxiety  Patient taking differently: Take 25 mg by mouth as needed for Anxiety  Yes ALYSSA Diaz, DO   albuterol (PROVENTIL) (2.5 MG/3ML) 0.083% nebulizer solution Take 3 mLs by nebulization every 6 hours as needed for Wheezing (coughing) Yes ALYSSA Diaz, DO   medicated lip balm (BLISTEX/CARMEX) 2-2.5-6.6 % STCK Apply topically as needed for Dry Lips Yes Historical Provider, MD         Past Medical History:   Diagnosis Date    Arthritis     GERD (gastroesophageal reflux disease)     History of blood transfusion     Incontinence     Seizures ([address]        Past Surgical History:   Procedure Laterality Date    APPENDECTOMY      CHOLECYSTECTOMY      CSF SHUNT Right     DEEP BRAIN STIMULATOR PLACEMENT      tremor control it is off now    CT EXCIS CHALAZION,GEN ANESTHESIA Right 8/23/2018    EYE CHALAZION EXCISION [facility] OR    CT XCAPSL CTRC RMVL INSJ IO LENS PROSTH W/O ECP Left 6/15/2017    EYE CATARACT EMULSIFICATION IOL IMPLANT performed by [facility] MD at [facility] OR    CT XCAPSL CTR RMVL INSJ IO LENS PROSTH W/O ECP Right 8/4/2017    CATARCAT EXTRACTION EYE WITH IOL performed by Rosie Tiwari MD at [address] [place] [address] OR         Family History   Problem Relation Age of Onset    High Blood Pressure Mother     High Blood Pressure Father CareTeam (Including outside providers/suppliers regularly involved in providing care):   Patient Care Team:  Monisha Rangel DO as PCP - General (Internal Medicine)  Monisha Rangel DO as PCP - [facility]    Wt Readings from Last 3 Encounters:   03/26/21 123 lb (55.8 kg)   02/17/20 117 lb (53.1 kg)   01/27/20 120 lb (54.4 kg)     Vitals:    03/26/21 0829   BP: 110/72   Site: Right Upper Arm   Position: Sitting   Cuff Size: Large Adult   Pulse: 84   Temp: 96.4 °F (35.8 °C)   TempSrc: Temporal   SpO2: 98%   Weight: 123 lb (55.8 kg)   Height: 5' 3\" (1.6 m)     Body mass index is 21.79 kg/m². Based upon direct observation of the patient, evaluation of cognition reveals global memory impairment noted. Physical exam as documented elsewhere in a separate note    Patient's complete Health Risk Assessment and screening values have been reviewed and are found in Flowsheets. The following problems were reviewed today and where indicated follow up appointments were made and/or referrals ordered. Positive Risk Factor Screenings with Interventions:      Cognitive: Words recalled: 2 Words Recalled  Clock Drawing Test (CDT) Score: (not able to draw)  Total Score Interpretation: Positive Mini-Cog  Cognitive Impairment Interventions:  · she is at her baseline status         General Health and ACP:  General  In general, how would you say your health is?: Good  In the past 7 days, have you experienced any of the following?  New or Increased Pain, New or Increased Fatigue, Loneliness, Social Isolation, Stress or Anger?: None of These  Do you get the social and emotional support that you need?: Yes  Do you have a Living Will?: (!) No  Advance Directives     Power of [address] Will ACP-Advance Directive ACP-Power of     Not on File Not on File Not on File Filed      General Health Risk Interventions:  · No Living Will: Additional information was provided       ADL:  ADLs  In the past 7 days, did you need help from others to perform any of the following everyday activities? Eating, dressing, grooming, bathing, toileting, or walking/balance?: (!) Bathing, Toileting, Walking/Balance, Grooming, Dressing  In the past 7 days, did you need help from others to take care of any of the following? Laundry, housekeeping, banking/finances, shopping, telephone use, food preparation, transportation, or taking medications?: [employer], Laundry, [employer], Shopping, Food Preparation, Transportation, Taking Medications  ADL Interventions:  · Support structures are in place with her parents and hired support    Personalized Preventive Plan   Current Health Maintenance Status  Immunization History   Administered Date(s) Administered    COVID-19, Moderna, PF, 100mcg/0.5mL 03/05/2021    Influenza Vaccine, unspecified formulation 09/30/2016    Influenza Virus Vaccine 09/03/2014, 10/29/2015, 07/25/2017, 09/30/2019    Influenza, MDCK Quadv, IM, PF (Flucelvax 4 yrs and older) 10/07/2019    Influenza, Quadv, IM, PF (6 mo and older Fluzone, Flulaval, Fluarix, and 3 yrs and older Afluria) 10/17/2017, 09/27/2018        Health Maintenance   Topic Date Due    Hepatitis C screen  Never done    HIV screen  Never done    DTaP/Tdap/Td vaccine (1 - Tdap) Never done    Lipid screen  Never done    Cervical cancer screen  05/20/2018    Annual Wellness Visit (AWV)  Never done    Flu vaccine (1) 09/01/2020    COVID-19 Vaccine (2 - Moderna 2-dose series) 04/02/2021    Hepatitis A vaccine  Aged Out    Hepatitis B vaccine  Aged Out    Hib vaccine  Aged Out    Meningococcal (ACWY) vaccine  Aged Out    Pneumococcal 0-64 years Vaccine  Aged Out     Recommendations for [employer] Due: see orders and patient instructions/AVS.  .   Recommended screening schedule for the next 5-10 years is provided to the patient in written form: see Patient Instructions/AVS.    John Crowley was seen today for medicare awv, knee pain and health maintenance. Diagnoses and all orders for this visit:    Hemiplegia affecting left nondominant side, unspecified etiology, unspecified hemiplegia type ([facility]    Primary osteoarthritis involving multiple joints  -     CBC Auto Differential; Future  -     Comprehensive Metabolic Panel; Future  -     lidocaine-prilocaine (EMLA) 2.5-2.5 % cream; Apply topically as needed. PVD (peripheral vascular disease) (HCC)    Gastroesophageal reflux disease without esophagitis    Urinary incontinence, unspecified type    Seizures (HCC)  -     CBC Auto Differential; Future  -     Comprehensive Metabolic Panel; Future    Anxiety    Chronic pain syndrome    Wheelchair bound    Av[facility] necrosis ([facility]    Routine general medical examination at a health care facility  -     CBC Auto Differential; Future  -     Comprehensive Metabolic Panel; Future  -     Lipid Panel; Future  -     Microalbumin / Creatinine Urine Ratio; Future  -     T4, Free; Future  -     TSH without Reflex; Future  -     Hepatitis C Antibody; Future  -     lidocaine-prilocaine (EMLA) 2.5-2.5 % cream; Apply topically as needed. Encounter for hepatitis C screening test for low risk patient  -     Hepatitis C Antibody;  Future    Chronic idiopathic constipation

## 2021-03-26 NOTE — PATIENT INSTRUCTIONS
healthy. Your doctor has checked your overall health and may have suggested ways to take good care of yourself. Your doctor also may have recommended tests. At home, you can help prevent illness with healthy eating, regular exercise, and other steps. Follow-up care is a key part of your treatment and safety. Be sure to make and go to all appointments, and call your doctor if you are having problems. It's also a good idea to know your test results and keep a list of the medicines you take. How can you care for yourself at home? Get screening tests that you and your doctor decide on. Screening helps find diseases before any symptoms appear. Eat healthy foods. Choose fruits, vegetables, whole grains, protein, and low-fat dairy foods. Limit fat, especially saturated fat. Reduce salt in your diet. Limit alcohol. If you are a man, have no more than 2 drinks a day or 14 drinks a week. If you are a woman, have no more than 1 drink a day or 7 drinks a week. Get at least 30 minutes of physical activity on most days of the week. Walking is a good choice. You also may want to do other activities, such as running, swimming, cycling, or playing tennis or team sports. Discuss any changes in your exercise program with your doctor. Reach and stay at a healthy weight. This will lower your risk for many problems, such as obesity, diabetes, heart disease, and high blood pressure. Do not smoke or allow others to smoke around you. If you need help quitting, talk to your doctor about stop-smoking programs and medicines. These can increase your chances of quitting for good. Care for your mental health. It is easy to get weighed down by worry and stress. Learn strategies to manage stress, like deep breathing and mindfulness, and stay connected with your family and community. If you find you often feel sad or hopeless, talk with your doctor. Treatment can help.   Talk to your doctor about whether you have any risk factors for sexually transmitted infections (STIs). You can help prevent STIs if you wait to have sex with a new partner (or partners) until you've each been tested for STIs. It also helps if you use condoms (male or female condoms) and if you limit your sex partners to one person who only has sex with you. Vaccines are available for some STIs, such as HPV. Use birth control if it's important to you to prevent pregnancy. Talk with your doctor about the choices available and what might be best for you. If you think you may have a problem with alcohol or drug use, talk to your doctor. This includes prescription medicines (such as amphetamines and opioids) and illegal drugs (such as cocaine and methamphetamine). Your doctor can help you figure out what type of treatment is best for you. Protect your skin from too much sun. When you're outdoors from 10 a.m. to 4 p.m., stay in the shade or cover up with clothing and a hat with a wide brim. Wear sunglasses that block UV rays. Even when it's cloudy, put broad-spectrum sunscreen (SPF 30 or higher) on any exposed skin. See a dentist one or two times a year for checkups and to have your teeth cleaned. Wear a seat belt in the car. When should you call for help? Watch closely for changes in your health, and be sure to contact your doctor if you have any problems or symptoms that concern you. Where can you learn more? Go to https://idiagdamien.healthAgricultural Solutionspartners. org and sign in to your [employer] account. Enter P072 in the [facility] box to learn more about \"Well Visit, Ages 25 to 48: Care Instructions. \"     If you do not have an account, please click on the \"Sign Up Now\" link. Current as of: May 27, 2020               Content Version: 12.8  © 2006-2021 Healthwise, Incorporated. Care instructions adapted under license by [facility] ([facility]).  If you have questions about a medical condition or this instruction, always ask your healthcare professional. Arash Owen disclaims any warranty or liability for your use of this information. Patient Education        Learning About Living [place]  What is a living will? A living will, also called a declaration, is a legal form. It tells your family and your doctor your wishes when you can't speak for yourself. It's used by the health professionals who will treat you as you near the end of your life or if you get seriously hurt or ill. If you put your wishes in writing, your loved ones and others will know what kind of care you want. They won't need to guess. This can ease your mind and be helpful to others. And you can change or cancel your living will at any time. A living will is not the same as an estate or property will. An estate will explains what you want to happen with your money and property after you die. How do you use it? A living will is used to describe the kinds of treatment or life support you want as you near the end of your life or if you get seriously hurt or ill. Keep these facts in mind about living govea. Your living will is used only if you can't speak or make decisions for yourself. Most often, one or more doctors must certify that you can't speak or decide for yourself before your living will takes effect. If you get better and can speak for yourself again, you can accept or refuse any treatment. It doesn't matter what you said in your living will. Some states may limit your right to refuse treatment in certain cases. For example, you may need to clearly state in your living will that you don't want artificial hydration and nutrition, such as being fed through a tube. Is a living will a legal document? A living will is a legal document. Each state has its own laws about living govea. And a living will may be called something else in your state. Here are some things to know about living govea. You don't need an  to complete a living will.  But legal advice can be helpful if your state's laws are unclear. It can also help if your health history is complicated or your family can't agree on what should be in your living will. You can change your living will at any time. Some people find that their wishes about end-of-life care change as their health changes. If you make big changes to your living will, complete a new form. If you move to another state, make sure that your living will is legal in the state where you now live. In most cases, doctors will respect your wishes even if you have a form from a different state. You might use a universal form that has been approved by many states. This kind of form can sometimes be filled out and stored online. Your digital copy will then be available wherever you have a connection to the internet. The doctors and nurses who need to treat you can find it right away. Your state may offer an online registry. This is another place where you can store your living will online. It's a good idea to get your living will notarized. This means using a person called a  to watch two people sign, or witness, your living will. What should you know when you create a living will? Here are some questions to ask yourself as you make your living will:  Do you know enough about life support methods that might be used? If not, talk to your doctor so you know what might be done if you can't breathe on your own, your heart stops, or you can't swallow. What things would you still want to be able to do after you receive life-support methods? Would you want to be able to walk? To speak? To eat on your own? To live without the help of machines? Do you want certain Bahai practices performed if you become very ill? If you have a choice, where do you want to be cared for? In your home? At a hospital or nursing home? If you have a choice at the end of your life, where would you prefer to die? At home? In a hospital or nursing home? Somewhere else?   Would you prefer to be buried or cremated? Do you want your organs to be donated after you die? What should you do with your living will? Make sure that your family members and your health care agent have copies of your living will (also called a declaration). Give your doctor a copy of your living will. Ask him or her to keep it as part of your medical record. If you have more than one doctor, make sure that each one has a copy. Put a copy of your living will where it can be easily found. For example, some people may put a copy on their refrigerator door. If you are using a digital copy, be sure your doctor, family members, and health care agent know how to find and access it. Where can you learn more? Go to https://Sweet[employer] Intelligence[employer]Smart Media Inventions.myParcelDelivery. org and sign in to your [employer] account. Enter G153 in the [employer] box to learn more about \"Learning About Living [place]. \"     If you do not have an account, please click on the \"Sign Up Now\" link. Current as of: July 17, 2020               Content Version: 12.8  © 7399-5403 [employer]. Care instructions adapted under license by [facility] ([facility]). If you have questions about a medical condition or this instruction, always ask your healthcare professional. [place] [address] any warranty or liability for your use of this information. Patient Education        Advance Directives: Care Instructions  Overview  An advance directive is a legal way to state your wishes at the end of your life. It tells your family and your doctor what to do if you can't say what you want. There are two main types of advance directives. You can change them any time your wishes change. Living will. This form tells your family and your doctor your wishes about life support and other treatment. The form is also called a declaration. Medical power of .    This form lets you name a person to make treatment decisions for you when you can't Instructions. \"     If you do not have an account, please click on the \"Sign Up Now\" link. Current as of: July 17, 2020               Content Version: 12.8  © 2006-2021 Healthwise, Incorporated. Care instructions adapted under license by [facility] ([facility]). If you have questions about a medical condition or this instruction, always ask your healthcare professional. [address] any warranty or liability for your use of this information.

## 2021-03-28 ENCOUNTER — TELEPHONE (OUTPATIENT)
Dept: PRIMARY CARE CLINIC | Age: 46
End: 2021-03-28

## 2021-04-15 ENCOUNTER — VIRTUAL VISIT (OUTPATIENT)
Dept: PRIMARY CARE CLINIC | Age: 46
End: 2021-04-15
Payer: MEDICARE

## 2021-04-15 DIAGNOSIS — M87.00 AVASCULAR NECROSIS (HCC): ICD-10-CM

## 2021-04-15 DIAGNOSIS — M15.9 PRIMARY OSTEOARTHRITIS INVOLVING MULTIPLE JOINTS: ICD-10-CM

## 2021-04-15 DIAGNOSIS — F41.9 ANXIETY: Primary | ICD-10-CM

## 2021-04-15 DIAGNOSIS — Z99.3 WHEELCHAIR BOUND: ICD-10-CM

## 2021-04-15 DIAGNOSIS — Z79.899 MEDICATION MANAGEMENT: ICD-10-CM

## 2021-04-15 DIAGNOSIS — G89.4 CHRONIC PAIN SYNDROME: ICD-10-CM

## 2021-04-15 PROCEDURE — 99442 PR PHYS/QHP TELEPHONE EVALUATION 11-20 MIN: CPT | Performed by: PEDIATRICS

## 2021-04-15 RX ORDER — DIAZEPAM 5 MG/1
5 TABLET ORAL EVERY 8 HOURS PRN
Qty: 60 TABLET | Refills: 0 | Status: SHIPPED | OUTPATIENT
Start: 2021-04-15 | End: 2021-05-04 | Stop reason: SDUPTHER

## 2021-04-15 RX ORDER — HYDROCODONE BITARTRATE AND ACETAMINOPHEN 7.5; 325 MG/1; MG/1
1 TABLET ORAL EVERY 8 HOURS PRN
Qty: 40 TABLET | Refills: 0 | Status: SHIPPED | OUTPATIENT
Start: 2021-04-15 | End: 2021-05-04 | Stop reason: SDUPTHER

## 2021-04-15 ASSESSMENT — ENCOUNTER SYMPTOMS
RHINORRHEA: 0
RESPIRATORY NEGATIVE: 1
ALLERGIC/IMMUNOLOGIC NEGATIVE: 1
EYES NEGATIVE: 1

## 2021-04-15 NOTE — PROGRESS NOTES
[address] [facility], [address]  Phone [phone]   Fax [phone]      OFFICE VISIT: 4/15/2021    Tiara Shah-: 1975      HPI  Reason For Visit:  Yumiko Angel is a 42 y.o. Chronic Pain and Anxiety    Patient presents via telephone conference on follow-up for her chronic pain. She needs a refill of her pain medication today.     Anxiety:  She is requesting a refill of her lorazepam 1 mg. Last fill of this medication was over a year ago. She does take this on a as needed basis for her anxiety and it is very helpful. Adjunctive medications:              Lamictal 200 mg twice daily        Chronic pain:  She typically takes hydrocodone 10 mg on a when necessary basis. 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  When combined with lidocaine, it is pretty helpful     Pain Control: Average: 6/10             Best: 4/10                Worst: 10/10  (4/15/2021)     Pain Interfering with life:  100%  Pain Interfering with general activity:  100%     Her pain level varies in severity and location      Activities of daily living: She is completely wheelchair bound and unable to ambulate at all. She needs assistance with all ADLs. Even going to the bathroom requires assistance.   She does have helped by her father as well as assistant who comes and meets her needs     Adverse effects: None  Aberrant behavior: None  Affect: Very good considering her multiple health issues     Alternative medications:              Celebrex 200 mg daily              Diclofenac gel 1% 2 g 4 times daily topically              Naproxen sodium 220 mg when necessary              She is intolerant of many medications.     Urine Drug screen: 10/23/2018 and appropriate              She is incontinent and unable to produce urine on demand. Risk Factors:              Illegal Drug use: no              History of substance use disorder: no              Mental health conditions: stable depression and anxiety              Sleep disordered breathing: no              Concurrent Benzodiazepine use: yes     Bowel regimen: She is not regularly adherent to her bowel regimen              We discussed this again and stressed the importance of this regimen.              Senokot S,  1 tablets twice daily              MiraLAX 17 g daily     Bowel function: Chronic constipation     RORY was reviewed today per office protocol. Report shows No discrepancies. Fill pattern is consistent from single provider(s) at single pharmacy(s). Request # [id number]      vitals were not taken for this visit. There is no height or weight on file to calculate BMI. I have reviewed the following with the Ms. Shah   Lab Review  Procedure visit on 01/06/2021   Component Date Value    Preg Test, Ur 01/06/2021 neg     Control 01/06/2021 pos      Copies of these are in the chart. Current Outpatient Medications   Medication Sig Dispense Refill    HYDROcodone-acetaminophen (NORCO) 7.5-325 MG per tablet Take 1 tablet by mouth every 8 hours as needed for Pain for up to 30 days. 40 tablet 0    diazePAM (VALIUM) 5 MG tablet Take 1 tablet by mouth every 8 hours as needed for Anxiety or Sleep for up to 10 days. 60 tablet 0    lidocaine-prilocaine (EMLA) 2.5-2.5 % cream Apply topically as needed.  210 g 3    lamoTRIgine (LAMICTAL) 200 MG tablet Take 1 tablet by mouth 2 times daily 60 tablet 11    desloratadine (CLARINEX) 5 MG tablet Take 1 tablet by mouth daily 30 tablet 11    medroxyPROGESTERone (DEPO-PROVERA) 150 MG/ML injection Inject 150 mg into the muscle every 3 months 1 mL 3    hydrocortisone 2.5 % cream APPLY topically as needed for Dry Lips       No current facility-administered medications for this visit. Allergies: Latex, Bactrim [sulfamethoxazole-trimethoprim], Ciprofloxacin, Ciprofloxacin hcl, Depakote [divalproex sodium], Dilantin [phenytoin sodium extended], Dye [iodides], Keflex [cephalexin], Pcn [penicillins], Primaxin [imipenem], Unasyn [ampicillin-sulbactam sodium], and Wasp venom     Past Medical History:   Diagnosis Date    Arthritis     GERD (gastroesophageal reflux disease)     History of blood transfusion     Incontinence     Seizures ([address]        Family History   Problem Relation Age of Onset    High Blood Pressure Mother     High Blood Pressure Father        Past Surgical History:   Procedure Laterality Date    APPENDECTOMY      CHOLECYSTECTOMY      CSF SHUNT Right     DEEP BRAIN STIMULATOR PLACEMENT      tremor control it is off now    PA EXCIS CHALAZION,GEN ANESTHESIA Right 2018    EYE CHALAZION EXCISION performed [facility] Soco [facility]an [facility]    PA XCAPSL CTRC RMVL INSJ IO LENS PROSTH W/O ECP Left 6/15/2017    EYE CATARACT EMULSIFICATION IOL IMPLANT performed by Soco Rajan MD at [facility] OR    PA XCAPSL CTRC RMVL INSJ IO LENS PROSTH W/O ECP Right 2017    CATARCAT EXTRACTION EYE WITH IOL performed by Soco Rajan MD at [facility] OR       Social History     Tobacco Use    Smoking status: Former Smoker     Packs/day: 1.00     Years: 19.00     Pack years: 19.00     Types: Cigarettes     Quit date: 2015     Years since quittin.1    Smokeless tobacco: Never Used    Tobacco comment: quit smoking  6 yrs ago   Substance Use Topics    Alcohol use: No        Review of Systems   Constitutional: Negative. HENT: Negative for congestion, ear pain, postnasal drip and rhinorrhea. Eyes: Negative. Respiratory: Negative. Cardiovascular: Negative. Endocrine: Negative. Musculoskeletal: Positive for arthralgias (knees (baseline)).    Skin: Negative for rash.   Allergic/Immunologic: Negative. Neurological: Negative. Hematological: Negative. Physical Exam  Physical exam was not performed today as this was a telephone conference visit      ASSESSMENT      ICD-10-CM    1. Anxiety  F41.9 diazePAM (VALIUM) 5 MG tablet   2. Chronic pain syndrome  G89.4 HYDROcodone-acetaminophen (NORCO) 7.5-325 MG per tablet   3. Medication management  Z79.899 HYDROcodone-acetaminophen (NORCO) 7.5-325 MG per tablet   4. Primary osteoarthritis involving multiple joints  M89.49 HYDROcodone-acetaminophen (NORCO) 7.5-325 MG per tablet   5. Avascular necrosis (HCC)  M87.00 HYDROcodone-acetaminophen (NORCO) 7.5-325 MG per tablet   6. Wheelchair bound  Z99.3 HYDROcodone-acetaminophen (NORCO) 7.5-325 MG per tablet         PLAN    1. Chronic pain syndrome  The current medical regimen is effective;  continue present plan and medications.  - HYDROcodone-acetaminophen (NORCO) 7.5-325 MG per tablet; Take 1 tablet by mouth every 8 hours as needed for Pain for up to 30 days. Dispense: 40 tablet; Refill: 0    2. Medication management  The current medical regimen is effective;  continue present plan and medications.  - HYDROcodone-acetaminophen (NORCO) 7.5-325 MG per tablet; Take 1 tablet by mouth every 8 hours as needed for Pain for up to 30 days. Dispense: 40 tablet; Refill: 0    3. Primary osteoarthritis involving multiple joints  The current medical regimen is effective;  continue present plan and medications.  - HYDROcodone-acetaminophen (NORCO) 7.5-325 MG per tablet; Take 1 tablet by mouth every 8 hours as needed for Pain for up to 30 days. Dispense: 40 tablet; Refill: 0    4. Avascular necrosis (HCC)  The current medical regimen is effective;  continue present plan and medications.  - HYDROcodone-acetaminophen (NORCO) 7.5-325 MG per tablet; Take 1 tablet by mouth every 8 hours as needed for Pain for up to 30 days. Dispense: 40 tablet; Refill: 0    5.  Wheelchair bound  This exacerbates her pain.  - HYDROcodone-acetaminophen (NORCO) 7.5-325 MG per tablet; Take 1 tablet by mouth every 8 hours as needed for Pain for up to 30 days. Dispense: 40 tablet; Refill: 0    6. Anxiety  She has a lot of episodic anxiety. This is not bad enough to need a medication every day. - diazePAM (VALIUM) 5 MG tablet; Take 1 tablet by mouth every 8 hours as needed for Anxiety or Sleep for up to 10 days. Dispense: 60 tablet; Refill: 0      No orders of the defined types were placed in this encounter. Return in about 1 month (around 5/15/2021) for 15. Due to patients co-morbidities and risk for potential exposure of COVID 19 pandemic. Patient was contacted and agreed to proceed with a telephone virtual visit. The risks and benefits of converting to a telephone virtual visit were discussed in light of the current infectious disease epidemic. Patient also understood that insurance coverage and co-pays are up to their individual insurance plans. Provider performed history of present illness and review of systems. Diagnosis and treatment plan was discussed with patient. Pharmacy of choice was reviewed along with past medical history, medication allergies, and current medications. Education provided to patient or patient parents/guardian with current illness diagnosis as well as when to seek additional healthcare due to changing or for worsening symptoms. Patient voiced understanding. 20 minutes were spent on the phone with patient. Qamar Concepcion, was evaluated through a synchronous (real-time) audio-video encounter. The patient (or guardian if applicable) is aware that this is a billable service. Verbal consent to proceed has been obtained within the past 12 months. The visit was conducted pursuant to the emergency declaration under the [facility], [address] [place] [address] authority and the [employer] General Act. Patient identification was verified, and a caregiver was present when appropriate. The patient was located in a state where the provider was credentialed to provide care. Total time spent for this encounter: 20m    --IRA Dueñas DO on 4/15/2021 at 8:20 AM    An electronic signature was used to authenticate this note.

## 2021-04-15 NOTE — PATIENT INSTRUCTIONS
Patient Education        Safe Use of Opioid Pain Medicine: Care Instructions  Your Care Instructions  Pain is your body's way of warning you that something is wrong. Pain feels different for everybody. Only you can describe your pain. A doctor can suggest or prescribe many types of medicines for pain. These range from over-the-counter medicines like acetaminophen (Tylenol) to powerful medicines called opioids. Examples of opioids are fentanyl, hydrocodone, morphine, and oxycodone. Heroin is an illegal opioid  Opioids are strong medicines. They can help you manage pain when you use them the right way. But if you misuse them, they can cause serious harm and even death. For these reasons, doctors are very careful about how they prescribe opioids. If you decide to take opioids, here are some things to remember. · Keep your doctor informed. You can develop opioid use disorder. Moderate to severe opioid use disorder is sometimes called addiction. The risk is higher if you have a history of substance use. Your doctor will monitor you closely for signs of opioid use disorder and to figure out when you no longer need to take opioids. · Make a treatment plan. The goal of your plan is to be able to function and do the things you need to do, even if you still have some pain. You might be able to manage your pain with other non-opioid options like physical therapy, relaxation, or over-the-counter pain medicines. · Be aware of the side effects. Opioids can cause serious side effects, such as constipation, dry mouth, and nausea. And over time, you may need a higher dose to get pain relief. This is called tolerance. Your body also gets used to opioids. This is called physical dependence. If you suddenly stop taking them, you may have withdrawal symptoms. The doctor carefully considered what pain medicine is right for you.  You may not have received opioids if your doctor was concerned about drug interactions or your safety, the [address] ([facility]). ? If these programs aren't available in your area and your medicine doesn't have specific disposal instructions (such as flushing), you can throw them into your household trash if you follow the FDA's instructions. Visit fda.gov and search for \"unused medicine disposal.\"  ? If you have opioid patches (used or unused), your options are to take them to a [facility]-authorized site or flush them down the toilet. Do not throw them in the trash. ? Only flush your medicine down the toilet if you can't get to a [facility]-approved site or your medicine instructions state clearly to flush them. · Reduce the risk of overdose. Misuse of opioids can be very dangerous. Protect yourself by asking your doctor about a naloxone rescue kit. It can help youand even save your lifeif you take too much of an opioid. Try other ways to reduce pain. · Relax, and reduce stress. Relaxation techniques such as deep breathing or meditation can help. · Keep moving. Gentle, daily exercise can help reduce pain over the long run. Try low- or no-impact exercises such as walking, swimming, and stationary biking. Do stretches to stay flexible. · Try heat, cold packs, and massage. · Get enough sleep. Pain can make you tired and drain your energy. Talk with your doctor if you have trouble sleeping because of pain. · Think positive. Your thoughts can affect your pain level. Do things that you enjoy to distract yourself when you have pain instead of focusing on the pain. See a movie, read a book, listen to music, or spend time with a friend. If you are not taking a prescription pain medicine, ask your doctor if you can take an over-the-counter medicine. When should you call for help? Call 911 anytime you think you may need emergency care. For example, call if:  · You have symptoms of a severe allergic reaction. These may include:  ? Sudden raised, red areas (hives) all over your body. ?  Swelling of the throat, mouth, lips, or tongue. ? Trouble breathing. ? Passing out (losing consciousness). Or you may feel very lightheaded or suddenly feel weak, confused, or restless. · You have signs of an overdose. These include:  ? Cold, clammy skin. ? Confusion. ? Severe nervousness or restlessness. ? Severe dizziness, drowsiness, or weakness. ? Slow breathing. ? Seizures. Call your doctor now or seek immediate medical care if:  · You have symptoms of an allergic reaction, such as:  ? A rash or hives (raised, red areas on the skin). ? Itching. ? Swelling. ? Belly pain, nausea, or vomiting. Watch closely for changes in your health, and be sure to contact your doctor if:  · You think you might be taking too much pain medicine, and you need help to take less or stop. · Your medicine is not helping with the pain. · You are having side effects, such as constipation. Where can you learn more? Go to https://[employer]. org and sign in to your [employer] account. Enter R108 in the [employer] box to learn more about \"Safe Use of Opioid Pain Medicine: Care Instructions. \"     If you do not have an account, please click on the \"Sign Up Now\" link. Current as of: August 4, 2020               Content Version: 12.8  © 0272-1888 Healthwise, [employer]orporated. Care instructions adapted under license by [facility] ([facility]). If you have questions about a medical condition or this instruction, always ask your healthcare professional. [place] [address] any warranty or liability for your use of this information.

## 2021-04-19 NOTE — TELEPHONE ENCOUNTER
Called and LM to see what med they are referring to.  Looks like they wanted a refill of Ativan, but was changed to Valium

## 2021-04-23 ENCOUNTER — PROCEDURE VISIT (OUTPATIENT)
Dept: PRIMARY CARE CLINIC | Age: 46
End: 2021-04-23
Payer: MEDICARE

## 2021-04-23 DIAGNOSIS — Z30.9 ENCOUNTER FOR CONTRACEPTIVE MANAGEMENT, UNSPECIFIED TYPE: Primary | ICD-10-CM

## 2021-04-23 PROCEDURE — 96372 THER/PROPH/DIAG INJ SC/IM: CPT | Performed by: PEDIATRICS

## 2021-04-23 RX ORDER — MEDROXYPROGESTERONE ACETATE 150 MG/ML
150 INJECTION, SUSPENSION INTRAMUSCULAR ONCE
Status: COMPLETED | OUTPATIENT
Start: 2021-04-23 | End: 2021-04-23

## 2021-04-23 RX ADMIN — MEDROXYPROGESTERONE ACETATE 150 MG: 150 INJECTION, SUSPENSION INTRAMUSCULAR at 13:39

## 2021-04-23 NOTE — PROGRESS NOTES
After obtaining consent, and per orders of Dr. Maycol Davis, injection of depo given in Left deltoid by Jewell Hayes. Patient instructed to remain in clinic for 20 minutes afterwards, and to report any adverse reaction to me immediately.

## 2021-05-04 ENCOUNTER — VIRTUAL VISIT (OUTPATIENT)
Dept: PRIMARY CARE CLINIC | Age: 46
End: 2021-05-04
Payer: MEDICARE

## 2021-05-04 DIAGNOSIS — F41.9 ANXIETY: ICD-10-CM

## 2021-05-04 DIAGNOSIS — L30.9 DERMATITIS: Primary | ICD-10-CM

## 2021-05-04 DIAGNOSIS — M15.9 PRIMARY OSTEOARTHRITIS INVOLVING MULTIPLE JOINTS: ICD-10-CM

## 2021-05-04 DIAGNOSIS — Z79.899 MEDICATION MANAGEMENT: ICD-10-CM

## 2021-05-04 DIAGNOSIS — Z99.3 WHEELCHAIR BOUND: ICD-10-CM

## 2021-05-04 DIAGNOSIS — G89.4 CHRONIC PAIN SYNDROME: ICD-10-CM

## 2021-05-04 DIAGNOSIS — J45.30 MILD PERSISTENT ASTHMA WITHOUT COMPLICATION: ICD-10-CM

## 2021-05-04 DIAGNOSIS — M87.00 AVASCULAR NECROSIS (HCC): ICD-10-CM

## 2021-05-04 PROCEDURE — 99443 PR PHYS/QHP TELEPHONE EVALUATION 21-30 MIN: CPT | Performed by: PEDIATRICS

## 2021-05-04 RX ORDER — ALBUTEROL SULFATE 90 UG/1
2 AEROSOL, METERED RESPIRATORY (INHALATION) 4 TIMES DAILY PRN
Qty: 3 INHALER | Refills: 3 | Status: SHIPPED | OUTPATIENT
Start: 2021-05-04 | End: 2021-09-16 | Stop reason: SDUPTHER

## 2021-05-04 RX ORDER — HYDROCODONE BITARTRATE AND ACETAMINOPHEN 7.5; 325 MG/1; MG/1
1 TABLET ORAL EVERY 8 HOURS PRN
Qty: 40 TABLET | Refills: 0 | Status: SHIPPED | OUTPATIENT
Start: 2021-05-04 | End: 2021-06-15 | Stop reason: SDUPTHER

## 2021-05-04 RX ORDER — TRIAMCINOLONE ACETONIDE 1 MG/G
CREAM TOPICAL
Qty: 80 G | Refills: 5 | Status: SHIPPED | OUTPATIENT
Start: 2021-05-04 | End: 2022-01-05

## 2021-05-04 RX ORDER — DIAZEPAM 5 MG/1
5 TABLET ORAL EVERY 8 HOURS PRN
Qty: 60 TABLET | Refills: 0 | Status: SHIPPED | OUTPATIENT
Start: 2021-05-04 | End: 2021-11-18 | Stop reason: SDUPTHER

## 2021-05-04 ASSESSMENT — ENCOUNTER SYMPTOMS
RESPIRATORY NEGATIVE: 1
ALLERGIC/IMMUNOLOGIC NEGATIVE: 1
RHINORRHEA: 0
EYES NEGATIVE: 1

## 2021-05-04 NOTE — PROGRESS NOTES
[address] [facility], [address]  Phone [phone]   Fax [phone]      OFFICE VISIT: 2021    Smitha Camargo Alyssa-: 1975      [facility]  Reason For Visit:  Fatou is a 42 y.o. Back Pain, Anxiety, and Arthritis    Patient presents on telephone conference for follow-up of chronic pain and anxiety  This was originally scheduled as a video conference using doxy. Me, but they were unable to connect via that service, resulting in transfer to a telephone conversation visit. She needs a refill of her pain medication today.     She needs a refill of triamcinolone. Anxiety:  She is requesting a refill of her lorazepam 1 mg. Last fill of this medication was over a year ago, diazepam 5 mg was ordered on 4/15/2021 for number 60 tablets. This is not on the [facility] report. She does take this on a as needed basis for her anxiety and it is very helpful. Adjunctive medications:              Lamictal 200 mg twice daily  Symptoms: this is still a problem. The pharmacy stated that there was nothing called in. This Rx was confirmed as received by the pharmacy.       Chronic pain:  She typically takes hydrocodone 10 mg on a when necessary basis. 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  When combined with lidocaine, it is pretty helpful     Pain Control: Average: 6/10             Best: 4/10                Worst: 10/10  (2021)     Pain Interfering with life:  100%  Pain Interfering with general activity:  100%     Her pain level varies in severity and location      Activities of daily living: She is completely wheelchair bound and unable to ambulate at all. She needs assistance with all ADLs. Even going to the bathroom requires assistance. She does have helped by her father as well as assistant who comes and meets her needs     Adverse effects: None  Aberrant behavior: None  Affect: Very good considering her multiple health issues     Alternative medications:              Celebrex 200 mg daily              Diclofenac gel 1% 2 g 4 times daily topically              Naproxen sodium 220 mg when necessary              She is intolerant of many medications.     Urine Drug screen: 10/23/2018 and appropriate              She is incontinent and unable to produce urine on demand. Risk Factors:              Illegal Drug use: no              History of substance use disorder: no              Mental health conditions: stable depression and anxiety              Sleep disordered breathing: no              Concurrent Benzodiazepine use: yes     Bowel regimen: She is not regularly adherent to her bowel regimen              We discussed this again and stressed the importance of this regimen.              Senokot S,  1 tablets twice daily              MiraLAX 17 g daily     Bowel function: Chronic constipation     RORY was reviewed today per office protocol. Report shows No discrepancies.  Fill pattern is consistent from single provider(s) at single pharmacy(s). Request # [id number]    Active cumulative morphine equivalent score is 0. This is obviously incorrect     Controlled Substance Monitoring:    Acute and Chronic Pain Monitoring:   RX Monitoring 5/4/2021   Attestation The Prescription Monitoring Report for this patient was reviewed today. Acute Pain Prescriptions -   Periodic Controlled Substance Monitoring Possible medication side effects, risk of tolerance/dependence & alternative treatments discussed. ;No signs of potential drug abuse or diversion identified. ;Assessed functional status. ;Obtaining appropriate analgesic effect of treatment.    Chronic Pain > 50 MEDD TAKE 1 CAPSULE BY MOUTH DAILY 90 capsule 3    Incontinence Supplies MISC All incontinence supplies: diapers, wipes, chucks, gloves x 1 month and 11  each 0    diclofenac sodium (VOLTAREN) 1 % GEL Apply 2 g topically 4 times daily 100 g 5    fluconazole (DIFLUCAN) 150 MG tablet Take 1 tablet by mouth daily 3 tablet 0    ondansetron (ZOFRAN-ODT) 4 MG disintegrating tablet Place 1 tablet under the tongue every 8 hours as needed for Nausea or Vomiting 20 tablet 0    clotrimazole (LOTRIMIN) 1 % cream APPLY EXTERNALLY TO THE AFFECTED AREA TWICE DAILY 30 g 0    Misc. Devices (WHEELCHAIR CUSHION) MISC Thin roho cushion 1 each 0    nystatin (MYCOSTATIN) 692617 UNIT/ML suspension Take 5 mLs by mouth 4 times daily 473 mL 1    naloxone (NARCAN) 4 MG/0.1ML LIQD nasal spray 1 spray by Nasal route as needed for Opioid Reversal 1 each 0    Misc. Devices [facility]) MISC 1 Units by Enteral route daily With reclining back 1 each 0    hydrOXYzine (ATARAX) 25 MG tablet Take 1 tablet by mouth every 8 hours as needed for Anxiety (Patient taking differently: Take 25 mg by mouth as needed for Anxiety ) 30 tablet 3    albuterol (PROVENTIL) (2.5 MG/3ML) 0.083% nebulizer solution Take 3 mLs by nebulization every 6 hours as needed for Wheezing (coughing) 120 each 5    medicated lip balm (BLISTEX/CARMEX) 2-2.5-6.6 % STCK Apply topically as needed for Dry Lips       No current facility-administered medications for this visit.         Allergies: Latex, Bactrim [sulfamethoxazole-trimethoprim], Ciprofloxacin, Ciprofloxacin hcl, Depakote [divalproex sodium], Dilantin [phenytoin sodium extended], Dye [iodides], Keflex [cephalexin], Pcn [penicillins], Primaxin [imipenem], Unasyn [ampicillin-sulbactam sodium], and Wasp venom     Past Medical History:   Diagnosis Date    Arthritis     GERD (gastroesophageal reflux disease)     History of blood transfusion     Incontinence     Seizures ([facility] [address]        Family History   Problem Relation (VALIUM) 5 MG tablet; Take 1 tablet by mouth every 8 hours as needed for Anxiety or Sleep for up to 10 days. Dispense: 60 tablet; Refill: 0    7. Dermatitis  This will hopefully take care of the rash  - triamcinolone (KENALOG) 0.1 % cream; Apply topically 2 times daily. Dispense: 80 g; Refill: 5    8. Mild persistent asthma without complication  Continue to use prn.  - albuterol sulfate HFA (VENTOLIN HFA) 108 (90 Base) MCG/ACT inhaler; Inhale 2 puffs into the lungs 4 times daily as needed for Wheezing  Dispense: 3 Inhaler; Refill: 3      No orders of the defined types were placed in this encounter. No follow-ups on file. Due to patients co-morbidities and risk for potential exposure of COVID 19 pandemic. Patient was contacted and agreed to proceed with a telephone virtual visit. The risks and benefits of converting to a telephone virtual visit were discussed in light of the current infectious disease epidemic. Patient also understood that insurance coverage and co-pays are up to their individual insurance plans. Provider performed history of present illness and review of systems. Diagnosis and treatment plan was discussed with patient. Pharmacy of choice was reviewed along with past medical history, medication allergies, and current medications. Education provided to patient or patient parents/guardian with current illness diagnosis as well as when to seek additional healthcare due to changing or for worsening symptoms. Patient voiced understanding. 30 minutes were spent on the phone with patient. Daryle German, was evaluated through a synchronous (real-time) audio-video encounter. The patient (or guardian if applicable) is aware that this is a billable service. Verbal consent to proceed has been obtained within the past 12 months.  The visit was conducted pursuant to the emergency declaration under the [facility] waiver authority and the Coronavirus Preparedness and Response Supplemental Appropriations Act. Patient identification was verified, and a caregiver was present when appropriate. The patient was located in a state where the provider was credentialed to provide care. Total time spent for this encounter: 30m    --IRA Ramos DO on 5/4/2021 at 12:12 PM    An electronic signature was used to authenticate this note.               [address] [place] [address]

## 2021-05-04 NOTE — PATIENT INSTRUCTIONS
Patient Education        Generalized Anxiety Disorder: Care Instructions  Overview     We all worry. It's a normal part of life. But when you have generalized anxiety disorder, you worry about lots of things. You have a hard time not worrying. This worry or anxiety interferes with your relationships, work or school, and other areas of your life. You may worry most days about things like money, health, work, or friends. That may make you feel tired, tense, or cranky. It can make it hard to think. It may get in the way of healthy sleep. Counseling and medicine can both work to treat anxiety. They are often used together with lifestyle changes, such as getting enough sleep. Treatment can include a type of counseling called cognitive-behavioral therapy, or CBT. It helps you notice and replace thoughts that make you worry. You also might have counseling along with those closest to you so that they can help. Follow-up care is a key part of your treatment and safety. Be sure to make and go to all appointments, and call your doctor if you are having problems. It's also a good idea to know your test results and keep a list of the medicines you take. How can you care for yourself at home? · Get at least 30 minutes of exercise on most days of the week. Walking is a good choice. You also may want to do other activities, such as running, swimming, cycling, or playing tennis or team sports. · Learn and do relaxation exercises, such as deep breathing. · Go to bed at the same time every night. Try for 8 to 10 hours of sleep a night. · Avoid alcohol, marijuana, and illegal drugs. · Find a counselor who uses cognitive-behavioral therapy (CBT). · Don't isolate yourself. Let those closest to you help you. Find someone you can trust and confide in. Talk to that person. · Be safe with medicines. Take your medicines exactly as prescribed. Call your doctor if you think you are having a problem with your medicine.   · Practice healthy thinking. How you think can affect how you feel and act. Ask yourself if your thoughts are helpful or unhelpful. If they are unhelpful, you can learn how to change them. · Recognize and accept your anxiety. When you feel anxious, say to yourself, \"This is not an emergency. I feel uncomfortable, but I am not in danger. I can keep going even if I feel anxious. \"  When should you call for help? Call  911 anytime you think you may need emergency care. For example, call if:    · You feel you can't stop from hurting yourself or someone else. Keep the numbers for these national suicide hotlines: 2-034-868-TALK (6-114.896.1919) and 0-077-GHWODCQ (9-414.781.2401). If you or someone you know talks about suicide or feeling hopeless, get help right away. Call your doctor or counselor now or seek immediate medical care if:    · You have new anxiety, or your anxiety gets worse.     · You have been feeling sad, depressed, or hopeless or have lost interest in things that you usually enjoy.     · You do not get better as expected. Where can you learn more? Go to https://[employer] Properties.Mopio. org and sign in to your Open Source Storage account. Enter G123 in the Te[employer]ach box to learn more about \"Generalized Anxiety Disorder: Care Instructions. \"     If you do not have an account, please click on the \"Sign Up Now\" link. Current as of: November 3, 2020               Content Version: 12.8  © 0078-8660 [employer]. Care instructions adapted under license by [facility] If you have questions about a medical condition or this instruction, always ask your healthcare professional. [place] [address] any warranty or liability for your use of this information. Patient Education        Medication Refill: Care Instructions  Your Care Instructions     Medicines are a big part of treatment for many health problems. So it can be upsetting to run out of your medicine.  It may even be dangerous to stop a medicine suddenly. The doctor may have given you enough medicine to help you until you can see your regular doctor. The doctor has checked you carefully, but problems can develop later. If you notice any problems or new symptoms, get medical treatment right away. Follow-up care is a key part of your treatment and safety. Be sure to make and go to all appointments, and call your doctor if you are having problems. It's also a good idea to know your test results and keep a list of the medicines you take. How can you care for yourself at home? · Be safe with medicines. Take your medicines exactly as prescribed. · Know when you will run out of your medicine. Use a calendar to remind you to get a refill. Don't wait until you have only a few pills left. · Talk with your doctor or pharmacist about your medicine. Find out what to do if you miss a dose. When should you call for help? Call your doctor now or seek immediate medical care if:    · You have problems with your medicine. Watch closely for changes in your health, and be sure to contact your doctor if you have any problems. Where can you learn more? Go to https://PeopleDocpeSimple.TV.Intent Media. org and sign in to your Guidekick account. Enter K326 in the [facility] box to learn more about \"Medication Refill: Care Instructions. \"     If you do not have an account, please click on the \"Sign Up Now\" link. Current as of: May 27, 2020               Content Version: 12.8  © 2006-2021 "[employer]. Care instructions adapted under license by [facility] [employer] [facility] ([facility]). If you have questions about a medical condition or this instruction, always ask your healthcare professional. [place] [address] any warranty or liability for your use of this information.          Patient Education        Safe Use of Opioid Pain Medicine: Care Instructions  Your Care Instructions  Pain is your body's way of warning you that something is wrong. Pain feels different for everybody. Only you can describe your pain. A doctor can suggest or prescribe many types of medicines for pain. These range from over-the-counter medicines like acetaminophen (Tylenol) to powerful medicines called opioids. Examples of opioids are fentanyl, hydrocodone, morphine, and oxycodone. Heroin is an illegal opioid  Opioids are strong medicines. They can help you manage pain when you use them the right way. But if you misuse them, they can cause serious harm and even death. For these reasons, doctors are very careful about how they prescribe opioids. If you decide to take opioids, here are some things to remember. · Keep your doctor informed. You can develop opioid use disorder. Moderate to severe opioid use disorder is sometimes called addiction. The risk is higher if you have a history of substance use. Your doctor will monitor you closely for signs of opioid use disorder and to figure out when you no longer need to take opioids. · Make a treatment plan. The goal of your plan is to be able to function and do the things you need to do, even if you still have some pain. You might be able to manage your pain with other non-opioid options like physical therapy, relaxation, or over-the-counter pain medicines. · Be aware of the side effects. Opioids can cause serious side effects, such as constipation, dry mouth, and nausea. And over time, you may need a higher dose to get pain relief. This is called tolerance. Your body also gets used to opioids. This is called physical dependence. If you suddenly stop taking them, you may have withdrawal symptoms. The doctor carefully considered what pain medicine is right for you. You may not have received opioids if your doctor was concerned about drug interactions or your safety, or if he or she had other concerns. It is best to have one doctor or clinic treat your pain.  This way you will get the pain medicine that will help you the most. And a doctor will be able to watch for any problems that the medicine might cause. The doctor has checked you carefully, but problems can develop later. If you notice any problems or new symptoms,  get medical treatment right away. Follow-up care is a key part of your treatment and safety. Be sure to make and go to all appointments, and call your doctor if you are having problems. It's also a good idea to know your test results and keep a list of the medicines you take. How can you care for yourself at home? If you need to take opioids to manage your pain, remember these safety tips. · Follow directions carefully. It's easy to misuse opioids if you take a dose other than what's prescribed by your doctor. This can lead to overdose and even death. Even sharing them with someone they weren't meant for is misuse. · Be cautious. Opioids may affect your judgment and decision making. Do not drive or operate machinery until you can think clearly. Talk with your doctor about when it is safe to drive. · Reduce the risk of drug interactions. Opioids can be dangerous if you take them with alcohol or with certain drugs like sleeping pills and muscle relaxers. Make sure your doctor knows about all the other medicines you take, including over-the-counter medicines. Don't start any new medicines before you talk to your doctor or pharmacist.  · Safely store and dispose of opioids. Store opioids in a safe and secure place. Make sure that pets, children, friends, and family can't get to them. When you're done using opioids, make sure to dispose of them safely and as quickly as possible. The U.S. Food and Drug Administration (FDA) recommends these disposal options. ? The best option is to take your medicine to a drop-off box or take-back program that is authorized by the [address] ([place]).   ? If these programs aren't available in your area and your medicine doesn't have specific disposal instructions (such as flushing), you can throw them into your household trash if you follow the FDA's instructions. Visit fda.gov and search for \"unused medicine disposal.\"  ? If you have opioid patches (used or unused), your options are to take them to a [place]-authorized site or flush them down the toilet. Do not throw them in the trash. ? Only flush your medicine down the toilet if you can't get to a [place]-approved site or your medicine instructions state clearly to flush them. · Reduce the risk of overdose. Misuse of opioids can be very dangerous. Protect yourself by asking your doctor about a naloxone rescue kit. It can help youand even save your lifeif you take too much of an opioid. Try other ways to reduce pain. · Relax, and reduce stress. Relaxation techniques such as deep breathing or meditation can help. · Keep moving. Gentle, daily exercise can help reduce pain over the long run. Try low- or no-impact exercises such as walking, swimming, and stationary biking. Do stretches to stay flexible. · Try heat, cold packs, and massage. · Get enough sleep. Pain can make you tired and drain your energy. Talk with your doctor if you have trouble sleeping because of pain. · Think positive. Your thoughts can affect your pain level. Do things that you enjoy to distract yourself when you have pain instead of focusing on the pain. See a movie, read a book, listen to music, or spend time with a friend. If you are not taking a prescription pain medicine, ask your doctor if you can take an over-the-counter medicine. When should you call for help? Call 911 anytime you think you may need emergency care. For example, call if:  · You have symptoms of a severe allergic reaction. These may include:  ? Sudden raised, red areas (hives) all over your body. ? Swelling of the throat, mouth, lips, or tongue. ? Trouble breathing. ? Passing out (losing consciousness).  Or you may feel very lightheaded or suddenly feel weak, confused, or restless. · You have signs of an overdose. These include:  ? Cold, clammy skin. ? Confusion. ? Severe nervousness or restlessness. ? Severe dizziness, drowsiness, or weakness. ? Slow breathing. ? Seizures. Call your doctor now or seek immediate medical care if:  · You have symptoms of an allergic reaction, such as:  ? A rash or hives (raised, red areas on the skin). ? Itching. ? Swelling. ? Belly pain, nausea, or vomiting. Watch closely for changes in your health, and be sure to contact your doctor if:  · You think you might be taking too much pain medicine, and you need help to take less or stop. · Your medicine is not helping with the pain. · You are having side effects, such as constipation. Where can you learn more? Go to https://Nativeflowelgineb.BigMachines. org and sign in to your [employer] account. Enter R108 in the [employer] box to learn more about \"Safe Use of Opioid Pain Medicine: Care Instructions. \"     If you do not have an account, please click on the \"Sign Up Now\" link. Current as of: August 4, 2020               Content Version: 12.8  © 9720-0908 [employer]. Care instructions adapted under license by [facility] ([facility]). If you have questions about a medical condition or this instruction, always ask your healthcare professional. [address] any warranty or liability for your use of this information.

## 2021-06-01 ENCOUNTER — OFFICE VISIT (OUTPATIENT)
Dept: PRIMARY CARE CLINIC | Age: 46
End: 2021-06-01
Payer: MEDICARE

## 2021-06-01 ENCOUNTER — HOSPITAL ENCOUNTER (OUTPATIENT)
Dept: GENERAL RADIOLOGY | Age: 46
Discharge: HOME | End: 2021-06-01
Payer: MEDICARE

## 2021-06-01 VITALS
DIASTOLIC BLOOD PRESSURE: 76 MMHG | OXYGEN SATURATION: 98 % | SYSTOLIC BLOOD PRESSURE: 124 MMHG | BODY MASS INDEX: 21.79 KG/M2 | RESPIRATION RATE: 20 BRPM | WEIGHT: 123 LBS | HEART RATE: 84 BPM | TEMPERATURE: 98.6 F

## 2021-06-01 DIAGNOSIS — Z87.442 HISTORY OF KIDNEY STONES: ICD-10-CM

## 2021-06-01 DIAGNOSIS — N20.0 NEPHROLITHIASIS: ICD-10-CM

## 2021-06-01 DIAGNOSIS — K59.00 CONSTIPATION, UNSPECIFIED CONSTIPATION TYPE: ICD-10-CM

## 2021-06-01 DIAGNOSIS — R30.9 PAINFUL URINATION: Primary | ICD-10-CM

## 2021-06-01 DIAGNOSIS — R82.79 URINE CULTURE POSITIVE: ICD-10-CM

## 2021-06-01 DIAGNOSIS — R30.9 PAINFUL URINATION: ICD-10-CM

## 2021-06-01 LAB
APPEARANCE FLUID: NORMAL
BILIRUBIN, POC: NORMAL
BLOOD URINE, POC: NORMAL
CLARITY, POC: NORMAL
COLOR, POC: NORMAL
GLUCOSE URINE, POC: NEGATIVE
KETONES, POC: NORMAL
LEUKOCYTE EST, POC: NEGATIVE
NITRITE, POC: NEGATIVE
PH, POC: 7.5
PROTEIN, POC: NORMAL
SPECIFIC GRAVITY, POC: 1.02
UROBILINOGEN, POC: 8

## 2021-06-01 PROCEDURE — G8420 CALC BMI NORM PARAMETERS: HCPCS | Performed by: NURSE PRACTITIONER

## 2021-06-01 PROCEDURE — 99213 OFFICE O/P EST LOW 20 MIN: CPT | Performed by: NURSE PRACTITIONER

## 2021-06-01 PROCEDURE — 1036F TOBACCO NON-USER: CPT | Performed by: NURSE PRACTITIONER

## 2021-06-01 PROCEDURE — 96372 THER/PROPH/DIAG INJ SC/IM: CPT | Performed by: NURSE PRACTITIONER

## 2021-06-01 PROCEDURE — G8427 DOCREV CUR MEDS BY ELIG CLIN: HCPCS | Performed by: NURSE PRACTITIONER

## 2021-06-01 PROCEDURE — 74018 RADEX ABDOMEN 1 VIEW: CPT

## 2021-06-01 PROCEDURE — 81002 URINALYSIS NONAUTO W/O SCOPE: CPT | Performed by: NURSE PRACTITIONER

## 2021-06-01 RX ORDER — KETOROLAC TROMETHAMINE 30 MG/ML
30 INJECTION, SOLUTION INTRAMUSCULAR; INTRAVENOUS ONCE
Status: COMPLETED | OUTPATIENT
Start: 2021-06-01 | End: 2021-06-01

## 2021-06-01 RX ADMIN — KETOROLAC TROMETHAMINE 30 MG: 30 INJECTION, SOLUTION INTRAMUSCULAR; INTRAVENOUS at 09:50

## 2021-06-01 ASSESSMENT — ENCOUNTER SYMPTOMS
CHEST TIGHTNESS: 0
BACK PAIN: 1

## 2021-06-01 NOTE — PROGRESS NOTES
[address] [facility] [address] [address]  Dept: [phone]  Dept Fax: [phone]: [phone]     Visit type: Established patient    Reason for Visit: Dysuria (x 2 days) and Lower Back Pain (Left side pain x 2 days)      Assessment and Plan       1. Painful urination  -     POCT Urinalysis no Micro  -     Urinalysis Reflex to Culture  -     XR ABDOMEN (KUB) (SINGLE AP VIEW); Future  2. Urine culture positive  3. History of kidney stones  -     XR ABDOMEN (KUB) (SINGLE AP VIEW); Future  4. Nephrolithiasis  5. Constipation, unspecified constipation type      Return if symptoms worsen or fail to improve. Obtain xray will call with results any indicated treatments. Push fluids that is not only Dr Canelo Markham! Water and non caffeine liquids as much as possible. Urine dark and concentrated and large amount of retained stool. PUSH THE FLUIDS! If patient does not urinate, febrile, worsening pain follow up with ER otherwise FU with your PCP. Subjective       Patient and guardian note patient has pain in left side for a few days that is worse than usual.  Patient notes hx of flank pain that is chronic. Guardian notes patient has a personal hx of kidney stones with lithotripsy several years ago. Patient has speech difficulty that is from previous accident as well as wheel chair bound from same. She is afebrile and notes she can urinate but not as much as usual right now. Review of Systems   Constitutional: Negative for chills, fatigue and fever. Respiratory: Negative for chest tightness. Cardiovascular: Negative for chest pain. Genitourinary: Positive for difficulty urinating and dysuria. Negative for hematuria. Musculoskeletal: Positive for back pain (left flank).         Allergies   Allergen Reactions    Latex     Bactrim [Sulfamethoxazole-Trimethoprim]     Ciprofloxacin     Ciprofloxacin Hcl     Depakote [Divalproex Sodium]     Dilantin [Phenytoin Sodium Extended]     Dye [Iodides] Other (See Comments)     Skin peeled off    Keflex [Cephalexin]     Pcn [Penicillins]     Primaxin [Imipenem]     Unasyn [Ampicillin-Sulbactam Sodium]     Wasp Venom Swelling       Outpatient Medications Prior to Visit   Medication Sig Dispense Refill    HYDROcodone-acetaminophen (NORCO) 7.5-325 MG per tablet Take 1 tablet by mouth every 8 hours as needed for Pain for up to 30 days. 40 tablet 0    triamcinolone (KENALOG) 0.1 % cream Apply topically 2 times daily. 80 g 5    albuterol sulfate HFA (VENTOLIN HFA) 108 (90 Base) MCG/ACT inhaler Inhale 2 puffs into the lungs 4 times daily as needed for Wheezing 3 Inhaler 3    lidocaine-prilocaine (EMLA) 2.5-2.5 % cream Apply topically as needed.  210 g 3    lamoTRIgine (LAMICTAL) 200 MG tablet Take 1 tablet by mouth 2 times daily 60 tablet 11    desloratadine (CLARINEX) 5 MG tablet Take 1 tablet by mouth daily 30 tablet 11    medroxyPROGESTERone (DEPO-PROVERA) 150 MG/ML injection Inject 150 mg into the muscle every 3 months 1 mL 3    hydrocortisone 2.5 % cream APPLY EXTERNALLY TO THE AFFECTED AREA TWICE DAILY AS DIRECTED 30 g 1    senna-docusate (SENEXON-S) 8.6-50 MG per tablet Take 2 tablets by mouth 2 times daily 120 tablet 11    celecoxib (CELEBREX) 200 MG capsule TAKE 1 CAPSULE BY MOUTH DAILY 90 capsule 3    omeprazole (PRILOSEC) 20 MG delayed release capsule TAKE 1 CAPSULE BY MOUTH DAILY 90 capsule 3    Incontinence Supplies MISC All incontinence supplies: diapers, wipes, chucks, gloves x 1 month and 11  each 0    diclofenac sodium (VOLTAREN) 1 % GEL Apply 2 g topically 4 times daily 100 g 5    fluconazole (DIFLUCAN) 150 MG tablet Take 1 tablet by mouth daily 3 tablet 0    ondansetron (ZOFRAN-ODT) 4 MG disintegrating tablet Place 1 tablet under the tongue every 8 hours as needed for Nausea or Vomiting 20 tablet 0    clotrimazole (LOTRIMIN) 1 % cream APPLY EXTERNALLY TO THE AFFECTED AREA TWICE DAILY 30 g 0    Misc. Devices (WHEELCHAIR CUSHION) MISC Thin roho cushion 1 each 0    nystatin (MYCOSTATIN) 760811 UNIT/ML suspension Take 5 mLs by mouth 4 times daily 473 mL 1    naloxone (NARCAN) 4 MG/0.1ML LIQD nasal spray 1 spray by Nasal route as needed for Opioid Reversal 1 each 0    Misc. Devices [facility]) MISC 1 Units by Enteral route daily With reclining back 1 each 0    hydrOXYzine (ATARAX) 25 MG tablet Take 1 tablet by mouth every 8 hours as needed for Anxiety (Patient taking differently: Take 25 mg by mouth as needed for Anxiety ) 30 tablet 3    albuterol (PROVENTIL) (2.5 MG/3ML) 0.083% nebulizer solution Take 3 mLs by nebulization every 6 hours as needed for Wheezing (coughing) 120 each 5    medicated lip balm (BLISTEX/CARMEX) 2-2.5-6.6 % STCK Apply topically as needed for Dry Lips      EPINEPHrine (EPIPEN 2-ADAN) 0.3 MG/0.3ML SOAJ injection Inject 0.3 mLs into the muscle once for 1 dose Use as directed for allergic reaction 2 each 5     No facility-administered medications prior to visit.         Past Medical History:   Diagnosis Date    Arthritis     GERD (gastroesophageal reflux disease)     History of blood transfusion     Incontinence     Seizures ([address]         Social History     Tobacco Use    Smoking status: Former Smoker     Packs/day: 1.00     Years: 19.00     Pack years: 19.00     Types: Cigarettes     Quit date: 2015     Years since quittin.2    Smokeless tobacco: Never Used    Tobacco comment: quit smoking  6 yrs ago   Substance Use Topics    Alcohol use: No        Past Surgical History:   Procedure Laterality Date    APPENDECTOMY      CHOLECYSTECTOMY      CSF SHUNT Right     DEEP BRAIN STIMULATOR PLACEMENT      tremor control it is off now    NM EXCIS CHALAZION,GEN ANESTHESIA Right 2018    EYE CHALAZION EXCISION performed by Shannon [facility]ooten [facility] at [facility]    NM XCAPSL CTRC RMVL INSJ IO LENS PROSTH W/O ECP Left 6/15/2017    EYE CATARACT EMULSIFICATION IOL IMPLANT performed by Carlos Sheffield MD at [address] [place] [address] [place] XCAPSL CTRC RMVL INSJ IO LENS PROSTH W/O ECP Right 8/4/2017    CATARCAT EXTRACTION EYE WITH IOL performed by Carlos Sheffield MD at [facility] OR       Family History   Problem Relation Age of Onset    High Blood Pressure Mother     High Blood Pressure Father        Objective       /76 (Site: Right Upper Arm, Position: Sitting)   Pulse 84   Temp 98.6 °F (37 °C) (Temporal)   Resp 20   Wt 123 lb (55.8 kg)   SpO2 98%   BMI 21.79 kg/m²   Physical Exam  Constitutional:       General: She is not in acute distress. Appearance: She is normal weight. She is not ill-appearing or toxic-appearing. Comments: Baseline is wheelchair bound   Cardiovascular:      Rate and Rhythm: Normal rate. Abdominal:      Palpations: Abdomen is soft. Tenderness: There is no abdominal tenderness. There is left CVA tenderness (left flank pain ). There is no guarding. Neurological:      Mental Status: She is alert. Data Reviewed and Summarized           Imaging/Testing:    EXAMINATION:  XR ABDOMEN (KUB) (SINGLE AP VIEW)  6/1/2021 11:15 AM   HISTORY: History kidney stones. Painful urination   COMPARISON: 1/27/2020 acute abdominal series and 1/20/2018 abdomen   pelvis CT   TECHNIQUE: Single view: KUB   FINDINGS:    There are small, 2-3 mm, punctate calcific densities appreciated in   the region of the left kidney compatible with nephrolithiasis. There is stool and gas identified throughout the colon without dilated   bowel loops. There is no organomegaly. Inferior vena cava filter identified. Catheter from ventricular peritoneal shunt L5. No evidence of   mechanical discontinuity observed. There is deformity and flattening of the left femoral head, consider   late stage avascular necrosis. No acute osseous abnormalities observed, otherwise.       Impression   1. Left nephrolithiasis.    2. Unremarkable bowel gas pattern without bowel dilatation or evidence   of obstruction.    Signed by Dr Mya Kpalan on 6/1/2021 11:27 AM           LOVELY Morales - CNP

## 2021-06-01 NOTE — PATIENT INSTRUCTIONS
Obtain xray will call with results any indicated treatments. Push fluids that is not only Dr Crescencio Posey! Water and non caffeine liquids as much as possible. Urine dark and concentrated and large amount of retained stool. PUSH THE FLUIDS! If patient does not urinate, febrile, worsening pain follow up with ER otherwise FU with your PCP.

## 2021-06-03 DIAGNOSIS — N39.0 URINARY TRACT INFECTION WITHOUT HEMATURIA, SITE UNSPECIFIED: Primary | ICD-10-CM

## 2021-06-03 LAB — URINE CULTURE, ROUTINE: NORMAL

## 2021-06-03 RX ORDER — NITROFURANTOIN 25; 75 MG/1; MG/1
100 CAPSULE ORAL 2 TIMES DAILY
Qty: 14 CAPSULE | Refills: 0 | Status: SHIPPED | OUTPATIENT
Start: 2021-06-03 | End: 2021-06-10

## 2021-06-07 ENCOUNTER — TELEPHONE (OUTPATIENT)
Dept: PRIMARY CARE CLINIC | Age: 46
End: 2021-06-07

## 2021-06-07 NOTE — TELEPHONE ENCOUNTER
pts dad called. Pt was seen at [address] [facility] about a week ago and was told that she had a UTI. But also had kidney stones. He wants to know if she needs to see someone for these.

## 2021-06-08 NOTE — TELEPHONE ENCOUNTER
No, as long as they stay in the kidney, they are not a problem. She does need to drink more water to dilute the urine so she does not form more stones.

## 2021-06-15 ENCOUNTER — VIRTUAL VISIT (OUTPATIENT)
Dept: PRIMARY CARE CLINIC | Age: 46
End: 2021-06-15
Payer: MEDICARE

## 2021-06-15 DIAGNOSIS — M87.00 AVASCULAR NECROSIS (HCC): ICD-10-CM

## 2021-06-15 DIAGNOSIS — F41.9 ANXIETY: ICD-10-CM

## 2021-06-15 DIAGNOSIS — M15.9 PRIMARY OSTEOARTHRITIS INVOLVING MULTIPLE JOINTS: ICD-10-CM

## 2021-06-15 DIAGNOSIS — Z99.3 WHEELCHAIR BOUND: ICD-10-CM

## 2021-06-15 DIAGNOSIS — G89.4 CHRONIC PAIN SYNDROME: ICD-10-CM

## 2021-06-15 DIAGNOSIS — Z79.899 MEDICATION MANAGEMENT: ICD-10-CM

## 2021-06-15 PROCEDURE — 99442 PR PHYS/QHP TELEPHONE EVALUATION 11-20 MIN: CPT | Performed by: PEDIATRICS

## 2021-06-15 RX ORDER — HYDROCODONE BITARTRATE AND ACETAMINOPHEN 7.5; 325 MG/1; MG/1
1 TABLET ORAL EVERY 8 HOURS PRN
Qty: 40 TABLET | Refills: 0 | Status: SHIPPED | OUTPATIENT
Start: 2021-06-15 | End: 2021-07-16 | Stop reason: SDUPTHER

## 2021-06-15 RX ORDER — DIAZEPAM 5 MG/1
5 TABLET ORAL EVERY 8 HOURS PRN
Qty: 60 TABLET | Refills: 0 | Status: CANCELLED | OUTPATIENT
Start: 2021-06-15 | End: 2021-06-25

## 2021-06-15 ASSESSMENT — PATIENT HEALTH QUESTIONNAIRE - PHQ9
SUM OF ALL RESPONSES TO PHQ QUESTIONS 1-9: 0
1. LITTLE INTEREST OR PLEASURE IN DOING THINGS: 0
SUM OF ALL RESPONSES TO PHQ QUESTIONS 1-9: 0
2. FEELING DOWN, DEPRESSED OR HOPELESS: 0
SUM OF ALL RESPONSES TO PHQ9 QUESTIONS 1 & 2: 0
SUM OF ALL RESPONSES TO PHQ QUESTIONS 1-9: 0

## 2021-06-15 ASSESSMENT — ENCOUNTER SYMPTOMS
EYES NEGATIVE: 1
RHINORRHEA: 0
RESPIRATORY NEGATIVE: 1
ALLERGIC/IMMUNOLOGIC NEGATIVE: 1

## 2021-06-15 NOTE — PATIENT INSTRUCTIONS
Patient Education        Safe Use of Opioid Pain Medicine: Care Instructions  Your Care Instructions  Pain is your body's way of warning you that something is wrong. Pain feels different for everybody. Only you can describe your pain. A doctor can suggest or prescribe many types of medicines for pain. These range from over-the-counter medicines like acetaminophen (Tylenol) to powerful medicines called opioids. Examples of opioids are fentanyl, hydrocodone, morphine, and oxycodone. Heroin is an illegal opioid  Opioids are strong medicines. They can help you manage pain when you use them the right way. But if you misuse them, they can cause serious harm and even death. For these reasons, doctors are very careful about how they prescribe opioids. If you decide to take opioids, here are some things to remember. · Keep your doctor informed. You can develop opioid use disorder. Moderate to severe opioid use disorder is sometimes called addiction. The risk is higher if you have a history of substance use. Your doctor will monitor you closely for signs of opioid use disorder and to figure out when you no longer need to take opioids. · Make a treatment plan. The goal of your plan is to be able to function and do the things you need to do, even if you still have some pain. You might be able to manage your pain with other non-opioid options like physical therapy, relaxation, or over-the-counter pain medicines. · Be aware of the side effects. Opioids can cause serious side effects, such as constipation, dry mouth, and nausea. And over time, you may need a higher dose to get pain relief. This is called tolerance. Your body also gets used to opioids. This is called physical dependence. If you suddenly stop taking them, you may have withdrawal symptoms. The doctor carefully considered what pain medicine is right for you.  You may not have received opioids if your doctor was concerned about drug interactions or your safety, the [address] ([facility]). ? If these programs aren't available in your area and your medicine doesn't have specific disposal instructions (such as flushing), you can throw them into your household trash if you follow the FDA's instructions. Visit fda.gov and search for \"unused medicine disposal.\"  ? If you have opioid patches (used or unused), your options are to take them to a [facility]-authorized site or flush them down the toilet. Do not throw them in the trash. ? Only flush your medicine down the toilet if you can't get to a [facility]-approved site or your medicine instructions state clearly to flush them. · Reduce the risk of overdose. Misuse of opioids can be very dangerous. Protect yourself by asking your doctor about a naloxone rescue kit. It can help youand even save your lifeif you take too much of an opioid. Try other ways to reduce pain. · Relax, and reduce stress. Relaxation techniques such as deep breathing or meditation can help. · Keep moving. Gentle, daily exercise can help reduce pain over the long run. Try low- or no-impact exercises such as walking, swimming, and stationary biking. Do stretches to stay flexible. · Try heat, cold packs, and massage. · Get enough sleep. Pain can make you tired and drain your energy. Talk with your doctor if you have trouble sleeping because of pain. · Think positive. Your thoughts can affect your pain level. Do things that you enjoy to distract yourself when you have pain instead of focusing on the pain. See a movie, read a book, listen to music, or spend time with a friend. If you are not taking a prescription pain medicine, ask your doctor if you can take an over-the-counter medicine. When should you call for help? Call 911 anytime you think you may need emergency care. For example, call if:  · You have symptoms of a severe allergic reaction. These may include:  ? Sudden raised, red areas (hives) all over your body. ?  Swelling of the

## 2021-06-15 NOTE — PROGRESS NOTES
appropriate              She is incontinent and unable to produce urine on demand. Risk Factors:              Illegal Drug use: no              History of substance use disorder: no              Mental health conditions: stable depression and anxiety              Sleep disordered breathing: no              Concurrent Benzodiazepine use: yes     Bowel regimen: She is not regularly adherent to her bowel regimen              We discussed this again and stressed the importance of this regimen.              Senokot S,  1 tablets twice daily              MiraLAX 17 g daily     Bowel function: Chronic constipation     RORY was reviewed today per office protocol. Report shows No discrepancies.  Fill pattern is consistent from single provider(s) at single pharmacy(s). Request # [id number]     Active cumulative morphine equivalent score is 0. This is obviously incorrect       vitals were not taken for this visit. There is no height or weight on file to calculate BMI. I have reviewed the following with the Ms. Shah   Lab Review  Office Visit on 06/01/2021   Component Date Value    Glucose, UA POC 06/01/2021 negative     Bilirubin, UA 06/01/2021 moderate     Ketones, UA 06/01/2021 small     Spec Grav, UA 06/01/2021 1.025     Blood, UA POC 06/01/2021 trace     pH, UA 06/01/2021 7.5     Protein, UA POC 06/01/2021 30+     Urobilinogen, UA 06/01/2021 8     Leukocytes, UA 06/01/2021 negative     Nitrite, UA 06/01/2021 negative     Urine Culture, Routine 06/01/2021 >100,000 CFU/ml mixed skin/urogenital jasper. No further workup    Procedure visit on 01/06/2021   Component Date Value    Preg Test, Ur 01/06/2021 neg     Control 01/06/2021 pos      Copies of these are in the chart. Current Outpatient Medications   Medication Sig Dispense Refill    HYDROcodone-acetaminophen (NORCO) 7.5-325 MG per tablet Take 1 tablet by mouth every 8 hours as needed for Pain for up to 30 days.  40 tablet 0    triamcinolone every 6 hours as needed for Wheezing (coughing) 120 each 5    medicated lip balm (BLISTEX/CARMEX) 2-2.5-6.6 % STCK Apply topically as needed for Dry Lips       No current facility-administered medications for this visit. Allergies: Latex, Bactrim [sulfamethoxazole-trimethoprim], Ciprofloxacin, Ciprofloxacin hcl, Depakote [divalproex sodium], Dilantin [phenytoin sodium extended], Dye [iodides], Keflex [cephalexin], Pcn [penicillins], Primaxin [imipenem], Unasyn [ampicillin-sulbactam sodium], and Wasp venom     Past Medical History:   Diagnosis Date    Arthritis     GERD (gastroesophageal reflux disease)     History of blood transfusion     Incontinence     Seizures ([address]        Family History   Problem Relation Age of Onset    High Blood Pressure Mother     High Blood Pressure Father        Past Surgical History:   Procedure Laterality Date    APPENDECTOMY      CHOLECYSTECTOMY      CSF SHUNT Right     DEEP BRAIN STIMULATOR PLACEMENT      tremor control it is off now    MT EXCIS CHALAZION,GEN ANESTHESIA Right 2018    EYE CHALAZION EXCISION performed [facility] at [facility]    MT XCAPSL CTRC RMVL INSJ IO LENS PROSTH W/O ECP Left 6/15/2017    EYE CATARACT EMULSIFICATION IOL IMPLANT performed by Martha Redding MD at [facility] OR    MT XCAPSL CTRC RMVL INSJ IO LENS PROSTH W/O ECP Right 2017    CATARCAT EXTRACTION EYE WITH IOL performed by Martha Redding MD at [facility] OR       Social History     Tobacco Use    Smoking status: Former Smoker     Packs/day: 1.00     Years: 19.00     Pack years: 19.00     Types: Cigarettes     Quit date: 2015     Years since quittin.3    Smokeless tobacco: Never Used    Tobacco comment: quit smoking  6 yrs ago   Substance Use Topics    Alcohol use: No        Review of Systems   Constitutional: Negative. HENT: Negative for congestion, ear pain, postnasal drip and rhinorrhea. Eyes: Negative. Respiratory: Negative.     Cardiovascular: Negative. Endocrine: Negative. Musculoskeletal: Positive for arthralgias (knees (baseline)). Skin: Negative for rash. Allergic/Immunologic: Negative. Neurological: Negative. Hematological: Negative. Psychiatric/Behavioral: The patient is nervous/anxious. Physical Exam  PE was not done today due to being a telephone visit. ASSESSMENT      ICD-10-CM    1. Chronic pain syndrome  G89.4 HYDROcodone-acetaminophen (NORCO) 7.5-325 MG per tablet   2. Medication management  Z79.899 HYDROcodone-acetaminophen (NORCO) 7.5-325 MG per tablet   3. Primary osteoarthritis involving multiple joints  M89.49 HYDROcodone-acetaminophen (NORCO) 7.5-325 MG per tablet   4. Avascular necrosis (HCC)  M87.00 HYDROcodone-acetaminophen (NORCO) 7.5-325 MG per tablet   5. Wheelchair bound  Z99.3 HYDROcodone-acetaminophen (NORCO) 7.5-325 MG per tablet   6. Anxiety  F41.9          PLAN    1. Chronic pain syndrome  The current medical regimen is effective;  continue present plan and medications.  - HYDROcodone-acetaminophen (NORCO) 7.5-325 MG per tablet; Take 1 tablet by mouth every 8 hours as needed for Pain for up to 30 days. Dispense: 40 tablet; Refill: 0    2. Medication management  The current medical regimen is effective;  continue present plan and medications.  - HYDROcodone-acetaminophen (NORCO) 7.5-325 MG per tablet; Take 1 tablet by mouth every 8 hours as needed for Pain for up to 30 days. Dispense: 40 tablet; Refill: 0    3. Primary osteoarthritis involving multiple joints  The current medical regimen is effective;  continue present plan and medications.  - HYDROcodone-acetaminophen (NORCO) 7.5-325 MG per tablet; Take 1 tablet by mouth every 8 hours as needed for Pain for up to 30 days. Dispense: 40 tablet; Refill: 0    4. Avascular necrosis (HCC)  The current medical regimen is effective;  continue present plan and medications.  - HYDROcodone-acetaminophen (NORCO) 7.5-325 MG per tablet;  Take 1 tablet by mouth every 8 hours as needed for Pain for up to 30 days. Dispense: 40 tablet; Refill: 0    5. Wheelchair bound  The current medical regimen is effective;  continue present plan and medications.  - HYDROcodone-acetaminophen (NORCO) 7.5-325 MG per tablet; Take 1 tablet by mouth every 8 hours as needed for Pain for up to 30 days. Dispense: 40 tablet; Refill: 0    6. Anxiety  She does not need a refill of this today. - diazePAM (VALIUM) 5 MG tablet; Take 1 tablet by mouth every 8 hours as needed for Anxiety or Sleep for up to 10 days. Dispense: 60 tablet; Refill: 0      No orders of the defined types were placed in this encounter. Return in about 1 month (around 7/15/2021) for 15. Due to patients co-morbidities and risk for potential exposure of COVID 19 pandemic. Patient was contacted and agreed to proceed with a telephone virtual visit. The risks and benefits of converting to a telephone virtual visit were discussed in light of the current infectious disease epidemic. Patient also understood that insurance coverage and co-pays are up to their individual insurance plans. Provider performed history of present illness and review of systems. Diagnosis and treatment plan was discussed with patient. Pharmacy of choice was reviewed along with past medical history, medication allergies, and current medications. Education provided to patient or patient parents/guardian with current illness diagnosis as well as when to seek additional healthcare due to changing or for worsening symptoms. Patient voiced understanding. 20 minutes were spent on the phone with patient. Holly Dominguez, was evaluated through a synchronous (real-time) audio-video encounter. The patient (or guardian if applicable) is aware that this is a billable service. Verbal consent to proceed has been obtained within the past 12 months.  The visit was conducted pursuant to the emergency declaration under the [address] and the

## 2021-06-26 ENCOUNTER — HOSPITAL ENCOUNTER (EMERGENCY)
Age: 46
Discharge: HOME | End: 2021-06-26
Payer: MEDICARE

## 2021-06-26 ENCOUNTER — APPOINTMENT (OUTPATIENT)
Dept: CT IMAGING | Age: 46
End: 2021-06-26
Payer: MEDICARE

## 2021-06-26 VITALS
TEMPERATURE: 98.8 F | SYSTOLIC BLOOD PRESSURE: 130 MMHG | RESPIRATION RATE: 22 BRPM | OXYGEN SATURATION: 98 % | HEART RATE: 94 BPM | DIASTOLIC BLOOD PRESSURE: 100 MMHG

## 2021-06-26 DIAGNOSIS — R10.9 LEFT FLANK PAIN: Primary | ICD-10-CM

## 2021-06-26 LAB
ALBUMIN SERPL-MCNC: 4 G/DL (ref 3.5–5.2)
ALP BLD-CCNC: 172 U/L (ref 35–104)
ALT SERPL-CCNC: 15 U/L (ref 5–33)
AMYLASE: 65 U/L (ref 28–100)
ANION GAP SERPL CALCULATED.3IONS-SCNC: 10 MMOL/L (ref 7–19)
AST SERPL-CCNC: 11 U/L (ref 5–32)
BACTERIA: NEGATIVE /HPF
BASOPHILS ABSOLUTE: 0.1 K/UL (ref 0–0.2)
BASOPHILS RELATIVE PERCENT: 0.7 % (ref 0–1)
BILIRUB SERPL-MCNC: 0.4 MG/DL (ref 0.2–1.2)
BILIRUBIN URINE: NEGATIVE
BLOOD, URINE: ABNORMAL
BUN BLDV-MCNC: 9 MG/DL (ref 6–20)
CALCIUM SERPL-MCNC: 9.3 MG/DL (ref 8.6–10)
CHLORIDE BLD-SCNC: 102 MMOL/L (ref 98–111)
CLARITY: ABNORMAL
CO2: 26 MMOL/L (ref 22–29)
COLOR: ABNORMAL
CREAT SERPL-MCNC: 0.7 MG/DL (ref 0.5–0.9)
CRYSTALS, UA: ABNORMAL /HPF
EOSINOPHILS ABSOLUTE: 0.2 K/UL (ref 0–0.6)
EOSINOPHILS RELATIVE PERCENT: 2.6 % (ref 0–5)
EPITHELIAL CELLS, UA: 12 /HPF (ref 0–5)
GFR AFRICAN AMERICAN: >59
GFR NON-AFRICAN AMERICAN: >60
GLUCOSE BLD-MCNC: 97 MG/DL (ref 74–109)
GLUCOSE URINE: NEGATIVE MG/DL
HCG QUALITATIVE: NEGATIVE
HCT VFR BLD CALC: 39 % (ref 37–47)
HEMOGLOBIN: 13.1 G/DL (ref 12–16)
HYALINE CASTS: 6 /HPF (ref 0–8)
IMMATURE GRANULOCYTES #: 0 K/UL
KETONES, URINE: ABNORMAL MG/DL
LEUKOCYTE ESTERASE, URINE: ABNORMAL
LIPASE: 39 U/L (ref 13–60)
LYMPHOCYTES ABSOLUTE: 3.1 K/UL (ref 1.1–4.5)
LYMPHOCYTES RELATIVE PERCENT: 34 % (ref 20–40)
MCH RBC QN AUTO: 33.9 PG (ref 27–31)
MCHC RBC AUTO-ENTMCNC: 33.6 G/DL (ref 33–37)
MCV RBC AUTO: 101 FL (ref 81–99)
MONOCYTES ABSOLUTE: 0.5 K/UL (ref 0–0.9)
MONOCYTES RELATIVE PERCENT: 5.1 % (ref 0–10)
NEUTROPHILS ABSOLUTE: 5.2 K/UL (ref 1.5–7.5)
NEUTROPHILS RELATIVE PERCENT: 57.3 % (ref 50–65)
NITRITE, URINE: NEGATIVE
PDW BLD-RTO: 12.9 % (ref 11.5–14.5)
PH UA: 6.5 (ref 5–8)
PLATELET # BLD: 204 K/UL (ref 130–400)
PMV BLD AUTO: 8.8 FL (ref 9.4–12.3)
POTASSIUM REFLEX MAGNESIUM: 3.8 MMOL/L (ref 3.5–5)
PROTEIN UA: ABNORMAL MG/DL
RBC # BLD: 3.86 M/UL (ref 4.2–5.4)
RBC UA: 6 /HPF (ref 0–4)
SODIUM BLD-SCNC: 138 MMOL/L (ref 136–145)
SPECIFIC GRAVITY UA: 1.03 (ref 1–1.03)
TOTAL PROTEIN: 7.2 G/DL (ref 6.6–8.7)
UROBILINOGEN, URINE: 1 E.U./DL
WBC # BLD: 9.1 K/UL (ref 4.8–10.8)
WBC UA: 9 /HPF (ref 0–5)

## 2021-06-26 PROCEDURE — 81001 URINALYSIS AUTO W/SCOPE: CPT

## 2021-06-26 PROCEDURE — 83690 ASSAY OF LIPASE: CPT

## 2021-06-26 PROCEDURE — 82150 ASSAY OF AMYLASE: CPT

## 2021-06-26 PROCEDURE — 84703 CHORIONIC GONADOTROPIN ASSAY: CPT

## 2021-06-26 PROCEDURE — 74150 CT ABDOMEN W/O CONTRAST: CPT

## 2021-06-26 PROCEDURE — 85025 COMPLETE CBC W/AUTO DIFF WBC: CPT

## 2021-06-26 PROCEDURE — 80053 COMPREHEN METABOLIC PANEL: CPT

## 2021-06-26 PROCEDURE — 36415 COLL VENOUS BLD VENIPUNCTURE: CPT

## 2021-06-26 PROCEDURE — 99282 EMERGENCY DEPT VISIT SF MDM: CPT

## 2021-06-26 RX ORDER — KETOROLAC TROMETHAMINE 30 MG/ML
30 INJECTION, SOLUTION INTRAMUSCULAR; INTRAVENOUS ONCE
Status: DISCONTINUED | OUTPATIENT
Start: 2021-06-26 | End: 2021-06-26 | Stop reason: HOSPADM

## 2021-06-26 RX ORDER — ORPHENADRINE CITRATE 30 MG/ML
60 INJECTION INTRAMUSCULAR; INTRAVENOUS ONCE
Status: DISCONTINUED | OUTPATIENT
Start: 2021-06-26 | End: 2021-06-26 | Stop reason: HOSPADM

## 2021-06-26 RX ORDER — KETOROLAC TROMETHAMINE 10 MG/1
10 TABLET, FILM COATED ORAL EVERY 6 HOURS PRN
Qty: 20 TABLET | Refills: 0 | Status: SHIPPED | OUTPATIENT
Start: 2021-06-26 | End: 2022-01-05

## 2021-06-26 RX ORDER — TAMSULOSIN HYDROCHLORIDE 0.4 MG/1
0.4 CAPSULE ORAL DAILY
Qty: 90 CAPSULE | Refills: 1 | Status: SHIPPED | OUTPATIENT
Start: 2021-06-26 | End: 2022-01-05

## 2021-06-26 ASSESSMENT — ENCOUNTER SYMPTOMS
RHINORRHEA: 0
ABDOMINAL DISTENTION: 0
COLOR CHANGE: 0
PHOTOPHOBIA: 0
EYE PAIN: 0
SHORTNESS OF BREATH: 0
COUGH: 0
EYE DISCHARGE: 0
VOMITING: 0
SORE THROAT: 0
APNEA: 0
NAUSEA: 0
ABDOMINAL PAIN: 1
BACK PAIN: 0

## 2021-06-26 ASSESSMENT — PAIN SCALES - GENERAL: PAINLEVEL_OUTOF10: 5

## 2021-06-26 NOTE — ED PROVIDER NOTES
[facility] - [facility] EMERGENCY DEPT  eMERGENCYdEPARTMENT eNCOUnter      Pt Name: Yarely Tiwari  MRN: [id number]  [place] 1975  Date of evaluation: 6/26/2021  Provider:SOFYA Pedersen    CHIEF COMPLAINT       Chief Complaint   Patient presents with    Flank Pain    Abdominal Pain         HISTORY OF PRESENT ILLNESS  (Location/Symptom, Timing/Onset, Context/Setting, Quality, Duration, Modifying Factors, Severity.)   Yarely Tiwari is a 42 y.o. female who presents to the emergency department with complaints of left flank pain started yesterday. Left upper quadrant left flank pain specific. Patient has a history of traumatic brain injury she is wheelchair-bound prone to UTIs. Patient denies any hematuria dysuria or discharge. Patient has normal bowel movements. No fever no chills she is not having any nausea or vomiting. Rating pain is 5 out of 10. Reproduced with percussion. She has known bilateral nephrolithiasis. Not currently followed by urology per dad. He is the primary historian. Telling me she is at her mental status baseline. HPI    Nursing Notes were reviewed and I agree. REVIEW OF SYSTEMS    (2-9 systems for level 4, 10 or more for level 5)     Review of Systems   Constitutional: Negative for activity change, appetite change, chills and fever. HENT: Negative for congestion, postnasal drip, rhinorrhea and sore throat. Eyes: Negative for photophobia, pain, discharge and visual disturbance. Respiratory: Negative for apnea, cough and shortness of breath. Cardiovascular: Negative for chest pain and leg swelling. Gastrointestinal: Positive for abdominal pain. Negative for abdominal distention, nausea and vomiting. Genitourinary: Positive for flank pain. Negative for vaginal bleeding. Musculoskeletal: Negative for arthralgias, back pain, joint swelling, neck pain and neck stiffness. Skin: Negative for color change and rash.    Neurological: Negative for dizziness, syncope, facial asymmetry and headaches. Hematological: Negative for adenopathy. Does not bruise/bleed easily. Psychiatric/Behavioral: Negative for agitation, behavioral problems and confusion. Except as noted above the remainder of the review of systems was reviewed and negative. PAST MEDICAL HISTORY     Past Medical History:   Diagnosis Date    Arthritis     GERD (gastroesophageal reflux disease)     History of blood transfusion     Incontinence     Seizures ([address]          SURGICAL HISTORY       Past Surgical History:   Procedure Laterality Date    APPENDECTOMY      CHOLECYSTECTOMY      CSF SHUNT Right     DEEP BRAIN STIMULATOR PLACEMENT      tremor control it is off now    ME EXCIS CHALAZION,GEN ANESTHESIA Right 8/23/2018    EYE CHALAZION EXCISION performed by Cammie Ly MD at [facility] OR    ME XCAPSL CTRC RMVL INSJ IO LENS PROSTH W/O ECP Left 6/15/2017    EYE CATARACT EMULSIFICATION IOL IMPLANT performed by Cammie Ly MD at [facility] OR    ME XCAPSL CTRC RMVL INSJ IO LENS PROSTH W/O ECP Right 8/4/2017    CATARCAT EXTRACTION EYE WITH IOL performed by Cammie Ly MD at [address]       Discharge Medication List as of 6/26/2021  7:51 PM      CONTINUE these medications which have NOT CHANGED    Details   HYDROcodone-acetaminophen (NORCO) 7.5-325 MG per tablet Take 1 tablet by mouth every 8 hours as needed for Pain for up to 30 days. , Disp-40 tablet, R-0Normal      triamcinolone (KENALOG) 0.1 % cream Apply topically 2 times daily. , Disp-80 g, R-5, Normal      albuterol sulfate HFA (VENTOLIN HFA) 108 (90 Base) MCG/ACT inhaler Inhale 2 puffs into the lungs 4 times daily as needed for Wheezing, Disp-3 Inhaler, R-3Normal      lidocaine-prilocaine (EMLA) 2.5-2.5 % cream Apply topically as needed. , Disp-210 g, R-3, Normal      lamoTRIgine (LAMICTAL) 200 MG tablet Take 1 tablet by mouth 2 times daily, Disp-60 tablet, R-11Normal      desloratadine (CLARINEX) 5 MG tablet Take 1 tablet by mouth daily, Disp-30 tablet, R-11Normal      medroxyPROGESTERone (DEPO-PROVERA) 150 MG/ML injection Inject 150 mg into the muscle every 3 months, Disp-1 mL, R-3Normal      hydrocortisone 2.5 % cream APPLY EXTERNALLY TO THE AFFECTED AREA TWICE DAILY AS DIRECTED, Disp-30 g, R-1, Normal      senna-docusate (SENEXON-S) 8.6-50 MG per tablet Take 2 tablets by mouth 2 times daily, Disp-120 tablet, R-11Normal      EPINEPHrine (EPIPEN 2-ADAN) 0.3 MG/0.3ML SOAJ injection Inject 0.3 mLs into the muscle once for 1 dose Use as directed for allergic reaction, Disp-2 each, R-5Normal      celecoxib (CELEBREX) 200 MG capsule TAKE 1 CAPSULE BY MOUTH DAILY, Disp-90 capsule,R-3Normal      omeprazole (PRILOSEC) 20 MG delayed release capsule TAKE 1 CAPSULE BY MOUTH DAILY, Disp-90 capsule,R-3Normal      Incontinence Supplies MISC Disp-100 each,R-0, PrintAll incontinence supplies: diapers, wipes, chucks, gloves x 1 month and 11 RF      diclofenac sodium (VOLTAREN) 1 % GEL Apply 2 g topically 4 times daily, Topical, 4 TIMES DAILY Starting Wed 9/23/2020, Disp-100 g,R-5, Normal      ondansetron (ZOFRAN-ODT) 4 MG disintegrating tablet Place 1 tablet under the tongue every 8 hours as needed for Nausea or Vomiting, Disp-20 tablet, R-0Normal      clotrimazole (LOTRIMIN) 1 % cream APPLY EXTERNALLY TO THE AFFECTED AREA TWICE DAILY, Disp-30 g, R-0, Normal      naloxone (NARCAN) 4 MG/0.1ML LIQD nasal spray 1 spray by Nasal route as needed for Opioid Reversal, Disp-1 each, R-0Normal      hydrOXYzine (ATARAX) 25 MG tablet Take 1 tablet by mouth every 8 hours as needed for Anxiety, Disp-30 tablet, R-3Normal      albuterol (PROVENTIL) (2.5 MG/3ML) 0.083% nebulizer solution Take 3 mLs by nebulization every 6 hours as needed for Wheezing (coughing), Disp-120 each, R-5Normal      medicated lip balm (BLISTEX/CARMEX) 2-2.5-6.6 % STCK Apply topically as needed for Dry Lips, Topical, PRN, Historical Med             ALLERGIES     Latex, Bactrim [sulfamethoxazole-trimethoprim], Ciprofloxacin, Ciprofloxacin hcl, Depakote [divalproex sodium], Dilantin [phenytoin sodium extended], Dye [iodides], Keflex [cephalexin], Pcn [penicillins], Primaxin [imipenem], Unasyn [ampicillin-sulbactam sodium], and Wasp venom    FAMILY HISTORY       Family History   Problem Relation Age of Onset    High Blood Pressure Mother     High Blood Pressure Father           SOCIAL HISTORY       Social History     Socioeconomic History    Marital status: Single     Spouse name: None    Number of children: None    Years of education: None    Highest education level: None   Occupational History    None   Tobacco Use    Smoking status: Former Smoker     Packs/day: 1.00     Years: 19.00     Pack years: 19.00     Types: Cigarettes     Quit date: 2015     Years since quittin.3    Smokeless tobacco: Never Used    Tobacco comment: quit smoking  6 yrs ago   Substance and Sexual Activity    Alcohol use: No    Drug use: No    Sexual activity: None   Other Topics Concern    None   Social History Narrative    None     Social Determinants of Health     Financial Resource Strain: Low Risk     Difficulty of Paying Living Expenses: Not hard at all   Food Insecurity: No Food Insecurity    Worried About Running Out of Food in the Last Year: Never true    Jairon of Food in the Last Year: Never true   Transportation Needs: No Transportation Needs    Lack of Transportation (Medical): No    Lack of Transportation (Non-Medical):  No   Physical Activity:     Days of Exercise per Week:     Minutes of Exercise per Session:    Stress:     Feeling of Stress :    Social Connections:     Frequency of Communication with Friends and Family:     Frequency of Social Gatherings with Friends and Family:     Attends Gnosticism Services:     Active Member of Clubs or Organizations:     Attends Club or Organization Meetings:     Marital Status:    Intimate Partner Violence:     Fear of Current or Ex-Partner:     Emotionally Abused:     Physically Abused:     Sexually Abused:        SCREENINGS           PHYSICAL EXAM    (up to 7 forlevel 4, 8 or more for level 5)     ED Triage Vitals [06/26/21 1725]   BP Temp Temp src Pulse Resp SpO2 Height Weight   (!) 130/100 98.8 °F (37.1 °C) -- 94 22 98 % -- --       Physical Exam  Vitals and nursing note reviewed. Constitutional:       General: She is not in acute distress. Appearance: She is well-developed. She is not diaphoretic. HENT:      Head: Normocephalic and atraumatic. Right Ear: External ear normal.      Left Ear: External ear normal.      Mouth/Throat:      Pharynx: No oropharyngeal exudate. Eyes:      General:         Right eye: No discharge. Left eye: No discharge. Pupils: Pupils are equal, round, and reactive to light. Neck:      Thyroid: No thyromegaly. Cardiovascular:      Rate and Rhythm: Normal rate and regular rhythm. Heart sounds: Normal heart sounds. No murmur heard. No friction rub. Pulmonary:      Effort: Pulmonary effort is normal. No respiratory distress. Breath sounds: Normal breath sounds. No stridor. No wheezing. Abdominal:      General: Bowel sounds are normal. There is no distension. Palpations: Abdomen is soft. Tenderness: There is abdominal tenderness in the left upper quadrant. There is left CVA tenderness. Musculoskeletal:         General: Normal range of motion. Cervical back: Normal range of motion and neck supple. Skin:     General: Skin is warm and dry. Capillary Refill: Capillary refill takes less than 2 seconds. Findings: No rash. Neurological:      Mental Status: She is alert and oriented to person, place, and time. Cranial Nerves: No cranial nerve deficit. Sensory: No sensory deficit. Coordination: Coordination normal.   Psychiatric:         Behavior: Behavior normal.         Thought Content:  Thought content normal. DIAGNOSTIC RESULTS     RADIOLOGY:   Non-plain film images such as CT, Ultrasound and MRI are read by the radiologist. Plain radiographic images are visualized and preliminarilyinterpreted by No att. providers found with the below findings:      Interpretation per the Radiologist below, if available at the time of this note:    CT KIDNEY WO CONTRAST   Final Result   . 1. No acute intra-abdominal or pelvic abnormality. Small   nonobstructing nephrolithiasis are noted in the left kidney. The   largest stone measures approximately 3 mm. The ureters are   decompressed and the bladder appears within normal limits. 2. Small amount free fluid in the pelvis, most likely physiologic. 3. Evidence of previous appendectomy. 4. A  shunt is present, the tip is in the mid and lower abdomen,   catheter appears contiguous without breaks. 5. An IVC filter is present and appears to be in satisfactory   position. 6. 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 Signed by Dr Ed Bella.  [facility]          LABS:  Labs Reviewed   URINE RT REFLEX TO CULTURE - Abnormal; Notable for the following components:       Result Value    Color, UA DARK YELLOW (*)     Clarity, UA CLOUDY (*)     Ketones, Urine TRACE (*)     Blood, Urine TRACE (*)     Protein, UA TRACE (*)     Leukocyte Esterase, Urine SMALL (*)     All other components within normal limits   CBC WITH AUTO DIFFERENTIAL - Abnormal; Notable for the following components:    RBC 3.86 (*)     .0 (*)     MCH 33.9 (*)     MPV 8.8 (*)     All other components within normal limits   COMPREHENSIVE METABOLIC PANEL W/ REFLEX TO MG FOR LOW K - Abnormal; Notable for the following components:    Alkaline Phosphatase 172 (*)     All other components within normal limits   MICROSCOPIC URINALYSIS - Abnormal; Notable for the following components:    Bacteria, UA NEGATIVE (*)     Crystals, UA NEG (*)     WBC, UA 9 (*)     RBC, UA 6 (*)     All other components within normal limits   HCG, SERUM, QUALITATIVE   LIPASE   AMYLASE       All other labs were within normal range or notreturned as of this dictation. RE-ASSESSMENT        EMERGENCY DEPARTMENT COURSE and DIFFERENTIAL DIAGNOSIS/MDM:   Vitals:    Vitals:    06/26/21 1725   BP: (!) 130/100   Pulse: 94   Resp: 22   Temp: 98.8 °F (37.1 °C)   SpO2: 98%       MDM  Patient has hematuria microscopically no bacteria patient has relief of symptoms with Toradol patient has no acute findings on her ureters nothing to support kidney stone passage. She has multiple stones within the kidneys bilaterally consistent with history provided. As the pain is easily controlled with Toradol with hematuria we will treat this as either a missed stone or recently passed stone we will treat her for urinary colic and add Flomax on to make sure no sediment is retained. I had like to of her to follow with urology closely. No indication for antibiotics at this time. Plan will be for discharge. PROCEDURES:    Procedures      FINAL IMPRESSION      1.  Left flank pain          DISPOSITION/PLAN   DISPOSITION Decision To Discharge 06/26/2021 07:40:52 PM      PATIENT REFERRED TO:  [address] [place] [address] EMERGENCY DEPT  [facility] access hospital  [phone]    If symptoms worsen    B Kenny Anna DO  [address] [place] [address]  [phone]      As needed    Yancy Head MD  [address] [place] [address]  [phone]    Schedule an appointment as soon as possible for a visit   As needed      DISCHARGE MEDICATIONS:  Discharge Medication List as of 6/26/2021  7:51 PM      START taking these medications    Details   tamsulosin (FLOMAX) 0.4 MG capsule Take 1 capsule by mouth daily, Disp-90 capsule, R-1Normal      ketorolac (TORADOL) 10 MG tablet Take 1 tablet by mouth every 6 hours as needed for Pain, Disp-20 tablet, R-0Normal             (Please note that portions of this note were completed with a voice recognition program. Efforts were made to edit the dictations but occasionallywords are mis-transcribed.)    Niki Caz[address], [place]  06/26/21 6113

## 2021-06-27 NOTE — ED NOTES
Pt presents with abdominal and flank pain. Pt was told she has kidney stones but they were not operable. Pt continues to be in intermittent pain.       Jeremy Major RN  06/26/21 0903

## 2021-06-28 ENCOUNTER — CARE COORDINATION (OUTPATIENT)
Dept: CARE COORDINATION | Age: 46
End: 2021-06-28

## 2021-06-28 ENCOUNTER — TELEPHONE (OUTPATIENT)
Dept: ADMINISTRATIVE | Age: 46
End: 2021-06-28

## 2021-06-28 DIAGNOSIS — N20.0 KIDNEY STONE: Primary | ICD-10-CM

## 2021-06-28 NOTE — TELEPHONE ENCOUNTER
Aminah Faith is called stating pt was seen yesterday at [facility] ED for blood in urine, and kidney stones (a lot) in both Kidneys. Father was advised to get an appointment with Dr. Andrei avelar. Father would also like a call back.     Please Advise

## 2021-06-28 NOTE — TELEPHONE ENCOUNTER
Referral entered to be scheduled, looks like the ER sent patient's info to Urology for review as well

## 2021-06-30 ENCOUNTER — OFFICE VISIT (OUTPATIENT)
Dept: UROLOGY | Age: 46
End: 2021-06-30
Payer: MEDICARE

## 2021-06-30 VITALS — WEIGHT: 123 LBS | HEIGHT: 63 IN | BODY MASS INDEX: 21.79 KG/M2

## 2021-06-30 DIAGNOSIS — R31.21 ASYMPTOMATIC MICROSCOPIC HEMATURIA: ICD-10-CM

## 2021-06-30 DIAGNOSIS — N20.0 LEFT RENAL STONE: Primary | ICD-10-CM

## 2021-06-30 LAB
APPEARANCE FLUID: CLEAR
BILIRUBIN, POC: NORMAL
BLOOD URINE, POC: NORMAL
CLARITY, POC: CLEAR
COLOR, POC: NORMAL
GLUCOSE URINE, POC: NORMAL
KETONES, POC: NORMAL
LEUKOCYTE EST, POC: NORMAL
NITRITE, POC: NORMAL
PH, POC: 5.5
PROTEIN, POC: NORMAL
SPECIFIC GRAVITY, POC: 1.03
UROBILINOGEN, POC: 1

## 2021-06-30 PROCEDURE — 99204 OFFICE O/P NEW MOD 45 MIN: CPT | Performed by: NURSE PRACTITIONER

## 2021-06-30 PROCEDURE — 81002 URINALYSIS NONAUTO W/O SCOPE: CPT | Performed by: NURSE PRACTITIONER

## 2021-06-30 NOTE — PROGRESS NOTES
Brianda Torres is a 42 y.o. female who presents today   Chief Complaint   Patient presents with    New Patient     I am here today for bilateral kidney stones     Patient is a 47-year-old female presents the clinic today with complaints of left flank pain, stabbing pain under her left breast.  She presented to the ED on 6/26/2021 with complaints of left flank pain. CT revealed small nonobstructing left renal calculi, the largest approximately 3 mm. No evidence of obstructive uropathy, renal mass, hydroureteronephrosis. Today she continues to complain of left flank pain. She describes it as a stabbing pain under her left breast.  UA revealed 6 RBCs per high-powered field in the ED. No blood noted on her UA today. She denies any gross hematuria, fever, chills, nausea, vomiting, suprapubic pain, frequency, urgency, dysuria. She is a former smoker approximately 10 years. She is wheelchair-bound due to history of traumatic brain injury she is currently accompanied by her dad who is the primary historian.     Past Medical History:   Diagnosis Date    Arthritis     GERD (gastroesophageal reflux disease)     History of blood transfusion     Incontinence     Seizures (HCC)        Past Surgical History:   Procedure Laterality Date    APPENDECTOMY      CHOLECYSTECTOMY      CSF SHUNT Right     DEEP BRAIN STIMULATOR PLACEMENT      tremor control it is off now    FL EXCIS CHALAZION,GEN ANESTHESIA Right 8/23/2018    EYE CHALAZION EXCISION performed by Anne Arora MD at [facility] OR    FL [facility] RMVL INSJ IO LENS PROSTH W/O ECP Left 6/15/2017    EYE CATARACT EMULSIFICATION IOL IMPLANT performed by Anne Arora MD at [facility] OR    FL XCAPSL CTRC RMVL INSJ IO LENS PROSTH W/O ECP Right 8/4/2017    CATARCAT EXTRACTION EYE WITH IOL performed by Anne Arora MD at [address] [place] [address] OR       Current Outpatient Medications   Medication Sig Dispense Refill    tamsulosin (FLOMAX) 0.4 MG capsule Take 1 capsule by mg by mouth as needed for Anxiety ) 30 tablet 3    albuterol (PROVENTIL) (2.5 MG/3ML) 0.083% nebulizer solution Take 3 mLs by nebulization every 6 hours as needed for Wheezing (coughing) 120 each 5    medicated lip balm (BLISTEX/CARMEX) 2-2.5-6.6 % STCK Apply topically as needed for Dry Lips      EPINEPHrine (EPIPEN 2-ADAN) 0.3 MG/0.3ML SOAJ injection Inject 0.3 mLs into the muscle once for 1 dose Use as directed for allergic reaction 2 each 5     No current facility-administered medications for this visit. Allergies   Allergen Reactions    Latex     Bactrim [Sulfamethoxazole-Trimethoprim]     Ciprofloxacin     Ciprofloxacin Hcl     Depakote [Divalproex Sodium]     Dilantin [Phenytoin Sodium Extended]     Dye [Iodides] Other (See Comments)     Skin peeled off    Keflex [Cephalexin]     Pcn [Penicillins]     Primaxin [Imipenem]     Unasyn [Ampicillin-Sulbactam Sodium]     Wasp Venom Swelling       Social History     Socioeconomic History    Marital status: Single     Spouse name: None    Number of children: None    Years of education: None    Highest education level: None   Occupational History    None   Tobacco Use    Smoking status: Former Smoker     Packs/day: 1.00     Years: 19.00     Pack years: 19.00     Types: Cigarettes     Quit date: 2015     Years since quittin.3    Smokeless tobacco: Never Used    Tobacco comment: quit smoking  6 yrs ago   Substance and Sexual Activity    Alcohol use: No    Drug use: No    Sexual activity: None   Other Topics Concern    None   Social History Narrative    None     Social Determinants of Health     Financial Resource Strain: Low Risk     Difficulty of Paying Living Expenses: Not hard at all   Food Insecurity: No Food Insecurity    Worried About Running Out of Food in the Last Year: Never true    Jairon of Food in the Last Year: Never true   Transportation Needs: No Transportation Needs    Lack of Transportation (Medical):  No    Lack of Transportation (Non-Medical): No   Physical Activity:     Days of Exercise per Week:     Minutes of Exercise per Session:    Stress:     Feeling of Stress :    Social Connections:     Frequency of Communication with Friends and Family:     Frequency of Social Gatherings with Friends and Family:     Attends Christianity Services:     Active Member of Clubs or Organizations:     Attends Club or Organization Meetings:     Marital Status:    Intimate Partner Violence:     Fear of Current or Ex-Partner:     Emotionally Abused:     Physically Abused:     Sexually Abused:        Family History   Problem Relation Age of Onset    High Blood Pressure Mother     High Blood Pressure Father        REVIEW OF SYSTEMS:  Review of Systems   Constitutional: Negative for chills and fever. Gastrointestinal: Negative for abdominal distention, abdominal pain, nausea and vomiting. Genitourinary: Positive for flank pain. Negative for difficulty urinating, dysuria, frequency, hematuria and urgency. Musculoskeletal: Positive for gait problem. Negative for back pain. Neurological: Positive for speech difficulty and weakness. Psychiatric/Behavioral: Negative for agitation and confusion. PHYSICAL EXAM:  Ht 5' 3\" (1.6 m)   Wt 123 lb (55.8 kg)   BMI 21.79 kg/m²   Physical Exam  Vitals and nursing note reviewed. Constitutional:       General: She is not in acute distress. Appearance: Normal appearance. She is not ill-appearing. Pulmonary:      Effort: Pulmonary effort is normal. No respiratory distress. Abdominal:      General: There is no distension. Tenderness: There is no abdominal tenderness. There is left CVA tenderness. There is no right CVA tenderness. Musculoskeletal:      Comments: Wheelchair-bound   Neurological:      Mental Status: She is alert. Mental status is at baseline. Motor: Weakness present.       Coordination: Coordination abnormal.      Gait: Gait abnormal. Psychiatric:         Mood and Affect: Mood normal.         Behavior: Behavior normal.       DATA:  CMP:    Lab Results   Component Value Date     06/26/2021    K 3.8 06/26/2021     06/26/2021    CO2 26 06/26/2021    BUN 9 06/26/2021    CREATININE 0.7 06/26/2021    GFRAA >59 06/26/2021    LABGLOM >60 06/26/2021    GLUCOSE 97 06/26/2021    PROT 7.2 06/26/2021    LABALBU 4.0 06/26/2021    CALCIUM 9.3 06/26/2021    BILITOT 0.4 06/26/2021    ALKPHOS 172 06/26/2021    AST 11 06/26/2021    ALT 15 06/26/2021     Results for orders placed or performed in visit on 06/30/21   POCT Urinalysis no Micro   Result Value Ref Range    Color, UA ROGERIO     Clarity, UA CLEAR     Glucose, UA POC NEG     Bilirubin, UA SMALL     Ketones, UA TRACE     Spec Grav, UA 1.030     Blood, UA POC NEG     pH, UA 5.5     Protein, UA POC 30mg/dL     Urobilinogen, UA 1     Leukocytes, UA NEG     Nitrite, UA NEG     Appearance, Fluid Clear Clear, Slightly Cloudy       IMAGING:  I have reviewed the CT that was obtained on 6/26/2021. She does have approximately 3-4 nonobstructing left renal calculi. Some of these of which appear to be in the renal parenchyma. No hydroureteronephrosis, mass, obstructive uropathy. 1. Left renal stone  Several left renal stones identified on CT. These are punctate, nonobstructing stones that should not be causing her pain. 2 of the stones are likely in the renal parenchyma. We did discuss ESWL if the left lower pole calculus is seen on KUB. I do not think that a 3 mm nonobstructing renal stone is causing her left flank pain. Father does not want to intervene with surgery unless absolutely needed. Will defer flank pain to PCP since this is not secondary to her renal calculi. - POCT Urinalysis no Micro    2. Asymptomatic microscopic hematuria  Denies any gross hematuria. UA in ED revealed 6 RBCs per high-power field. UA today was negative for RBCs. Patient is at low risk.   We'll have her follow-up in 3 months for repeat UA. Orders Placed This Encounter   Procedures    POCT Urinalysis no Micro        Return in about 3 months (around 9/30/2021). At least 50 minutes were spent on the day of the visit reviewing the patient's past medical records/imaging, speaking face to face with the patient, and charting in the post visit period. All information inputted into the note by the MA to include chief complaint, past medical history, past surgical history, medications, allergies, social and family history and review of systems has been reviewed and updated as needed by me. EMR Dragon/transcription disclaimer: Much of this documentt is electronic  transcription/translation of spoken language to printed text. The  electronic translation of spoken language may be erroneous, or at times,  nonsensical words or phrases may be inadvertently transcribed.  Although I  have reviewed the document for such errors, some may still exist.

## 2021-07-01 ASSESSMENT — ENCOUNTER SYMPTOMS
BACK PAIN: 0
ABDOMINAL DISTENTION: 0
NAUSEA: 0
VOMITING: 0
ABDOMINAL PAIN: 0

## 2021-07-11 DIAGNOSIS — K21.9 GASTROESOPHAGEAL REFLUX DISEASE WITHOUT ESOPHAGITIS: ICD-10-CM

## 2021-07-11 DIAGNOSIS — R10.13 MIDEPIGASTRIC PAIN: ICD-10-CM

## 2021-07-11 DIAGNOSIS — R11.0 NAUSEA: ICD-10-CM

## 2021-07-13 RX ORDER — ONDANSETRON 4 MG/1
4 TABLET, ORALLY DISINTEGRATING ORAL EVERY 8 HOURS PRN
Qty: 20 TABLET | Refills: 0 | Status: SHIPPED | OUTPATIENT
Start: 2021-07-13 | End: 2021-07-16 | Stop reason: SDUPTHER

## 2021-07-16 ENCOUNTER — VIRTUAL VISIT (OUTPATIENT)
Dept: PRIMARY CARE CLINIC | Age: 46
End: 2021-07-16
Payer: MEDICARE

## 2021-07-16 DIAGNOSIS — R11.0 NAUSEA: ICD-10-CM

## 2021-07-16 DIAGNOSIS — R10.13 MIDEPIGASTRIC PAIN: ICD-10-CM

## 2021-07-16 DIAGNOSIS — Z99.3 WHEELCHAIR BOUND: ICD-10-CM

## 2021-07-16 DIAGNOSIS — M87.00 AVASCULAR NECROSIS (HCC): ICD-10-CM

## 2021-07-16 DIAGNOSIS — G89.4 CHRONIC PAIN SYNDROME: ICD-10-CM

## 2021-07-16 DIAGNOSIS — K21.9 GASTROESOPHAGEAL REFLUX DISEASE WITHOUT ESOPHAGITIS: ICD-10-CM

## 2021-07-16 DIAGNOSIS — Z79.899 MEDICATION MANAGEMENT: ICD-10-CM

## 2021-07-16 DIAGNOSIS — M15.9 PRIMARY OSTEOARTHRITIS INVOLVING MULTIPLE JOINTS: ICD-10-CM

## 2021-07-16 PROCEDURE — 99443 PR PHYS/QHP TELEPHONE EVALUATION 21-30 MIN: CPT | Performed by: PEDIATRICS

## 2021-07-16 RX ORDER — ONDANSETRON 4 MG/1
4 TABLET, ORALLY DISINTEGRATING ORAL EVERY 8 HOURS PRN
Qty: 20 TABLET | Refills: 0 | Status: SHIPPED | OUTPATIENT
Start: 2021-07-16 | End: 2022-01-05

## 2021-07-16 RX ORDER — HYDROCODONE BITARTRATE AND ACETAMINOPHEN 7.5; 325 MG/1; MG/1
1 TABLET ORAL EVERY 8 HOURS PRN
Qty: 40 TABLET | Refills: 0 | Status: SHIPPED | OUTPATIENT
Start: 2021-07-16 | End: 2021-08-17 | Stop reason: SDUPTHER

## 2021-07-16 ASSESSMENT — ENCOUNTER SYMPTOMS
RHINORRHEA: 0
RESPIRATORY NEGATIVE: 1
NAUSEA: 1
EYES NEGATIVE: 1
ALLERGIC/IMMUNOLOGIC NEGATIVE: 1

## 2021-07-16 NOTE — PROGRESS NOTES
[facility], [address]  Phone [phone]   Fax [phone]      OFFICE VISIT: 2021    Chriss Walshht-: 1975      [facility]  Reason For Visit:  Ibeth Mcpherson is a 42 y.o. Anxiety and Chronic Pain    Patient presents on telephone conference for follow-up of chronic pain and anxiety     She needs a refill of her pain medication today. She does not need a refill of her diazepam.     Chronic pain:  She typically takes hydrocodone 10 mg on a when necessary basis. 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  When combined with lidocaine, it is pretty helpful     Pain Control: Average: 6/10             Best: 4/10                Worst: 10/10  (2021)     Pain Interfering with life:  100%  Pain Interfering with general activity:  100%     Her pain level varies in severity and location      Activities of daily living: She is completely wheelchair bound and unable to ambulate at all. She needs assistance with all ADLs. Even going to the bathroom requires assistance. She does have helped by her father as well as assistant who comes and meets her needs     Adverse effects: None  Aberrant behavior: None  Affect: Very good considering her multiple health issues     Alternative medications:              Celebrex 200 mg daily              Diclofenac gel 1% 2 g 4 times daily topically              Naproxen sodium 220 mg when necessary              She is intolerant of many medications.     Urine Drug screen: 10/23/2018 and appropriate              She is incontinent and unable to produce urine on demand.   Risk Factors:              Illegal Drug use: no              History of substance use disorder: no              Mental health conditions: stable depression and anxiety              Sleep disordered breathing: no              Concurrent Benzodiazepine use: yes     Bowel regimen: She is not regularly adherent to her bowel regimen              We discussed this again and stressed the importance of this regimen.              Senokot S,  1 tablets twice daily              MiraLAX 17 g daily     Bowel function: Chronic constipation     RORY was reviewed today per office protocol. Report shows No discrepancies.  Fill pattern is consistent from single provider(s) at single pharmacy(s). Request # [id number]     Active cumulative morphine equivalent score is 10. Controlled Substance Monitoring:    Acute and Chronic Pain Monitoring:   RX Monitoring 7/16/2021   Attestation The Prescription Monitoring Report for this patient was reviewed today. Acute Pain Prescriptions -   Periodic Controlled Substance Monitoring Possible medication side effects, risk of tolerance/dependence & alternative treatments discussed. ;No signs of potential drug abuse or diversion identified. ;Assessed functional status. ;Obtaining appropriate analgesic effect of treatment. Chronic Pain > 50 MEDD Re-evaluated the status of the patient's underlying condition causing pain. Chronic Pain > 80 MEDD -         She is also having a substantial amount of nausea. She would like some Zofran called in. She does tolerate Zofran very well. This medication is very effective for her nausea       vitals were not taken for this visit. There is no height or weight on file to calculate BMI. I have reviewed the following with the Ms.  [facility]   Lab Review  Office Visit on 06/30/2021   Component Date Value    Color, UA 06/30/2021 ROGERIO     Clarity, UA 06/30/2021 CLEAR     Glucose, UA POC 06/30/2021 NEG     Bilirubin, UA 06/30/2021 SMALL     Ketones, UA 06/30/2021 TRACE     Spec Grav, UA 06/30/2021 1.030     Blood, UA POC 06/30/2021 NEG     pH, UA 06/30/2021 5.5     Protein, UA POC 06/30/2021 30mg/dL     Urobilinogen, UA 06/30/2021 1     Leukocytes, UA 06/30/2021 NEG     Nitrite, UA 06/30/2021 NEG     Appearance, Fluid 06/30/2021 Clear    Admission on 06/26/2021, Discharged on 06/26/2021   Component Date Value    Color, UA 06/26/2021 DARK YELLOW*    Clarity, UA 06/26/2021 CLOUDY*    Glucose, Ur 06/26/2021 Negative     Bilirubin Urine 06/26/2021 Negative     Ketones, Urine 06/26/2021 TRACE*    Specific [place]aine, UA 06/26/2021 1.026     Blood, Urine 06/26/2021 TRACE*    pH, UA 06/26/2021 6.5     Protein, UA 06/26/2021 TRACE*    Urobilinogen, Urine 06/26/2021 1.0     Nitrite, Urine 06/26/2021 Negative     Leukocyte Esterase, Urine 06/26/2021 SMALL*    WBC 06/26/2021 9.1     RBC 06/26/2021 3.86*    Hemoglobin 06/26/2021 13.1     Hematocrit 06/26/2021 39.0     MCV 06/26/2021 101.0*    MCH 06/26/2021 33.9*    MCHC 06/26/2021 33.6     RDW 06/26/2021 12.9     Platelets 15/43/4281 204     MPV 06/26/2021 8.8*    Neutrophils % 06/26/2021 57.3     Lymphocytes % 06/26/2021 34.0     Monocytes % 06/26/2021 5.1     Eosinophils % 06/26/2021 2.6     Basophils % 06/26/2021 0.7     Neutrophils Absolute 06/26/2021 5.2     Immature Granulocytes # 06/26/2021 0.0     Lymphocytes Absolute 06/26/2021 3.1     Monocytes Absolute 06/26/2021 0.50     Eosinophils Absolute 06/26/2021 0.20     Basophils Absolute 06/26/2021 0.10     Sodium 06/26/2021 138     Potassium reflex Magnesi* 06/26/2021 3.8     Chloride 06/26/2021 102     CO2 06/26/2021 26     Anion Gap 06/26/2021 10     Glucose 06/26/2021 97     BUN 06/26/2021 9     CREATININE 06/26/2021 0.7     GFR Non- 06/26/2021 >60     GFR  06/26/2021 >59     Calcium 06/26/2021 9.3     Total Protein 06/26/2021 7.2     Albumin 06/26/2021 4.0     Total Bilirubin 06/26/2021 0.4     Alkaline Phosphatase 06/26/2021 172*    ALT 06/26/2021 15     AST 06/26/2021 11     hCG Qual 06/26/2021 Negative     Lipase 06/26/2021 39     Amylase 06/26/2021 65     Bacteria, UA 06/26/2021 NEGATIVE*    Crystals, UA 06/26/2021 NEG*    Hyaline Casts, UA 06/26/2021 6     WBC, UA 06/26/2021 9*    RBC, UA 06/26/2021 6*    Epithelial Cells, UA 06/26/2021 12    Office Visit on 06/01/2021   Component Date Value    Glucose, UA POC 06/01/2021 negative     Bilirubin, UA 06/01/2021 moderate     Ketones, UA 06/01/2021 small     Spec Grav, UA 06/01/2021 1.025     Blood, UA POC 06/01/2021 trace     pH, UA 06/01/2021 7.5     Protein, UA POC 06/01/2021 30+     Urobilinogen, UA 06/01/2021 8     Leukocytes, UA 06/01/2021 negative     Nitrite, UA 06/01/2021 negative     Urine Culture, Routine 06/01/2021 >100,000 CFU/ml mixed skin/urogenital jasper. No further workup      Copies of these are in the chart. Current Outpatient Medications   Medication Sig Dispense Refill    ondansetron (ZOFRAN-ODT) 4 MG disintegrating tablet Place 1 tablet under the tongue every 8 hours as needed for Nausea or Vomiting 20 tablet 0    HYDROcodone-acetaminophen (NORCO) 7.5-325 MG per tablet Take 1 tablet by mouth every 8 hours as needed for Pain for up to 30 days. 40 tablet 0    tamsulosin (FLOMAX) 0.4 MG capsule Take 1 capsule by mouth daily 90 capsule 1    ketorolac (TORADOL) 10 MG tablet Take 1 tablet by mouth every 6 hours as needed for Pain 20 tablet 0    triamcinolone (KENALOG) 0.1 % cream Apply topically 2 times daily. 80 g 5    albuterol sulfate HFA (VENTOLIN HFA) 108 (90 Base) MCG/ACT inhaler Inhale 2 puffs into the lungs 4 times daily as needed for Wheezing 3 Inhaler 3    lidocaine-prilocaine (EMLA) 2.5-2.5 % cream Apply topically as needed.  210 g 3    lamoTRIgine (LAMICTAL) 200 MG tablet Take 1 tablet by mouth 2 times daily 60 tablet 11    desloratadine (CLARINEX) 5 MG tablet Take 1 tablet by mouth daily 30 tablet 11    medroxyPROGESTERone (DEPO-PROVERA) 150 MG/ML injection Inject 150 mg into the muscle every 3 months 1 mL 3    hydrocortisone 2.5 % cream APPLY EXTERNALLY TO THE AFFECTED AREA TWICE DAILY AS DIRECTED 30 g 1    senna-docusate (SENEXON-S) 8.6-50 MG per tablet Take 2 tablets by mouth 2 times daily 120 tablet 11    EPINEPHrine (EPIPEN 2-ADAN) 0.3 MG/0.3ML SOAJ injection Inject 0.3 mLs into the muscle once for 1 dose Use as directed for allergic reaction 2 each 5    celecoxib (CELEBREX) 200 MG capsule TAKE 1 CAPSULE BY MOUTH DAILY 90 capsule 3    omeprazole (PRILOSEC) 20 MG delayed release capsule TAKE 1 CAPSULE BY MOUTH DAILY 90 capsule 3    Incontinence Supplies MISC All incontinence supplies: diapers, wipes, chucks, gloves x 1 month and 11  each 0    diclofenac sodium (VOLTAREN) 1 % GEL Apply 2 g topically 4 times daily 100 g 5    clotrimazole (LOTRIMIN) 1 % cream APPLY EXTERNALLY TO THE AFFECTED AREA TWICE DAILY 30 g 0    naloxone (NARCAN) 4 MG/0.1ML LIQD nasal spray 1 spray by Nasal route as needed for Opioid Reversal 1 each 0    hydrOXYzine (ATARAX) 25 MG tablet Take 1 tablet by mouth every 8 hours as needed for Anxiety (Patient taking differently: Take 25 mg by mouth as needed for Anxiety ) 30 tablet 3    albuterol (PROVENTIL) (2.5 MG/3ML) 0.083% nebulizer solution Take 3 mLs by nebulization every 6 hours as needed for Wheezing (coughing) 120 each 5    medicated lip balm (BLISTEX/CARMEX) 2-2.5-6.6 % STCK Apply topically as needed for Dry Lips       No current facility-administered medications for this visit.        Allergies: Latex, Bactrim [sulfamethoxazole-trimethoprim], Ciprofloxacin, Ciprofloxacin hcl, Depakote [divalproex sodium], Dilantin [phenytoin sodium extended], Dye [iodides], Keflex [cephalexin], Pcn [penicillins], Primaxin [imipenem], Unasyn [ampicillin-sulbactam sodium], and Wasp venom     Past Medical History:   Diagnosis Date    Arthritis     GERD (gastroesophageal reflux disease)  History of blood transfusion     Incontinence     Seizures (HCC)        Family History   Problem Relation Age of Onset    High Blood Pressure Mother     High Blood Pressure Father        Past Surgical History:   Procedure Laterality Date    APPENDECTOMY      CHOLECYSTECTOMY      CSF SHUNT Right     DEEP BRAIN STIMULATOR PLACEMENT      tremor control it is off now    IA EXCIS CHALAZION,GEN ANESTHESIA Right 2018    EYE CHALAZION EXCISION performed by Canelo Vazquez [facility] at [facility]    IA XCAPSL CTRC RMVL INSJ IO LENS PROSTH W/O ECP Left 6/15/2017    EYE CATARACT EMULSIFICATION IOL IMPLANT performed by Canelo Vazquez MD at [facility] OR    IA XCAPSL CTRC RMVL INSJ IO LENS PROSTH W/O ECP Right 2017    CATARCAT EXTRACTION EYE WITH IOL performed by Canelo Vazquez MD at [facility]       Social History     Tobacco Use    Smoking status: Former Smoker     Packs/day: 1.00     Years: 19.00     Pack years: 19.00     Types: Cigarettes     Quit date: 2015     Years since quittin.4    Smokeless tobacco: Never Used    Tobacco comment: quit smoking  6 yrs ago   Substance Use Topics    Alcohol use: No        Review of Systems   Constitutional: Negative. HENT: Negative for congestion, ear pain, postnasal drip and rhinorrhea. Eyes: Negative. Respiratory: Negative. Cardiovascular: Negative. Gastrointestinal: Positive for nausea. Endocrine: Negative. Musculoskeletal: Positive for arthralgias (knees (baseline)). Skin: Negative for rash. Allergic/Immunologic: Negative. Neurological: Negative. Hematological: Negative. Psychiatric/Behavioral: The patient is nervous/anxious. Physical Exam  Physical exam was not performed today as this was a telephone conference visit      ASSESSMENT      ICD-10-CM    1. Nausea  R11.0 ondansetron (ZOFRAN-ODT) 4 MG disintegrating tablet   2.  Gastroesophageal reflux disease without esophagitis  K21.9 ondansetron (ZOFRAN-ODT) 4 MG disintegrating tablet   3. Midepigastric pain  R10.13 ondansetron (ZOFRAN-ODT) 4 MG disintegrating tablet   4. Chronic pain syndrome  G89.4 HYDROcodone-acetaminophen (NORCO) 7.5-325 MG per tablet   5. Medication management  Z79.899 HYDROcodone-acetaminophen (NORCO) 7.5-325 MG per tablet   6. Primary osteoarthritis involving multiple joints  M89.49 HYDROcodone-acetaminophen (NORCO) 7.5-325 MG per tablet   7. Avascular necrosis (HCC)  M87.00 HYDROcodone-acetaminophen (NORCO) 7.5-325 MG per tablet   8. Wheelchair bound  Z99.3 HYDROcodone-acetaminophen (NORCO) 7.5-325 MG per tablet         PLAN    1. Nausea  Will send in zofran for her.  - ondansetron (ZOFRAN-ODT) 4 MG disintegrating tablet; Place 1 tablet under the tongue every 8 hours as needed for Nausea or Vomiting  Dispense: 20 tablet; Refill: 0    2. Gastroesophageal reflux disease without esophagitis  zofran helps keep from getting out of control, as exacerbated with vomiting.  - ondansetron (ZOFRAN-ODT) 4 MG disintegrating tablet; Place 1 tablet under the tongue every 8 hours as needed for Nausea or Vomiting  Dispense: 20 tablet; Refill: 0    3. Midepigastric pain  Control nausea  - ondansetron (ZOFRAN-ODT) 4 MG disintegrating tablet; Place 1 tablet under the tongue every 8 hours as needed for Nausea or Vomiting  Dispense: 20 tablet; Refill: 0    4. Chronic pain syndrome  The current medical regimen is effective;  continue present plan and medications.  - HYDROcodone-acetaminophen (NORCO) 7.5-325 MG per tablet; Take 1 tablet by mouth every 8 hours as needed for Pain for up to 30 days. Dispense: 40 tablet; Refill: 0    5. Medication management  The current medical regimen is effective;  continue present plan and medications.  - HYDROcodone-acetaminophen (NORCO) 7.5-325 MG per tablet; Take 1 tablet by mouth every 8 hours as needed for Pain for up to 30 days. Dispense: 40 tablet; Refill: 0    6.  Primary osteoarthritis involving multiple joints  The current medical regimen is effective;  continue present plan and medications.  - HYDROcodone-acetaminophen (NORCO) 7.5-325 MG per tablet; Take 1 tablet by mouth every 8 hours as needed for Pain for up to 30 days. Dispense: 40 tablet; Refill: 0    7. Avascular necrosis (HCC)  The current medical regimen is effective;  continue present plan and medications.  - HYDROcodone-acetaminophen (NORCO) 7.5-325 MG per tablet; Take 1 tablet by mouth every 8 hours as needed for Pain for up to 30 days. Dispense: 40 tablet; Refill: 0    8. Wheelchair bound  The current medical regimen is effective;  continue present plan and medications.  - HYDROcodone-acetaminophen (NORCO) 7.5-325 MG per tablet; Take 1 tablet by mouth every 8 hours as needed for Pain for up to 30 days. Dispense: 40 tablet; Refill: 0      No orders of the defined types were placed in this encounter. Return for 30. Due to patients co-morbidities and risk for potential exposure of COVID 19 pandemic. Patient was contacted and agreed to proceed with a telephone virtual visit. The risks and benefits of converting to a telephone virtual visit were discussed in light of the current infectious disease epidemic. Patient also understood that insurance coverage and co-pays are up to their individual insurance plans. Provider performed history of present illness and review of systems. Diagnosis and treatment plan was discussed with patient. Pharmacy of choice was reviewed along with past medical history, medication allergies, and current medications. Education provided to patient or patient parents/guardian with current illness diagnosis as well as when to seek additional healthcare due to changing or for worsening symptoms. Patient voiced understanding. 30 minutes were spent on the phone with patient. [place], was evaluated through a synchronous (real-time) audio-video encounter.  The patient (or guardian if applicable) is aware that this is a billable service. Verbal consent to proceed has been obtained within the past 12 months. The visit was conducted pursuant to the emergency declaration under the [address] [place] [address] authority and the [employer] General Act. Patient identification was verified, and a caregiver was present when appropriate. The patient was located in a state where the provider was credentialed to provide care. Total time spent for this encounter: 30m    --IRA Godfrey DO on 7/16/2021 at 1:38 PM    An electronic signature was used to authenticate this note.

## 2021-08-17 ENCOUNTER — VIRTUAL VISIT (OUTPATIENT)
Dept: PRIMARY CARE CLINIC | Age: 46
End: 2021-08-17
Payer: MEDICARE

## 2021-08-17 DIAGNOSIS — M15.9 PRIMARY OSTEOARTHRITIS INVOLVING MULTIPLE JOINTS: ICD-10-CM

## 2021-08-17 DIAGNOSIS — Z99.3 WHEELCHAIR BOUND: ICD-10-CM

## 2021-08-17 DIAGNOSIS — G89.4 CHRONIC PAIN SYNDROME: ICD-10-CM

## 2021-08-17 DIAGNOSIS — M87.00 AVASCULAR NECROSIS (HCC): ICD-10-CM

## 2021-08-17 DIAGNOSIS — Z79.899 MEDICATION MANAGEMENT: ICD-10-CM

## 2021-08-17 PROCEDURE — 99442 PR PHYS/QHP TELEPHONE EVALUATION 11-20 MIN: CPT | Performed by: PEDIATRICS

## 2021-08-17 RX ORDER — HYDROCODONE BITARTRATE AND ACETAMINOPHEN 7.5; 325 MG/1; MG/1
1 TABLET ORAL EVERY 8 HOURS PRN
Qty: 40 TABLET | Refills: 0 | Status: SHIPPED | OUTPATIENT
Start: 2021-08-17 | End: 2021-08-18 | Stop reason: SDUPTHER

## 2021-08-17 ASSESSMENT — ENCOUNTER SYMPTOMS
EYES NEGATIVE: 1
NAUSEA: 1
RHINORRHEA: 0
ALLERGIC/IMMUNOLOGIC NEGATIVE: 1
RESPIRATORY NEGATIVE: 1

## 2021-08-17 NOTE — PROGRESS NOTES
[address] [facility], [address]  Phone [phone]   Fax [phone]      OFFICE VISIT: 2021    Katy Celaya Alyssa-: 1975      HPI  Reason For Visit:  Chiquita Messina is a 42 y.o. Back Pain and Arthritis    Patient presents via telephone conference visit on follow-up for medication refills for her chronic pain and arthritis. She needs a refill of her pain medication today. She does not need a refill of her diazepam.     Chronic pain:  She typically takes hydrocodone 10 mg on a when necessary basis. 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  When combined with lidocaine, it is pretty helpful     Pain Control: Average: 6/10             Best: 4/10                Worst: 10/10  (2021)     Pain Interfering with life:  100%  Pain Interfering with general activity:  100%     Her pain level varies in severity and location      Activities of daily living: She is completely wheelchair bound and unable to ambulate at all. She needs assistance with all ADLs. Even going to the bathroom requires assistance. She does have helped by her father as well as assistant who comes and meets her needs     Adverse effects: None  Aberrant behavior: None  Affect: Very good considering her multiple health issues     Alternative medications:              Celebrex 200 mg daily              Diclofenac gel 1% 2 g 4 times daily topically              Naproxen sodium 220 mg when necessary              She is intolerant of many medications.     Urine Drug screen: 10/23/2018 and appropriate              She is incontinent and unable to produce urine on demand.   Risk Factors:              Illegal Drug use: no              History of substance use disorder: no              Mental health conditions: stable depression and anxiety              Sleep disordered breathing: no              Concurrent Benzodiazepine use: yes     Bowel regimen: She is not regularly adherent to her bowel regimen              We discussed this again and stressed the importance of this regimen.              Senokot S,  1 tablets twice daily              MiraLAX 17 g daily     Bowel function: Chronic constipation     RORY was reviewed today per office protocol. Report shows No discrepancies.  Fill pattern is consistent from single provider(s) at single pharmacy(s). Request # [id number]     Active cumulative morphine equivalent score is 10. Controlled Substance Monitoring:    Acute and Chronic Pain Monitoring:   RX Monitoring 8/17/2021   Attestation The Prescription Monitoring Report for this patient was reviewed today. Acute Pain Prescriptions -   Periodic Controlled Substance Monitoring Possible medication side effects, risk of tolerance/dependence & alternative treatments discussed. ;No signs of potential drug abuse or diversion identified. ;Assessed functional status. ;Obtaining appropriate analgesic effect of treatment. Chronic Pain > 50 MEDD Re-evaluated the status of the patient's underlying condition causing pain. Chronic Pain > 80 MEDD -          vitals were not taken for this visit. There is no height or weight on file to calculate BMI. I have reviewed the following with the MsGay[facility]  Alyssa   Lab Review  Office Visit on 06/30/2021   Component Date Value    Color, UA 06/30/2021 ROGERIO     Clarity, UA 06/30/2021 CLEAR     Glucose, UA POC 06/30/2021 NEG     Bilirubin, UA 06/30/2021 SMALL     Ketones, UA 06/30/2021 TRACE     Spec Grav, UA 06/30/2021 1.030     Blood, UA POC 06/30/2021 NEG     pH, UA 06/30/2021 5.5     Protein, UA POC 06/30/2021 30mg/dL     Urobilinogen, UA 06/30/2021 1     Leukocytes, UA 06/30/2021 NEG     Nitrite, UA 06/30/2021 NEG     Appearance, Fluid 06/30/2021 Clear    Admission on 06/26/2021, Discharged on 06/26/2021   Component Date Value    Color, UA 06/26/2021 DARK YELLOW*    Clarity, UA 06/26/2021 CLOUDY*    Glucose, Ur 06/26/2021 Negative     Bilirubin Urine 06/26/2021 Negative     Ketones, Urine 06/26/2021 TRACE*    Specific [place], UA 06/26/2021 1.026     Blood, Urine 06/26/2021 TRACE*    pH, UA 06/26/2021 6.5     Protein, UA 06/26/2021 TRACE*    Urobilinogen, Urine 06/26/2021 1.0     Nitrite, Urine 06/26/2021 Negative     Leukocyte Esterase, Urine 06/26/2021 SMALL*    WBC 06/26/2021 9.1     RBC 06/26/2021 3.86*    Hemoglobin 06/26/2021 13.1     Hematocrit 06/26/2021 39.0     MCV 06/26/2021 101.0*    MCH 06/26/2021 33.9*    MCHC 06/26/2021 33.6     RDW 06/26/2021 12.9     Platelets 70/00/0563 204     MPV 06/26/2021 8.8*    Neutrophils % 06/26/2021 57.3     Lymphocytes % 06/26/2021 34.0     Monocytes % 06/26/2021 5.1     Eosinophils % 06/26/2021 2.6     Basophils % 06/26/2021 0.7     Neutrophils Absolute 06/26/2021 5.2     Immature Granulocytes # 06/26/2021 0.0     Lymphocytes Absolute 06/26/2021 3.1     Monocytes Absolute 06/26/2021 0.50     Eosinophils Absolute 06/26/2021 0.20     Basophils Absolute 06/26/2021 0.10     Sodium 06/26/2021 138     Potassium reflex Magnesi* 06/26/2021 3.8     Chloride 06/26/2021 102     CO2 06/26/2021 26     Anion Gap 06/26/2021 10     Glucose 06/26/2021 97     BUN 06/26/2021 9     CREATININE 06/26/2021 0.7     GFR Non- 06/26/2021 >60     GFR  06/26/2021 >59     Calcium 06/26/2021 9.3     Total Protein 06/26/2021 7.2     Albumin 06/26/2021 4.0     Total Bilirubin 06/26/2021 0.4     Alkaline Phosphatase 06/26/2021 172*    ALT 06/26/2021 15     AST 06/26/2021 11     hCG Qual 06/26/2021 Negative     Lipase 06/26/2021 39     Amylase 06/26/2021 65     Bacteria, UA 06/26/2021 NEGATIVE*    Crystals, UA 06/26/2021 NEG*    Hyaline Casts, UA 06/26/2021 6     WBC, UA 06/26/2021 9*    RBC, UA 06/26/2021 6*    Epithelial Cells, UA 06/26/2021 12    Office Visit on 06/01/2021   Component Date Value    Glucose, UA POC 06/01/2021 negative     Bilirubin, UA 06/01/2021 moderate     Ketones, UA 06/01/2021 small     Spec Grav, UA 06/01/2021 1.025     Blood, UA POC 06/01/2021 trace     pH, UA 06/01/2021 7.5     Protein, UA POC 06/01/2021 30+     Urobilinogen, UA 06/01/2021 8     Leukocytes, UA 06/01/2021 negative     Nitrite, UA 06/01/2021 negative     Urine Culture, Routine 06/01/2021 >100,000 CFU/ml mixed skin/urogenital jasper. No further workup      Copies of these are in the chart. Current Outpatient Medications   Medication Sig Dispense Refill    HYDROcodone-acetaminophen (NORCO) 7.5-325 MG per tablet Take 1 tablet by mouth every 8 hours as needed for Pain for up to 30 days. 40 tablet 0    ondansetron (ZOFRAN-ODT) 4 MG disintegrating tablet Place 1 tablet under the tongue every 8 hours as needed for Nausea or Vomiting 20 tablet 0    tamsulosin (FLOMAX) 0.4 MG capsule Take 1 capsule by mouth daily 90 capsule 1    ketorolac (TORADOL) 10 MG tablet Take 1 tablet by mouth every 6 hours as needed for Pain 20 tablet 0    triamcinolone (KENALOG) 0.1 % cream Apply topically 2 times daily. 80 g 5    albuterol sulfate HFA (VENTOLIN HFA) 108 (90 Base) MCG/ACT inhaler Inhale 2 puffs into the lungs 4 times daily as needed for Wheezing 3 Inhaler 3    lidocaine-prilocaine (EMLA) 2.5-2.5 % cream Apply topically as needed.  210 g 3    lamoTRIgine (LAMICTAL) 200 MG tablet Take 1 tablet by mouth 2 times daily 60 tablet 11    desloratadine (CLARINEX) 5 MG tablet Take 1 tablet by mouth daily 30 tablet 11    medroxyPROGESTERone (DEPO-PROVERA) 150 MG/ML injection Inject 150 mg into the muscle every 3 months 1 mL 3    hydrocortisone 2.5 % cream APPLY EXTERNALLY TO THE AFFECTED AREA TWICE DAILY AS DIRECTED 30 g 1    senna-docusate (SENEXON-S) 8.6-50 MG per tablet Take 2 tablets by mouth 2 times daily 120 tablet 11    EPINEPHrine (EPIPEN 2-ADAN) 0.3 MG/0.3ML SOAJ injection Inject 0.3 mLs into the muscle once for 1 dose Use as directed for allergic reaction 2 each 5    celecoxib (CELEBREX) 200 MG capsule TAKE 1 CAPSULE BY MOUTH DAILY 90 capsule 3    omeprazole (PRILOSEC) 20 MG delayed release capsule TAKE 1 CAPSULE BY MOUTH DAILY 90 capsule 3    Incontinence Supplies MISC All incontinence supplies: diapers, wipes, chucks, gloves x 1 month and 11  each 0    diclofenac sodium (VOLTAREN) 1 % GEL Apply 2 g topically 4 times daily 100 g 5    clotrimazole (LOTRIMIN) 1 % cream APPLY EXTERNALLY TO THE AFFECTED AREA TWICE DAILY 30 g 0    naloxone (NARCAN) 4 MG/0.1ML LIQD nasal spray 1 spray by Nasal route as needed for Opioid Reversal 1 each 0    hydrOXYzine (ATARAX) 25 MG tablet Take 1 tablet by mouth every 8 hours as needed for Anxiety (Patient taking differently: Take 25 mg by mouth as needed for Anxiety ) 30 tablet 3    albuterol (PROVENTIL) (2.5 MG/3ML) 0.083% nebulizer solution Take 3 mLs by nebulization every 6 hours as needed for Wheezing (coughing) 120 each 5    medicated lip balm (BLISTEX/CARMEX) 2-2.5-6.6 % STCK Apply topically as needed for Dry Lips       No current facility-administered medications for this visit.        Allergies: Latex, Bactrim [sulfamethoxazole-trimethoprim], Ciprofloxacin, Ciprofloxacin hcl, Depakote [divalproex sodium], Dilantin [phenytoin sodium extended], Dye [iodides], Keflex [cephalexin], Pcn [penicillins], Primaxin [imipenem], Unasyn [ampicillin-sulbactam sodium], and Wasp venom     Past Medical History:   Diagnosis Date    Arthritis     GERD (gastroesophageal reflux disease)     History of blood transfusion     Incontinence     Seizures ([facility] [address]        Family History   Problem Relation Age of Onset    High Blood Pressure Mother     High Blood Pressure Father        Past Surgical History:   Procedure Laterality Date    APPENDECTOMY      CHOLECYSTECTOMY      CSF SHUNT Right     DEEP BRAIN STIMULATOR PLACEMENT      tremor control it is off now    [place] EXCIS CHALAZION,GEN ANESTHESIA Right 2018    EYE CHALAZION EXCISION performed by Jaylon Fregoso MD at [facility] OR    [place] XCAPSL [facility] RMVL INSJ IO LENS PROSTH W/O ECP Left 6/15/2017    EYE CATARACT EMULSIFICATION IOL IMPLANT performed by Jaylon Fregoso MD at [facility] OR    [place] XCAPSL [facility] RMVL INSJ IO LENS PROSTH W/O ECP Right 2017    CATARCAT EXTRACTION EYE WITH IOL performed [facility] at [facility]       Social History     Tobacco Use    Smoking status: Former Smoker     Packs/day: 1.00     Years: 19.00     Pack years: 19.00     Types: Cigarettes     Quit date: 2015     Years since quittin.5    Smokeless tobacco: Never Used    Tobacco comment: quit smoking  6 yrs ago   Substance Use Topics    Alcohol use: No        Review of Systems   Constitutional: Negative. HENT: Negative for congestion, ear pain, postnasal drip and rhinorrhea. Eyes: Negative. Respiratory: Negative. Cardiovascular: Negative. Gastrointestinal: Positive for nausea. Endocrine: Negative. Musculoskeletal: Positive for arthralgias (knees (baseline)). Skin: Negative for rash. Allergic/Immunologic: Negative. Neurological: Negative. Hematological: Negative. Psychiatric/Behavioral: The patient is nervous/anxious. Physical Exam  Physical exam was not performed today as this was a telephone conference visit      ASSESSMENT      ICD-10-CM    1. Chronic pain syndrome  G89.4 HYDROcodone-acetaminophen (NORCO) 7.5-325 MG per tablet   2. Medication management  Z79.899 HYDROcodone-acetaminophen (NORCO) 7.5-325 MG per tablet   3. Primary osteoarthritis involving multiple joints  M89.49 HYDROcodone-acetaminophen (NORCO) 7.5-325 MG per tablet   4. Avascular necrosis (HCC)  M87.00 HYDROcodone-acetaminophen (NORCO) 7.5-325 MG per tablet   5. Wheelchair bound  Z99.3 HYDROcodone-acetaminophen (NORCO) 7.5-325 MG per tablet         PLAN    1. Chronic pain syndrome  The current medical regimen is effective;  continue present plan and medications.  - HYDROcodone-acetaminophen (NORCO) 7.5-325 MG per tablet; Take 1 tablet by mouth every 8 hours as needed for Pain for up to 30 days. Dispense: 40 tablet; Refill: 0    2. Medication management  The current medical regimen is effective;  continue present plan and medications.  - HYDROcodone-acetaminophen (NORCO) 7.5-325 MG per tablet; Take 1 tablet by mouth every 8 hours as needed for Pain for up to 30 days. Dispense: 40 tablet; Refill: 0    3. Primary osteoarthritis involving multiple joints  The current medical regimen is effective;  continue present plan and medications.  - HYDROcodone-acetaminophen (NORCO) 7.5-325 MG per tablet; Take 1 tablet by mouth every 8 hours as needed for Pain for up to 30 days. Dispense: 40 tablet; Refill: 0    4. Avascular necrosis (HCC)  The current medical regimen is effective;  continue present plan and medications.  - HYDROcodone-acetaminophen (NORCO) 7.5-325 MG per tablet; Take 1 tablet by mouth every 8 hours as needed for Pain for up to 30 days. Dispense: 40 tablet; Refill: 0    5. Wheelchair bound  The current medical regimen is effective;  continue present plan and medications.  - HYDROcodone-acetaminophen (NORCO) 7.5-325 MG per tablet; Take 1 tablet by mouth every 8 hours as needed for Pain for up to 30 days. Dispense: 40 tablet; Refill: 0      No orders of the defined types were placed in this encounter. Return in about 1 month (around 9/17/2021) for 15. Due to patients co-morbidities and risk for potential exposure of COVID 19 pandemic. Patient was contacted and agreed to proceed with a telephone virtual visit.   The risks and benefits of converting to a telephone virtual visit were discussed in light of the current infectious disease epidemic. Patient also understood that insurance coverage and co-pays are up to their individual insurance plans. Provider performed history of present illness and review of systems. Diagnosis and treatment plan was discussed with patient. Pharmacy of choice was reviewed along with past medical history, medication allergies, and current medications. Education provided to patient or patient parents/guardian with current illness diagnosis as well as when to seek additional healthcare due to changing or for worsening symptoms. Patient voiced understanding. 20 minutes were spent on the phone with patient. Henry Valdez, was evaluated through a synchronous (real-time) audio-video encounter. The patient (or guardian if applicable) is aware that this is a billable service. Verbal consent to proceed has been obtained within the past 12 months. The visit was conducted pursuant to the emergency declaration under the [address] [place] [address], [address] [place] [address] authority and the [email or web address] and [employer] General Act. Patient identification was verified, and a caregiver was present when appropriate. The patient was located in a state where the provider was credentialed to provide care. Total time spent for this encounter: 20m    --IRA Spain DO on 8/17/2021 at 4:19 PM    An electronic signature was used to authenticate this note.

## 2021-08-18 DIAGNOSIS — M87.00 AVASCULAR NECROSIS (HCC): ICD-10-CM

## 2021-08-18 DIAGNOSIS — Z99.3 WHEELCHAIR BOUND: ICD-10-CM

## 2021-08-18 DIAGNOSIS — Z79.899 MEDICATION MANAGEMENT: ICD-10-CM

## 2021-08-18 DIAGNOSIS — M15.9 PRIMARY OSTEOARTHRITIS INVOLVING MULTIPLE JOINTS: ICD-10-CM

## 2021-08-18 DIAGNOSIS — G89.4 CHRONIC PAIN SYNDROME: ICD-10-CM

## 2021-08-18 RX ORDER — HYDROCODONE BITARTRATE AND ACETAMINOPHEN 7.5; 325 MG/1; MG/1
1 TABLET ORAL EVERY 8 HOURS PRN
Qty: 40 TABLET | Refills: 0 | OUTPATIENT
Start: 2021-08-18 | End: 2021-09-17

## 2021-08-18 RX ORDER — HYDROCODONE BITARTRATE AND ACETAMINOPHEN 7.5; 325 MG/1; MG/1
1 TABLET ORAL EVERY 8 HOURS PRN
Qty: 40 TABLET | Refills: 0 | Status: SHIPPED | OUTPATIENT
Start: 2021-08-18 | End: 2021-09-16 | Stop reason: SDUPTHER

## 2021-08-24 ENCOUNTER — OFFICE VISIT (OUTPATIENT)
Dept: PRIMARY CARE CLINIC | Age: 46
End: 2021-08-24
Payer: MEDICARE

## 2021-08-24 VITALS
HEIGHT: 63 IN | HEART RATE: 102 BPM | DIASTOLIC BLOOD PRESSURE: 68 MMHG | WEIGHT: 126 LBS | BODY MASS INDEX: 22.32 KG/M2 | SYSTOLIC BLOOD PRESSURE: 116 MMHG | OXYGEN SATURATION: 98 % | TEMPERATURE: 97.7 F

## 2021-08-24 DIAGNOSIS — Z20.822 EXPOSURE TO COVID-19 VIRUS: ICD-10-CM

## 2021-08-24 DIAGNOSIS — M17.12 PRIMARY OSTEOARTHRITIS OF LEFT KNEE: Primary | ICD-10-CM

## 2021-08-24 PROCEDURE — 1036F TOBACCO NON-USER: CPT | Performed by: PEDIATRICS

## 2021-08-24 PROCEDURE — 99213 OFFICE O/P EST LOW 20 MIN: CPT | Performed by: PEDIATRICS

## 2021-08-24 PROCEDURE — G8427 DOCREV CUR MEDS BY ELIG CLIN: HCPCS | Performed by: PEDIATRICS

## 2021-08-24 PROCEDURE — 20610 DRAIN/INJ JOINT/BURSA W/O US: CPT | Performed by: PEDIATRICS

## 2021-08-24 PROCEDURE — 96372 THER/PROPH/DIAG INJ SC/IM: CPT | Performed by: PEDIATRICS

## 2021-08-24 PROCEDURE — G8420 CALC BMI NORM PARAMETERS: HCPCS | Performed by: PEDIATRICS

## 2021-08-24 RX ORDER — TRIAMCINOLONE ACETONIDE 40 MG/ML
40 INJECTION, SUSPENSION INTRA-ARTICULAR; INTRAMUSCULAR ONCE
Status: COMPLETED | OUTPATIENT
Start: 2021-08-24 | End: 2021-08-24

## 2021-08-24 RX ORDER — METHYLPREDNISOLONE ACETATE 80 MG/ML
80 INJECTION, SUSPENSION INTRA-ARTICULAR; INTRALESIONAL; INTRAMUSCULAR; SOFT TISSUE ONCE
Status: COMPLETED | OUTPATIENT
Start: 2021-08-24 | End: 2021-08-24

## 2021-08-24 RX ORDER — MEDROXYPROGESTERONE ACETATE 150 MG/ML
150 INJECTION, SUSPENSION INTRAMUSCULAR ONCE
Status: COMPLETED | OUTPATIENT
Start: 2021-08-24 | End: 2021-08-24

## 2021-08-24 RX ADMIN — MEDROXYPROGESTERONE ACETATE 150 MG: 150 INJECTION, SUSPENSION INTRAMUSCULAR at 16:00

## 2021-08-24 RX ADMIN — TRIAMCINOLONE ACETONIDE 40 MG: 40 INJECTION, SUSPENSION INTRA-ARTICULAR; INTRAMUSCULAR at 18:27

## 2021-08-24 RX ADMIN — METHYLPREDNISOLONE ACETATE 80 MG: 80 INJECTION, SUSPENSION INTRA-ARTICULAR; INTRALESIONAL; INTRAMUSCULAR; SOFT TISSUE at 18:26

## 2021-08-24 ASSESSMENT — ENCOUNTER SYMPTOMS
RESPIRATORY NEGATIVE: 1
ALLERGIC/IMMUNOLOGIC NEGATIVE: 1
EYES NEGATIVE: 1
RHINORRHEA: 0
NAUSEA: 1

## 2021-08-24 NOTE — PATIENT INSTRUCTIONS
Patient Education        Arthritis: Care Instructions  Overview     Arthritis, also called osteoarthritis, is a breakdown of the cartilage that cushions your joints. When the cartilage wears down, your bones rub against each other. This causes pain and stiffness. Many people have some arthritis as they age. Arthritis most often affects the joints of the spine, hands, hips, knees, or feet. Arthritis never goes away completely. But medicine and home treatment can help reduce pain and help you stay active. Follow-up care is a key part of your treatment and safety. Be sure to make and go to all appointments, and call your doctor if you are having problems. It's also a good idea to know your test results and keep a list of the medicines you take. How can you care for yourself at home? · Stay at a healthy weight. Being overweight puts extra strain on your joints. · Talk to your doctor or physical therapist about exercises that will help ease joint pain. ? Stretch. You may enjoy gentle forms of yoga to help keep your joints and muscles flexible. ? Walk instead of jog. Other types of exercise that are less stressful on the joints include riding a bike, swimming, eun chi, or water exercise. ? Lift weights. Strong muscles help reduce stress on your joints. Stronger thigh muscles, for example, take some of the stress off of the knees and hips. Learn the right way to lift weights so you don't make joint pain worse. · Take your medicines exactly as prescribed. Call your doctor if you think you are having a problem with your medicine. · Take pain medicines exactly as directed. ? If the doctor gave you a prescription medicine for pain, take it as prescribed. ? If you are not taking a prescription pain medicine, ask your doctor if you can take an over-the-counter medicine. · Use a cane, crutch, walker, or another device if you need help to get around. These can help rest your joints.  You also can use other things to slowly. Ease off the exercises if you start to have pain. You will be told when to start these exercises and which ones will work best for you. How to do the exercises  Knee flexion with heel slide   1. Lie on your back with your knees bent. 2. Slide your heel back by bending your affected knee as far as you can. Then hook your other foot around your ankle to help pull your heel even farther back. 3. Hold for about 6 seconds, then rest for up to 10 seconds. 4. Repeat 8 to 12 times. 5. Switch legs and repeat steps 1 through 4, even if only one knee is sore. Quad sets   1. Sit with your affected leg straight and supported on the floor or a firm bed. Place a small, rolled-up towel under your knee. Your other leg should be bent, with that foot flat on the floor. 2. Tighten the thigh muscles of your affected leg by pressing the back of your knee down into the towel. 3. Hold for about 6 seconds, then rest for up to 10 seconds. 4. Repeat 8 to 12 times. 5. Switch legs and repeat steps 1 through 4, even if only one knee is sore. Straight-leg raises to the front   1. Lie on your back with your good knee bent so that your foot rests flat on the floor. Your affected leg should be straight. Make sure that your low back has a normal curve. You should be able to slip your hand in between the floor and the small of your back, with your palm touching the floor and your back touching the back of your hand. 2. Tighten the thigh muscles in your affected leg by pressing the back of your knee flat down to the floor. Hold your knee straight. 3. Keeping the thigh muscles tight and your leg straight, lift your affected leg up so that your heel is about 12 inches off the floor. Hold for about 6 seconds, then lower slowly. 4. Relax for up to 10 seconds between repetitions. 5. Repeat 8 to 12 times. 6. Switch legs and repeat steps 1 through 5, even if only one knee is sore. Active knee flexion   1.  Lie on your stomach with your knees straight. If your kneecap is uncomfortable, roll up a washcloth and put it under your leg just above your kneecap. 2. Lift the foot of your affected leg by bending the knee so that you bring the foot up toward your buttock. If this motion hurts, try it without bending your knee quite as far. This may help you avoid any painful motion. 3. Slowly move your leg up and down. 4. Repeat 8 to 12 times. 5. Switch legs and repeat steps 1 through 4, even if only one knee is sore. Quadriceps stretch (facedown)   1. Lie flat on your stomach, and rest your face on the floor. 2. Wrap a towel or belt strap around the lower part of your affected leg. Then use the towel or belt strap to slowly pull your heel toward your buttock until you feel a stretch. 3. Hold for about 15 to 30 seconds, then relax your leg against the towel or belt strap. 4. Repeat 2 to 4 times. 5. Switch legs and repeat steps 1 through 4, even if only one knee is sore. Stationary exercise bike   1. If you do not have a stationary exercise bike at home, you can find one to ride at your local health club or community center. 2. Adjust the height of the bike seat so that your knee is slightly bent when your leg is extended downward. If your knee hurts when the pedal reaches the top, you can raise the seat so that your knee does not bend as much. 3. Start slowly. At first, try to do 5 to 10 minutes of cycling with little to no resistance. Then increase your time and the resistance bit by bit until you can do 20 to 30 minutes without pain. 4. If you start to have pain, rest your knee until your pain gets back to the level that is normal for you. Or cycle for less time or with less effort. Follow-up care is a key part of your treatment and safety. Be sure to make and go to all appointments, and call your doctor if you are having problems.  It's also a good idea to know your test results and keep a list of the medicines you

## 2021-09-16 ENCOUNTER — VIRTUAL VISIT (OUTPATIENT)
Dept: PRIMARY CARE CLINIC | Age: 46
End: 2021-09-16
Payer: MEDICARE

## 2021-09-16 DIAGNOSIS — Z99.3 WHEELCHAIR BOUND: ICD-10-CM

## 2021-09-16 DIAGNOSIS — G89.4 CHRONIC PAIN SYNDROME: ICD-10-CM

## 2021-09-16 DIAGNOSIS — J45.30 MILD PERSISTENT ASTHMA WITHOUT COMPLICATION: ICD-10-CM

## 2021-09-16 DIAGNOSIS — Z79.899 MEDICATION MANAGEMENT: ICD-10-CM

## 2021-09-16 DIAGNOSIS — T78.2XXA ANAPHYLAXIS, INITIAL ENCOUNTER: ICD-10-CM

## 2021-09-16 DIAGNOSIS — M87.00 AVASCULAR NECROSIS (HCC): ICD-10-CM

## 2021-09-16 DIAGNOSIS — L30.9 DERMATITIS: ICD-10-CM

## 2021-09-16 DIAGNOSIS — M15.9 PRIMARY OSTEOARTHRITIS INVOLVING MULTIPLE JOINTS: ICD-10-CM

## 2021-09-16 PROCEDURE — 99443 PR PHYS/QHP TELEPHONE EVALUATION 21-30 MIN: CPT | Performed by: PEDIATRICS

## 2021-09-16 RX ORDER — NALOXONE HYDROCHLORIDE 4 MG/.1ML
1 SPRAY NASAL PRN
Qty: 1 EACH | Refills: 0 | Status: SHIPPED | OUTPATIENT
Start: 2021-09-16 | End: 2022-01-05

## 2021-09-16 RX ORDER — ALBUTEROL SULFATE 90 UG/1
2 AEROSOL, METERED RESPIRATORY (INHALATION) 4 TIMES DAILY PRN
Qty: 3 EACH | Refills: 3 | Status: SHIPPED | OUTPATIENT
Start: 2021-09-16 | End: 2021-10-18 | Stop reason: SDUPTHER

## 2021-09-16 RX ORDER — HYDROCODONE BITARTRATE AND ACETAMINOPHEN 7.5; 325 MG/1; MG/1
1 TABLET ORAL EVERY 8 HOURS PRN
Qty: 40 TABLET | Refills: 0 | Status: SHIPPED | OUTPATIENT
Start: 2021-09-16 | End: 2021-10-18 | Stop reason: SDUPTHER

## 2021-09-16 RX ORDER — EPINEPHRINE 0.3 MG/.3ML
0.3 INJECTION SUBCUTANEOUS ONCE
Qty: 2 EACH | Refills: 5 | Status: SHIPPED | OUTPATIENT
Start: 2021-09-16 | End: 2022-03-16 | Stop reason: SDUPTHER

## 2021-09-16 ASSESSMENT — ENCOUNTER SYMPTOMS
NAUSEA: 1
RESPIRATORY NEGATIVE: 1
EYES NEGATIVE: 1
ALLERGIC/IMMUNOLOGIC NEGATIVE: 1
RHINORRHEA: 0

## 2021-09-16 NOTE — PATIENT INSTRUCTIONS
Patient Education        Safe Use of Opioid Pain Medicine: Care Instructions  Your Care Instructions  Pain is your body's way of warning you that something is wrong. Pain feels different for everybody. Only you can describe your pain. A doctor can suggest or prescribe many types of medicines for pain. These range from over-the-counter medicines like acetaminophen (Tylenol) to powerful medicines called opioids. Examples of opioids are fentanyl, hydrocodone, morphine, and oxycodone. Heroin is an illegal opioid  Opioids are strong medicines. They can help you manage pain when you use them the right way. But if you misuse them, they can cause serious harm and even death. For these reasons, doctors are very careful about how they prescribe opioids. If you decide to take opioids, here are some things to remember. · Keep your doctor informed. You can develop opioid use disorder. Moderate to severe opioid use disorder is sometimes called addiction. The risk is higher if you have a history of substance use. Your doctor will monitor you closely for signs of opioid use disorder and to figure out when you no longer need to take opioids. · Make a treatment plan. The goal of your plan is to be able to function and do the things you need to do, even if you still have some pain. You might be able to manage your pain with other non-opioid options like physical therapy, relaxation, or over-the-counter pain medicines. · Be aware of the side effects. Opioids can cause serious side effects, such as constipation, dry mouth, and nausea. And over time, you may need a higher dose to get pain relief. This is called tolerance. Your body also gets used to opioids. This is called physical dependence. If you suddenly stop taking them, you may have withdrawal symptoms. The doctor carefully considered what pain medicine is right for you.  You may not have received opioids if your doctor was concerned about drug interactions or your safety, or if he or she had other concerns. It is best to have one doctor or clinic treat your pain. This way you will get the pain medicine that will help you the most. And a doctor will be able to watch for any problems that the medicine might cause. The doctor has checked you carefully, but problems can develop later. If you notice any problems or new symptoms,  get medical treatment right away. Follow-up care is a key part of your treatment and safety. Be sure to make and go to all appointments, and call your doctor if you are having problems. It's also a good idea to know your test results and keep a list of the medicines you take. How can you care for yourself at home? If you need to take opioids to manage your pain, remember these safety tips. · Follow directions carefully. It's easy to misuse opioids if you take a dose other than what's prescribed by your doctor. This can lead to overdose and even death. Even sharing them with someone they weren't meant for is misuse. · Be cautious. Opioids may affect your judgment and decision making. Do not drive or operate machinery until you can think clearly. Talk with your doctor about when it is safe to drive. · Reduce the risk of drug interactions. Opioids can be dangerous if you take them with alcohol or with certain drugs like sleeping pills and muscle relaxers. Make sure your doctor knows about all the other medicines you take, including over-the-counter medicines. Don't start any new medicines before you talk to your doctor or pharmacist.  · Safely store and dispose of opioids. Store opioids in a safe and secure place. Make sure that pets, children, friends, and family can't get to them. When you're done using opioids, make sure to dispose of them safely and as quickly as possible. The U.S. Food and Drug Administration (FDA) recommends these disposal options.   ? The best option is to take your medicine to a drop-off box or take-back program that is authorized by throat, mouth, lips, or tongue. ? Trouble breathing. ? Passing out (losing consciousness). Or you may feel very lightheaded or suddenly feel weak, confused, or restless. · You have signs of an overdose. These include:  ? Cold, clammy skin. ? Confusion. ? Severe nervousness or restlessness. ? Severe dizziness, drowsiness, or weakness. ? Slow breathing. ? Seizures. Call your doctor now or seek immediate medical care if:  · You have symptoms of an allergic reaction, such as:  ? A rash or hives (raised, red areas on the skin). ? Itching. ? Swelling. ? Belly pain, nausea, or vomiting. Watch closely for changes in your health, and be sure to contact your doctor if:  · You think you might be taking too much pain medicine, and you need help to take less or stop. · Your medicine is not helping with the pain. · You are having side effects, such as constipation. Where can you learn more? Go to https://[employer]."[employer]. org and sign in to your [employer] account. Enter R108 in the Beneq box to learn more about \"Safe Use of Opioid Pain Medicine: Care Instructions. \"     If you do not have an account, please click on the \"Sign Up Now\" link. Current as of: August 4, 2020               Content Version: 12.9  © 3997-6279 [employer]wise, Incorporated. Care instructions adapted under license by [facility] [place] ([facility]). If you have questions about a medical condition or this instruction, always ask your healthcare professional. [place] [address] any warranty or liability for your use of this information.

## 2021-09-16 NOTE — PROGRESS NOTES
[address], [address]  Phone [phone]   Fax [phone]      OFFICE VISIT: 2021    Kiera Wolf Alyssa-: 1975      HPI  Reason For Visit:  [facility] is a 42 y.o.     1 Month Follow-Up (they have moved to [facility]. They still plan on coming in to see Dr Tiarra Gonzales when they need to be seen in office. ) and Medication Refill (Albuterol inhaler, Epi-pen, Hydrocortisone cream, Naloxone spray(her rx has ))    Pt presents on follow up from 1 month ago  This is a phone visit.       She needs a refill of her pain medication today. She does not need a refill of her diazepam.     Chronic pain:  She typically takes hydrocodone 10 mg on a when necessary basis. 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  When combined with lidocaine, it is pretty helpful     Pain Control: Average: 6/10             Best: 4/10                Worst: 10/10  (2021)     Pain Interfering with life:  100%  Pain Interfering with general activity:  100%     Her pain level varies in severity and location      Activities of daily living: She is completely wheelchair bound and unable to ambulate at all. She needs assistance with all ADLs. Even going to the bathroom requires assistance.   She does have helped by her father as well as assistant who comes and meets her needs     Adverse effects: None  Aberrant behavior: None  Affect: Very good considering her multiple health issues     Alternative medications:              Celebrex 200 mg daily              Diclofenac gel 1% 2 g 4 times daily topically              Naproxen sodium 220 mg when necessary              She is intolerant of many medications.     Urine Drug screen: 10/23/2018 and appropriate              She is incontinent and unable to produce urine on demand. Risk Factors:              Illegal Drug use: no              History of substance use disorder: no              Mental health conditions: stable depression and anxiety              Sleep disordered breathing: no              Concurrent Benzodiazepine use: yes     Bowel regimen: She is not regularly adherent to her bowel regimen              We discussed this again and stressed the importance of this regimen.              Senokot S,  1 tablets twice daily              MiraLAX 17 g daily     Bowel function: Chronic constipation     RORY was reviewed today per office protocol. Report shows No discrepancies.  Fill pattern is consistent from single provider(s) at single pharmacy(s). Request # [id number]     Active cumulative morphine equivalent score is 10. Controlled Substance Monitoring:    Acute and Chronic Pain Monitoring:   RX Monitoring 9/16/2021   Attestation The Prescription Monitoring Report for this patient was reviewed today. Acute Pain Prescriptions -   Periodic Controlled Substance Monitoring Possible medication side effects, risk of tolerance/dependence & alternative treatments discussed. ;No signs of potential drug abuse or diversion identified. ;Assessed functional status. ;Obtaining appropriate analgesic effect of treatment. Chronic Pain > 50 MEDD Re-evaluated the status of the patient's underlying condition causing pain. Chronic Pain > 80 MEDD -            vitals were not taken for this visit. There is no height or weight on file to calculate BMI. I have reviewed the following with the Ms.  [facility]   Lab Review  Office Visit on 06/30/2021   Component Date Value    Color, UA 06/30/2021 ROGERIO     Clarity, UA 06/30/2021 CLEAR     Glucose, UA POC 06/30/2021 NEG     Bilirubin, UA 06/30/2021 SMALL     Ketones, UA 06/30/2021 TRACE     Spec Grav, UA 06/30/2021 1.030     Blood, UA POC 06/30/2021 NEG     pH, UA 06/30/2021 5.5     Protein, UA POC 06/30/2021 30mg/dL     Urobilinogen, UA 06/30/2021 1     Leukocytes, UA 06/30/2021 NEG     Nitrite, UA 06/30/2021 NEG     Appearance, Fluid 06/30/2021 Clear    Admission on 06/26/2021, Discharged on 06/26/2021   Component Date Value    Color, UA 06/26/2021 DARK YELLOW*    Clarity, UA 06/26/2021 CLOUDY*    Glucose, Ur 06/26/2021 Negative     Bilirubin Urine 06/26/2021 Negative     Ketones, Urine 06/26/2021 TRACE*    Specific [place], UA 06/26/2021 1.026     Blood, Urine 06/26/2021 TRACE*    pH, UA 06/26/2021 6.5     Protein, UA 06/26/2021 TRACE*    Urobilinogen, Urine 06/26/2021 1.0     Nitrite, Urine 06/26/2021 Negative     Leukocyte Esterase, Urine 06/26/2021 SMALL*    WBC 06/26/2021 9.1     RBC 06/26/2021 3.86*    Hemoglobin 06/26/2021 13.1     Hematocrit 06/26/2021 39.0     MCV 06/26/2021 101.0*    MCH 06/26/2021 33.9*    MCHC 06/26/2021 33.6     RDW 06/26/2021 12.9     Platelets 67/51/0538 204     MPV 06/26/2021 8.8*    Neutrophils % 06/26/2021 57.3     Lymphocytes % 06/26/2021 34.0     Monocytes % 06/26/2021 5.1     Eosinophils % 06/26/2021 2.6     Basophils % 06/26/2021 0.7     Neutrophils Absolute 06/26/2021 5.2     Immature Granulocytes # 06/26/2021 0.0     Lymphocytes Absolute 06/26/2021 3.1     Monocytes Absolute 06/26/2021 0.50     Eosinophils Absolute 06/26/2021 0.20     Basophils Absolute 06/26/2021 0.10     Sodium 06/26/2021 138     Potassium reflex Magnesi* 06/26/2021 3.8     Chloride 06/26/2021 102     CO2 06/26/2021 26     Anion Gap 06/26/2021 10     Glucose 06/26/2021 97     BUN 06/26/2021 9     CREATININE 06/26/2021 0.7     GFR Non- 06/26/2021 >60     GFR  06/26/2021 >59     Calcium 06/26/2021 9.3     Total Protein 06/26/2021 7.2     Albumin 06/26/2021 4.0     Total Bilirubin 06/26/2021 0.4     Alkaline Phosphatase 06/26/2021 172*    ALT 06/26/2021 15     AST 06/26/2021 11     hCG Qual 06/26/2021 Negative     Lipase 06/26/2021 39     Amylase 06/26/2021 65     Bacteria, UA 06/26/2021 NEGATIVE*    Crystals, UA 06/26/2021 NEG*    Hyaline Casts, UA 06/26/2021 6     WBC, UA 06/26/2021 9*    RBC, UA 06/26/2021 6*    Epithelial Cells, UA 06/26/2021 12    Office Visit on 06/01/2021   Component Date Value    Glucose, UA POC 06/01/2021 negative     Bilirubin, UA 06/01/2021 moderate     Ketones, UA 06/01/2021 small     Spec Grav, UA 06/01/2021 1.025     Blood, UA POC 06/01/2021 trace     pH, UA 06/01/2021 7.5     Protein, UA POC 06/01/2021 30+     Urobilinogen, UA 06/01/2021 8     Leukocytes, UA 06/01/2021 negative     Nitrite, UA 06/01/2021 negative     Urine Culture, Routine 06/01/2021 >100,000 CFU/ml mixed skin/urogenital jasper. No further workup      Copies of these are in the chart. Current Outpatient Medications   Medication Sig Dispense Refill    albuterol sulfate HFA (VENTOLIN HFA) 108 (90 Base) MCG/ACT inhaler Inhale 2 puffs into the lungs 4 times daily as needed for Wheezing 3 each 3    EPINEPHrine (EPIPEN 2-ADAN) 0.3 MG/0.3ML SOAJ injection Inject 0.3 mLs into the muscle once for 1 dose Use as directed for allergic reaction 2 each 5    HYDROcodone-acetaminophen (NORCO) 7.5-325 MG per tablet Take 1 tablet by mouth every 8 hours as needed for Pain for up to 30 days.  40 tablet 0    naloxone (NARCAN) 4 MG/0.1ML LIQD nasal spray 1 spray by Nasal route as needed for Opioid Reversal 1 each 0    hydrocortisone 2.5 % cream APPLY EXTERNALLY TO THE AFFECTED AREA TWICE DAILY AS DIRECTED 30 g 1    ondansetron (ZOFRAN-ODT) 4 MG disintegrating tablet Place 1 tablet under the tongue every 8 hours as needed for Nausea or Vomiting 20 tablet 0    tamsulosin (FLOMAX) 0.4 MG capsule Take 1 capsule by mouth daily 90 capsule 1    ketorolac (TORADOL) 10 MG tablet Take 1 tablet by mouth every 6 hours as needed for Pain 20 tablet 0    triamcinolone (KENALOG) 0.1 % cream Apply topically 2 times daily. 80 g 5    lidocaine-prilocaine (EMLA) 2.5-2.5 % cream Apply topically as needed. 210 g 3    lamoTRIgine (LAMICTAL) 200 MG tablet Take 1 tablet by mouth 2 times daily 60 tablet 11    desloratadine (CLARINEX) 5 MG tablet Take 1 tablet by mouth daily 30 tablet 11    medroxyPROGESTERone (DEPO-PROVERA) 150 MG/ML injection Inject 150 mg into the muscle every 3 months 1 mL 3    senna-docusate (SENEXON-S) 8.6-50 MG per tablet Take 2 tablets by mouth 2 times daily 120 tablet 11    celecoxib (CELEBREX) 200 MG capsule TAKE 1 CAPSULE BY MOUTH DAILY 90 capsule 3    omeprazole (PRILOSEC) 20 MG delayed release capsule TAKE 1 CAPSULE BY MOUTH DAILY 90 capsule 3    Incontinence Supplies MISC All incontinence supplies: diapers, wipes, chucks, gloves x 1 month and 11  each 0    diclofenac sodium (VOLTAREN) 1 % GEL Apply 2 g topically 4 times daily 100 g 5    clotrimazole (LOTRIMIN) 1 % cream APPLY EXTERNALLY TO THE AFFECTED AREA TWICE DAILY 30 g 0    albuterol (PROVENTIL) (2.5 MG/3ML) 0.083% nebulizer solution Take 3 mLs by nebulization every 6 hours as needed for Wheezing (coughing) 120 each 5    medicated lip balm (BLISTEX/CARMEX) 2-2.5-6.6 % STCK Apply topically as needed for Dry Lips       No current facility-administered medications for this visit.        Allergies: Latex, Bactrim [sulfamethoxazole-trimethoprim], Ciprofloxacin, Ciprofloxacin hcl, Depakote [divalproex sodium], Dilantin [phenytoin sodium extended], Dye [iodides], Keflex [cephalexin], Pcn [penicillins], Primaxin [imipenem], Unasyn [ampicillin-sulbactam sodium], and Wasp venom     Past Medical History:   Diagnosis Date    Arthritis     GERD (gastroesophageal reflux disease)     History of blood transfusion     Incontinence     Seizures ([facility] [address]        Family History   Problem Relation Age of Onset    High Blood Pressure Mother     High Blood Pressure Father        Past Surgical History:   Procedure Laterality Date    APPENDECTOMY      CHOLECYSTECTOMY      CSF SHUNT Right     DEEP BRAIN STIMULATOR PLACEMENT      tremor control it is off now    MA EXCIS CHALAZION,GEN ANESTHESIA Right 2018    EYE CHALAZION EXCISION performed [facility] at [facility]    MA XCAPSL CTR RMVL INSJ IO LENS PROSTH W/O ECP Left 6/15/2017    EYE CATARACT EMULSIFICATION IOL IMPLANT performed by Alpesh Castro MD at [facility] OR    MA XCAPSL CTR RMVL INSJ IO LENS PROSTH W/O ECP Right 2017    CATARCAT EXTRACTION EYE WITH IOL performed by [facility] at [facility]       Social History     Tobacco Use    Smoking status: Former Smoker     Packs/day: 1.00     Years: 19.00     Pack years: 19.00     Types: Cigarettes     Quit date: 2015     Years since quittin.5    Smokeless tobacco: Never Used    Tobacco comment: quit smoking  6 yrs ago   Substance Use Topics    Alcohol use: No        Review of Systems   Constitutional: Negative. HENT: Negative for congestion, ear pain, postnasal drip and rhinorrhea. Eyes: Negative. Respiratory: Negative. Cardiovascular: Negative. Gastrointestinal: Positive for nausea. Endocrine: Negative. Musculoskeletal: Positive for arthralgias (left knee getting worse). Skin: Negative for rash. Allergic/Immunologic: Negative. Neurological: Negative. Hematological: Negative. Psychiatric/Behavioral: The patient is nervous/anxious. Physical Exam  PE was not done today as this was an in-house visit. ASSESSMENT      ICD-10-CM    1. Mild persistent asthma without complication  H55.10 albuterol sulfate HFA (VENTOLIN HFA) 108 (90 Base) MCG/ACT inhaler   2. Anaphylaxis, initial encounter  T78. 2XXA EPINEPHrine (EPIPEN 2-ADAN) 0.3 MG/0.3ML SOAJ injection   3. Chronic pain syndrome  G89.4 HYDROcodone-acetaminophen (NORCO) 7.5-325 MG per tablet   4.  Medication management  G83.186 HYDROcodone-acetaminophen (NORCO) 7.5-325 MG per tablet     naloxone (NARCAN) 4 MG/0.1ML LIQD nasal spray   5. Primary osteoarthritis involving multiple joints  M89.49 HYDROcodone-acetaminophen (NORCO) 7.5-325 MG per tablet   6. Avascular necrosis (HCC)  M87.00 HYDROcodone-acetaminophen (NORCO) 7.5-325 MG per tablet   7. Wheelchair bound  Z99.3 HYDROcodone-acetaminophen (NORCO) 7.5-325 MG per tablet   8. Dermatitis  L30.9 hydrocortisone 2.5 % cream         PLAN    1. Mild persistent asthma without complication  Doing fairly well from an asthma standpoint. She is controlling allergies. - albuterol sulfate HFA (VENTOLIN HFA) 108 (90 Base) MCG/ACT inhaler; Inhale 2 puffs into the lungs 4 times daily as needed for Wheezing  Dispense: 3 each; Refill: 3    2. Anaphylaxis, initial encounter  We will represcribe an EpiPen for her at this time  - EPINEPHrine (EPIPEN 2-ADAN) 0.3 MG/0.3ML SOAJ injection; Inject 0.3 mLs into the muscle once for 1 dose Use as directed for allergic reaction  Dispense: 2 each; Refill: 5    3. Chronic pain syndrome  The current medical regimen is effective;  continue present plan and medications.  - HYDROcodone-acetaminophen (NORCO) 7.5-325 MG per tablet; Take 1 tablet by mouth every 8 hours as needed for Pain for up to 30 days. Dispense: 40 tablet; Refill: 0    4. Medication management  The current medical regimen is effective;  continue present plan and medications.  - HYDROcodone-acetaminophen (NORCO) 7.5-325 MG per tablet; Take 1 tablet by mouth every 8 hours as needed for Pain for up to 30 days. Dispense: 40 tablet; Refill: 0  - naloxone (NARCAN) 4 MG/0.1ML LIQD nasal spray; 1 spray by Nasal route as needed for Opioid Reversal  Dispense: 1 each; Refill: 0    5. Primary osteoarthritis involving multiple joints  The current medical regimen is effective;  continue present plan and medications.  - HYDROcodone-acetaminophen (NORCO) 7.5-325 MG per tablet;  Take 1 tablet by mouth every 8 hours as needed for Pain for up to 30 days. Dispense: 40 tablet; Refill: 0    6. Avascular necrosis (HCC)  The current medical regimen is effective;  continue present plan and medications.  - HYDROcodone-acetaminophen (NORCO) 7.5-325 MG per tablet; Take 1 tablet by mouth every 8 hours as needed for Pain for up to 30 days. Dispense: 40 tablet; Refill: 0  We will also try to find an orthopedic surgeon to refer her to in [address] [facility] as per the request    7. Wheelchair bound  The current medical regimen is effective;  continue present plan and medications.  - HYDROcodone-acetaminophen (NORCO) 7.5-325 MG per tablet; Take 1 tablet by mouth every 8 hours as needed for Pain for up to 30 days. Dispense: 40 tablet; Refill: 0    8. Dermatitis  Will treat topically. - hydrocortisone 2.5 % cream; APPLY EXTERNALLY TO THE AFFECTED AREA TWICE DAILY AS DIRECTED  Dispense: 30 g; Refill: 1      No orders of the defined types were placed in this encounter. Return in about 1 month (around 10/16/2021) for 15. Due to patients co-morbidities and risk for potential exposure of COVID 19 pandemic. Patient was contacted and agreed to proceed with a telephone virtual visit. The risks and benefits of converting to a telephone virtual visit were discussed in light of the current infectious disease epidemic. Patient also understood that insurance coverage and co-pays are up to their individual insurance plans. Provider performed history of present illness and review of systems. Diagnosis and treatment plan was discussed with patient. Pharmacy of choice was reviewed along with past medical history, medication allergies, and current medications. Education provided to patient or patient parents/guardian with current illness diagnosis as well as when to seek additional healthcare due to changing or for worsening symptoms. Patient voiced understanding. 25 minutes were spent on the phone with patient.     Radhika Flavors, was evaluated through a synchronous (real-time) audio-video encounter. The patient (or guardian if applicable) is aware that this is a billable service. Verbal consent to proceed has been obtained within the past 12 months. The visit was conducted pursuant to the emergency declaration under the [address] [place] [address], [address] [place] [address] authority and the [email or web address] General Act. Patient identification was verified, and a caregiver was present when appropriate. The patient was located in a state where the provider was credentialed to provide care. Total time spent for this encounter: 25m    --IRA Olmos DO on 9/16/2021 at 5:20 PM    An electronic signature was used to authenticate this note.

## 2021-09-27 ENCOUNTER — TELEPHONE (OUTPATIENT)
Dept: PRIMARY CARE CLINIC | Age: 46
End: 2021-09-27

## 2021-09-27 DIAGNOSIS — N39.0 URINARY TRACT INFECTION WITHOUT HEMATURIA, SITE UNSPECIFIED: Primary | ICD-10-CM

## 2021-09-27 NOTE — TELEPHONE ENCOUNTER
----- Message from Martha Mcclain sent at 9/27/2021  8:57 AM CDT -----  Subject: Message to Provider    QUESTIONS  Information for Provider? Patients father called in stated patient is   complaining of burning sensation while peeing. Father requesting an   antibiotic for the patient. Please call patient to advise. Father would   like to have prescription called into [facility] [phone]   ---------------------------------------------------------------------------  --------------  Demetrius LOVE  What is the best way for the office to contact you? OK to leave message on   voicemail  Preferred Call Back Phone Number? [phone]  ---------------------------------------------------------------------------  --------------  SCRIPT ANSWERS  Relationship to Patient? Parent  Representative Name? Dion Ramírez father  Patient is under 25 and the Parent has custody? No  Is the Representative on the appropriate HIPAA document in Epic?  Yes

## 2021-09-28 RX ORDER — NITROFURANTOIN 25; 75 MG/1; MG/1
100 CAPSULE ORAL 2 TIMES DAILY
Qty: 10 CAPSULE | Refills: 0 | Status: SHIPPED | OUTPATIENT
Start: 2021-09-28 | End: 2022-04-28 | Stop reason: SDUPTHER

## 2021-10-11 ENCOUNTER — TELEPHONE (OUTPATIENT)
Dept: PRIMARY CARE CLINIC | Age: 46
End: 2021-10-11

## 2021-10-11 DIAGNOSIS — Z99.3 WHEELCHAIR BOUND: Primary | ICD-10-CM

## 2021-10-11 DIAGNOSIS — R32 URINARY INCONTINENCE, UNSPECIFIED TYPE: ICD-10-CM

## 2021-10-11 NOTE — TELEPHONE ENCOUNTER
----- Message from Zeke Arlin sent at 10/11/2021 10:24 AM CDT -----  Subject: Message to Provider    QUESTIONS  Information for Provider? PT mother would like orders to be send to PT    for: [address] 2 boxes of LARGE gloves. Please fax to?   [phone] Also they need to be mailed so the  has the   originals to: Norberto Crowley. [place], [place]   [place]? KAYE   ---------------------------------------------------------------------------  --------------  Dennis LOVE  What is the best way for the office to contact you? OK to leave message on   voicemail  Preferred Call Back Phone Number? [phone]  ---------------------------------------------------------------------------  --------------  SCRIPT ANSWERS  Relationship to Patient? Guardian  Representative Name? Miki Osgood  Is the Representative on the appropriate HIPAA document in Epic?  Yes

## 2021-10-13 NOTE — TELEPHONE ENCOUNTER
Faxed orders through [employer]. Venita,    Once Doc signs those, do you mind to mail them please?

## 2021-10-18 ENCOUNTER — VIRTUAL VISIT (OUTPATIENT)
Dept: PRIMARY CARE CLINIC | Age: 46
End: 2021-10-18
Payer: MEDICARE

## 2021-10-18 DIAGNOSIS — M87.00 AVASCULAR NECROSIS (HCC): ICD-10-CM

## 2021-10-18 DIAGNOSIS — Z99.3 WHEELCHAIR BOUND: ICD-10-CM

## 2021-10-18 DIAGNOSIS — J45.30 MILD PERSISTENT ASTHMA WITHOUT COMPLICATION: ICD-10-CM

## 2021-10-18 DIAGNOSIS — M15.9 PRIMARY OSTEOARTHRITIS INVOLVING MULTIPLE JOINTS: ICD-10-CM

## 2021-10-18 DIAGNOSIS — Z79.899 MEDICATION MANAGEMENT: ICD-10-CM

## 2021-10-18 DIAGNOSIS — G89.4 CHRONIC PAIN SYNDROME: ICD-10-CM

## 2021-10-18 PROCEDURE — G8428 CUR MEDS NOT DOCUMENT: HCPCS | Performed by: PEDIATRICS

## 2021-10-18 PROCEDURE — 99214 OFFICE O/P EST MOD 30 MIN: CPT | Performed by: PEDIATRICS

## 2021-10-18 RX ORDER — ALBUTEROL SULFATE 90 UG/1
2 AEROSOL, METERED RESPIRATORY (INHALATION) 4 TIMES DAILY PRN
Qty: 3 EACH | Refills: 3 | Status: SHIPPED | OUTPATIENT
Start: 2021-10-18 | End: 2021-11-18 | Stop reason: SDUPTHER

## 2021-10-18 RX ORDER — HYDROCODONE BITARTRATE AND ACETAMINOPHEN 7.5; 325 MG/1; MG/1
1 TABLET ORAL EVERY 8 HOURS PRN
Qty: 40 TABLET | Refills: 0 | Status: SHIPPED | OUTPATIENT
Start: 2021-10-18 | End: 2021-11-18 | Stop reason: SDUPTHER

## 2021-10-18 ASSESSMENT — ENCOUNTER SYMPTOMS
EYES NEGATIVE: 1
RHINORRHEA: 0
NAUSEA: 1
RESPIRATORY NEGATIVE: 1
ALLERGIC/IMMUNOLOGIC NEGATIVE: 1

## 2021-10-18 NOTE — PATIENT INSTRUCTIONS
Patient Education        Safe Use of Opioid Pain Medicine: Care Instructions  Your Care Instructions  Pain is your body's way of warning you that something is wrong. Pain feels different for everybody. Only you can describe your pain. A doctor can suggest or prescribe many types of medicines for pain. These range from over-the-counter medicines like acetaminophen (Tylenol) to powerful medicines called opioids. Examples of opioids are fentanyl, hydrocodone, morphine, and oxycodone. Heroin is an illegal opioid  Opioids are strong medicines. They can help you manage pain when you use them the right way. But if you misuse them, they can cause serious harm and even death. For these reasons, doctors are very careful about how they prescribe opioids. If you decide to take opioids, here are some things to remember. · Keep your doctor informed. You can develop opioid use disorder. Moderate to severe opioid use disorder is sometimes called addiction. The risk is higher if you have a history of substance use. Your doctor will monitor you closely for signs of opioid use disorder and to figure out when you no longer need to take opioids. · Make a treatment plan. The goal of your plan is to be able to function and do the things you need to do, even if you still have some pain. You might be able to manage your pain with other non-opioid options like physical therapy, relaxation, or over-the-counter pain medicines. · Be aware of the side effects. Opioids can cause serious side effects, such as constipation, dry mouth, and nausea. And over time, you may need a higher dose to get pain relief. This is called tolerance. Your body also gets used to opioids. This is called physical dependence. If you suddenly stop taking them, you may have withdrawal symptoms. The doctor carefully considered what pain medicine is right for you.  You may not have received opioids if your doctor was concerned about drug interactions or your safety, or if he or she had other concerns. It is best to have one doctor or clinic treat your pain. This way you will get the pain medicine that will help you the most. And a doctor will be able to watch for any problems that the medicine might cause. The doctor has checked you carefully, but problems can develop later. If you notice any problems or new symptoms,  get medical treatment right away. Follow-up care is a key part of your treatment and safety. Be sure to make and go to all appointments, and call your doctor if you are having problems. It's also a good idea to know your test results and keep a list of the medicines you take. How can you care for yourself at home? If you need to take opioids to manage your pain, remember these safety tips. · Follow directions carefully. It's easy to misuse opioids if you take a dose other than what's prescribed by your doctor. This can lead to overdose and even death. Even sharing them with someone they weren't meant for is misuse. · Be cautious. Opioids may affect your judgment and decision making. Do not drive or operate machinery until you can think clearly. Talk with your doctor about when it is safe to drive. · Reduce the risk of drug interactions. Opioids can be dangerous if you take them with alcohol or with certain drugs like sleeping pills and muscle relaxers. Make sure your doctor knows about all the other medicines you take, including over-the-counter medicines. Don't start any new medicines before you talk to your doctor or pharmacist.  · Safely store and dispose of opioids. Store opioids in a safe and secure place. Make sure that pets, children, friends, and family can't get to them. When you're done using opioids, make sure to dispose of them safely and as quickly as possible. The U.S. Food and Drug Administration (FDA) recommends these disposal options.   ? The best option is to take your medicine to a drop-off box or take-back program that is authorized by the [address] ([facility]). ? If these programs aren't available in your area and your medicine doesn't have specific disposal instructions (such as flushing), you can throw them into your household trash if you follow the FDA's instructions. Visit fda.gov and search for \"unused medicine disposal.\"  ? If you have opioid patches (used or unused), your options are to take them to a [facility]-authorized site or flush them down the toilet. Do not throw them in the trash. ? Only flush your medicine down the toilet if you can't get to a [facility]-approved site or your medicine instructions state clearly to flush them. · Reduce the risk of overdose. Misuse of opioids can be very dangerous. Protect yourself by asking your doctor about a naloxone rescue kit. It can help youand even save your lifeif you take too much of an opioid. Try other ways to reduce pain. · Relax, and reduce stress. Relaxation techniques such as deep breathing or meditation can help. · Keep moving. Gentle, daily exercise can help reduce pain over the long run. Try low- or no-impact exercises such as walking, swimming, and stationary biking. Do stretches to stay flexible. · Try heat, cold packs, and massage. · Get enough sleep. Pain can make you tired and drain your energy. Talk with your doctor if you have trouble sleeping because of pain. · Think positive. Your thoughts can affect your pain level. Do things that you enjoy to distract yourself when you have pain instead of focusing on the pain. See a movie, read a book, listen to music, or spend time with a friend. If you are not taking a prescription pain medicine, ask your doctor if you can take an over-the-counter medicine. When should you call for help? Call 911 anytime you think you may need emergency care. For example, call if:  · You have symptoms of a severe allergic reaction. These may include:  ? Sudden raised, red areas (hives) all over your body. ?  Swelling of the

## 2021-10-18 NOTE — PROGRESS NOTES
[address] [facility], [address]  Phone [phone]   Fax [phone]      OFFICE VISIT: 10/18/2021    Mando Shah-: 1975      HPI  Reason For Visit:  Ricardo Dunn is a 42 y.o. Joint Pain and Anxiety    Patient presents via doxy. me video conferencing on follow-up for medication management. She is needing a refill of her pain medication. Chronic pain:  She typically takes hydrocodone 10 mg on a when necessary basis. 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  When combined with lidocaine, it is pretty helpful     Pain Control: Average: 6/10             Best: 4/10                Worst: 10/10  (10/18/2021)     Pain Interfering with life:  100%  Pain Interfering with general activity:  100%     Her pain level varies in severity and location      Activities of daily living: She is completely wheelchair bound and unable to ambulate at all. She needs assistance with all ADLs. Even going to the bathroom requires assistance. She does have helped by her father as well as assistant who comes and meets her needs     Adverse effects: None  Aberrant behavior: None  Affect: Very good considering her multiple health issues     Alternative medications:              Celebrex 200 mg daily              Diclofenac gel 1% 2 g 4 times daily topically              Naproxen sodium 220 mg when necessary              She is intolerant of many medications.     Urine Drug screen: 10/23/2018 and appropriate              She is incontinent and unable to produce urine on demand.   Risk Factors:              Illegal Drug use: no              History of substance use disorder: no              Mental health conditions: stable depression and anxiety              Sleep disordered breathing: no              Concurrent Benzodiazepine use: yes     Bowel regimen: She is not regularly adherent to her bowel regimen              We discussed this again and stressed the importance of this regimen.              Senokot S,  1 tablets twice daily              MiraLAX 17 g daily     Bowel function: Chronic constipation     RORY was reviewed today per office protocol. Report shows No discrepancies.  Fill pattern is consistent from single provider(s) at single pharmacy(s). Request # [id number]     Active cumulative morphine equivalent score is 0. This is obviously incorrect       vitals were not taken for this visit. There is no height or weight on file to calculate BMI. I have reviewed the following with the Ms. Shah   Lab Review  Office Visit on 06/30/2021   Component Date Value    Color, UA 06/30/2021 ROGERIO     Clarity, UA 06/30/2021 CLEAR     Glucose, UA POC 06/30/2021 NEG     Bilirubin, UA 06/30/2021 SMALL     Ketones, UA 06/30/2021 TRACE     Spec Grav, UA 06/30/2021 1.030     Blood, UA POC 06/30/2021 NEG     pH, UA 06/30/2021 5.5     Protein, UA POC 06/30/2021 30mg/dL     Urobilinogen, UA 06/30/2021 1     Leukocytes, UA 06/30/2021 NEG     Nitrite, UA 06/30/2021 NEG     Appearance, Fluid 06/30/2021 Clear    Admission on 06/26/2021, Discharged on 06/26/2021   Component Date Value    Color, UA 06/26/2021 DARK YELLOW*    Clarity, UA 06/26/2021 CLOUDY*    Glucose, Ur 06/26/2021 Negative     Bilirubin Urine 06/26/2021 Negative     Ketones, Urine 06/26/2021 TRACE*    Specific [place], UA 06/26/2021 1.026     Blood, Urine 06/26/2021 TRACE*    pH, UA 06/26/2021 6.5     Protein, UA 06/26/2021 TRACE*    Urobilinogen, Urine 06/26/2021 1.0     Nitrite, Urine 06/26/2021 Negative     Leukocyte Esterase, Urine 06/26/2021 SMALL*    WBC 06/26/2021 9.1     RBC 06/26/2021 3.86*  Hemoglobin 06/26/2021 13.1     Hematocrit 06/26/2021 39.0     MCV 06/26/2021 101.0*    MCH 06/26/2021 33.9*    MCHC 06/26/2021 33.6     RDW 06/26/2021 12.9     Platelets 01/74/0483 204     MPV 06/26/2021 8.8*    Neutrophils % 06/26/2021 57.3     Lymphocytes % 06/26/2021 34.0     Monocytes % 06/26/2021 5.1     Eosinophils % 06/26/2021 2.6     Basophils % 06/26/2021 0.7     Neutrophils Absolute 06/26/2021 5.2     Immature Granulocytes # 06/26/2021 0.0     Lymphocytes Absolute 06/26/2021 3.1     Monocytes Absolute 06/26/2021 0.50     Eosinophils Absolute 06/26/2021 0.20     Basophils Absolute 06/26/2021 0.10     Sodium 06/26/2021 138     Potassium reflex Magnesi* 06/26/2021 3.8     Chloride 06/26/2021 102     CO2 06/26/2021 26     Anion Gap 06/26/2021 10     Glucose 06/26/2021 97     BUN 06/26/2021 9     CREATININE 06/26/2021 0.7     GFR Non- 06/26/2021 >60     GFR  06/26/2021 >59     Calcium 06/26/2021 9.3     Total Protein 06/26/2021 7.2     Albumin 06/26/2021 4.0     Total Bilirubin 06/26/2021 0.4     Alkaline Phosphatase 06/26/2021 172*    ALT 06/26/2021 15     AST 06/26/2021 11     hCG Qual 06/26/2021 Negative     Lipase 06/26/2021 39     Amylase 06/26/2021 65     Bacteria, UA 06/26/2021 NEGATIVE*    Crystals, UA 06/26/2021 NEG*    Hyaline Casts, UA 06/26/2021 6     WBC, UA 06/26/2021 9*    RBC, UA 06/26/2021 6*    Epithelial Cells, UA 06/26/2021 12    Office Visit on 06/01/2021   Component Date Value    Glucose, UA POC 06/01/2021 negative     Bilirubin, UA 06/01/2021 moderate     Ketones, UA 06/01/2021 small     Spec Grav, UA 06/01/2021 1.025     Blood, UA POC 06/01/2021 trace     pH, UA 06/01/2021 7.5     Protein, UA POC 06/01/2021 30+     Urobilinogen, UA 06/01/2021 8     Leukocytes, UA 06/01/2021 negative     Nitrite, UA 06/01/2021 negative     Urine Culture, Routine 06/01/2021 >100,000 CFU/ml mixed skin/urogenital jasper. No further workup      Copies of these are in the chart. Current Outpatient Medications   Medication Sig Dispense Refill    HYDROcodone-acetaminophen (NORCO) 7.5-325 MG per tablet Take 1 tablet by mouth every 8 hours as needed for Pain for up to 30 days. 40 tablet 0    albuterol sulfate HFA (VENTOLIN HFA) 108 (90 Base) MCG/ACT inhaler Inhale 2 puffs into the lungs 4 times daily as needed for Wheezing 3 each 3    Incontinence Supply Disposable (GNP PERSONAL CLEANSING WIPES) MISC Packages of 640 wipes 640 each 11    Disposable Gloves MISC 2 boxes of size large gloves 2 each 11    EPINEPHrine (EPIPEN 2-ADAN) 0.3 MG/0.3ML SOAJ injection Inject 0.3 mLs into the muscle once for 1 dose Use as directed for allergic reaction 2 each 5    naloxone (NARCAN) 4 MG/0.1ML LIQD nasal spray 1 spray by Nasal route as needed for Opioid Reversal 1 each 0    hydrocortisone 2.5 % cream APPLY EXTERNALLY TO THE AFFECTED AREA TWICE DAILY AS DIRECTED 30 g 1    ondansetron (ZOFRAN-ODT) 4 MG disintegrating tablet Place 1 tablet under the tongue every 8 hours as needed for Nausea or Vomiting 20 tablet 0    tamsulosin (FLOMAX) 0.4 MG capsule Take 1 capsule by mouth daily 90 capsule 1    ketorolac (TORADOL) 10 MG tablet Take 1 tablet by mouth every 6 hours as needed for Pain 20 tablet 0    triamcinolone (KENALOG) 0.1 % cream Apply topically 2 times daily. 80 g 5    lidocaine-prilocaine (EMLA) 2.5-2.5 % cream Apply topically as needed.  210 g 3    lamoTRIgine (LAMICTAL) 200 MG tablet Take 1 tablet by mouth 2 times daily 60 tablet 11    desloratadine (CLARINEX) 5 MG tablet Take 1 tablet by mouth daily 30 tablet 11    medroxyPROGESTERone (DEPO-PROVERA) 150 MG/ML injection Inject 150 mg into the muscle every 3 months 1 mL 3    senna-docusate (SENEXON-S) 8.6-50 MG per tablet Take 2 tablets by mouth 2 times daily 120 tablet 11    celecoxib (CELEBREX) 200 MG capsule TAKE 1 CAPSULE BY MOUTH DAILY 90 capsule 3    omeprazole (PRILOSEC) 20 MG delayed release capsule TAKE 1 CAPSULE BY MOUTH DAILY 90 capsule 3    Incontinence Supplies MISC All incontinence supplies: diapers, wipes, chucks, gloves x 1 month and 11  each 0    diclofenac sodium (VOLTAREN) 1 % GEL Apply 2 g topically 4 times daily 100 g 5    clotrimazole (LOTRIMIN) 1 % cream APPLY EXTERNALLY TO THE AFFECTED AREA TWICE DAILY 30 g 0    albuterol (PROVENTIL) (2.5 MG/3ML) 0.083% nebulizer solution Take 3 mLs by nebulization every 6 hours as needed for Wheezing (coughing) 120 each 5    medicated lip balm (BLISTEX/CARMEX) 2-2.5-6.6 % STCK Apply topically as needed for Dry Lips       No current facility-administered medications for this visit.        Allergies: Latex, Bactrim [sulfamethoxazole-trimethoprim], Ciprofloxacin, Ciprofloxacin hcl, Depakote [divalproex sodium], Dilantin [phenytoin sodium extended], Dye [iodides], Keflex [cephalexin], Pcn [penicillins], Primaxin [imipenem], Unasyn [ampicillin-sulbactam sodium], and Wasp venom     Past Medical History:   Diagnosis Date    Arthritis     GERD (gastroesophageal reflux disease)     History of blood transfusion     Incontinence     Seizures ([address]        Family History   Problem Relation Age of Onset    High Blood Pressure Mother     High Blood Pressure Father        Past Surgical History:   Procedure Laterality Date    APPENDECTOMY      CHOLECYSTECTOMY      CSF SHUNT Right     DEEP BRAIN STIMULATOR PLACEMENT      tremor control it is off now    NJ EXCIS CHALAZION,GEN ANESTHESIA Right 8/23/2018    EYE CHALAZION EXCISION [facility]    NJ XCAPSL CTRC RMVL INSJ IO LENS PROSTH W/O ECP Left 6/15/2017    EYE CATARACT EMULSIFICATION IOL IMPLANT performed by Shaan Li MD at [facility] OR    NJ XCYOJANAL CTRC RMVL INSJ IO LENS PROSTH W/O ECP Right 8/4/2017    CATARCAT EXTRACTION EYE WITH IOL performed by Shaan Li MD at [facility] [address]     Tobacco Use    Smoking status: Former Smoker     Packs/day: 1.00     Years: 19.00     Pack years: 19.00     Types: Cigarettes     Quit date: 2015     Years since quittin.6    Smokeless tobacco: Never Used    Tobacco comment: quit smoking  6 yrs ago   Substance Use Topics    Alcohol use: No        Review of Systems   Constitutional: Negative. HENT: Negative for congestion, ear pain, postnasal drip and rhinorrhea. Eyes: Negative. Respiratory: Negative. Cardiovascular: Negative. Gastrointestinal: Positive for nausea. Endocrine: Negative. Musculoskeletal: Positive for arthralgias (left knee getting worse). Skin: Negative for rash. Allergic/Immunologic: Negative. Neurological: Negative. Hematological: Negative. Psychiatric/Behavioral: The patient is nervous/anxious. Physical Exam  Physical exam was not performed today as this was a video teleconference visit using doxy. Me      ASSESSMENT      ICD-10-CM    1. Chronic pain syndrome  G89.4 HYDROcodone-acetaminophen (NORCO) 7.5-325 MG per tablet   2. Medication management  Z79.899 HYDROcodone-acetaminophen (NORCO) 7.5-325 MG per tablet   3. Primary osteoarthritis involving multiple joints  M89.49 HYDROcodone-acetaminophen (NORCO) 7.5-325 MG per tablet   4. Avascular necrosis (HCC)  M87.00 HYDROcodone-acetaminophen (NORCO) 7.5-325 MG per tablet   5. Wheelchair bound  Z99.3 HYDROcodone-acetaminophen (NORCO) 7.5-325 MG per tablet   6. Mild persistent asthma without complication  C42.10 albuterol sulfate HFA (VENTOLIN HFA) 108 (90 Base) MCG/ACT inhaler         PLAN    1. Chronic pain syndrome  The current medical regimen is effective;  continue present plan and medications.  - HYDROcodone-acetaminophen (NORCO) 7.5-325 MG per tablet; Take 1 tablet by mouth every 8 hours as needed for Pain for up to 30 days. Dispense: 40 tablet; Refill: 0    2.  Medication management  The current medical regimen is effective;  continue present plan and medications.  - HYDROcodone-acetaminophen (NORCO) 7.5-325 MG per tablet; Take 1 tablet by mouth every 8 hours as needed for Pain for up to 30 days. Dispense: 40 tablet; Refill: 0    3. Primary osteoarthritis involving multiple joints  The current medical regimen is effective;  continue present plan and medications.  - HYDROcodone-acetaminophen (NORCO) 7.5-325 MG per tablet; Take 1 tablet by mouth every 8 hours as needed for Pain for up to 30 days. Dispense: 40 tablet; Refill: 0    4. Avascular necrosis (HCC)  The current medical regimen is effective;  continue present plan and medications.  - HYDROcodone-acetaminophen (NORCO) 7.5-325 MG per tablet; Take 1 tablet by mouth every 8 hours as needed for Pain for up to 30 days. Dispense: 40 tablet; Refill: 0    5. Wheelchair bound  The current medical regimen is effective;  continue present plan and medications.  - HYDROcodone-acetaminophen (NORCO) 7.5-325 MG per tablet; Take 1 tablet by mouth every 8 hours as needed for Pain for up to 30 days. Dispense: 40 tablet; Refill: 0      No orders of the defined types were placed in this encounter. Return in about 1 month (around 11/18/2021) for Slade Erazo, was evaluated through a synchronous (real-time) audio-video encounter. The patient (or guardian if applicable) is aware that this is a billable service. Verbal consent to proceed has been obtained within the past 12 months. The visit was conducted pursuant to the emergency declaration under the [address] [place] [address] authority and the [employer] General Act. Patient identification was verified, and a caregiver was present when appropriate. The patient was located in a state where the provider was credentialed to provide care. Total time spent for this encounter: 30m    --B. Leonor Felty, DO on 10/18/2021 at 1:20 PM    An electronic signature was used to authenticate this note.

## 2021-10-30 DIAGNOSIS — K21.9 GASTROESOPHAGEAL REFLUX DISEASE WITHOUT ESOPHAGITIS: ICD-10-CM

## 2021-11-01 RX ORDER — OMEPRAZOLE 20 MG/1
20 CAPSULE, DELAYED RELEASE ORAL DAILY
Qty: 90 CAPSULE | Refills: 3 | Status: SHIPPED | OUTPATIENT
Start: 2021-11-01 | End: 2022-06-27 | Stop reason: SDUPTHER

## 2021-11-09 ENCOUNTER — TELEPHONE (OUTPATIENT)
Dept: PRIMARY CARE CLINIC | Age: 46
End: 2021-11-09

## 2021-11-09 ENCOUNTER — NURSE ONLY (OUTPATIENT)
Dept: PRIMARY CARE CLINIC | Age: 46
End: 2021-11-09
Payer: MEDICARE

## 2021-11-09 DIAGNOSIS — Z30.9 ENCOUNTER FOR CONTRACEPTIVE MANAGEMENT, UNSPECIFIED TYPE: Primary | ICD-10-CM

## 2021-11-09 DIAGNOSIS — H54.3 BLINDNESS OF BOTH EYES: Primary | ICD-10-CM

## 2021-11-09 LAB
CONTROL: NORMAL
PREGNANCY TEST URINE, POC: NORMAL

## 2021-11-09 PROCEDURE — 99999 PR OFFICE/OUTPT VISIT,PROCEDURE ONLY: CPT | Performed by: PEDIATRICS

## 2021-11-09 PROCEDURE — 81025 URINE PREGNANCY TEST: CPT | Performed by: PEDIATRICS

## 2021-11-09 PROCEDURE — 96372 THER/PROPH/DIAG INJ SC/IM: CPT | Performed by: PEDIATRICS

## 2021-11-09 RX ORDER — MEDROXYPROGESTERONE ACETATE 150 MG/ML
150 INJECTION, SUSPENSION INTRAMUSCULAR ONCE
Status: COMPLETED | OUTPATIENT
Start: 2021-11-09 | End: 2021-11-09

## 2021-11-09 RX ADMIN — MEDROXYPROGESTERONE ACETATE 150 MG: 150 INJECTION, SUSPENSION INTRAMUSCULAR at 14:45

## 2021-11-09 NOTE — PROGRESS NOTES
After obtaining consent, and per orders of Dr. Ashley Crouch, injection of Depo provera 150 mg was given in the Right deltoid . Patient tolerated it well. Patient instructed to report any adverse reaction to me immediately.      Results for orders placed or performed in visit on 11/09/21   POCT urine pregnancy   Result Value Ref Range    Preg Test, Ur none detected     Control

## 2021-11-09 NOTE — TELEPHONE ENCOUNTER
Pt was in office today with her father. She was seen by [facility] in [facility] and they recommend she see a Neurologist. Dad says she is going blind. He would like a referral to see a Neurologist [facility]. I did get the records from [facility] and attached them to this call for review. Will send to provider for recommendations.

## 2021-11-10 ENCOUNTER — TELEPHONE (OUTPATIENT)
Dept: NEUROSURGERY | Age: 46
End: 2021-11-10

## 2021-11-10 DIAGNOSIS — Z98.2 S/P VP SHUNT: Primary | ICD-10-CM

## 2021-11-10 NOTE — TELEPHONE ENCOUNTER
[facility] New Patient Questionnaire    1. Diagnosis/Reason for Referral?    Vision problems, dizziness and headache       2. Who is completing questionnaire? Patient  Caregiver Family      3. Has the patient had any previous spinal/brain surgeries? Yes - shunt placement 24 years ago. Deep brain stimulator implant in 2000. A. If yes, what is the name of the facility in which the surgery was performed? [facility] in [address] B. Procedure/Surgery performed? Deep brain stimulator     C. Who was the surgeon? Surgeon is retired now        D. When was the surgery? 2000       E. Did the patient improve after the surgery? Yes         4. Is this a second opinion? If yes, Dr. Peng Stands would like to review patient first before making the appointment. 5. Have MRI Images been obtain within the last year? Yes  No      XR  CT     If yes, where was the imaging performed? If yes, what part of the body? Lumbar  Cervical  Thoracic  Brain     If yes, when was it obtained? MM/YY    Note: if the scan was performed at a facility other than [facility], the disc will need to be brought to the appointment or we need to reach out to obtain the disc. A. Was the patient instructed to provide the disc? Yes   No      8. Has the patient had a NCV/EMG within the last year? Yes  No     If yes, where was it performed and date? MM/YY  Location:      9. Has the patient been to Physical Therapy? Yes  No     If yes, what location, how long attended, and last visit? Location:        Therapy Lasted:    Date of Last Visit:      10. Has the patient been to Pain Management? Yes  No     If yes, what location and last visit     Location:   Last Visit:   Is it helping?

## 2021-11-18 ENCOUNTER — VIRTUAL VISIT (OUTPATIENT)
Dept: PRIMARY CARE CLINIC | Age: 46
End: 2021-11-18
Payer: MEDICARE

## 2021-11-18 DIAGNOSIS — M15.9 PRIMARY OSTEOARTHRITIS INVOLVING MULTIPLE JOINTS: ICD-10-CM

## 2021-11-18 DIAGNOSIS — G89.4 CHRONIC PAIN SYNDROME: ICD-10-CM

## 2021-11-18 DIAGNOSIS — F41.9 ANXIETY: ICD-10-CM

## 2021-11-18 DIAGNOSIS — J45.30 MILD PERSISTENT ASTHMA WITHOUT COMPLICATION: ICD-10-CM

## 2021-11-18 DIAGNOSIS — Z79.899 MEDICATION MANAGEMENT: ICD-10-CM

## 2021-11-18 DIAGNOSIS — M87.00 AVASCULAR NECROSIS (HCC): ICD-10-CM

## 2021-11-18 DIAGNOSIS — Z99.3 WHEELCHAIR BOUND: ICD-10-CM

## 2021-11-18 PROCEDURE — 99443 PR PHYS/QHP TELEPHONE EVALUATION 21-30 MIN: CPT | Performed by: PEDIATRICS

## 2021-11-18 RX ORDER — HYDROCODONE BITARTRATE AND ACETAMINOPHEN 7.5; 325 MG/1; MG/1
1 TABLET ORAL EVERY 8 HOURS PRN
Qty: 40 TABLET | Refills: 0 | Status: SHIPPED | OUTPATIENT
Start: 2021-11-18 | End: 2021-12-06 | Stop reason: SDUPTHER

## 2021-11-18 RX ORDER — DIAZEPAM 2 MG/1
2 TABLET ORAL EVERY 8 HOURS PRN
Qty: 60 TABLET | Refills: 0 | Status: SHIPPED | OUTPATIENT
Start: 2021-11-18 | End: 2021-12-23

## 2021-11-18 RX ORDER — ALBUTEROL SULFATE 90 UG/1
2 AEROSOL, METERED RESPIRATORY (INHALATION) 4 TIMES DAILY PRN
Qty: 3 EACH | Refills: 3 | Status: SHIPPED | OUTPATIENT
Start: 2021-11-18 | End: 2022-01-05

## 2021-11-18 RX ORDER — CELECOXIB 200 MG/1
200 CAPSULE ORAL DAILY
Qty: 90 CAPSULE | Refills: 3 | Status: SHIPPED | OUTPATIENT
Start: 2021-11-18 | End: 2022-06-27 | Stop reason: SDUPTHER

## 2021-11-18 ASSESSMENT — ENCOUNTER SYMPTOMS
ALLERGIC/IMMUNOLOGIC NEGATIVE: 1
NAUSEA: 1
RHINORRHEA: 0
RESPIRATORY NEGATIVE: 1
EYES NEGATIVE: 1

## 2021-11-18 NOTE — PROGRESS NOTES
[address] [facility], [address]  Phone [phone]   Fax [phone]      OFFICE VISIT: 2021    Tanmayselena Zheng Alyssa-: 1975      [facility]  Reason For Visit:  Jermain Christopher is a 42 y.o. Chronic Pain and Anxiety    Chronic pain:  She typically takes hydrocodone 10 mg on a when necessary basis. 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  When combined with lidocaine, it is pretty helpful     Pain Control: Average: 6/10             Best: 4/10                Worst: 10/10  (2021)     Pain Interfering with life:  100%  Pain Interfering with general activity:  100%     Her pain level varies in severity and location      Activities of daily living: She is completely wheelchair bound and unable to ambulate at all. She needs assistance with all ADLs. Even going to the bathroom requires assistance. She does have helped by her father as well as assistant who comes and meets her needs     Adverse effects: None  Aberrant behavior: None  Affect: Very good considering her multiple health issues     Alternative medications:              Celebrex 200 mg daily              Diclofenac gel 1% 2 g 4 times daily topically              Naproxen sodium 220 mg when necessary              She is intolerant of many medications.     Urine Drug screen: 10/23/2018 and appropriate              She is incontinent and unable to produce urine on demand.   Risk Factors:              Illegal Drug use: no              History of substance use disorder: no              Mental health conditions: stable depression and anxiety              Sleep disordered breathing: no              Concurrent Benzodiazepine use: yes     Bowel regimen: She is not regularly adherent to her bowel regimen              We discussed this again and stressed the importance of this regimen.              Senokot S,  1 tablets twice daily              MiraLAX 17 g daily     Bowel function: Chronic constipation     RORY was reviewed today per office protocol. Report shows No discrepancies.  Fill pattern is consistent from single provider(s) at single pharmacy(s). Request # [id number]     Active cumulative morphine equivalent score is 0. This is obviously incorrect        Controlled Substance Monitoring:    Acute and Chronic Pain Monitoring:   RX Monitoring 9/16/2021   Attestation The Prescription Monitoring Report for this patient was reviewed today. Acute Pain Prescriptions -   Periodic Controlled Substance Monitoring Possible medication side effects, risk of tolerance/dependence & alternative treatments discussed. ;No signs of potential drug abuse or diversion identified. ;Assessed functional status. ;Obtaining appropriate analgesic effect of treatment. Chronic Pain > 50 MEDD Re-evaluated the status of the patient's underlying condition causing pain. Chronic Pain > 80 MEDD -          vitals were not taken for this visit. There is no height or weight on file to calculate BMI. I have reviewed the following with the Ms. Shah   Lab Review  Nurse Only on 11/09/2021   Component Date Value    Preg Test, Ur 11/09/2021 none detected    Office Visit on 06/30/2021   Component Date Value    Color, UA 06/30/2021 ROGERIO     Clarity, UA 06/30/2021 CLEAR     Glucose, UA POC 06/30/2021 NEG     Bilirubin, UA 06/30/2021 SMALL     Ketones, UA 06/30/2021 TRACE     Spec Grav, UA 06/30/2021 1.030     Blood, UA POC 06/30/2021 NEG     pH, UA 06/30/2021 5.5     Protein, UA POC 06/30/2021 30mg/dL     Urobilinogen, UA 06/30/2021 1     Leukocytes, UA 06/30/2021 NEG     Nitrite, UA 06/30/2021 NEG     Appearance, Fluid 06/30/2021 Clear    Admission on 06/26/2021, Discharged on 06/26/2021   Component Date Value    Color, UA 06/26/2021 DARK YELLOW*    Clarity, UA 06/26/2021 CLOUDY*    Glucose, Ur 06/26/2021 Negative     Bilirubin Urine 06/26/2021 Negative     Ketones, Urine 06/26/2021 TRACE*    Specific [place], UA 06/26/2021 1.026     Blood, Urine 06/26/2021 TRACE*    pH, UA 06/26/2021 6.5     Protein, UA 06/26/2021 TRACE*    Urobilinogen, Urine 06/26/2021 1.0     Nitrite, Urine 06/26/2021 Negative     Leukocyte Esterase, Urine 06/26/2021 SMALL*    WBC 06/26/2021 9.1     RBC 06/26/2021 3.86*    Hemoglobin 06/26/2021 13.1     Hematocrit 06/26/2021 39.0     MCV 06/26/2021 101.0*    MCH 06/26/2021 33.9*    MCHC 06/26/2021 33.6     RDW 06/26/2021 12.9     Platelets 49/54/2267 204     MPV 06/26/2021 8.8*    Neutrophils % 06/26/2021 57.3     Lymphocytes % 06/26/2021 34.0     Monocytes % 06/26/2021 5.1     Eosinophils % 06/26/2021 2.6     Basophils % 06/26/2021 0.7     Neutrophils Absolute 06/26/2021 5.2     Immature Granulocytes # 06/26/2021 0.0     Lymphocytes Absolute 06/26/2021 3.1     Monocytes Absolute 06/26/2021 0.50     Eosinophils Absolute 06/26/2021 0.20     Basophils Absolute 06/26/2021 0.10     Sodium 06/26/2021 138     Potassium reflex Magnesi* 06/26/2021 3.8     Chloride 06/26/2021 102     CO2 06/26/2021 26     Anion Gap 06/26/2021 10     Glucose 06/26/2021 97     BUN 06/26/2021 9     CREATININE 06/26/2021 0.7     GFR Non- 06/26/2021 >60     GFR  06/26/2021 >59     Calcium 06/26/2021 9.3     Total Protein 06/26/2021 7.2     Albumin 06/26/2021 4.0     Total Bilirubin 06/26/2021 0.4     Alkaline Phosphatase 06/26/2021 172*    ALT 06/26/2021 15     AST 06/26/2021 11     hCG Qual 06/26/2021 Negative     Lipase 06/26/2021 39     Amylase 06/26/2021 65     Bacteria, UA 06/26/2021 NEGATIVE*    Crystals, UA 06/26/2021 NEG*    Hyaline Casts, UA 06/26/2021 6  WBC, UA 06/26/2021 9*    RBC, UA 06/26/2021 6*    Epithelial Cells, UA 06/26/2021 12    Office Visit on 06/01/2021   Component Date Value    Glucose, UA POC 06/01/2021 negative     Bilirubin, UA 06/01/2021 moderate     Ketones, UA 06/01/2021 small     Spec Grav, UA 06/01/2021 1.025     Blood, UA POC 06/01/2021 trace     pH, UA 06/01/2021 7.5     Protein, UA POC 06/01/2021 30+     Urobilinogen, UA 06/01/2021 8     Leukocytes, UA 06/01/2021 negative     Nitrite, UA 06/01/2021 negative     Urine Culture, Routine 06/01/2021 >100,000 CFU/ml mixed skin/urogenital jasper. No further workup      Copies of these are in the chart. Current Outpatient Medications   Medication Sig Dispense Refill    HYDROcodone-acetaminophen (NORCO) 7.5-325 MG per tablet Take 1 tablet by mouth every 8 hours as needed for Pain for up to 30 days. 40 tablet 0    celecoxib (CELEBREX) 200 MG capsule Take 1 capsule by mouth daily 90 capsule 3    albuterol sulfate HFA (VENTOLIN HFA) 108 (90 Base) MCG/ACT inhaler Inhale 2 puffs into the lungs 4 times daily as needed for Wheezing 3 each 3    diazePAM (VALIUM) 2 MG tablet Take 1 tablet by mouth every 8 hours as needed for Anxiety or Sleep for up to 30 days.  60 tablet 0    nystatin (MYCOSTATIN) 700782 UNIT/ML suspension Take 5 mLs by mouth 4 times daily 473 mL 1    omeprazole (PRILOSEC) 20 MG delayed release capsule TAKE 1 CAPSULE BY MOUTH DAILY 90 capsule 3    Incontinence Supply Disposable (GNP PERSONAL CLEANSING WIPES) MISC Packages of 640 wipes 640 each 11    Disposable Gloves MISC 2 boxes of size large gloves 2 each 11    EPINEPHrine (EPIPEN 2-ADAN) 0.3 MG/0.3ML SOAJ injection Inject 0.3 mLs into the muscle once for 1 dose Use as directed for allergic reaction 2 each 5    naloxone (NARCAN) 4 MG/0.1ML LIQD nasal spray 1 spray by Nasal route as needed for Opioid Reversal 1 each 0    hydrocortisone 2.5 % cream APPLY EXTERNALLY TO THE AFFECTED AREA TWICE DAILY AS DIRECTED 30 g 1    ondansetron (ZOFRAN-ODT) 4 MG disintegrating tablet Place 1 tablet under the tongue every 8 hours as needed for Nausea or Vomiting 20 tablet 0    tamsulosin (FLOMAX) 0.4 MG capsule Take 1 capsule by mouth daily 90 capsule 1    ketorolac (TORADOL) 10 MG tablet Take 1 tablet by mouth every 6 hours as needed for Pain 20 tablet 0    triamcinolone (KENALOG) 0.1 % cream Apply topically 2 times daily. 80 g 5    lidocaine-prilocaine (EMLA) 2.5-2.5 % cream Apply topically as needed. 210 g 3    lamoTRIgine (LAMICTAL) 200 MG tablet Take 1 tablet by mouth 2 times daily 60 tablet 11    desloratadine (CLARINEX) 5 MG tablet Take 1 tablet by mouth daily 30 tablet 11    medroxyPROGESTERone (DEPO-PROVERA) 150 MG/ML injection Inject 150 mg into the muscle every 3 months 1 mL 3    senna-docusate (SENEXON-S) 8.6-50 MG per tablet Take 2 tablets by mouth 2 times daily 120 tablet 11    Incontinence Supplies MISC All incontinence supplies: diapers, wipes, chucks, gloves x 1 month and 11  each 0    diclofenac sodium (VOLTAREN) 1 % GEL Apply 2 g topically 4 times daily 100 g 5    clotrimazole (LOTRIMIN) 1 % cream APPLY EXTERNALLY TO THE AFFECTED AREA TWICE DAILY 30 g 0    albuterol (PROVENTIL) (2.5 MG/3ML) 0.083% nebulizer solution Take 3 mLs by nebulization every 6 hours as needed for Wheezing (coughing) 120 each 5    medicated lip balm (BLISTEX/CARMEX) 2-2.5-6.6 % STCK Apply topically as needed for Dry Lips       No current facility-administered medications for this visit.        Allergies: Latex, Bactrim [sulfamethoxazole-trimethoprim], Ciprofloxacin, Ciprofloxacin hcl, Depakote [divalproex sodium], Dilantin [phenytoin sodium extended], Dye [iodides], Keflex [cephalexin], Pcn [penicillins], Primaxin [imipenem], Unasyn [ampicillin-sulbactam sodium], and Wasp venom     Past Medical History:   Diagnosis Date    Arthritis     GERD (gastroesophageal reflux disease)     History of blood transfusion     Incontinence  Seizures ([address]        Family History   Problem Relation Age of Onset    High Blood Pressure Mother     High Blood Pressure Father        Past Surgical History:   Procedure Laterality Date    APPENDECTOMY      CHOLECYSTECTOMY      CSF SHUNT Right     DEEP BRAIN STIMULATOR PLACEMENT      tremor control it is off now    MI EXCIS CHALAZION,GEN ANESTHESIA Right 2018    EYE CHALAZION EXCISION performed by Monie Courtney [facility] at [facility] OR    MI XCAPSL CTR RMVL INSJ IO LENS PROSTH W/O ECP Left 6/15/2017    EYE CATARACT EMULSIFICATION IOL IMPLANT performed by Monie Courtney MD at [facility] OR    MI XCAPSL CTR RMVL INSJ IO LENS PROSTH W/O ECP Right 2017    CATARCAT EXTRACTION EYE WITH IOL [facility] at [facility]       Social History     Tobacco Use    Smoking status: Former Smoker     Packs/day: 1.00     Years: 19.00     Pack years: 19.00     Types: Cigarettes     Quit date: 2015     Years since quittin.7    Smokeless tobacco: Never Used    Tobacco comment: quit smoking  6 yrs ago   Substance Use Topics    Alcohol use: No        Review of Systems   Constitutional: Negative. HENT: Negative for congestion, ear pain, postnasal drip and rhinorrhea. Eyes: Negative. Respiratory: Negative. Cardiovascular: Negative. Gastrointestinal: Positive for nausea. Endocrine: Negative. Musculoskeletal: Positive for arthralgias (left knee getting worse). Skin: Negative for rash. Allergic/Immunologic: Negative. Neurological: Negative. Hematological: Negative. Psychiatric/Behavioral: The patient is nervous/anxious. Physical Exam  PE was not done today as this was a telephone visit. ASSESSMENT      ICD-10-CM    1. Chronic pain syndrome  G89.4 HYDROcodone-acetaminophen (NORCO) 7.5-325 MG per tablet   2. Medication management  Z79.899 HYDROcodone-acetaminophen (NORCO) 7.5-325 MG per tablet   3.  Primary osteoarthritis involving multiple joints M89.49 HYDROcodone-acetaminophen (NORCO) 7.5-325 MG per tablet     celecoxib (CELEBREX) 200 MG capsule   4. Avascular necrosis (HCC)  M87.00 HYDROcodone-acetaminophen (NORCO) 7.5-325 MG per tablet   5. Wheelchair bound  Z99.3 HYDROcodone-acetaminophen (NORCO) 7.5-325 MG per tablet   6. Mild persistent asthma without complication  I18.36 albuterol sulfate HFA (VENTOLIN HFA) 108 (90 Base) MCG/ACT inhaler   7. Anxiety  F41.9 diazePAM (VALIUM) 2 MG tablet         PLAN    1. Chronic pain syndrome  The current medical regimen is effective;  continue present plan and medications.  - HYDROcodone-acetaminophen (NORCO) 7.5-325 MG per tablet; Take 1 tablet by mouth every 8 hours as needed for Pain for up to 30 days. Dispense: 40 tablet; Refill: 0    2. Medication management  The current medical regimen is effective;  continue present plan and medications.  - HYDROcodone-acetaminophen (NORCO) 7.5-325 MG per tablet; Take 1 tablet by mouth every 8 hours as needed for Pain for up to 30 days. Dispense: 40 tablet; Refill: 0    3. Primary osteoarthritis involving multiple joints  The current medical regimen is effective;  continue present plan and medications.  - HYDROcodone-acetaminophen (NORCO) 7.5-325 MG per tablet; Take 1 tablet by mouth every 8 hours as needed for Pain for up to 30 days. Dispense: 40 tablet; Refill: 0  - celecoxib (CELEBREX) 200 MG capsule; Take 1 capsule by mouth daily  Dispense: 90 capsule; Refill: 3    4. Avascular necrosis (HCC)  The current medical regimen is effective;  continue present plan and medications.  - HYDROcodone-acetaminophen (NORCO) 7.5-325 MG per tablet; Take 1 tablet by mouth every 8 hours as needed for Pain for up to 30 days. Dispense: 40 tablet; Refill: 0    5. Wheelchair bound  The current medical regimen is effective;  continue present plan and medications.  - HYDROcodone-acetaminophen (NORCO) 7.5-325 MG per tablet;  Take 1 tablet by mouth every 8 hours as needed for Pain for up to 30 days.  Dispense: 40 tablet; Refill: 0    6. Mild persistent asthma without complication  The current medical regimen is effective;  continue present plan and medications. - albuterol sulfate HFA (VENTOLIN HFA) 108 (90 Base) MCG/ACT inhaler; Inhale 2 puffs into the lungs 4 times daily as needed for Wheezing  Dispense: 3 each; Refill: 3    7. Anxiety  The current medical regimen is effective;  continue present plan and medications. - diazePAM (VALIUM) 2 MG tablet; Take 1 tablet by mouth every 8 hours as needed for Anxiety or Sleep for up to 30 days. Dispense: 60 tablet; Refill: 0      No orders of the defined types were placed in this encounter. Return in about 1 month (around 12/18/2021) for 15. Due to patients co-morbidities and risk for potential exposure of COVID 19 pandemic. Patient was contacted and agreed to proceed with a telephone virtual visit. The risks and benefits of converting to a telephone virtual visit were discussed in light of the current infectious disease epidemic. Patient also understood that insurance coverage and co-pays are up to their individual insurance plans. Provider performed history of present illness and review of systems. Diagnosis and treatment plan was discussed with patient. Pharmacy of choice was reviewed along with past medical history, medication allergies, and current medications. Education provided to patient or patient parents/guardian with current illness diagnosis as well as when to seek additional healthcare due to changing or for worsening symptoms. Patient voiced understanding. 30 minutes were spent on the phone with patient. Chris White, was evaluated through a synchronous (real-time) audio-video encounter. The patient (or guardian if applicable) is aware that this is a billable service. Verbal consent to proceed has been obtained within the past 12 months.  The visit was conducted pursuant to the emergency declaration under the [facility] Act and the [address] waiver authority and the [employer] and [employer] General Act. Patient identification was verified, and a caregiver was present when appropriate. The patient was located in a state where the provider was credentialed to provide care. Total time spent for this encounter: 30m    --IRA Bravo DO on 11/18/2021 at 4:13 PM    An electronic signature was used to authenticate this note.

## 2021-11-19 ENCOUNTER — OFFICE VISIT (OUTPATIENT)
Dept: NEUROSURGERY | Age: 46
End: 2021-11-19

## 2021-11-19 ENCOUNTER — HOSPITAL ENCOUNTER (OUTPATIENT)
Dept: CT IMAGING | Age: 46
Discharge: HOME | End: 2021-11-19
Payer: MEDICARE

## 2021-11-19 ENCOUNTER — TELEPHONE (OUTPATIENT)
Dept: NEUROSURGERY | Age: 46
End: 2021-11-19

## 2021-11-19 ENCOUNTER — HOSPITAL ENCOUNTER (OUTPATIENT)
Dept: GENERAL RADIOLOGY | Age: 46
Discharge: HOME | End: 2021-11-19
Payer: MEDICARE

## 2021-11-19 ENCOUNTER — NURSE ONLY (OUTPATIENT)
Dept: PRIMARY CARE CLINIC | Age: 46
End: 2021-11-19
Payer: MEDICARE

## 2021-11-19 VITALS
DIASTOLIC BLOOD PRESSURE: 79 MMHG | HEIGHT: 63 IN | WEIGHT: 140 LBS | OXYGEN SATURATION: 100 % | HEART RATE: 94 BPM | SYSTOLIC BLOOD PRESSURE: 111 MMHG | BODY MASS INDEX: 24.8 KG/M2

## 2021-11-19 DIAGNOSIS — H54.7 WORSENING VISION: ICD-10-CM

## 2021-11-19 DIAGNOSIS — Z23 COVID-19 VACCINE ADMINISTERED: ICD-10-CM

## 2021-11-19 DIAGNOSIS — Z98.2 S/P VP SHUNT: ICD-10-CM

## 2021-11-19 DIAGNOSIS — Z87.828 HISTORY OF TRAUMATIC INJURY OF HEAD: ICD-10-CM

## 2021-11-19 DIAGNOSIS — Z23 FLU VACCINE NEED: Primary | ICD-10-CM

## 2021-11-19 DIAGNOSIS — Z98.2 S/P VP SHUNT: Primary | ICD-10-CM

## 2021-11-19 PROCEDURE — 70250 X-RAY EXAM OF SKULL: CPT

## 2021-11-19 PROCEDURE — 1036F TOBACCO NON-USER: CPT | Performed by: NURSE PRACTITIONER

## 2021-11-19 PROCEDURE — 90674 CCIIV4 VAC NO PRSV 0.5 ML IM: CPT | Performed by: PEDIATRICS

## 2021-11-19 PROCEDURE — G8482 FLU IMMUNIZE ORDER/ADMIN: HCPCS | Performed by: NURSE PRACTITIONER

## 2021-11-19 PROCEDURE — G8427 DOCREV CUR MEDS BY ELIG CLIN: HCPCS | Performed by: NURSE PRACTITIONER

## 2021-11-19 PROCEDURE — 99214 OFFICE O/P EST MOD 30 MIN: CPT | Performed by: NURSE PRACTITIONER

## 2021-11-19 PROCEDURE — G1010 CDSM STANSON: HCPCS

## 2021-11-19 PROCEDURE — G8420 CALC BMI NORM PARAMETERS: HCPCS | Performed by: NURSE PRACTITIONER

## 2021-11-19 PROCEDURE — 91300 COVID-19, PFIZER VACCINE 30MCG/0.3ML DOSE: CPT | Performed by: PEDIATRICS

## 2021-11-19 PROCEDURE — G0008 ADMIN INFLUENZA VIRUS VAC: HCPCS | Performed by: PEDIATRICS

## 2021-11-19 PROCEDURE — 99999 PR OFFICE/OUTPT VISIT,PROCEDURE ONLY: CPT | Performed by: PEDIATRICS

## 2021-11-19 PROCEDURE — 0003A COVID-19, PFIZER VACCINE 30MCG/0.3ML DOSE: CPT | Performed by: PEDIATRICS

## 2021-11-19 ASSESSMENT — ENCOUNTER SYMPTOMS
GASTROINTESTINAL NEGATIVE: 1
RESPIRATORY NEGATIVE: 1
BLURRED VISION: 1

## 2021-11-19 NOTE — TELEPHONE ENCOUNTER
We need to attempt to obtain old images of her brain. The parents say she had imaging done here at [facility] years ago. They are definitely not in PACS where I can view them. I am unsure if we can obtain old imaging from the radiology department? ?  Just remind me on Monday to help you guys.
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
## 2021-11-19 NOTE — PROGRESS NOTES
Review of Systems   Constitutional: Negative. HENT: Negative. Eyes: Positive for blurred vision. Respiratory: Negative. Cardiovascular: Negative. Gastrointestinal: Negative. Genitourinary: Negative. Musculoskeletal: Negative. Skin: Negative. Neurological: Positive for dizziness and headaches. Endo/Heme/Allergies: Negative. Psychiatric/Behavioral: Negative.

## 2021-11-19 NOTE — PROGRESS NOTES
Vaccine Information Sheet, \"Influenza - Inactivated\"  given to Seymour Todd, or parent/legal guardian of  Seymour Todd and verbalized understanding. Patient responses:    Have you ever had a reaction to a flu vaccine? No  Are you able to eat eggs without adverse effects? No  Do you have any current illness? No  Have you ever had Guillian [place] Syndrome? No    Flu vaccine given per order. Please see immunization tab. Dionisio LOVELACE PF, 30mcg/0.3mL      Current Status      Updated on: 11/19/2021  4:09 PM    Name Date Status Dose VIS Date Route Site  Lot# Given By Verified By   Dionisio LOVELACE PF, 30mcg/0.3mL 11/19/2021 Given 0.3 mL 10/29/2021 IM [address]  [id number] [place], [address] [place] [address] --   Exp. Date [facility] # Product Time Location External Comment   11/30/2021 [id number] [employer] COVID-19 Vacc -- -- -- --   Question Answer Comment   VIS/EUA reviewed with patient and questions answered? Yes    VIS/EUA Given Date 11/19/2021    COVID-19 Vaccine Dose Third/Immunocompromised    Pandemic Funds used for this vaccine? No    Target Population Immunocompromised    Updated by: JAIMIE Lyons                 Previous Statuses      Updated on: 11/19/2021  4:07 PM    Name Date Status Dose VIS Date Route Site  Lot# Given By Verified By   Dionisio LOVELACE PF, 30mcg/0.3mL 11/19/2021 Incomplete 0.3 mL 10/29/2021 IM left delt Pfizer -- -- --   Exp.  Date [facility] # Product Time Location External Comment   -- -- -- -- -- -- --   Updated by: JAIMIE Lyons

## 2021-11-22 NOTE — TELEPHONE ENCOUNTER
Called radiology and asked about obtaining old images. Radiology stated that if the patient had the images done here at [facility] then they will be in PACS, because everything would have gotten merged over. I voiced to them that the provider could not find the older images and they insisted that they should be there.

## 2021-11-23 NOTE — TELEPHONE ENCOUNTER
That is fine, we have done what we can. We will have her get the MRI and we will see her back and make recommendations from there. Thanks for trying.

## 2021-11-24 ENCOUNTER — TELEPHONE (OUTPATIENT)
Dept: PRIMARY CARE CLINIC | Age: 46
End: 2021-11-24

## 2021-11-24 DIAGNOSIS — R29.898 LEG WEAKNESS, BILATERAL: ICD-10-CM

## 2021-11-24 DIAGNOSIS — M15.9 PRIMARY OSTEOARTHRITIS INVOLVING MULTIPLE JOINTS: Primary | ICD-10-CM

## 2021-11-24 NOTE — TELEPHONE ENCOUNTER
pts mom called requesting a referral to [facility] for pt. She is getting weaker in her legs and collapses when they are trying to put her on toilet or in bed. She thinks this will need to be done out of [place] since they now live in [facility]. Will send to provider for authorization.

## 2021-11-29 ENCOUNTER — TELEPHONE (OUTPATIENT)
Dept: NEUROSURGERY | Age: 46
End: 2021-11-29

## 2021-11-29 NOTE — TELEPHONE ENCOUNTER
I was told by the parents that the DBS (deep brain stimulator) is no longer turn on or functioning? Is this not true? The  shunt is non-programmable and does not require anything.

## 2021-11-30 NOTE — TELEPHONE ENCOUNTER
Patients guardian called and they state that the device is not functioning. They state they told the MRI people this but the MRI people would not listen.   They are asking how do they proceed now/.'

## 2021-12-02 DIAGNOSIS — R47.9 SPEECH DIFFICULT TO UNDERSTAND: Primary | ICD-10-CM

## 2021-12-02 NOTE — TELEPHONE ENCOUNTER
Spoke with [employer], they will have a local rep call me at their earliest convenience regarding the device.

## 2021-12-03 ENCOUNTER — TELEPHONE (OUTPATIENT)
Dept: PRIMARY CARE CLINIC | Age: 46
End: 2021-12-03

## 2021-12-03 DIAGNOSIS — B37.31 YEAST INFECTION OF THE VAGINA: Primary | ICD-10-CM

## 2021-12-06 ENCOUNTER — VIRTUAL VISIT (OUTPATIENT)
Dept: PRIMARY CARE CLINIC | Age: 46
End: 2021-12-06
Payer: MEDICARE

## 2021-12-06 DIAGNOSIS — M87.00 AVASCULAR NECROSIS (HCC): ICD-10-CM

## 2021-12-06 DIAGNOSIS — B37.31 YEAST INFECTION OF THE VAGINA: ICD-10-CM

## 2021-12-06 DIAGNOSIS — M15.9 PRIMARY OSTEOARTHRITIS INVOLVING MULTIPLE JOINTS: ICD-10-CM

## 2021-12-06 DIAGNOSIS — Z99.3 WHEELCHAIR BOUND: ICD-10-CM

## 2021-12-06 DIAGNOSIS — G89.4 CHRONIC PAIN SYNDROME: ICD-10-CM

## 2021-12-06 DIAGNOSIS — Z79.899 MEDICATION MANAGEMENT: ICD-10-CM

## 2021-12-06 PROCEDURE — 99443 PR PHYS/QHP TELEPHONE EVALUATION 21-30 MIN: CPT | Performed by: PEDIATRICS

## 2021-12-06 RX ORDER — HYDROCODONE BITARTRATE AND ACETAMINOPHEN 7.5; 325 MG/1; MG/1
1 TABLET ORAL EVERY 8 HOURS PRN
Qty: 40 TABLET | Refills: 0 | Status: SHIPPED | OUTPATIENT
Start: 2021-12-06 | End: 2022-01-05

## 2021-12-06 RX ORDER — NYSTATIN 100000 U/G
CREAM TOPICAL
Qty: 30 G | Refills: 2 | Status: SHIPPED | OUTPATIENT
Start: 2021-12-06 | End: 2021-12-06 | Stop reason: SDUPTHER

## 2021-12-06 RX ORDER — NYSTATIN 100000 U/G
CREAM TOPICAL
Qty: 30 G | Refills: 2 | Status: SHIPPED | OUTPATIENT
Start: 2021-12-06

## 2021-12-06 RX ORDER — FLUCONAZOLE 150 MG/1
150 TABLET ORAL DAILY
Qty: 3 TABLET | Refills: 0 | Status: SHIPPED | OUTPATIENT
Start: 2021-12-06 | End: 2021-12-09

## 2021-12-06 ASSESSMENT — ENCOUNTER SYMPTOMS
EYES NEGATIVE: 1
RESPIRATORY NEGATIVE: 1
NAUSEA: 1
RHINORRHEA: 0
ALLERGIC/IMMUNOLOGIC NEGATIVE: 1

## 2021-12-06 NOTE — TELEPHONE ENCOUNTER
Notified mom that med was sent in. She is still wanting cream as well. She wants you to send it in so insurance will cover it.

## 2021-12-06 NOTE — PROGRESS NOTES
[facility], [address]  Phone [phone]   Fax [phone]      OFFICE VISIT: 2021    Waemili Ogden Alyssa-: 1975      [facility]  Reason For Visit:  Gato Anna is a 42 y.o. Anxiety and Joint Pain    Patient presents via telephone conference on follow-up for chronic pain. We are early for her typical prescription. I will be out of town at the time of their scheduled appointment so we are scheduling this early. I will send in the prescription now and have it on hold until it is due. .    Chronic pain:  She typically takes hydrocodone 10 mg on a when necessary basis. 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  When combined with lidocaine, it is pretty helpful     Pain Control: Average: 6/10             Best: 4/10                Worst: 10/10  (2021)     Pain Interfering with life:  100%  Pain Interfering with general activity:  100%     Her pain level varies in severity and location      Activities of daily living: She is completely wheelchair bound and unable to ambulate at all. She needs assistance with all ADLs. Even going to the bathroom requires assistance.   She does have helped by her father as well as assistant who comes and meets her needs     Adverse effects: None  Aberrant behavior: None  Affect: Very good considering her multiple health issues     Alternative medications:              Celebrex 200 mg daily              Diclofenac gel 1% 2 g 4 times daily topically              Naproxen sodium 220 mg when necessary              She is intolerant of many medications.     Urine Drug screen: 10/23/2018 and appropriate              She is incontinent and unable to produce urine on demand. Risk Factors:              Illegal Drug use: no              History of substance use disorder: no              Mental health conditions: stable depression and anxiety              Sleep disordered breathing: no              Concurrent Benzodiazepine use: yes     Bowel regimen: She is not regularly adherent to her bowel regimen              We discussed this again and stressed the importance of this regimen.              Senokot S,  1 tablets twice daily              MiraLAX 17 g daily     Bowel function: Chronic constipation     RORY was reviewed today per office protocol. Report shows No discrepancies.  Fill pattern is consistent from single provider(s) at single pharmacy(s). Request # [id number]     Active cumulative morphine equivalent score is 0. This is obviously incorrect     Controlled Substance Monitoring:    Acute and Chronic Pain Monitoring:   RX Monitoring 12/6/2021   Attestation The Prescription Monitoring Report for this patient was reviewed today. Acute Pain Prescriptions -   Periodic Controlled Substance Monitoring Possible medication side effects, risk of tolerance/dependence & alternative treatments discussed. ;No signs of potential drug abuse or diversion identified. ;Assessed functional status. ;Obtaining appropriate analgesic effect of treatment. Chronic Pain > 50 MEDD Re-evaluated the status of the patient's underlying condition causing pain. Chronic Pain > 80 MEDD -       Anxiety:  She is doing much better in this regard. She rarely has any issues with anxiety any more. She does not need any refills of Benzodiazepine. Yeast Vaginitis  She is needing topical agent as well. She needs Nystatin. vitals were not taken for this visit. There is no height or weight on file to calculate BMI.     I have reviewed the following with the Ms. Shah   Lab Review  Nurse Only on 11/09/2021   Component Date Value    Preg Test, Ur 11/09/2021 none detected    Office Visit on 06/30/2021   Component Date Value    Color, UA 06/30/2021 ROGERIO     Clarity, UA 06/30/2021 CLEAR     Glucose, UA POC 06/30/2021 NEG     Bilirubin, UA 06/30/2021 SMALL     Ketones, UA 06/30/2021 TRACE     Spec Grav, UA 06/30/2021 1.030     Blood, UA POC 06/30/2021 NEG     pH, UA 06/30/2021 5.5     Protein, UA POC 06/30/2021 30mg/dL     Urobilinogen, UA 06/30/2021 1     Leukocytes, UA 06/30/2021 NEG     Nitrite, UA 06/30/2021 NEG     Appearance, Fluid 06/30/2021 Clear    Admission on 06/26/2021, Discharged on 06/26/2021   Component Date Value    Color, UA 06/26/2021 DARK YELLOW*    Clarity, UA 06/26/2021 CLOUDY*    Glucose, Ur 06/26/2021 Negative     Bilirubin Urine 06/26/2021 Negative     Ketones, Urine 06/26/2021 TRACE*    Specific Wishon, UA 06/26/2021 1.026     Blood, Urine 06/26/2021 TRACE*    pH, UA 06/26/2021 6.5     Protein, UA 06/26/2021 TRACE*    Urobilinogen, Urine 06/26/2021 1.0     Nitrite, Urine 06/26/2021 Negative     Leukocyte Esterase, Urine 06/26/2021 SMALL*    WBC 06/26/2021 9.1     RBC 06/26/2021 3.86*    Hemoglobin 06/26/2021 13.1     Hematocrit 06/26/2021 39.0     MCV 06/26/2021 101.0*    MCH 06/26/2021 33.9*    MCHC 06/26/2021 33.6     RDW 06/26/2021 12.9     Platelets 12/54/9090 204     MPV 06/26/2021 8.8*    Neutrophils % 06/26/2021 57.3     Lymphocytes % 06/26/2021 34.0     Monocytes % 06/26/2021 5.1     Eosinophils % 06/26/2021 2.6     Basophils % 06/26/2021 0.7     Neutrophils Absolute 06/26/2021 5.2     Immature Granulocytes # 06/26/2021 0.0     Lymphocytes Absolute 06/26/2021 3.1     Monocytes Absolute 06/26/2021 0.50     Eosinophils Absolute 06/26/2021 0.20     Basophils Absolute 06/26/2021 0.10     Sodium 06/26/2021 138     Potassium reflex Magnesi* 06/26/2021 3.8  Chloride 06/26/2021 102     CO2 06/26/2021 26     Anion Gap 06/26/2021 10     Glucose 06/26/2021 97     BUN 06/26/2021 9     CREATININE 06/26/2021 0.7     GFR Non- 06/26/2021 >60     GFR  06/26/2021 >59     Calcium 06/26/2021 9.3     Total Protein 06/26/2021 7.2     Albumin 06/26/2021 4.0     Total Bilirubin 06/26/2021 0.4     Alkaline Phosphatase 06/26/2021 172*    ALT 06/26/2021 15     AST 06/26/2021 11     hCG Qual 06/26/2021 Negative     Lipase 06/26/2021 39     Amylase 06/26/2021 65     Bacteria, UA 06/26/2021 NEGATIVE*    Crystals, UA 06/26/2021 NEG*    Hyaline Casts, UA 06/26/2021 6     WBC, UA 06/26/2021 9*    RBC, UA 06/26/2021 6*    Epithelial Cells, UA 06/26/2021 12      Copies of these are in the chart. Current Outpatient Medications   Medication Sig Dispense Refill    HYDROcodone-acetaminophen (NORCO) 7.5-325 MG per tablet Take 1 tablet by mouth every 8 hours as needed for Pain for up to 30 days. 40 tablet 0    nystatin (MYCOSTATIN) 055375 UNIT/GM cream Apply topically 2 times daily. 30 g 2    fluconazole (DIFLUCAN) 150 MG tablet Take 1 tablet by mouth daily for 3 days 3 tablet 0    celecoxib (CELEBREX) 200 MG capsule Take 1 capsule by mouth daily 90 capsule 3    albuterol sulfate HFA (VENTOLIN HFA) 108 (90 Base) MCG/ACT inhaler Inhale 2 puffs into the lungs 4 times daily as needed for Wheezing (Patient not taking: Reported on 11/19/2021) 3 each 3    diazePAM (VALIUM) 2 MG tablet Take 1 tablet by mouth every 8 hours as needed for Anxiety or Sleep for up to 30 days.  (Patient not taking: Reported on 11/19/2021) 60 tablet 0    nystatin (MYCOSTATIN) 935282 UNIT/ML suspension Take 5 mLs by mouth 4 times daily (Patient not taking: Reported on 11/19/2021) 473 mL 1    omeprazole (PRILOSEC) 20 MG delayed release capsule TAKE 1 CAPSULE BY MOUTH DAILY 90 capsule 3    Incontinence Supply Disposable (GNP PERSONAL CLEANSING WIPES) MISC Packages of 640 wipes (Patient not taking: Reported on 11/19/2021) 640 each 11    Disposable Gloves MISC 2 boxes of size large gloves (Patient not taking: Reported on 11/19/2021) 2 each 11    EPINEPHrine (EPIPEN 2-ADAN) 0.3 MG/0.3ML SOAJ injection Inject 0.3 mLs into the muscle once for 1 dose Use as directed for allergic reaction 2 each 5    naloxone (NARCAN) 4 MG/0.1ML LIQD nasal spray 1 spray by Nasal route as needed for Opioid Reversal (Patient not taking: Reported on 11/19/2021) 1 each 0    hydrocortisone 2.5 % cream APPLY EXTERNALLY TO THE AFFECTED AREA TWICE DAILY AS DIRECTED (Patient not taking: Reported on 11/19/2021) 30 g 1    ondansetron (ZOFRAN-ODT) 4 MG disintegrating tablet Place 1 tablet under the tongue every 8 hours as needed for Nausea or Vomiting (Patient not taking: Reported on 11/19/2021) 20 tablet 0    tamsulosin (FLOMAX) 0.4 MG capsule Take 1 capsule by mouth daily (Patient not taking: Reported on 11/19/2021) 90 capsule 1    ketorolac (TORADOL) 10 MG tablet Take 1 tablet by mouth every 6 hours as needed for Pain (Patient not taking: Reported on 11/19/2021) 20 tablet 0    triamcinolone (KENALOG) 0.1 % cream Apply topically 2 times daily. (Patient not taking: Reported on 11/19/2021) 80 g 5    lidocaine-prilocaine (EMLA) 2.5-2.5 % cream Apply topically as needed.  (Patient not taking: Reported on 11/19/2021) 210 g 3    lamoTRIgine (LAMICTAL) 200 MG tablet Take 1 tablet by mouth 2 times daily 60 tablet 11    desloratadine (CLARINEX) 5 MG tablet Take 1 tablet by mouth daily (Patient not taking: Reported on 11/19/2021) 30 tablet 11    medroxyPROGESTERone (DEPO-PROVERA) 150 MG/ML injection Inject 150 mg into the muscle every 3 months (Patient not taking: Reported on 11/19/2021) 1 mL 3    senna-docusate (SENEXON-S) 8.6-50 MG per tablet Take 2 tablets by mouth 2 times daily 120 tablet 11    Incontinence Supplies MISC All incontinence supplies: diapers, wipes, chucks, gloves x 1 month and 11 RF (Patient not taking: Reported on 11/19/2021) 100 each 0    diclofenac sodium (VOLTAREN) 1 % GEL Apply 2 g topically 4 times daily (Patient not taking: Reported on 11/19/2021) 100 g 5    clotrimazole (LOTRIMIN) 1 % cream APPLY EXTERNALLY TO THE AFFECTED AREA TWICE DAILY (Patient not taking: Reported on 11/19/2021) 30 g 0    albuterol (PROVENTIL) (2.5 MG/3ML) 0.083% nebulizer solution Take 3 mLs by nebulization every 6 hours as needed for Wheezing (coughing) (Patient not taking: Reported on 11/19/2021) 120 each 5    medicated lip balm (BLISTEX/CARMEX) 2-2.5-6.6 % STCK Apply topically as needed for Dry Lips (Patient not taking: Reported on 11/19/2021)       No current facility-administered medications for this visit.        Allergies: Latex, Bactrim [sulfamethoxazole-trimethoprim], Ciprofloxacin, Ciprofloxacin hcl, Depakote [divalproex sodium], Dilantin [phenytoin sodium extended], Dye [iodides], Keflex [cephalexin], Pcn [penicillins], Primaxin [imipenem], Unasyn [ampicillin-sulbactam sodium], and Wasp venom     Past Medical History:   Diagnosis Date    Arthritis     GERD (gastroesophageal reflux disease)     History of blood transfusion     Incontinence     Seizures ([address]        Family History   Problem Relation Age of Onset    High Blood Pressure Mother     High Blood Pressure Father        Past Surgical History:   Procedure Laterality Date    APPENDECTOMY      CHOLECYSTECTOMY      CSF SHUNT Right     DEEP BRAIN STIMULATOR PLACEMENT      tremor control it is off now    [place] EXCIS CHALAZION,GEN ANESTHESIA Right 8/23/2018    EYE CHALAZION EXCISION [facility] OR    [place] DMITRIY ACEVEDO RMVL INSJ IO LENS PROSTH W/O ECP Left 6/15/2017    EYE CATARACT EMULSIFICATION IOL IMPLANT performed by [facility] MD at [facility] OR    [place] XCSUMEET VILLALOBOS RMVL INSJ IO LENS PROSTH W/O ECP Right 8/4/2017    CATARCAT EXTRACTION EYE WITH IOL performed by [facility] MD at [facility]       Social History Tobacco Use    Smoking status: Former Smoker     Packs/day: 1.00     Years: 19.00     Pack years: 19.00     Types: Cigarettes     Quit date: 2015     Years since quittin.8    Smokeless tobacco: Never Used    Tobacco comment: quit smoking  6 yrs ago   Substance Use Topics    Alcohol use: No        Review of Systems   Constitutional: Negative. HENT: Negative for congestion, ear pain, postnasal drip and rhinorrhea. Eyes: Negative. Respiratory: Negative. Cardiovascular: Negative. Gastrointestinal: Positive for nausea. Endocrine: Negative. Musculoskeletal: Positive for arthralgias (left knee getting worse). Skin: Negative for rash. Allergic/Immunologic: Negative. Neurological: Negative. Hematological: Negative. Psychiatric/Behavioral: The patient is nervous/anxious. Physical Exam  PE was not done today as this was a telephone visit      ASSESSMENT      ICD-10-CM    1. Chronic pain syndrome  G89.4 HYDROcodone-acetaminophen (NORCO) 7.5-325 MG per tablet   2. Medication management  Z79.899 HYDROcodone-acetaminophen (NORCO) 7.5-325 MG per tablet   3. Primary osteoarthritis involving multiple joints  M89.49 HYDROcodone-acetaminophen (NORCO) 7.5-325 MG per tablet   4. Avascular necrosis (HCC)  M87.00 HYDROcodone-acetaminophen (NORCO) 7.5-325 MG per tablet   5. Wheelchair bound  Z99.3 HYDROcodone-acetaminophen (NORCO) 7.5-325 MG per tablet   6. Yeast infection of the vagina  B37.3 nystatin (MYCOSTATIN) 137015 UNIT/GM cream         PLAN    1. Chronic pain syndrome  The current medical regimen is effective;  continue present plan and medications.  - HYDROcodone-acetaminophen (NORCO) 7.5-325 MG per tablet; Take 1 tablet by mouth every 8 hours as needed for Pain for up to 30 days. Dispense: 40 tablet; Refill: 0    2.  Medication management  The current medical regimen is effective;  continue present plan and medications.  - HYDROcodone-acetaminophen (NORCO) 7.5-325 MG per patient or patient parents/guardian with current illness diagnosis as well as when to seek additional healthcare due to changing or for worsening symptoms. Patient voiced understanding. 25 minutes were spent on the phone with patient. Shani King, was evaluated through a synchronous (real-time) audio-video encounter. The patient (or guardian if applicable) is aware that this is a billable service. Verbal consent to proceed has been obtained within the past 12 months. The visit was conducted pursuant to the emergency declaration under the [address] [place] [address] and the [employer] Act. Patient identification was verified, and a caregiver was present when appropriate. The patient was located in a state where the provider was credentialed to provide care. Total time spent for this encounter: 25m    --IRA Singh DO on 12/6/2021 at 5:55 PM    An electronic signature was used to authenticate this note.

## 2021-12-08 ENCOUNTER — TELEPHONE (OUTPATIENT)
Dept: PRIMARY CARE CLINIC | Age: 46
End: 2021-12-08

## 2021-12-08 DIAGNOSIS — Z99.3 WHEELCHAIR BOUND: Primary | ICD-10-CM

## 2021-12-08 NOTE — TELEPHONE ENCOUNTER
Received call from [address] [place] with  [facility]. He went to admit pt today to the physical therapy program and feels like she would benefit from occupational therapy as well. If Dr Galdino Jones will order this, he needs order faxed to [phone]. Will send to provider for authorization.

## 2021-12-20 NOTE — TELEPHONE ENCOUNTER
Local rep states that given we cannot acces the battery due to it being dead. There is no way to check the impedence, therefore, they do not recommend scanning her. We will hold off on MRI.

## 2021-12-22 ENCOUNTER — TELEPHONE (OUTPATIENT)
Dept: PRIMARY CARE CLINIC | Age: 46
End: 2021-12-22

## 2021-12-22 DIAGNOSIS — Z99.3 WHEELCHAIR BOUND: Primary | ICD-10-CM

## 2021-12-22 DIAGNOSIS — G89.4 CHRONIC PAIN SYNDROME: ICD-10-CM

## 2021-12-22 DIAGNOSIS — G81.94 HEMIPLEGIA AFFECTING LEFT NONDOMINANT SIDE, UNSPECIFIED ETIOLOGY, UNSPECIFIED HEMIPLEGIA TYPE (HCC): ICD-10-CM

## 2021-12-22 DIAGNOSIS — M15.9 PRIMARY OSTEOARTHRITIS INVOLVING MULTIPLE JOINTS: ICD-10-CM

## 2021-12-22 DIAGNOSIS — R29.898 LEG WEAKNESS, BILATERAL: ICD-10-CM

## 2021-12-22 NOTE — TELEPHONE ENCOUNTER
pts mother came in requesting a prescription for 90 oz of Thick-it a month for pt and a bath tub chair called [employer]. She is wanting these to take to [facility]. If approved, these will need to be printed out and mailed to pt.

## 2021-12-23 DIAGNOSIS — F41.9 ANXIETY: ICD-10-CM

## 2021-12-23 RX ORDER — DIAZEPAM 2 MG/1
2 TABLET ORAL EVERY 8 HOURS PRN
Qty: 60 TABLET | Refills: 0 | Status: SHIPPED | OUTPATIENT
Start: 2021-12-23 | End: 2022-03-16 | Stop reason: SDUPTHER

## 2021-12-28 ENCOUNTER — TELEPHONE (OUTPATIENT)
Dept: NEUROSURGERY | Age: 46
End: 2021-12-28

## 2021-12-28 DIAGNOSIS — Z30.42 DEPOT CONTRACEPTION: ICD-10-CM

## 2021-12-28 RX ORDER — MEDROXYPROGESTERONE ACETATE 150 MG/ML
150 INJECTION, SUSPENSION INTRAMUSCULAR
Qty: 1 ML | Refills: 3 | Status: SHIPPED | OUTPATIENT
Start: 2021-12-28 | End: 2022-08-23

## 2021-12-28 NOTE — TELEPHONE ENCOUNTER
Patients father called and asks, when can patient get her mri done? ( please see previous  Messages) the device she has has not worked for years. He wants to proceed but doesn't know what he can do. Please advise on what I can tell patient.

## 2022-01-03 NOTE — TELEPHONE ENCOUNTER
The rep does NOT recommend we perform MRI. We cannot let her have a MRI. We have to hold off for now. She can be scheduled on a T or TH to discuss with Dr. Kristy Crespo. At this point, I do not think revising her shunt would be necessary, however, we will discuss this at her appt.

## 2022-01-05 ENCOUNTER — OFFICE VISIT (OUTPATIENT)
Dept: PRIMARY CARE CLINIC | Age: 47
End: 2022-01-05
Payer: MEDICARE

## 2022-01-05 VITALS
RESPIRATION RATE: 20 BRPM | BODY MASS INDEX: 24.8 KG/M2 | SYSTOLIC BLOOD PRESSURE: 118 MMHG | HEART RATE: 96 BPM | DIASTOLIC BLOOD PRESSURE: 84 MMHG | WEIGHT: 140 LBS | TEMPERATURE: 97.2 F | OXYGEN SATURATION: 98 %

## 2022-01-05 DIAGNOSIS — J06.9 VIRAL UPPER RESPIRATORY TRACT INFECTION: ICD-10-CM

## 2022-01-05 DIAGNOSIS — R09.81 NASAL CONGESTION: Primary | ICD-10-CM

## 2022-01-05 DIAGNOSIS — R56.9 SEIZURES (HCC): ICD-10-CM

## 2022-01-05 LAB — SARS-COV-2, PCR: DETECTED

## 2022-01-05 PROCEDURE — G8482 FLU IMMUNIZE ORDER/ADMIN: HCPCS | Performed by: NURSE PRACTITIONER

## 2022-01-05 PROCEDURE — 99213 OFFICE O/P EST LOW 20 MIN: CPT | Performed by: NURSE PRACTITIONER

## 2022-01-05 PROCEDURE — 1036F TOBACCO NON-USER: CPT | Performed by: NURSE PRACTITIONER

## 2022-01-05 PROCEDURE — G8427 DOCREV CUR MEDS BY ELIG CLIN: HCPCS | Performed by: NURSE PRACTITIONER

## 2022-01-05 PROCEDURE — G8420 CALC BMI NORM PARAMETERS: HCPCS | Performed by: NURSE PRACTITIONER

## 2022-01-05 ASSESSMENT — ENCOUNTER SYMPTOMS
VOMITING: 0
ABDOMINAL PAIN: 0
COUGH: 0
DIARRHEA: 0
CHEST TIGHTNESS: 0
RHINORRHEA: 1
SORE THROAT: 1
SINUS PAIN: 0
SHORTNESS OF BREATH: 0

## 2022-01-05 NOTE — PATIENT INSTRUCTIONS
Exam consistent with viral illness. Typically viruses pass on their own in 7-10 days. You may take Over the counter medications as directed for cough, congestion, discomfort, or fever. If symptoms worsen or do not improve then call the clinic for further instructions or follow up with your Primary Care provider. SYMPTOMATIC COVID SCREEN . You were screened for COVID today and swabbed. You will be called with the results and any indicated treatments. You were provided with written CDC isolation/quarantine guidelines.

## 2022-01-05 NOTE — PROGRESS NOTES
[address] [facility] [address] [facility] [address] [facility]  Dept: [phone]  Dept Fax: [phone]  Loc: [phone]    Savi Markham is a 42 y.o. female who presents today for her medical conditions/complaintsas noted below. Savi Markham is c/o of Nasal Congestion (x 1 week) and Otalgia (Bilateral ear pain x 1 week)      HPI:   Complains of both ears hurting for the last couple of days. No fever. Said that her throat is mildly sore and that her nose has been running. Otalgia   There is pain in both ears. This is a new problem. The current episode started in the past 7 days. The problem occurs every few minutes. The problem has been unchanged. There has been no fever. The pain is mild. Associated symptoms include rhinorrhea and a sore throat. Pertinent negatives include no abdominal pain, coughing, diarrhea, headaches, hearing loss, neck pain, rash or vomiting. She has tried nothing for the symptoms. The treatment provided no relief.        Past Medical History:   Diagnosis Date    Arthritis     GERD (gastroesophageal reflux disease)     History of blood transfusion     Incontinence     Seizures (HCC)      Past Surgical History:   Procedure Laterality Date    APPENDECTOMY      CHOLECYSTECTOMY      CSF SHUNT Right     DEEP BRAIN STIMULATOR PLACEMENT      tremor control it is off now    [place] EXCIS CHALAZION,GEN ANESTHESIA Right 8/23/2018    EYE CHALAZION EXCISION performed by [facility] at [facility] OR    [place] XCAPSL CTRC RMVL INSJ IO LENS PROSTH W/O ECP Left 6/15/2017    EYE CATARACT EMULSIFICATION [facility]OL IMPLANT [facility] LENS [facility]OSTH W/O ECP Right 8/4/2017    CATARCAT EXTRACTION EYE WITH IOL performed by Kofi Alvarez MD at [address] [facility] OR     Family History   Problem Relation Age of Onset    High Blood Pressure Mother     High Blood Pressure Father      Social History Tobacco Use    Smoking status: Former Smoker     Packs/day: 1.00     Years: 19.00     Pack years: 19.00     Types: Cigarettes     Quit date: 2015     Years since quittin.8    Smokeless tobacco: Never Used    Tobacco comment: quit smoking  6 yrs ago   Substance Use Topics    Alcohol use: No      Current Outpatient Medications on File Prior to Visit   Medication Sig Dispense Refill    medroxyPROGESTERone (DEPO-PROVERA) 150 MG/ML injection Inject 150 mg into the muscle every 3 months 1 mL 3    diazePAM (VALIUM) 2 MG tablet Take 1 tablet by mouth every 8 hours as needed for Anxiety for up to 30 days. 60 tablet 0    Thickened Products (THICK-IT) LIQD Use with liquids to thicken as needed 2661 mL 3    nystatin (MYCOSTATIN) 633451 UNIT/GM cream Apply topically 2 times daily. 30 g 2    celecoxib (CELEBREX) 200 MG capsule Take 1 capsule by mouth daily 90 capsule 3    omeprazole (PRILOSEC) 20 MG delayed release capsule TAKE 1 CAPSULE BY MOUTH DAILY 90 capsule 3    EPINEPHrine (EPIPEN 2-ADAN) 0.3 MG/0.3ML SOAJ injection Inject 0.3 mLs into the muscle once for 1 dose Use as directed for allergic reaction 2 each 5    lamoTRIgine (LAMICTAL) 200 MG tablet Take 1 tablet by mouth 2 times daily 60 tablet 11    senna-docusate (SENEXON-S) 8.6-50 MG per tablet Take 2 tablets by mouth 2 times daily 120 tablet 11     No current facility-administered medications on file prior to visit.       Allergies   Allergen Reactions    Latex     Bactrim [Sulfamethoxazole-Trimethoprim]     Ciprofloxacin     Ciprofloxacin Hcl     Depakote [Divalproex Sodium]     Dilantin [Phenytoin Sodium Extended]     Dye [Iodides] Other (See Comments)     Skin peeled off    Keflex [Cephalexin]     Pcn [Penicillins]     Primaxin [Imipenem]     Unasyn [Ampicillin-Sulbactam Sodium]     Wasp Venom Swelling     Health Maintenance   Topic Date Due    Hepatitis C screen  Never done    Pneumococcal 0-64 years Vaccine (1 of 2 - PPSV23) Never done    HIV screen  Never done    DTaP/Tdap/Td vaccine (1 - Tdap) Never done    Cervical cancer screen  Never done    Lipid screen  Never done    Depression Screen  06/15/2022    Flu vaccine  Completed    COVID-19 Vaccine  Completed    Hepatitis A vaccine  Aged Out    Hepatitis B vaccine  Aged Out    Hib vaccine  Aged Out    Meningococcal (ACWY) vaccine  Aged Out       Subjective:   Review of Systems   Constitutional: Negative for chills, fatigue and fever. HENT: Positive for ear pain, rhinorrhea and sore throat. Negative for congestion, hearing loss, postnasal drip and sinus pain. Respiratory: Negative for cough, chest tightness and shortness of breath. Cardiovascular: Negative for chest pain. Gastrointestinal: Negative for abdominal pain, diarrhea and vomiting. Musculoskeletal: Negative for neck pain. Skin: Negative for rash. Neurological: Negative for headaches. Objective:   /84 (Site: Right Upper Arm, Position: Sitting)   Pulse 96   Temp 97.2 °F (36.2 °C) (Temporal)   Resp 20   Wt 140 lb (63.5 kg)   SpO2 98%   BMI 24.80 kg/m²    Physical Exam  Vitals and nursing note reviewed. Exam conducted with a chaperone present. Constitutional:       General: She is not in acute distress. Appearance: Normal appearance. She is not ill-appearing. Interventions: She is not intubated. Comments: In wheel chair   HENT:      Head: Normocephalic. Right Ear: Tympanic membrane and ear canal normal.      Left Ear: Tympanic membrane and ear canal normal.      Nose: Rhinorrhea (clear) present. Mouth/Throat:      Mouth: Mucous membranes are moist.      Pharynx: Oropharynx is clear. Eyes:      Pupils: Pupils are equal, round, and reactive to light. Cardiovascular:      Rate and Rhythm: Normal rate and regular rhythm. Pulses: Normal pulses. Heart sounds: Normal heart sounds.    Pulmonary:      Effort: Pulmonary effort is normal. No tachypnea, bradypnea, accessory muscle usage, prolonged expiration, respiratory distress or retractions. She is not intubated. Breath sounds: Normal breath sounds and air entry. No decreased air movement or transmitted upper airway sounds. No decreased breath sounds, wheezing, rhonchi or rales. Abdominal:      General: Abdomen is flat. Bowel sounds are normal.      Palpations: Abdomen is soft. Musculoskeletal:      Cervical back: Normal range of motion and neck supple. Skin:     General: Skin is warm and dry. Neurological:      Mental Status: She is alert. No results found for this visit on 01/05/22. Assessment:      Diagnosis Orders   1. Nasal congestion  COVID-19   2. Viral upper respiratory tract infection     3. Seizures ([facility] [address]         Plan:   Marisela Ibanez was seen today for nasal congestion and otalgia. Diagnoses and all orders for this visit:    Nasal congestion  -     COVID-19    Viral upper respiratory tract infection    Seizures ([facility] [address]    Other orders  -     COVID-19  -     COVID-19         No follow-ups on file. Patient given educational materials- see patient instructions. Discussed use, benefit, and side effects of prescribedmedications. All patient questions answered. Pt voiced understanding.      Electronically signed by LOVELY Hameed CNP on 1/5/2022 at 9:34 AM

## 2022-01-07 NOTE — TELEPHONE ENCOUNTER
Called patients dad per Erica Sosa to let them know that they would need to discuss this at clinic, also holding off on MRI.  Scheduled patient for a follow up for Renuka Wilder /Rosalia for 2-1-2022

## 2022-01-31 ENCOUNTER — TELEPHONE (OUTPATIENT)
Dept: NEUROSURGERY | Age: 47
End: 2022-01-31

## 2022-01-31 NOTE — TELEPHONE ENCOUNTER
Patient guardian, Igor Mo called the office. He voiced that they were going to have to move this patients appt from 2/1/2022 because they were not going to be able to make that date and time. Igor Mo voiced that they are also upset that the patients MRI has been cancelled, and he is not happy with the fact that the rep was the one who decided that. Igor Mo voiced that the patient needs to have an MRI done and they want to know why this was decided. I voiced to Igor Mo that the providers will discuss this with them at their office visit and that I was not sure why this decision was made. Igor Mo voiced that they need to reschedule but do not want to be rescheduled out to far. Patient was rescheduled for Tuesday 2/8/2022 at 1:45 pm. I voiced to Igor Mo that there are actually no openings that day, and that I overbooked the patient to be seen sooner rather than later. Igor Mo voiced thank you and that they will see us then.

## 2022-02-21 ENCOUNTER — NURSE ONLY (OUTPATIENT)
Dept: PRIMARY CARE CLINIC | Age: 47
End: 2022-02-21
Payer: MEDICARE

## 2022-02-21 DIAGNOSIS — Z30.42 DEPOT CONTRACEPTION: Primary | ICD-10-CM

## 2022-02-21 DIAGNOSIS — M54.9 BACK PAIN, UNSPECIFIED BACK LOCATION, UNSPECIFIED BACK PAIN LATERALITY, UNSPECIFIED CHRONICITY: ICD-10-CM

## 2022-02-21 LAB
APPEARANCE FLUID: NORMAL
BILIRUBIN, POC: NORMAL
BLOOD URINE, POC: NORMAL
CLARITY, POC: NORMAL
COLOR, POC: NORMAL
CONTROL: NORMAL
GLUCOSE URINE, POC: NORMAL
KETONES, POC: NORMAL
LEUKOCYTE EST, POC: NORMAL
NITRITE, POC: NORMAL
PH, POC: 7.5
PREGNANCY TEST URINE, POC: NORMAL
PROTEIN, POC: NORMAL
SPECIFIC GRAVITY, POC: 1.02
UROBILINOGEN, POC: NORMAL

## 2022-02-21 PROCEDURE — 81002 URINALYSIS NONAUTO W/O SCOPE: CPT | Performed by: NURSE PRACTITIONER

## 2022-02-21 PROCEDURE — 81025 URINE PREGNANCY TEST: CPT | Performed by: NURSE PRACTITIONER

## 2022-02-21 PROCEDURE — 96372 THER/PROPH/DIAG INJ SC/IM: CPT | Performed by: PEDIATRICS

## 2022-02-21 RX ORDER — MEDROXYPROGESTERONE ACETATE 150 MG/ML
150 INJECTION, SUSPENSION INTRAMUSCULAR ONCE
Status: COMPLETED | OUTPATIENT
Start: 2022-02-21 | End: 2022-02-21

## 2022-02-21 RX ORDER — MEDROXYPROGESTERONE ACETATE 150 MG/ML
150 INJECTION, SUSPENSION INTRAMUSCULAR ONCE
Qty: 1 ML | Refills: 3
Start: 2022-02-21 | End: 2022-05-23 | Stop reason: SDUPTHER

## 2022-02-21 RX ADMIN — MEDROXYPROGESTERONE ACETATE 150 MG: 150 INJECTION, SUSPENSION INTRAMUSCULAR at 12:05

## 2022-02-21 NOTE — PROGRESS NOTES
After obtaining consent, gave patient depo injection in Right deltoid, patient tolerated well. Medication was supplied by the patient. After obtaining urine specimen for pregnancy test, patient complained of back pain and urine presented dark yellow/orange. Spoke with Rafael MURRY. Ran a urinalysis. Per Lilian Cedeño request patient seems to be dry and needs to drink lots of water. Patient and guardian voiced understanding.

## 2022-03-11 ENCOUNTER — TELEPHONE (OUTPATIENT)
Dept: PRIMARY CARE CLINIC | Age: 47
End: 2022-03-11

## 2022-03-11 RX ORDER — LORATADINE 10 MG/1
10 TABLET ORAL DAILY
Qty: 30 TABLET | Refills: 11 | Status: SHIPPED | OUTPATIENT
Start: 2022-03-11 | End: 2022-06-27 | Stop reason: SDUPTHER

## 2022-03-11 RX ORDER — GUAIFENESIN 600 MG/1
600 TABLET, EXTENDED RELEASE ORAL 2 TIMES DAILY
Qty: 20 TABLET | Refills: 0 | Status: SHIPPED | OUTPATIENT
Start: 2022-03-11 | End: 2022-03-21

## 2022-03-11 NOTE — TELEPHONE ENCOUNTER
Pt has sore throat and is hoarse. Mom is wanting to know if we can send something to the pharmacy to head this off before it gets worse.  Will send to provider for recommendations

## 2022-03-16 ENCOUNTER — TELEMEDICINE (OUTPATIENT)
Dept: PRIMARY CARE CLINIC | Age: 47
End: 2022-03-16
Payer: MEDICARE

## 2022-03-16 DIAGNOSIS — F41.9 ANXIETY: ICD-10-CM

## 2022-03-16 DIAGNOSIS — T78.2XXD ANAPHYLAXIS, SUBSEQUENT ENCOUNTER: ICD-10-CM

## 2022-03-16 DIAGNOSIS — M87.00 AVASCULAR NECROSIS (HCC): ICD-10-CM

## 2022-03-16 DIAGNOSIS — Z79.899 MEDICATION MANAGEMENT: Primary | ICD-10-CM

## 2022-03-16 DIAGNOSIS — M15.9 PRIMARY OSTEOARTHRITIS INVOLVING MULTIPLE JOINTS: ICD-10-CM

## 2022-03-16 DIAGNOSIS — G81.94 HEMIPLEGIA AFFECTING LEFT NONDOMINANT SIDE, UNSPECIFIED ETIOLOGY, UNSPECIFIED HEMIPLEGIA TYPE (HCC): ICD-10-CM

## 2022-03-16 DIAGNOSIS — G89.4 CHRONIC PAIN SYNDROME: ICD-10-CM

## 2022-03-16 DIAGNOSIS — I73.9 PVD (PERIPHERAL VASCULAR DISEASE) (HCC): ICD-10-CM

## 2022-03-16 DIAGNOSIS — Z99.3 WHEELCHAIR BOUND: ICD-10-CM

## 2022-03-16 PROCEDURE — 99443 PR PHYS/QHP TELEPHONE EVALUATION 21-30 MIN: CPT | Performed by: PEDIATRICS

## 2022-03-16 RX ORDER — EPINEPHRINE 0.3 MG/.3ML
0.3 INJECTION SUBCUTANEOUS ONCE
Qty: 0.3 ML | Refills: 1 | Status: SHIPPED | OUTPATIENT
Start: 2022-03-16 | End: 2022-07-26

## 2022-03-16 RX ORDER — DIAZEPAM 2 MG/1
2 TABLET ORAL EVERY 8 HOURS PRN
Qty: 60 TABLET | Refills: 0 | Status: SHIPPED | OUTPATIENT
Start: 2022-03-16 | End: 2022-05-03 | Stop reason: SDUPTHER

## 2022-03-16 RX ORDER — HYDROCODONE BITARTRATE AND ACETAMINOPHEN 7.5; 325 MG/1; MG/1
1 TABLET ORAL EVERY 8 HOURS PRN
Qty: 40 TABLET | Refills: 0 | Status: SHIPPED | OUTPATIENT
Start: 2022-03-16 | End: 2022-05-03 | Stop reason: SDUPTHER

## 2022-03-16 ASSESSMENT — ENCOUNTER SYMPTOMS
RESPIRATORY NEGATIVE: 1
NAUSEA: 1
ALLERGIC/IMMUNOLOGIC NEGATIVE: 1
EYES NEGATIVE: 1
RHINORRHEA: 0

## 2022-03-16 NOTE — PATIENT INSTRUCTIONS
Patient Education        Safe Use of Opioid Pain Medicine: Care Instructions  Your Care Instructions  Pain is your body's way of warning you that something is wrong. Pain feels different for everybody. Only you can describe your pain. A doctor can suggest or prescribe many types of medicines for pain. These range from over-the-counter medicines like acetaminophen (Tylenol) to powerful medicines called opioids. Examples of opioids are fentanyl, hydrocodone, morphine, and oxycodone. Heroin is an illegal opioid  Opioids are strong medicines. They can help you manage pain when you use them the right way. But if you misuse them, they can cause serious harm and even death. For these reasons, doctors are very careful about how they prescribe opioids. If you decide to take opioids, here are some things to remember. · Keep your doctor informed. You can develop opioid use disorder. Moderate to severe opioid use disorder is sometimes called addiction. The risk is higher if you have a history of substance use. Your doctor will monitor you closely for signs of opioid use disorder and to figure out when you no longer need to take opioids. · Make a treatment plan. The goal of your plan is to be able to function and do the things you need to do, even if you still have some pain. You might be able to manage your pain with other non-opioid options like physical therapy, relaxation, or over-the-counter pain medicines. · Be aware of the side effects. Opioids can cause serious side effects, such as constipation, dry mouth, and nausea. And over time, you may need a higher dose to get pain relief. This is called tolerance. Your body also gets used to opioids. This is called physical dependence. If you suddenly stop taking them, you may have withdrawal symptoms. The doctor carefully considered what pain medicine is right for you.  You may not have received opioids if your doctor was concerned about drug interactions or your safety, or if he or she had other concerns. It is best to have one doctor or clinic treat your pain. This way you will get the pain medicine that will help you the most. And a doctor will be able to watch for any problems that the medicine might cause. The doctor has checked you carefully, but problems can develop later. If you notice any problems or new symptoms,  get medical treatment right away. Follow-up care is a key part of your treatment and safety. Be sure to make and go to all appointments, and call your doctor if you are having problems. It's also a good idea to know your test results and keep a list of the medicines you take. How can you care for yourself at home? If you need to take opioids to manage your pain, remember these safety tips. · Follow directions carefully. It's easy to misuse opioids if you take a dose other than what's prescribed by your doctor. This can lead to overdose and even death. Even sharing them with someone they weren't meant for is misuse. · Be cautious. Opioids may affect your judgment and decision making. Do not drive or operate machinery until you can think clearly. Talk with your doctor about when it is safe to drive. · Reduce the risk of drug interactions. Opioids can be dangerous if you take them with alcohol or with certain drugs like sleeping pills and muscle relaxers. Make sure your doctor knows about all the other medicines you take, including over-the-counter medicines. Don't start any new medicines before you talk to your doctor or pharmacist.  · Safely store and dispose of opioids. Store opioids in a safe and secure place. Make sure that pets, children, friends, and family can't get to them. When you're done using opioids, make sure to dispose of them safely and as quickly as possible. The U.S. Food and Drug Administration (FDA) recommends these disposal options.   ? The best option is to take your medicine to a drop-off box or take-back program that is authorized by the [address] ([facility]). ? If these programs aren't available in your area and your medicine doesn't have specific disposal instructions (such as flushing), you can throw them into your household trash if you follow the FDA's instructions. Visit fda.gov and search for \"unused medicine disposal.\"  ? If you have opioid patches (used or unused), your options are to take them to a [facility]-authorized site or flush them down the toilet. Do not throw them in the trash. ? Only flush your medicine down the toilet if you can't get to a [facility]-approved site or your medicine instructions state clearly to flush them. · Reduce the risk of overdose. Misuse of opioids can be very dangerous. Protect yourself by asking your doctor about a naloxone rescue kit. It can help you--and even save your life--if you take too much of an opioid. Try other ways to reduce pain. · Relax, and reduce stress. Relaxation techniques such as deep breathing or meditation can help. · Keep moving. Gentle, daily exercise can help reduce pain over the long run. Try low- or no-impact exercises such as walking, swimming, and stationary biking. Do stretches to stay flexible. · Try heat, cold packs, and massage. · Get enough sleep. Pain can make you tired and drain your energy. Talk with your doctor if you have trouble sleeping because of pain. · Think positive. Your thoughts can affect your pain level. Do things that you enjoy to distract yourself when you have pain instead of focusing on the pain. See a movie, read a book, listen to music, or spend time with a friend. If you are not taking a prescription pain medicine, ask your doctor if you can take an over-the-counter medicine. When should you call for help? Call 911 anytime you think you may need emergency care. For example, call if:  · You have symptoms of a severe allergic reaction. These may include:  ? Sudden raised, red areas (hives) all over your body. ?  Swelling of the throat, mouth, lips, or tongue. ? Trouble breathing. ? Passing out (losing consciousness). Or you may feel very lightheaded or suddenly feel weak, confused, or restless. · You have signs of an overdose. These include:  ? Cold, clammy skin. ? Confusion. ? Severe nervousness or restlessness. ? Severe dizziness, drowsiness, or weakness. ? Slow breathing. ? Seizures. Call your doctor now or seek immediate medical care if:  · You have symptoms of an allergic reaction, such as:  ? A rash or hives (raised, red areas on the skin). ? Itching. ? Swelling. ? Belly pain, nausea, or vomiting. Watch closely for changes in your health, and be sure to contact your doctor if:  · You think you might be taking too much pain medicine, and you need help to take less or stop. · Your medicine is not helping with the pain. · You are having side effects, such as constipation. Where can you learn more? Go to https://Mobile2Me.[employer]. org and sign in to your [employer] account. Enter R108 in the [employer] box to learn more about \"Safe Use of Opioid Pain Medicine: Care Instructions. \"     If you do not have an account, please click on the \"Sign Up Now\" link. Current as of: April 8, 2021               Content Version: 13.1  © 6287-5233 Healthwise, [employer]. Care instructions adapted under license by [facility] ([facility]). If you have questions about a medical condition or this instruction, always ask your healthcare professional. [place] [address] any warranty or liability for your use of this information. Patient Education        Anxiety Disorder: Care Instructions  Your Care Instructions     Anxiety is a normal reaction to stress. Difficult situations can cause you to have symptoms such as sweaty palms and a nervous feeling. In an anxiety disorder, the symptoms are far more severe.  Constant worry, muscle tension, trouble sleeping, nausea and diarrhea, and other symptoms can make normal daily activities difficult or impossible. These symptoms may occur for no reason, and they can affect your work, school, or social life. Medicines, counseling, and self-care can all help. Follow-up care is a key part of your treatment and safety. Be sure to make and go to all appointments, and call your doctor if you are having problems. It's also a good idea to know your test results and keep a list of the medicines you take. How can you care for yourself at home? · Take medicines exactly as directed. Call your doctor if you think you are having a problem with your medicine. · Go to your counseling sessions and follow-up appointments. · Recognize and accept your anxiety. Then, when you are in a situation that makes you anxious, say to yourself, \"This is not an emergency. I feel uncomfortable, but I am not in danger. I can keep going even if I feel anxious. \"  · Be kind to your body:  ? Relieve tension with exercise or a massage. ? Get enough rest.  ? Avoid alcohol, caffeine, nicotine, and illegal drugs. They can increase your anxiety level and cause sleep problems. ? Learn and do relaxation techniques. See below for more about these techniques. · Engage your mind. Get out and do something you enjoy. Go to a funny movie, or take a walk or hike. Plan your day. Having too much or too little to do can make you anxious. · Keep a record of your symptoms. Discuss your fears with a good friend or family member, or join a support group for people with similar problems. Talking to others sometimes relieves stress. · Get involved in social groups, or volunteer to help others. Being alone sometimes makes things seem worse than they are. · Get at least 30 minutes of exercise on most days of the week to relieve stress. Walking is a good choice. You also may want to do other activities, such as running, swimming, cycling, or playing tennis or team sports.   Relaxation techniques  Do relaxation exercises 10 to 20 minutes a day. You can play soothing, relaxing music while you do them, if you wish. · Tell others in your house that you are going to do your relaxation exercises. Ask them not to disturb you. · Find a comfortable place, away from all distractions and noise. · Lie down on your back, or sit with your back straight. · Focus on your breathing. Make it slow and steady. · Breathe in through your nose. Breathe out through either your nose or mouth. · Breathe deeply, filling up the area between your navel and your rib cage. Breathe so that your belly goes up and down. · Do not hold your breath. · Breathe like this for 5 to 10 minutes. Notice the feeling of calmness throughout your whole body. As you continue to breathe slowly and deeply, relax by doing the following for another 5 to 10 minutes:  · Tighten and relax each muscle group in your body. You can begin at your toes and work your way up to your head. · Imagine your muscle groups relaxing and becoming heavy. · Empty your mind of all thoughts. · Let yourself relax more and more deeply. · Become aware of the state of calmness that surrounds you. · When your relaxation time is over, you can bring yourself back to alertness by moving your fingers and toes and then your hands and feet and then stretching and moving your entire body. Sometimes people fall asleep during relaxation, but they usually wake up shortly afterward. · Always give yourself time to return to full alertness before you drive a car or do anything that might cause an accident if you are not fully alert. Never play a relaxation tape while you drive a car. When should you call for help? Call 911 anytime you think you may need emergency care. For example, call if:    · You feel you cannot stop from hurting yourself or someone else. Keep the numbers for these national suicide hotlines: 2-927-934-TALK (8-220.563.2929) and 8-562-UTXVLVB (4-663.454.9390).  If you or someone you know talks about suicide or feeling hopeless, get help right away. Watch closely for changes in your health, and be sure to contact your doctor if:    · You have anxiety or fear that affects your life.     · You have symptoms of anxiety that are new or different from those you had before. Where can you learn more? Go to https://chpepiceweb.[employer]. org and sign in to your [employer] account. Enter P754 in the [employer] box to learn more about \"Anxiety Disorder: Care Instructions. \"     If you do not have an account, please click on the \"Sign Up Now\" link. Current as of: September 23, 2020               Content Version: 12.9  © 2006-2021 [employer], [facility]. Care instructions adapted under license by [facility] ([facility]). If you have questions about a medical condition or this instruction, always ask your healthcare professional. [address] any warranty or liability for your use of this information.

## 2022-03-16 NOTE — PROGRESS NOTES
[address] [facility], [address]  Phone [phone]   Fax [phone]      OFFICE VISIT: 3/16/2022    Randy Shah-: 1975      [facility]  Reason For Visit:  Petros Harmon is a 41 y.o. No chief complaint on file. Patient presents via telephone conference on follow-up for chronic pain. They are moving to [place] in the AM.     Chronic pain:  She typically takes hydrocodone 10 mg on a when necessary basis. 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  When combined with lidocaine, it is pretty helpful     Pain Control: Average: 6/10             Best: 4/10                Worst: 10/10  (3/16/2022)     Pain Interfering with life:  100%  Pain Interfering with general activity:  100%     Her pain level varies in severity and location      Activities of daily living: She is completely wheelchair bound and unable to ambulate at all. She needs assistance with all ADLs. Even going to the bathroom requires assistance. She does have helped by her father as well as assistant who comes and meets her needs     Adverse effects: None  Aberrant behavior: None  Affect: Very good considering her multiple health issues     Alternative medications:              Celebrex 200 mg daily              Diclofenac gel 1% 2 g 4 times daily topically              Naproxen sodium 220 mg when necessary              She is intolerant of many medications.     Urine Drug screen: 10/23/2018 and appropriate              She is incontinent and unable to produce urine on demand.   Risk Factors:              Illegal Drug use: no              History of substance use disorder: no              Mental health conditions: stable depression and anxiety              Sleep disordered breathing: no              Concurrent Benzodiazepine use: yes     Bowel regimen: She is not regularly adherent to her bowel regimen              We discussed this again and stressed the importance of this regimen.              Senokot S,  1 tablets twice daily              MiraLAX 17 g daily     Bowel function: Chronic constipation     RORY was reviewed today per office protocol. Report shows No discrepancies.  Fill pattern is consistent from single provider(s) at single pharmacy(s). Request # [id number]     Active cumulative morphine equivalent score is 0. This is obviously incorrect     Narx Report is consistent      Anxiety:  She takes Diazepam 2mg on a prn basis. Last Rx for this medication was on 12/23/2021 for # 60 tablets  This regimen is effective at curtailing her breakthrough anxiety. She is not on any other SSRI medication as an anxiety prophylaxis other than Lamotrigine  She is needing a refill of this today as well      Hemiplegia affecting the left, nondominant side. This is a chronic process and she is compensated. Her family is working with her with ongoing PT to try to prevent contractures. She is in a wheelchair. Peripheral Vascular Disease:  She is stable and on appropriate medication for this to prevent progression. History of anaphylaxis:  Will Rx an Epi-Pen today. vitals were not taken for this visit. There is no height or weight on file to calculate BMI. I have reviewed the following with the Ms. Shah   Lab Review  Nurse Only on 02/21/2022   Component Date Value    Preg Test, Ur 02/21/2022 Neg     Color, UA 02/21/2022 dark yellow     Clarity, UA 02/21/2022 cloudy     Glucose, UA POC 02/21/2022 Neg     Bilirubin, UA 02/21/2022 Neg     Ketones, UA 02/21/2022 Neg  Spec Grav, UA 02/21/2022 1.025     Blood, UA POC 02/21/2022 Neg     pH, UA 02/21/2022 7.5     Protein, UA POC 02/21/2022 Trace     Urobilinogen, UA 02/21/2022 4.0 E.U/ dL     Leukocytes, UA 02/21/2022 Neg     Nitrite, UA 02/21/2022 Neg     Appearance, Fluid 02/21/2022 Slightly Cloudy    Office Visit on 01/05/2022   Component Date Value    SARS-CoV-2, PCR 01/05/2022 DETECTED*   Nurse Only on 11/09/2021   Component Date Value    Preg Test, Ur 11/09/2021 none detected      Copies of these are in the chart. Current Outpatient Medications   Medication Sig Dispense Refill    diazePAM (VALIUM) 2 MG tablet Take 1 tablet by mouth every 8 hours as needed for Anxiety for up to 30 days. 60 tablet 0    HYDROcodone-acetaminophen (NORCO) 7.5-325 MG per tablet Take 1 tablet by mouth every 8 hours as needed for Pain for up to 30 days. 40 tablet 0    EPINEPHrine (EPIPEN 2-ADAN) 0.3 MG/0.3ML SOAJ injection Inject 0.3 mLs into the muscle once for 1 dose Use as directed for allergic reaction 0.3 mL 1    loratadine (CLARITIN) 10 MG tablet Take 1 tablet by mouth daily 30 tablet 11    guaiFENesin (MUCINEX) 600 MG extended release tablet Take 1 tablet by mouth 2 times daily for 10 days 20 tablet 0    medroxyPROGESTERone (DEPO-PROVERA) 150 MG/ML injection Inject 1 mL into the muscle once for 1 dose 1 mL 3    medroxyPROGESTERone (DEPO-PROVERA) 150 MG/ML injection Inject 150 mg into the muscle every 3 months 1 mL 3    Thickened Products (THICK-IT) LIQD Use with liquids to thicken as needed 2661 mL 3    nystatin (MYCOSTATIN) 380913 UNIT/GM cream Apply topically 2 times daily.  30 g 2    celecoxib (CELEBREX) 200 MG capsule Take 1 capsule by mouth daily 90 capsule 3    omeprazole (PRILOSEC) 20 MG delayed release capsule TAKE 1 CAPSULE BY MOUTH DAILY 90 capsule 3    lamoTRIgine (LAMICTAL) 200 MG tablet Take 1 tablet by mouth 2 times daily 60 tablet 11    senna-docusate (SENEXON-S) 8.6-50 MG per tablet Take 2 tablets by mouth 2 times daily 120 tablet 11     No current facility-administered medications for this visit. Allergies: Latex, Bactrim [sulfamethoxazole-trimethoprim], Ciprofloxacin, Ciprofloxacin hcl, Depakote [divalproex sodium], Dilantin [phenytoin sodium extended], Dye [iodides], Keflex [cephalexin], Pcn [penicillins], Primaxin [imipenem], Unasyn [ampicillin-sulbactam sodium], and Wasp venom     Past Medical History:   Diagnosis Date    Arthritis     GERD (gastroesophageal reflux disease)     History of blood transfusion     Incontinence     Seizures ([address]        Family History   Problem Relation Age of Onset    High Blood Pressure Mother     High Blood Pressure Father        Past Surgical History:   Procedure Laterality Date    APPENDECTOMY      CHOLECYSTECTOMY      CSF SHUNT Right     DEEP BRAIN STIMULATOR PLACEMENT      tremor control it is off now    CO EXCIS CHALAZION,GEN ANESTHESIA Right 2018    EYE CHALAZION EXCISION performed by Azeem Appiah [facility] at [facility] OR    CO XCAPSL CTR RMVL INSJ IO LENS PROSTH W/O ECP Left 6/15/2017    EYE CATARACT EMULSIFICATION IOL IMPLANT performed by Azeem Appiah MD at [facility] OR    CO XCAPSL CTRC RMVL INSJ IO LENS PROSTH W/O ECP Right 2017    CATARCAT EXTRACTION EYE WITH IOL performed by Azeem Appiah MD at [facility] OR       Social History     Tobacco Use    Smoking status: Former Smoker     Packs/day: 1.00     Years: 19.00     Pack years: 19.00     Types: Cigarettes     Quit date: 2015     Years since quittin.0    Smokeless tobacco: Never Used    Tobacco comment: quit smoking  6 yrs ago   Substance Use Topics    Alcohol use: No        Review of Systems   Constitutional: Negative. HENT: Negative for congestion, ear pain, postnasal drip and rhinorrhea. Eyes: Negative. Respiratory: Negative. Cardiovascular: Negative. Gastrointestinal: Positive for nausea. Endocrine: Negative.     Musculoskeletal: Positive for arthralgias (left knee getting worse). Skin: Negative for rash. Allergic/Immunologic: Negative. Neurological: Negative. Hematological: Negative. Psychiatric/Behavioral: The patient is nervous/anxious. Physical Exam  PE was not done today as this was a telephone visit      ASSESSMENT      ICD-10-CM    1. Medication management  Z79.899 HYDROcodone-acetaminophen (NORCO) 7.5-325 MG per tablet   2. Anxiety  F41.9 diazePAM (VALIUM) 2 MG tablet   3. Hemiplegia affecting left nondominant side, unspecified etiology, unspecified hemiplegia type ([facility]  G81.94    4. PVD (peripheral vascular disease) ([facility])  I73.9    5. Chronic pain syndrome  G89.4 HYDROcodone-acetaminophen (NORCO) 7.5-325 MG per tablet   6. Primary osteoarthritis involving multiple joints  M89.49 HYDROcodone-acetaminophen (NORCO) 7.5-325 MG per tablet   7. Avascular necrosis ([facility])  M87.00 HYDROcodone-acetaminophen (NORCO) 7.5-325 MG per tablet   8. Wheelchair bound  Z99.3 HYDROcodone-acetaminophen (NORCO) 7.5-325 MG per tablet   9. Anaphylaxis, subsequent encounter  T78. 2XXD EPINEPHrine (EPIPEN 2-ADAN) 0.3 MG/0.3ML SOAJ injection         PLAN    1. Anxiety  The current medical regimen is effective;  continue present plan and medications. - diazePAM (VALIUM) 2 MG tablet; Take 1 tablet by mouth every 8 hours as needed for Anxiety for up to 30 days. Dispense: 60 tablet; Refill: 0    2. Hemiplegia affecting left nondominant side, unspecified etiology, unspecified hemiplegia type ([facility]  The current medical regimen is effective;  continue present plan and medications. 3. PVD (peripheral vascular disease) ([facility]  The current medical regimen is effective;  continue present plan and medications. 4. Chronic pain syndrome  The current medical regimen is effective;  continue present plan and medications.  - HYDROcodone-acetaminophen (NORCO) 7.5-325 MG per tablet; Take 1 tablet by mouth every 8 hours as needed for Pain for up to 30 days.   Dispense: 40 tablet; Refill: 0    5. Medication management  The current medical regimen is effective;  continue present plan and medications.  - HYDROcodone-acetaminophen (NORCO) 7.5-325 MG per tablet; Take 1 tablet by mouth every 8 hours as needed for Pain for up to 30 days. Dispense: 40 tablet; Refill: 0    6. Primary osteoarthritis involving multiple joints  The current medical regimen is effective;  continue present plan and medications.  - HYDROcodone-acetaminophen (NORCO) 7.5-325 MG per tablet; Take 1 tablet by mouth every 8 hours as needed for Pain for up to 30 days. Dispense: 40 tablet; Refill: 0    7. Avascular necrosis ([facility])  The current medical regimen is effective;  continue present plan and medications.  - HYDROcodone-acetaminophen (NORCO) 7.5-325 MG per tablet; Take 1 tablet by mouth every 8 hours as needed for Pain for up to 30 days. Dispense: 40 tablet; Refill: 0    8. Wheelchair bound  This is a chronic state and exaccerbates her pain as she has difficulty adjusting her position in the chair.  - HYDROcodone-acetaminophen (NORCO) 7.5-325 MG per tablet; Take 1 tablet by mouth every 8 hours as needed for Pain for up to 30 days. Dispense: 40 tablet; Refill: 0    9. Anaphylaxis, subsequent encounter  Will give refill of Epipen. She has allergy to bee stings. - EPINEPHrine (EPIPEN 2-ADAN) 0.3 MG/0.3ML SOAJ injection; Inject 0.3 mLs into the muscle once for 1 dose Use as directed for allergic reaction  Dispense: 0.3 mL; Refill: 1      No orders of the defined types were placed in this encounter. Return if symptoms worsen or fail to improve. Due to patients co-morbidities and risk for potential exposure of COVID 19 pandemic. Patient was contacted and agreed to proceed with a telephone virtual visit. The risks and benefits of converting to a telephone virtual visit were discussed in light of the current infectious disease epidemic.   Patient also understood that insurance coverage and co-pays are up to their individual insurance plans. Provider performed history of present illness and review of systems. Diagnosis and treatment plan was discussed with patient. Pharmacy of choice was reviewed along with past medical history, medication allergies, and current medications. Education provided to patient or patient parents/guardian with current illness diagnosis as well as when to seek additional healthcare due to changing or for worsening symptoms. Patient voiced understanding. 30 minutes were spent on the phone with patient. Angle Jones, was evaluated through a synchronous (real-time) audio-video encounter. The patient (or guardian if applicable) is aware that this is a billable service, which includes applicable co-pays. This Virtual Visit was conducted with patient's (and/or legal guardian's) consent. The visit was conducted pursuant to the emergency declaration under the [address] [place] [address], [address] [place] [address] waiver authority and the [employer] General Act. Patient identification was verified, and a caregiver was present when appropriate. The patient was located at home in a state where the provider was licensed to provide care. Total time spent for this encounter: 30m    --IRA Zhang DO on 3/16/2022 at 8:06 AM    An electronic signature was used to authenticate this note.

## 2022-03-28 DIAGNOSIS — R10.13 MIDEPIGASTRIC PAIN: ICD-10-CM

## 2022-03-28 DIAGNOSIS — R11.0 NAUSEA: ICD-10-CM

## 2022-03-28 DIAGNOSIS — K21.9 GASTROESOPHAGEAL REFLUX DISEASE WITHOUT ESOPHAGITIS: ICD-10-CM

## 2022-03-28 RX ORDER — ONDANSETRON 4 MG/1
4 TABLET, ORALLY DISINTEGRATING ORAL EVERY 8 HOURS PRN
Qty: 20 TABLET | Refills: 0 | Status: SHIPPED | OUTPATIENT
Start: 2022-03-28

## 2022-04-11 DIAGNOSIS — R56.9 SEIZURES (HCC): ICD-10-CM

## 2022-04-11 RX ORDER — LAMOTRIGINE 200 MG/1
200 TABLET ORAL 2 TIMES DAILY
Qty: 60 TABLET | Refills: 3 | Status: SHIPPED | OUTPATIENT
Start: 2022-04-11 | End: 2022-06-27 | Stop reason: SDUPTHER

## 2022-04-11 NOTE — TELEPHONE ENCOUNTER
Received fax from pharmacy requesting refill on pts medication(s). Pt was last seen in office on 3/16/2022  and has a follow up scheduled for Visit date not found.  Will send request to  Dr. Nadege Crenshaw  for authorization     Requested Prescriptions     Pending Prescriptions Disp Refills    lamoTRIgine (LAMICTAL) 200 MG tablet [Pharmacy Med Name: LAMOTRIGINE 200MG TABLETS] 60 tablet 3     Sig: Take 1 tablet by mouth 2 times daily

## 2022-04-26 DIAGNOSIS — K59.09 CHRONIC CONSTIPATION: ICD-10-CM

## 2022-04-26 RX ORDER — DOCUSATE SODIUM 50 MG AND SENNOSIDES 8.6 MG 8.6; 5 MG/1; MG/1
TABLET, FILM COATED ORAL
Qty: 120 TABLET | Refills: 11 | Status: SHIPPED | OUTPATIENT
Start: 2022-04-26 | End: 2022-05-03 | Stop reason: SDUPTHER

## 2022-04-28 ENCOUNTER — TELEPHONE (OUTPATIENT)
Dept: PRIMARY CARE CLINIC | Age: 47
End: 2022-04-28

## 2022-04-28 DIAGNOSIS — N39.0 URINARY TRACT INFECTION WITHOUT HEMATURIA, SITE UNSPECIFIED: ICD-10-CM

## 2022-04-28 RX ORDER — NITROFURANTOIN 25; 75 MG/1; MG/1
100 CAPSULE ORAL 2 TIMES DAILY
Qty: 10 CAPSULE | Refills: 0 | Status: SHIPPED | OUTPATIENT
Start: 2022-04-28 | End: 2022-05-03 | Stop reason: SDUPTHER

## 2022-04-28 NOTE — TELEPHONE ENCOUNTER
Call returned to pts mom to let her know that rx was sent to pharmacy for pt.      Requested Prescriptions     Signed Prescriptions Disp Refills    nitrofurantoin, macrocrystal-monohydrate, (MACROBID) 100 MG capsule 10 capsule 0     Sig: Take 1 capsule by mouth 2 times daily for 5 days     Authorizing Provider: Masha Sewell     Ordering User: Yudi Santiago

## 2022-04-28 NOTE — TELEPHONE ENCOUNTER
pts mom called stating that they are trying to get back home. [place] did not work out for them. They are in a [employer] in [facility] and only have $23.00 in their account until tomorrow. Pt has another UTI and is in need of an Abx. Mom is requesting abx be sent to [facility] at [address] [facility] if at all possible. She would like a call back at [phone].

## 2022-05-03 ENCOUNTER — TELEMEDICINE (OUTPATIENT)
Dept: PRIMARY CARE CLINIC | Age: 47
End: 2022-05-03
Payer: MEDICARE

## 2022-05-03 DIAGNOSIS — Z79.899 MEDICATION MANAGEMENT: ICD-10-CM

## 2022-05-03 DIAGNOSIS — K59.09 CHRONIC CONSTIPATION: ICD-10-CM

## 2022-05-03 DIAGNOSIS — N39.0 URINARY TRACT INFECTION WITHOUT HEMATURIA, SITE UNSPECIFIED: ICD-10-CM

## 2022-05-03 DIAGNOSIS — Z99.3 WHEELCHAIR BOUND: ICD-10-CM

## 2022-05-03 DIAGNOSIS — M87.00 AVASCULAR NECROSIS (HCC): ICD-10-CM

## 2022-05-03 DIAGNOSIS — G89.4 CHRONIC PAIN SYNDROME: ICD-10-CM

## 2022-05-03 DIAGNOSIS — M15.9 PRIMARY OSTEOARTHRITIS INVOLVING MULTIPLE JOINTS: ICD-10-CM

## 2022-05-03 DIAGNOSIS — F41.9 ANXIETY: ICD-10-CM

## 2022-05-03 PROCEDURE — 99443 PR PHYS/QHP TELEPHONE EVALUATION 21-30 MIN: CPT | Performed by: PEDIATRICS

## 2022-05-03 RX ORDER — AMOXICILLIN 250 MG
2 CAPSULE ORAL 2 TIMES DAILY
Qty: 120 TABLET | Refills: 11 | Status: SHIPPED | OUTPATIENT
Start: 2022-05-03

## 2022-05-03 RX ORDER — DIAZEPAM 2 MG/1
2 TABLET ORAL EVERY 8 HOURS PRN
Qty: 60 TABLET | Refills: 0 | Status: SHIPPED | OUTPATIENT
Start: 2022-05-03 | End: 2022-06-02

## 2022-05-03 RX ORDER — HYDROCODONE BITARTRATE AND ACETAMINOPHEN 7.5; 325 MG/1; MG/1
1 TABLET ORAL EVERY 8 HOURS PRN
Qty: 40 TABLET | Refills: 0 | Status: SHIPPED | OUTPATIENT
Start: 2022-05-03 | End: 2022-06-02 | Stop reason: SDUPTHER

## 2022-05-03 RX ORDER — NITROFURANTOIN 25; 75 MG/1; MG/1
100 CAPSULE ORAL 2 TIMES DAILY
Qty: 10 CAPSULE | Refills: 0 | Status: SHIPPED | OUTPATIENT
Start: 2022-05-03 | End: 2022-05-08

## 2022-05-03 ASSESSMENT — ENCOUNTER SYMPTOMS
RESPIRATORY NEGATIVE: 1
RHINORRHEA: 0
NAUSEA: 1
ALLERGIC/IMMUNOLOGIC NEGATIVE: 1
EYES NEGATIVE: 1

## 2022-05-03 NOTE — PROGRESS NOTES
[facility], [address]  Phone [phone]   Fax [phone]      OFFICE VISIT: 5/3/2022    Ignacio Fleming Alyssa-: 1975      HPI  Reason For Visit:  Pop Sy is a 41 y.o. Chronic Pain and Anxiety    Patient presents via telephone conference on follow-up for chronic pain.       Chronic pain:  She typically takes hydrocodone 10 mg on a when necessary basis. 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  When combined with lidocaine, it is pretty helpful     Pain Control: Average: 6/10             Best: 4/10                Worst: 10/10  (5/3/2022)     Pain Interfering with life:  100%  Pain Interfering with general activity:  100%     Her pain level varies in severity and location      Activities of daily living: She is completely wheelchair bound and unable to ambulate at all. She needs assistance with all ADLs. Even going to the bathroom requires assistance. She does have helped by her father as well as assistant who comes and meets her needs     Adverse effects: None  Aberrant behavior: None  Affect: Very good considering her multiple health issues     Alternative medications:              Celebrex 200 mg daily              Diclofenac gel 1% 2 g 4 times daily topically              Naproxen sodium 220 mg when necessary              She is intolerant of many medications.     Urine Drug screen: 10/23/2018 and appropriate              She is incontinent and unable to produce urine on demand.   Risk Factors:              Illegal Drug use: no              History of substance use disorder: no              Mental health conditions: stable depression and anxiety              Sleep disordered breathing: no              Concurrent Benzodiazepine use: yes     Bowel regimen: She is not regularly adherent to her bowel regimen              We discussed this again and stressed the importance of this regimen.              Senokot S,  1 tablets twice daily              MiraLAX 17 g daily     Bowel function: Chronic constipation     RORY was reviewed today per office protocol. Report shows No discrepancies.  Fill pattern is consistent from single provider(s) at single pharmacy(s). Request # [id number]     Active cumulative morphine equivalent score is 0. This is obviously incorrect      [facility] Report is consistent        Anxiety:  She takes Diazepam 2mg on a prn basis. Last Rx for this medication was on 3/16/2022 for # 60 tablets  This regimen is effective at curtailing her breakthrough anxiety. She is not on any other SSRI medication as an anxiety prophylaxis other than Lamotrigine  She is needing a refill of this today as well  This medication is not showing up on [facility] report at all       Hemiplegia affecting the left, nondominant side. This is a chronic process and she is compensated. Her family is working with her with ongoing PT to try to prevent contractures. She is in a wheelchair.         vitals were not taken for this visit. There is no height or weight on file to calculate BMI. I have reviewed the following with the Ms. Shah   Lab Review  Nurse Only on 02/21/2022   Component Date Value    Preg Test, Ur 02/21/2022 Neg     Color, UA 02/21/2022 dark yellow     Clarity, UA 02/21/2022 cloudy     Glucose, UA POC 02/21/2022 Neg     Bilirubin, UA 02/21/2022 Neg     Ketones, UA 02/21/2022 Neg     Spec Grav, UA 02/21/2022 1.025     Blood, UA POC 02/21/2022 Neg     pH, UA 02/21/2022 7.5     Protein, UA POC 02/21/2022 Trace     Urobilinogen, UA 02/21/2022 4.0 E.U/ dL     Leukocytes, UA 02/21/2022 Neg     Nitrite, UA 02/21/2022 Neg     Appearance, Fluid 02/21/2022 Slightly Cloudy    Office Visit on 01/05/2022   Component Date Value    SARS-CoV-2, PCR 01/05/2022 DETECTED*   Nurse Only on 11/09/2021   Component Date Value    Preg Test, Ur 11/09/2021 none detected      Copies of these are in the chart. Current Outpatient Medications   Medication Sig Dispense Refill    diazePAM (VALIUM) 2 MG tablet Take 1 tablet by mouth every 8 hours as needed for Anxiety for up to 30 days. 60 tablet 0    HYDROcodone-acetaminophen (NORCO) 7.5-325 MG per tablet Take 1 tablet by mouth every 8 hours as needed for Pain for up to 30 days. 40 tablet 0    senna-docusate (STIMULANT LAXATIVE) 8.6-50 MG per tablet Take 2 tablets by mouth in the morning and at bedtime 120 tablet 11    nitrofurantoin, macrocrystal-monohydrate, (MACROBID) 100 MG capsule Take 1 capsule by mouth 2 times daily for 5 days 10 capsule 0    lamoTRIgine (LAMICTAL) 200 MG tablet Take 1 tablet by mouth 2 times daily 60 tablet 3    ondansetron (ZOFRAN-ODT) 4 MG disintegrating tablet Place 1 tablet under the tongue every 8 hours as needed for Nausea or Vomiting 20 tablet 0    EPINEPHrine (EPIPEN 2-ADAN) 0.3 MG/0.3ML SOAJ injection Inject 0.3 mLs into the muscle once for 1 dose Use as directed for allergic reaction 0.3 mL 1    medroxyPROGESTERone (DEPO-PROVERA) 150 MG/ML injection Inject 1 mL into the muscle once for 1 dose 1 mL 3    medroxyPROGESTERone (DEPO-PROVERA) 150 MG/ML injection Inject 150 mg into the muscle every 3 months 1 mL 3    Thickened Products (THICK-IT) LIQD Use with liquids to thicken as needed 2661 mL 3    nystatin (MYCOSTATIN) 365047 UNIT/GM cream Apply topically 2 times daily.  30 g 2    celecoxib (CELEBREX) 200 MG capsule Take 1 capsule by mouth daily 90 capsule 3    omeprazole (PRILOSEC) 20 MG delayed release capsule TAKE 1 CAPSULE BY MOUTH DAILY 90 capsule 3 No current facility-administered medications for this visit. Allergies: Latex, Bactrim [sulfamethoxazole-trimethoprim], Ciprofloxacin, Ciprofloxacin hcl, Depakote [divalproex sodium], Dilantin [phenytoin sodium extended], Dye [iodides], Keflex [cephalexin], Pcn [penicillins], Primaxin [imipenem], Unasyn [ampicillin-sulbactam sodium], and Wasp venom     Past Medical History:   Diagnosis Date    Arthritis     GERD (gastroesophageal reflux disease)     History of blood transfusion     Incontinence     Seizures ([address]        Family History   Problem Relation Age of Onset    High Blood Pressure Mother     High Blood Pressure Father        Past Surgical History:   Procedure Laterality Date    APPENDECTOMY      CHOLECYSTECTOMY      CSF SHUNT Right     DEEP BRAIN STIMULATOR PLACEMENT      tremor control it is off now    VT EXCIS CHALAZION,GEN ANESTHESIA Right 2018    EYE CHALAZION EXCISION performed by Kylee Avery MD at [facility] OR    VT XCAPSL CTRC RMVL INSJ IO LENS PROSTH W/O ECP Left 6/15/2017    EYE CATARACT EMULSIFICATION IOL IMPLANT performed by Kylee Avery MD at [facility] OR    VT XCAPSL CTRC RMVL INSJ IO LENS PROSTH W/O ECP Right 2017    CATARCAT EXTRACTION EYE WITH IOL performed by [facility]e [facility]very MD at [facility] OR       Social History     Tobacco Use    Smoking status: Former Smoker     Packs/day: 1.00     Years: 19.00     Pack years: 19.00     Types: Cigarettes     Quit date: 2015     Years since quittin.2    Smokeless tobacco: Never Used    Tobacco comment: quit smoking  6 yrs ago   Substance Use Topics    Alcohol use: No        Review of Systems   Constitutional: Negative. HENT: Negative for congestion, ear pain, postnasal drip and rhinorrhea. Eyes: Negative. Respiratory: Negative. Cardiovascular: Negative. Gastrointestinal: Positive for nausea. Endocrine: Negative. Musculoskeletal: Positive for arthralgias (left knee getting worse).    Skin: Negative for rash. Allergic/Immunologic: Negative. Neurological: Negative. Hematological: Negative. Psychiatric/Behavioral: The patient is nervous/anxious. Physical Exam  Physical exam was not performed today as this was a telephone conference visit      ASSESSMENT      ICD-10-CM    1. Anxiety  F41.9 diazePAM (VALIUM) 2 MG tablet   2. Chronic pain syndrome  G89.4 HYDROcodone-acetaminophen (NORCO) 7.5-325 MG per tablet   3. Medication management  Z79.899 HYDROcodone-acetaminophen (NORCO) 7.5-325 MG per tablet   4. Primary osteoarthritis involving multiple joints  M89.49 HYDROcodone-acetaminophen (NORCO) 7.5-325 MG per tablet   5. Avascular necrosis (HCC)  M87.00 HYDROcodone-acetaminophen (NORCO) 7.5-325 MG per tablet   6. Wheelchair bound  Z99.3 HYDROcodone-acetaminophen (NORCO) 7.5-325 MG per tablet   7. Chronic constipation  K59.09 senna-docusate (STIMULANT LAXATIVE) 8.6-50 MG per tablet   8. Urinary tract infection without hematuria, site unspecified  N39.0 nitrofurantoin, macrocrystal-monohydrate, (MACROBID) 100 MG capsule         PLAN    1. Anxiety  The current medical regimen is effective;  continue present plan and medications. - diazePAM (VALIUM) 2 MG tablet; Take 1 tablet by mouth every 8 hours as needed for Anxiety for up to 30 days. Dispense: 60 tablet; Refill: 0    2. Chronic pain syndrome  The current medical regimen is effective;  continue present plan and medications.  - HYDROcodone-acetaminophen (NORCO) 7.5-325 MG per tablet; Take 1 tablet by mouth every 8 hours as needed for Pain for up to 30 days. Dispense: 40 tablet; Refill: 0    3. Medication management  The current medical regimen is effective;  continue present plan and medications.  - HYDROcodone-acetaminophen (NORCO) 7.5-325 MG per tablet; Take 1 tablet by mouth every 8 hours as needed for Pain for up to 30 days. Dispense: 40 tablet; Refill: 0    4.  Primary osteoarthritis involving multiple joints  The current medical regimen is effective;  continue present plan and medications.  - HYDROcodone-acetaminophen (NORCO) 7.5-325 MG per tablet; Take 1 tablet by mouth every 8 hours as needed for Pain for up to 30 days. Dispense: 40 tablet; Refill: 0    5. Avascular necrosis (HCC)  The current medical regimen is effective;  continue present plan and medications.  - HYDROcodone-acetaminophen (NORCO) 7.5-325 MG per tablet; Take 1 tablet by mouth every 8 hours as needed for Pain for up to 30 days. Dispense: 40 tablet; Refill: 0    6. Wheelchair bound  The current medical regimen is effective;  continue present plan and medications.  - HYDROcodone-acetaminophen (NORCO) 7.5-325 MG per tablet; Take 1 tablet by mouth every 8 hours as needed for Pain for up to 30 days. Dispense: 40 tablet; Refill: 0    7. Chronic constipation  The current medical regimen is effective;  continue present plan and medications. - senna-docusate (STIMULANT LAXATIVE) 8.6-50 MG per tablet; Take 2 tablets by mouth in the morning and at bedtime  Dispense: 120 tablet; Refill: 11    8. Urinary tract infection without hematuria, site unspecified  Refill for another round of abx.  - nitrofurantoin, macrocrystal-monohydrate, (MACROBID) 100 MG capsule; Take 1 capsule by mouth 2 times daily for 5 days  Dispense: 10 capsule; Refill: 0      No orders of the defined types were placed in this encounter. Return in about 1 month (around 6/3/2022) for 15. Due to patients co-morbidities and risk for potential exposure of COVID 19 pandemic. Patient was contacted and agreed to proceed with a telephone virtual visit. The risks and benefits of converting to a telephone virtual visit were discussed in light of the current infectious disease epidemic. Patient also understood that insurance coverage and co-pays are up to their individual insurance plans. Provider performed history of present illness and review of systems.  Diagnosis and treatment plan was discussed with patient. Pharmacy of choice was reviewed along with past medical history, medication allergies, and current medications. Education provided to patient or patient parents/guardian with current illness diagnosis as well as when to seek additional healthcare due to changing or for worsening symptoms. Patient voiced understanding. 25 minutes were spent on the phone with patient. Diane Singh, was evaluated through a synchronous (real-time) audio-video encounter. The patient (or guardian if applicable) is aware that this is a billable service, which includes applicable co-pays. This Virtual Visit was conducted with patient's (and/or legal guardian's) consent. The visit was conducted pursuant to the emergency declaration under the [address] [place] [address] authority and the [employer] Act. Patient identification was verified, and a caregiver was present when appropriate. The patient was located at home in a state where the provider was licensed to provide care. Total time spent for this encounter: 25m    --IRA Keen DO on 5/3/2022 at 6:32 PM    An electronic signature was used to authenticate this note.

## 2022-05-03 NOTE — PATIENT INSTRUCTIONS
Patient Education        Safe Use of Opioid Pain Medicine: Care Instructions  Your Care Instructions  Pain is your body's way of warning you that something is wrong. Pain feelsdifferent for everybody. Only you can describe your pain. A doctor can suggest or prescribe many types of medicines for pain. These range from over-the-counter medicines like acetaminophen (Tylenol) to powerful medicines called opioids. Examples of opioids are fentanyl, hydrocodone,morphine, and oxycodone. Heroin is an illegal opioid  Opioids are strong medicines. They can help you manage pain when you use them the right way. But if you misuse them, they can cause serious harm and even death. For these reasons, doctors are very careful about how they prescribeopioids. If you decide to take opioids, here are some things to remember.  Keep your doctor informed. You can develop opioid use disorder. Moderate to severe opioid use disorder is sometimes called addiction. The risk is higher if you have a history of substance use. Your doctor will monitor you closely for signs of opioid use disorder and to figure out when you no longer need to take opioids.  Make a treatment plan. The goal of your plan is to be able to function and do the things you need to do, even if you still have some pain. You might be able to manage your pain with other non-opioid options like physical therapy, relaxation, or over-the-counter pain medicines.  Be aware of the side effects. Opioids can cause serious side effects, such as constipation, dry mouth, and nausea. And over time, you may need a higher dose to get pain relief. This is called tolerance. Your body also gets used to opioids. This is called physical dependence. If you suddenly stop taking them, you may have withdrawal symptoms. The doctor carefully considered what pain medicine is right for you.  You may not have received opioids if your doctor was concerned about drug interactionsor your safety, or if he or she had other concerns. It is best to have one doctor or clinic treat your pain. This way you will get the pain medicine that will help you the most. And a doctor will be able towatch for any problems that the medicine might cause. The doctor has checked you carefully, but problems can develop later. If you notice any problems or new symptoms,  get medical treatment right away. Follow-up care is a key part of your treatment and safety. Be sure to make and go to all appointments, and call your doctor if you are having problems. It's also a good idea to know your test results and keep alist of the medicines you take. How can you care for yourself at home? If you need to take opioids to manage your pain, remember these safety tips.  Follow directions carefully. It's easy to misuse opioids if you take a dose other than what's prescribed by your doctor. This can lead to overdose and even death. Even sharing them with someone they weren't meant for is misuse.  Be cautious. Opioids may affect your judgment and decision making. Do not drive or operate machinery until you can think clearly. Talk with your doctor about when it is safe to drive.  Reduce the risk of drug interactions. Opioids can be dangerous if you take them with alcohol or with certain drugs like sleeping pills and muscle relaxers. Make sure your doctor knows about all the other medicines you take, including over-the-counter medicines. Don't start any new medicines before you talk to your doctor or pharmacist.  [facility] store and dispose of opioids. Store opioids in a safe and secure place. Make sure that pets, children, friends, and family can't get to them. When you're done using opioids, make sure to dispose of them safely and as quickly as possible. The U.S. Food and Drug Administration (FDA) recommends these disposal options.   ? The best option is to take your medicine to a drop-off box or take-back program that is authorized by the U.S. Drug Enforcement Administration (MURRAY). ? If these programs aren't available in your area and your medicine doesn't have specific disposal instructions (such as flushing), you can throw them into your household trash if you follow the FDA's instructions. Visit fda.gov and search for \"unused medicine disposal.\"  ? If you have opioid patches (used or unused), your options are to take them to a MURRAY-authorized site or flush them down the toilet. Do not throw them in the trash. ? Only flush your medicine down the toilet if you can't get to a MURRAY-approved site or your medicine instructions state clearly to flush them.  Reduce the risk of overdose. Misuse of opioids can be very dangerous. Protect yourself by asking your doctor about a naloxone rescue kit. It can help you--and even save your life--if you take too much of an opioid. Try other ways to reduce pain.  Relax, and reduce stress. Relaxation techniques such as deep breathing or meditation can help.  Keep moving. Gentle, daily exercise can help reduce pain over the long run. Try low- or no-impact exercises such as walking, swimming, and stationary biking. Do stretches to stay flexible.  Try heat, cold packs, and massage.  Get enough sleep. Pain can make you tired and drain your energy. Talk with your doctor if you have trouble sleeping because of pain.  Think positive. Your thoughts can affect your pain level. Do things that you enjoy to distract yourself when you have pain instead of focusing on the pain. See a movie, read a book, listen to music, or spend time with a friend. If you are not taking a prescription pain medicine, ask your doctor if you cantake an over-the-counter medicine. When should you call for help? Call 911 anytime you think you may need emergency care. For example, call if:   You have symptoms of a severe allergic reaction. These may include:  ? Sudden raised, red areas (hives) all over your body. ?  Swelling of the throat, mouth, lips, or tongue. ? Trouble breathing. ? Passing out (losing consciousness). Or you may feel very lightheaded or suddenly feel weak, confused, or restless.  You have signs of an overdose. These include:  ? Cold, clammy skin. ? Confusion. ? Severe nervousness or restlessness. ? Severe dizziness, drowsiness, or weakness. ? Slow breathing. ? Seizures. Call your doctor now or seek immediate medical care if:   You have symptoms of an allergic reaction, such as:  ? A rash or hives (raised, red areas on the skin). ? Itching. ? Swelling. ? Belly pain, nausea, or vomiting. Watch closely for changes in your health, and be sure to contact your doctor if:  Hermelinda Marrero You think you might be taking too much pain medicine, and you need help to take less or stop.  Your medicine is not helping with the pain.  You are having side effects, such as constipation. Where can you learn more? Go to https://GameCrushjoleenRIVS.Cinchcast. org and sign in to your Zindigo account. Enter R108 in the "[employer] box to learn more about \"Safe Use of Opioid Pain Medicine: Care Instructions. \"     If you do not have an account, please click on the \"Sign Up Now\" link. Current as of: December 13, 2021               Content Version: 13.2  © 2006-2022 Healthwise, Incorporated. Care instructions adapted under license by [facility] ([facility]). If you have questions about a medical condition or this instruction, always ask your healthcare professional. [place] [address] any warranty or liability for your use of this information. Patient Education        Learning About Anxiety Disorders  What are anxiety disorders? Anxiety disorders are a type of medical problem. They cause severe anxiety. When you feel anxious, you feel that something bad is about to happen. Thisfeeling interferes with your life. These disorders include:   Generalized anxiety disorder.  You feel worried and stressed about many everyday events and activities. This goes on for several months and disrupts your life on most days.  Panic disorder. You have repeated panic attacks. A panic attack is a sudden, intense fear or anxiety. It may make you feel short of breath. Your heart may pound.  Social anxiety disorder. You feel very anxious about what you will say or do in front of people. For example, you may be scared to talk or eat in public. This problem affects your daily life.  Phobias. You are very scared of a specific object, situation, or activity. For example, you may fear spiders, high places, or small spaces. What are the symptoms? Generalized anxiety disorder  Symptoms may include:   Feeling worried and stressed about many things almost every day.  Feeling tired or irritable. You may have a hard time concentrating.  Having headaches or muscle aches.  Having a hard time getting to sleep or staying asleep. Panic disorder  You may have repeated panic attacks when there is no reason for feeling afraid. You may change your daily activities because you worry that you will haveanother attack. Symptoms may include:   Intense fear, terror, or anxiety.  Trouble breathing or very fast breathing.  Chest pain or tightness.  A heartbeat that races or is not regular. Social anxiety disorder  Symptoms may include:   Fear about a social situation, such as eating in front of others or speaking in public. You may worry a lot. Or you may be afraid that something bad will happen.  Anxiety that can cause you to blush, sweat, and feel shaky.  A heartbeat that is faster than normal.   A hard time focusing. Phobias  Symptoms may include:   More fear than most people of being around an object, being in a situation, or doing an activity. You might also be stressed about the chance of being around the thing you fear.    Worry about losing control, panicking, fainting, or having physical symptoms like a faster heartbeat when you are around the situation or object. How are these disorders treated? Anxiety disorders can be treated with medicines or counseling. A combination ofboth may be used. Medicines may include:   Antidepressants. These may help your symptoms by keeping chemicals in your brain in balance.  Benzodiazepines. These may give you short-term relief of your symptoms. Some people use cognitive-behavioral therapy. A therapist helps you learn tochange stressful or bad thoughts into helpful thoughts. Lead a healthy lifestyle  A healthy lifestyle may help you feel better.  Get at least 30 minutes of exercise on most days of the week. Walking is a good choice.  Eat a healthy diet. Include fruits, vegetables, lean proteins, and whole grains in your diet each day.  Try to go to bed at the same time every night. Try for 8 hours of sleep a night.  Find ways to manage stress. Try relaxation exercises.  Avoid alcohol and illegal drugs. Follow-up care is a key part of your treatment and safety. Be sure to make and go to all appointments, and call your doctor if you are having problems. It's also a good idea to know your test results and keep alist of the medicines you take. Where can you learn more? Go to https://CriticalMetricspepicPraxis Engineering Technologies.Attila Technologies. org and sign in to your JoGuru account. Enter X809 in the [facility] box to learn more about \"Learning About Anxiety Disorders. \"     If you do not have an account, please click on the \"Sign Up Now\" link. Current as of: June 16, 2021               Content Version: 13.2  © 7031-4661 [employer], [facility]. Care instructions adapted under license by [facility] ([facility]). If you have questions about a medical condition or this instruction, always ask your healthcare professional. [place] [address] any warranty or liability for your use of this information.

## 2022-05-23 RX ORDER — MEDROXYPROGESTERONE ACETATE 150 MG/ML
150 INJECTION, SUSPENSION INTRAMUSCULAR ONCE
Qty: 1 ML | Refills: 3 | Status: SHIPPED | OUTPATIENT
Start: 2022-05-23 | End: 2022-08-03 | Stop reason: SDUPTHER

## 2022-05-23 NOTE — TELEPHONE ENCOUNTER
Received fax from pharmacy requesting refill on pts medication(s). Pt was last seen in office on 5/3/2022  and has a follow up scheduled for 6/2/2022. Will send request to  Dr. Julio Le  for authorization.      Requested Prescriptions     Pending Prescriptions Disp Refills    medroxyPROGESTERone (DEPO-PROVERA) 150 MG/ML injection 1 mL 3     Sig: Inject 1 mL into the muscle once for 1 dose

## 2022-05-24 ENCOUNTER — NURSE ONLY (OUTPATIENT)
Dept: PRIMARY CARE CLINIC | Age: 47
End: 2022-05-24
Payer: MEDICARE

## 2022-05-24 DIAGNOSIS — Z30.42 DEPOT CONTRACEPTION: Primary | ICD-10-CM

## 2022-05-24 PROCEDURE — 96372 THER/PROPH/DIAG INJ SC/IM: CPT | Performed by: PEDIATRICS

## 2022-05-24 RX ORDER — MEDROXYPROGESTERONE ACETATE 150 MG/ML
150 INJECTION, SUSPENSION INTRAMUSCULAR ONCE
Status: COMPLETED | OUTPATIENT
Start: 2022-05-24 | End: 2022-05-24

## 2022-05-24 RX ADMIN — MEDROXYPROGESTERONE ACETATE 150 MG: 150 INJECTION, SUSPENSION INTRAMUSCULAR at 15:39

## 2022-05-24 NOTE — PROGRESS NOTES
After obtaining consent, and per orders of Dr. Cindy Ross, injection of depo given in Right deltoid by Alicia Kirby. Patient instructed to remain in clinic for 20 minutes afterwards, and to report any adverse reaction to me immediately.

## 2022-06-02 ENCOUNTER — TELEMEDICINE (OUTPATIENT)
Dept: PRIMARY CARE CLINIC | Age: 47
End: 2022-06-02
Payer: MEDICARE

## 2022-06-02 DIAGNOSIS — M15.9 PRIMARY OSTEOARTHRITIS INVOLVING MULTIPLE JOINTS: ICD-10-CM

## 2022-06-02 DIAGNOSIS — G89.4 CHRONIC PAIN SYNDROME: ICD-10-CM

## 2022-06-02 DIAGNOSIS — Z79.899 MEDICATION MANAGEMENT: ICD-10-CM

## 2022-06-02 DIAGNOSIS — M87.00 AVASCULAR NECROSIS (HCC): ICD-10-CM

## 2022-06-02 DIAGNOSIS — Z99.3 WHEELCHAIR BOUND: ICD-10-CM

## 2022-06-02 PROCEDURE — 99213 OFFICE O/P EST LOW 20 MIN: CPT | Performed by: PEDIATRICS

## 2022-06-02 RX ORDER — HYDROCODONE BITARTRATE AND ACETAMINOPHEN 7.5; 325 MG/1; MG/1
1 TABLET ORAL EVERY 8 HOURS PRN
Qty: 40 TABLET | Refills: 0 | Status: SHIPPED | OUTPATIENT
Start: 2022-06-02 | End: 2022-06-30 | Stop reason: SDUPTHER

## 2022-06-02 ASSESSMENT — ENCOUNTER SYMPTOMS
RESPIRATORY NEGATIVE: 1
EYES NEGATIVE: 1
NAUSEA: 1
ALLERGIC/IMMUNOLOGIC NEGATIVE: 1
RHINORRHEA: 0

## 2022-06-02 NOTE — PROGRESS NOTES
[address] [place],  [address]  Phone [phone]   Fax [phone]      OFFICE VISIT: 2022    Fabi Walshht-: 1975      HPI  Reason For Visit:  Romero Pang is a 41 y.o.     1 Month Follow-Up, Medication Refill (norco), and Follow-Up from Hospital (went to er (doesnt know which one) for a cyst under arm. on abx but dont know the name. seems to be doing better. )    Patient presents for telephone conference visit on follow-up for chronic pain and anxiety. She is on Abx Doxycycline. Chronic pain:  She typically takes hydrocodone 10 mg on a when necessary basis. 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  When combined with lidocaine, it is pretty helpful     Pain Control: Average: 6/10             Best: 4/10                Worst: 10/10  (2022)     Pain Interfering with life:  100%  Pain Interfering with general activity:  100%     Her pain level varies in severity and location      Activities of daily living: She is completely wheelchair bound and unable to ambulate at all. She needs assistance with all ADLs. Even going to the bathroom requires assistance.   She does have helped by her father as well as assistant who comes and meets her needs     Adverse effects: None  Aberrant behavior: None  Affect: Very good considering her multiple health issues     Alternative medications:              Celebrex 200 mg daily              Diclofenac gel 1% 2 g 4 times daily topically              Naproxen sodium 220 mg when necessary              She is intolerant of many medications.     Urine Drug screen: 10/23/2018 and appropriate              She is incontinent and unable to produce urine on demand. Risk Factors:              Illegal Drug use: no              History of substance use disorder: no              Mental health conditions: stable depression and anxiety              Sleep disordered breathing: no              Concurrent Benzodiazepine use: yes     Bowel regimen: She is not regularly adherent to her bowel regimen              We discussed this again and stressed the importance of this regimen.              Senokot S,  1 tablets twice daily              MiraLAX 17 g daily     Bowel function: Chronic constipation     RORY was reviewed today per office protocol. Report shows No discrepancies.  Fill pattern is consistent from single provider(s) at single pharmacy(s). Request # [id number]     Active cumulative morphine equivalent score is 10. This is obviously incorrect      Narx Report is completely empty        Anxiety:  She takes Diazepam 2mg on a prn basis. Last Rx for this medication was on  5/4/2022 for # 60 tablets  This regimen is effective at curtailing her breakthrough anxiety. She is not on any other SSRI medication as an anxiety prophylaxis other than Lamotrigine  She is needing a refill of this today as well  This medication is not showing [facility] at [facility]        Hemiplegia affecting the left, nondominant side. This is a chronic process and she is compensated. Her family is working with her with ongoing PT to try to prevent contractures. She is in a wheelchair.         vitals were not taken for this visit. There is no height or weight on file to calculate BMI. I have reviewed the following with the Ms. Shah   Lab Review  Nurse Only on 02/21/2022   Component Date Value    Preg Test, Ur 02/21/2022 Neg     Color, UA 02/21/2022 dark yellow     Clarity, UA 02/21/2022 cloudy     Glucose, UA POC 02/21/2022 Neg     Bilirubin, UA 02/21/2022 Neg     Ketones, UA 02/21/2022 Neg     Spec Grav, UA 02/21/2022 1.025     Ciprofloxacin, Ciprofloxacin hcl, Depakote [divalproex sodium], Dilantin [phenytoin sodium extended], Dye [iodides], Keflex [cephalexin], Pcn [penicillins], Primaxin [imipenem], Unasyn [ampicillin-sulbactam sodium], and Wasp venom     Past Medical History:   Diagnosis Date    Arthritis     GERD (gastroesophageal reflux disease)     History of blood transfusion     Incontinence     Seizures ([address]        Family History   Problem Relation Age of Onset    High Blood Pressure Mother     High Blood Pressure Father        Past Surgical History:   Procedure Laterality Date    APPENDECTOMY      CHOLECYSTECTOMY      CSF SHUNT Right     DEEP BRAIN STIMULATOR PLACEMENT      tremor control it is off now    PA EXCIS CHALAZION,GEN ANESTHESIA Right 2018    EYE CHALAZION EXCISION performed by Chan Pace MD at [facility] OR    PA XCAPSL CTRC RMVL INSJ IO LENS PROSTH W/O ECP Left 6/15/2017    EYE CATARACT EMULSIFICATION IOL IMPLANT performed by Chan Pace MD at [facility] OR    PA XCAPSL CTRC RMVL INSJ IO LENS PROSTH W/O ECP Right 2017    CATARCAT EXTRACTION EYE WITH IOL performed by [facility] at [facility]       Social History     Tobacco Use    Smoking status: Former Smoker     Packs/day: 1.00     Years: 19.00     Pack years: 19.00     Types: Cigarettes     Quit date: 2015     Years since quittin.3    Smokeless tobacco: Never Used    Tobacco comment: quit smoking  6 yrs ago   Substance Use Topics    Alcohol use: No        Review of Systems   Constitutional: Negative. HENT: Negative for congestion, ear pain, postnasal drip and rhinorrhea. Eyes: Negative. Respiratory: Negative. Cardiovascular: Negative. Gastrointestinal: Positive for nausea. Endocrine: Negative. Musculoskeletal: Positive for arthralgias (left knee getting worse). Skin: Negative for rash. Allergic/Immunologic: Negative. Neurological: Negative. Hematological: Negative. Psychiatric/Behavioral: The patient is nervous/anxious. Physical Exam  Physical exam was not performed today as this was a telephone conference visit      ASSESSMENT      ICD-10-CM    1. Chronic pain syndrome  G89.4 HYDROcodone-acetaminophen (NORCO) 7.5-325 MG per tablet   2. Medication management  Z79.899 HYDROcodone-acetaminophen (NORCO) 7.5-325 MG per tablet   3. Primary osteoarthritis involving multiple joints  M89.49 HYDROcodone-acetaminophen (NORCO) 7.5-325 MG per tablet   4. Avascular necrosis (HCC)  M87.00 HYDROcodone-acetaminophen (NORCO) 7.5-325 MG per tablet   5. Wheelchair bound  Z99.3 HYDROcodone-acetaminophen (NORCO) 7.5-325 MG per tablet         PLAN    1. Chronic pain syndrome  The current medical regimen is effective;  continue present plan and medications.  - HYDROcodone-acetaminophen (NORCO) 7.5-325 MG per tablet; Take 1 tablet by mouth every 8 hours as needed for Pain for up to 30 days. Dispense: 40 tablet; Refill: 0    2. Medication management  The current medical regimen is effective;  continue present plan and medications.  - HYDROcodone-acetaminophen (NORCO) 7.5-325 MG per tablet; Take 1 tablet by mouth every 8 hours as needed for Pain for up to 30 days. Dispense: 40 tablet; Refill: 0    3. Primary osteoarthritis involving multiple joints  The current medical regimen is effective;  continue present plan and medications.  - HYDROcodone-acetaminophen (NORCO) 7.5-325 MG per tablet; Take 1 tablet by mouth every 8 hours as needed for Pain for up to 30 days. Dispense: 40 tablet; Refill: 0    4. Avascular necrosis (HCC)  The current medical regimen is effective;  continue present plan and medications.  - HYDROcodone-acetaminophen (NORCO) 7.5-325 MG per tablet; Take 1 tablet by mouth every 8 hours as needed for Pain for up to 30 days. Dispense: 40 tablet; Refill: 0    5. Wheelchair bound  The current medical regimen is effective;  continue present plan and medications.   - HYDROcodone-acetaminophen (NORCO) 7.5-325 MG per tablet; Take 1 tablet by mouth every 8 hours as needed for Pain for up to 30 days. Dispense: 40 tablet; Refill: 0      No orders of the defined types were placed in this encounter. Return in about 1 month (around 7/2/2022) for 15. Due to patients co-morbidities and risk for potential exposure of COVID 19 pandemic. Patient was contacted and agreed to proceed with a telephone virtual visit. The risks and benefits of converting to a telephone virtual visit were discussed in light of the current infectious disease epidemic. Patient also understood that insurance coverage and co-pays are up to their individual insurance plans. Provider performed history of present illness and review of systems. Diagnosis and treatment plan was discussed with patient. Pharmacy of choice was reviewed along with past medical history, medication allergies, and current medications. Education provided to patient or patient parents/guardian with current illness diagnosis as well as when to seek additional healthcare due to changing or for worsening symptoms. Patient voiced understanding. 25 minutes were spent on the phone with patient. Rebecca Bragg, was evaluated through a synchronous (real-time) audio-video encounter. The patient (or guardian if applicable) is aware that this is a billable service, which includes applicable co-pays. This Virtual Visit was conducted with patient's (and/or legal guardian's) consent. The visit was conducted pursuant to the emergency declaration under the [address] [place] [address], [address] [place] [address] authority and the [employer]ative and [employer] General Act. Patient identification was verified, and a caregiver was present when appropriate. The patient was located at Home: [address] [place] [phone]. Total time spent for this encounter: 25m    --IRA Turner DO on 6/2/2022 at 7:07 PM    An electronic signature was used to authenticate this note.

## 2022-06-02 NOTE — PATIENT INSTRUCTIONS
Patient Education        Safe Use of Opioid Pain Medicine: Care Instructions  Your Care Instructions  Pain is your body's way of warning you that something is wrong. Pain feelsdifferent for everybody. Only you can describe your pain. A doctor can suggest or prescribe many types of medicines for pain. These range from over-the-counter medicines like acetaminophen (Tylenol) to powerful medicines called opioids. Examples of opioids are fentanyl, hydrocodone,morphine, and oxycodone. Heroin is an illegal opioid  Opioids are strong medicines. They can help you manage pain when you use them the right way. But if you misuse them, they can cause serious harm and even death. For these reasons, doctors are very careful about how they prescribeopioids. If you decide to take opioids, here are some things to remember.  Keep your doctor informed. You can develop opioid use disorder. Moderate to severe opioid use disorder is sometimes called addiction. The risk is higher if you have a history of substance use. Your doctor will monitor you closely for signs of opioid use disorder and to figure out when you no longer need to take opioids.  Make a treatment plan. The goal of your plan is to be able to function and do the things you need to do, even if you still have some pain. You might be able to manage your pain with other non-opioid options like physical therapy, relaxation, or over-the-counter pain medicines.  Be aware of the side effects. Opioids can cause serious side effects, such as constipation, dry mouth, and nausea. And over time, you may need a higher dose to get pain relief. This is called tolerance. Your body also gets used to opioids. This is called physical dependence. If you suddenly stop taking them, you may have withdrawal symptoms. The doctor carefully considered what pain medicine is right for you.  You may not have received opioids if your doctor was concerned about drug interactionsor your safety, or if he or she had other concerns. It is best to have one doctor or clinic treat your pain. This way you will get the pain medicine that will help you the most. And a doctor will be able towatch for any problems that the medicine might cause. The doctor has checked you carefully, but problems can develop later. If you notice any problems or new symptoms,  get medical treatment right away. Follow-up care is a key part of your treatment and safety. Be sure to make and go to all appointments, and call your doctor if you are having problems. It's also a good idea to know your test results and keep alist of the medicines you take. How can you care for yourself at home? If you need to take opioids to manage your pain, remember these safety tips.  Follow directions carefully. It's easy to misuse opioids if you take a dose other than what's prescribed by your doctor. This can lead to overdose and even death. Even sharing them with someone they weren't meant for is misuse.  Be cautious. Opioids may affect your judgment and decision making. Do not drive or operate machinery until you can think clearly. Talk with your doctor about when it is safe to drive.  Reduce the risk of drug interactions. Opioids can be dangerous if you take them with alcohol or with certain drugs like sleeping pills and muscle relaxers. Make sure your doctor knows about all the other medicines you take, including over-the-counter medicines. Don't start any new medicines before you talk to your doctor or pharmacist.  [facility] store and dispose of opioids. Store opioids in a safe and secure place. Make sure that pets, children, friends, and family can't get to them. When you're done using opioids, make sure to dispose of them safely and as quickly as possible. The U.S. Food and Drug Administration (FDA) recommends these disposal options.   ? The best option is to take your medicine to a drop-off box or take-back program that is authorized by the U.S. Drug Enforcement Administration (MURRAY). ? If these programs aren't available in your area and your medicine doesn't have specific disposal instructions (such as flushing), you can throw them into your household trash if you follow the FDA's instructions. Visit fda.gov and search for \"unused medicine disposal.\"  ? If you have opioid patches (used or unused), your options are to take them to a MURRAY-authorized site or flush them down the toilet. Do not throw them in the trash. ? Only flush your medicine down the toilet if you can't get to a MURRAY-approved site or your medicine instructions state clearly to flush them.  Reduce the risk of overdose. Misuse of opioids can be very dangerous. Protect yourself by asking your doctor about a naloxone rescue kit. It can help you--and even save your life--if you take too much of an opioid. Try other ways to reduce pain.  Relax, and reduce stress. Relaxation techniques such as deep breathing or meditation can help.  Keep moving. Gentle, daily exercise can help reduce pain over the long run. Try low- or no-impact exercises such as walking, swimming, and stationary biking. Do stretches to stay flexible.  Try heat, cold packs, and massage.  Get enough sleep. Pain can make you tired and drain your energy. Talk with your doctor if you have trouble sleeping because of pain.  Think positive. Your thoughts can affect your pain level. Do things that you enjoy to distract yourself when you have pain instead of focusing on the pain. See a movie, read a book, listen to music, or spend time with a friend. If you are not taking a prescription pain medicine, ask your doctor if you cantake an over-the-counter medicine. When should you call for help? Call 911 anytime you think you may need emergency care. For example, call if:   You have symptoms of a severe allergic reaction. These may include:  ? Sudden raised, red areas (hives) all over your body. ?  Swelling of the throat, mouth, lips, or tongue. ? Trouble breathing. ? Passing out (losing consciousness). Or you may feel very lightheaded or suddenly feel weak, confused, or restless.  You have signs of an overdose. These include:  ? Cold, clammy skin. ? Confusion. ? Severe nervousness or restlessness. ? Severe dizziness, drowsiness, or weakness. ? Slow breathing. ? Seizures. Call your doctor now or seek immediate medical care if:   You have symptoms of an allergic reaction, such as:  ? A rash or hives (raised, red areas on the skin). ? Itching. ? Swelling. ? Belly pain, nausea, or vomiting. Watch closely for changes in your health, and be sure to contact your doctor if:  Jeremiah Loser You think you might be taking too much pain medicine, and you need help to take less or stop.  Your medicine is not helping with the pain.  You are having side effects, such as constipation. Where can you learn more? Go to https://Contemporary Analysisdamien.FonJax. org and sign in to your [employer] [place] account. Enter R108 in the Online[employer] box to learn more about \"Safe Use of Opioid Pain Medicine: Care Instructions. \"     If you do not have an account, please click on the \"Sign Up Now\" link. Current as of: December 13, 2021               Content Version: 13.2  © 8613-1220 Healthwise, Incorporated. Care instructions adapted under license by [facility] ([facility]). If you have questions about a medical condition or this instruction, always ask your healthcare professional. [place] [address] any warranty or liability for your use of this information.

## 2022-06-24 ENCOUNTER — TELEPHONE (OUTPATIENT)
Dept: PRIMARY CARE CLINIC | Age: 47
End: 2022-06-24

## 2022-06-24 RX ORDER — OFLOXACIN 3 MG/ML
1 SOLUTION/ DROPS OPHTHALMIC 4 TIMES DAILY
Qty: 1 EACH | Refills: 2 | Status: SHIPPED | OUTPATIENT
Start: 2022-06-24 | End: 2022-07-04

## 2022-06-27 DIAGNOSIS — R56.9 SEIZURES (HCC): ICD-10-CM

## 2022-06-27 DIAGNOSIS — M15.9 PRIMARY OSTEOARTHRITIS INVOLVING MULTIPLE JOINTS: ICD-10-CM

## 2022-06-27 DIAGNOSIS — K21.9 GASTROESOPHAGEAL REFLUX DISEASE WITHOUT ESOPHAGITIS: ICD-10-CM

## 2022-06-27 RX ORDER — LAMOTRIGINE 200 MG/1
200 TABLET ORAL 2 TIMES DAILY
Qty: 60 TABLET | Refills: 3 | Status: SHIPPED | OUTPATIENT
Start: 2022-06-27

## 2022-06-27 RX ORDER — LORATADINE 10 MG/1
10 TABLET ORAL DAILY
Qty: 30 TABLET | Refills: 11 | Status: SHIPPED | OUTPATIENT
Start: 2022-06-27 | End: 2022-07-27

## 2022-06-27 RX ORDER — CELECOXIB 200 MG/1
200 CAPSULE ORAL DAILY
Qty: 90 CAPSULE | Refills: 3 | Status: SHIPPED | OUTPATIENT
Start: 2022-06-27

## 2022-06-27 RX ORDER — OMEPRAZOLE 20 MG/1
20 CAPSULE, DELAYED RELEASE ORAL DAILY
Qty: 90 CAPSULE | Refills: 3 | Status: SHIPPED | OUTPATIENT
Start: 2022-06-27

## 2022-06-29 NOTE — TELEPHONE ENCOUNTER
pts mom called, she said that she called last week for refill on her ear gtts and eye gtts were sent in. Pradeep Rene had okayed her ofloxin to be refilled. pts mother was wanting the ear gtts for pain. I didn't think those were available anymore though. Is there anything else she can use for ear pain?

## 2022-06-30 ENCOUNTER — TELEMEDICINE (OUTPATIENT)
Dept: PRIMARY CARE CLINIC | Age: 47
End: 2022-06-30
Payer: MEDICARE

## 2022-06-30 DIAGNOSIS — M87.00 AVASCULAR NECROSIS (HCC): ICD-10-CM

## 2022-06-30 DIAGNOSIS — G80.0 SPASTIC QUADRIPLEGIC CEREBRAL PALSY (HCC): ICD-10-CM

## 2022-06-30 DIAGNOSIS — Z99.3 WHEELCHAIR BOUND: ICD-10-CM

## 2022-06-30 DIAGNOSIS — Z79.899 MEDICATION MANAGEMENT: ICD-10-CM

## 2022-06-30 DIAGNOSIS — F41.9 ANXIETY: ICD-10-CM

## 2022-06-30 DIAGNOSIS — G89.4 CHRONIC PAIN SYNDROME: ICD-10-CM

## 2022-06-30 DIAGNOSIS — M15.9 PRIMARY OSTEOARTHRITIS INVOLVING MULTIPLE JOINTS: ICD-10-CM

## 2022-06-30 DIAGNOSIS — H92.03 OTALGIA OF BOTH EARS: Primary | ICD-10-CM

## 2022-06-30 PROCEDURE — 99443 PR PHYS/QHP TELEPHONE EVALUATION 21-30 MIN: CPT | Performed by: PEDIATRICS

## 2022-06-30 RX ORDER — HYDROCODONE BITARTRATE AND ACETAMINOPHEN 7.5; 325 MG/1; MG/1
1 TABLET ORAL EVERY 8 HOURS PRN
Qty: 40 TABLET | Refills: 0 | Status: SHIPPED | OUTPATIENT
Start: 2022-06-30 | End: 2022-07-30

## 2022-06-30 RX ORDER — DIAZEPAM 2 MG/1
2 TABLET ORAL EVERY 8 HOURS PRN
Qty: 60 TABLET | Refills: 0 | Status: CANCELLED | OUTPATIENT
Start: 2022-06-30 | End: 2022-07-30

## 2022-06-30 ASSESSMENT — ENCOUNTER SYMPTOMS
EYES NEGATIVE: 1
NAUSEA: 1
RESPIRATORY NEGATIVE: 1
ALLERGIC/IMMUNOLOGIC NEGATIVE: 1
RHINORRHEA: 0

## 2022-06-30 NOTE — TELEPHONE ENCOUNTER
Called and spoke with pts father. He first said he would bring her in, then decided not to.  States this is not anything new going on and they will discuss at their [facility]V tonight

## 2022-06-30 NOTE — PATIENT INSTRUCTIONS
Patient Education        Learning About Anxiety Disorders  What are anxiety disorders? Anxiety disorders are a type of medical problem. They cause severe anxiety. When you feel anxious, you feel that something bad is about to happen. Thisfeeling interferes with your life. These disorders include:   Generalized anxiety disorder. You feel worried and stressed about many everyday events and activities. This goes on for several months and disrupts your life on most days.  Panic disorder. You have repeated panic attacks. A panic attack is a sudden, intense fear or anxiety. It may make you feel short of breath. Your heart may pound.  Social anxiety disorder. You feel very anxious about what you will say or do in front of people. For example, you may be scared to talk or eat in public. This problem affects your daily life.  Phobias. You are very scared of a specific object, situation, or activity. For example, you may fear spiders, high places, or small spaces. What are the symptoms? Generalized anxiety disorder  Symptoms may include:   Feeling worried and stressed about many things almost every day.  Feeling tired or irritable. You may have a hard time concentrating.  Having headaches or muscle aches.  Having a hard time getting to sleep or staying asleep. Panic disorder  You may have repeated panic attacks when there is no reason for feeling afraid. You may change your daily activities because you worry that you will haveanother attack. Symptoms may include:   Intense fear, terror, or anxiety.  Trouble breathing or very fast breathing.  Chest pain or tightness.  A heartbeat that races or is not regular. Social anxiety disorder  Symptoms may include:   Fear about a social situation, such as eating in front of others or speaking in public. You may worry a lot. Or you may be afraid that something bad will happen.  Anxiety that can cause you to blush, sweat, and feel shaky.    A heartbeat that is faster than normal.   A hard time focusing. Phobias  Symptoms may include:   More fear than most people of being around an object, being in a situation, or doing an activity. You might also be stressed about the chance of being around the thing you fear.  Worry about losing control, panicking, fainting, or having physical symptoms like a faster heartbeat when you are around the situation or object. How are these disorders treated? Anxiety disorders can be treated with medicines or counseling. A combination ofboth may be used. Medicines may include:   Antidepressants. These may help your symptoms by keeping chemicals in your brain in balance.  Benzodiazepines. These may give you short-term relief of your symptoms. Some people use cognitive-behavioral therapy. A therapist helps you learn tochange stressful or bad thoughts into helpful thoughts. Lead a healthy lifestyle  A healthy lifestyle may help you feel better.  Get at least 30 minutes of exercise on most days of the week. Walking is a good choice.  Eat a healthy diet. Include fruits, vegetables, lean proteins, and whole grains in your diet each day.  Try to go to bed at the same time every night. Try for 8 hours of sleep a night.  Find ways to manage stress. Try relaxation exercises.  Avoid alcohol and illegal drugs. Follow-up care is a key part of your treatment and safety. Be sure to make and go to all appointments, and call your doctor if you are having problems. It's also a good idea to know your test results and keep alist of the medicines you take. Where can you learn more? Go to https://kadie.OfferSavvy. org and sign in to your [employer] account. Enter F942 in the [facility] box to learn more about \"Learning About Anxiety Disorders. \"     If you do not have an account, please click on the \"Sign Up Now\" link.   Current as of: February 9, 2022               Content Version: 13.3  © 1925-7733 Healthwise, Incorporated. Care instructions adapted under license by [facility] ([facility]). If you have questions about a medical condition or this instruction, always ask your healthcare professional. [address] any warranty or liability for your use of this information. Patient Education        Safe Use of Opioid Pain Medicine: Care Instructions  Overview  Pain involves unpleasant emotions and feelings. Pain feels different foreverybody. Only you can describe your pain. A doctor can suggest or prescribe many types of medicines for pain. These range from over-the-counter medicines like acetaminophen (Tylenol) to powerful medicines called opioids. Examples of opioids are fentanyl, hydrocodone, andmorphine. Heroin is an illegal opioid  Opioids are strong medicines. They can help you manage pain when you use them the right way. They can cause serious harm and even death. For these reasons, doctors are very careful about how they prescribe opioids. If opioids are used,your doctor will give you the lowest dose for the shortest possible time. If you decide to take opioids, here are some things to remember.  Keep your doctor informed. You can develop opioid use disorder. Moderate to severe opioid use disorder is sometimes called addiction. The risk is higher if you have a history of substance use. Your doctor will monitor you closely for signs of opioid use disorder and to figure out when you no longer need to take opioids.  Make a treatment plan. The goal of your plan is to be able to function and do the things you need to do, even if you still have some pain. You might be able to manage your pain with other non-opioid options. These include cognitive behavioral therapy (CBT), physical therapy, relaxation, non-opioid prescription pain medicine, and over-the-counter medicines.  Be aware of the side effects. Opioids can cause side effects, such as constipation, sleepiness, and nausea.  And over time, you may need a higher dose to get pain relief. This is called tolerance. Your body also gets used to opioids. This is called physical dependence. If you suddenly stop taking them, you may have withdrawal symptoms. Serious risks of using opioids include overdose and death.  Know the risk factors for addiction. Your risk for opioid use disorder is higher if you have a history of substance use disorder. Other things that increase the risk include being a teenager, being an older adult, having a history of mental illness, and taking high doses of opioid medicine. The doctor carefully considered what pain medicine is right for you. You may not have received opioids if your doctor was concerned about drug interactionsor your safety, or if your doctor had other concerns. It is best to have one doctor or clinic treat your pain. This way they can find the treatment that will help you the most. And a doctor will be able to watchfor any problems that the medicine might cause. The doctor has checked you carefully, but problems can develop later. If you notice any problems or new symptoms,  get medical treatment right away. Follow-up care is a key part of your treatment and safety. Be sure to make and go to all appointments, and call your doctor if you are having problems. It's also a good idea to know your test results and keep alist of the medicines you take. How can you care for yourself at home? If you need to take opioids to manage your pain, remember these safety tips.  Follow directions carefully. It's easy to misuse opioids if you take a dose other than what's prescribed by your doctor. This can lead to accidental overdose and even death. Even sharing them with someone they weren't meant for is misuse.  Be cautious. Opioids may affect your judgment and decision making. Do not drive or operate machinery while you take them. Talk with your doctor about when it is safe to drive.    Reduce the risk of drug interactions. Opioids can be dangerous if you take them with alcohol or with certain drugs like sleeping pills and muscle relaxers. The combination can decrease your breathing rate and lead to overdose or death. Make sure your doctor knows about all the other medicines you take, including over-the-counter medicines. Don't start any new medicines before you talk to your doctor or pharmacist.  [facility] store and dispose of opioids. Store opioids in a safe and secure place. Make sure that pets, children, friends, and family can't get to them. When you're done using opioids, make sure to dispose of them safely and as quickly as possible. The U.S. Food and Drug Administration (FDA) recommends these disposal options. ? The best option is to take your medicine to a drop-off box or take-back program that is authorized by the [facility] [place] ([facility]). ? If these programs aren't available in your area and your medicine doesn't have specific disposal instructions (such as flushing), you can throw them into your household trash if you follow the FDA's instructions. Visit fda.gov and search for \"unused medicine disposal.\"  ? If you have opioid patches (used or unused), your options are to take them to a MURRAY-authorized site or flush them down the toilet. Do not throw them in the trash. ? Only flush your medicine down the toilet if you can't get to a MURRAY-approved site or your medicine instructions state clearly to flush them.  Reduce the risk of overdose. Opioids can be very dangerous. Protect yourself by asking your doctor about a naloxone rescue kit. It can help you--and even save your life--if you take too much of an opioid. Try other ways to reduce pain.  Relax, and reduce stress. Relaxation techniques such as deep breathing or meditation can help.  Keep moving. Gentle, daily exercise can help reduce pain over the long run.  Try low- or no-impact exercises such as walking, swimming, and stationary biking. Do stretches to stay flexible.  Help yourself with healthy thinking through cognitive behavioral therapy (CBT). This type of therapy is used to help people think in a healthy way. It focuses on thought (cognitive) and action (behavioral).  Try heat, cold packs, and massage.  Get enough sleep. Pain can make you tired and drain your energy. Talk with your doctor if you have trouble sleeping because of pain.  Think positive. Your thoughts can affect your pain level. Do things that you enjoy to distract yourself when you have pain instead of focusing on the pain. See a movie, read a book, listen to music, or spend time with a friend. When should you call for help? Call 911 anytime you think you may need emergency care. For example, call if:   You have symptoms of a severe allergic reaction. These may include:  ? Sudden raised, red areas (hives) all over your body. ? Swelling of the throat, mouth, lips, or tongue. ? Trouble breathing. ? Passing out (losing consciousness). Or you may feel very lightheaded or suddenly feel weak, confused, or restless.  You have signs of an overdose. These include:  ? Slow, shallow, or stopped breathing. ? Pinpoint pupils. ? Blue or purple lips or fingertips. ? No response when you ask questions, shake the person, or rub the person's breastbone with your knuckles. ? Seizures. If you have a naloxone rescue kit, use it after you call 911. Call your doctor now or seek immediate medical care if:   You have symptoms of an allergic reaction, such as:  ? A rash or hives (raised, red areas on the skin). ? Itching. ? Swelling. ? Belly pain, nausea, or vomiting. Watch closely for changes in your health, and be sure to contact your doctor if:  Edie Fiore You think you might be taking too much pain medicine, and you need help to take less or stop.  Your medicine is not helping with the pain.  You are having side effects, such as constipation or trouble urinating.   Where can you learn more? Go to https://chpepiceweb.Aircrm. org and sign in to your BabyGlowz account. Enter R108 in the StayClassy box to learn more about \"Safe Use of Opioid Pain Medicine: Care Instructions. \"     If you do not have an account, please click on the \"Sign Up Now\" link. Current as of: December 13, 2021               Content Version: 13.3  © 2006-2022 Healthwise, Incorporated. Care instructions adapted under license by [facility] ([facility]). If you have questions about a medical condition or this instruction, always ask your healthcare professional. [address] any warranty or liability for your use of this information.

## 2022-06-30 NOTE — PROGRESS NOTES
[address] [facility], [address]  Phone [phone]   Fax [phone]      OFFICE VISIT: 2022    Ricardo Washburn [place]-: 1975      [facility]  Reason For Visit:  Alexandre Mendoza is a 41 y.o. Chronic Pain and Anxiety    Patient presents for telephone conference visit on follow-up for chronic pain and anxiety.       Chronic pain:  She typically takes hydrocodone 10 mg on a when necessary basis. 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  When combined with lidocaine, it is pretty helpful     Pain Control: Average: 10             Best: /10                Worst: 10/10  (2022)     Pain Interfering with life:  100%  Pain Interfering with general activity:  100%     Her pain level varies in severity and location      Activities of daily living: She is completely wheelchair bound and unable to ambulate at all. She needs assistance with all ADLs. Even going to the bathroom requires assistance. She does have helped by her father as well as assistant who comes and meets her needs     Adverse effects: None  Aberrant behavior: None  Affect: Very good considering her multiple health issues     Alternative medications:              Celebrex 200 mg daily              Diclofenac gel 1% 2 g 4 times daily topically              Naproxen sodium 220 mg when necessary              She is intolerant of many medications.     Urine Drug screen: 10/23/2018 and appropriate              She is incontinent and unable to produce urine on demand.   Risk Factors:              Illegal Drug use: no              History of substance use disorder: no              Mental health conditions: stable depression and anxiety              Sleep disordered breathing: no              Concurrent Benzodiazepine use: yes     Bowel regimen: She is not regularly adherent to her bowel regimen              We discussed this again and stressed the importance of this regimen.              Senokot S,  1 tablets twice daily              MiraLAX 17 g daily     Bowel function: Chronic constipation     RORY was reviewed today per office protocol. Report shows No discrepancies.  Fill pattern is consistent from single provider(s) at single pharmacy(s). Request # [id number]     Active cumulative morphine equivalent score is 0. This is obviously incorrect      Narx Report is completely empty        Anxiety:  She takes Diazepam 2mg on a prn basis. Last Rx for this medication was on  5/4/2022 for # 60 tablets  This regimen is effective at curtailing her breakthrough anxiety. She is not on any other SSRI medication as an anxiety prophylaxis other than Lamotrigine  She is needing a refill of this today as well        Hemiplegia affecting the left, nondominant side. This is a chronic process and she is compensated. Her family is working with her with ongoing PT to try to prevent contractures. She is in a wheelchair.         vitals were not taken for this visit. There is no height or weight on file to calculate BMI. I have reviewed the following with the Ms. Shah   Lab Review  Nurse Only on 02/21/2022   Component Date Value    Preg Test, Ur 02/21/2022 Neg     Color, UA 02/21/2022 dark yellow     Clarity, UA 02/21/2022 cloudy     Glucose, UA POC 02/21/2022 Neg     Bilirubin, UA 02/21/2022 Neg     Ketones, UA 02/21/2022 Neg     Spec Grav, UA 02/21/2022 1.025     Blood, UA POC 02/21/2022 Neg     pH, UA 02/21/2022 7.5     Protein, UA POC 02/21/2022 Trace     Urobilinogen, UA 02/21/2022 4.0 E.U/ dL     Leukocytes, UA 02/21/2022 Neg     Nitrite, UA 02/21/2022 Neg     Appearance, Fluid 02/21/2022 Slightly Cloudy    Office Visit on 01/05/2022   Component Date Value    SARS-CoV-2, PCR 01/05/2022 DETECTED*     Copies of these are in the chart. Current Outpatient Medications   Medication Sig Dispense Refill    HYDROcodone-acetaminophen (NORCO) 7.5-325 MG per tablet Take 1 tablet by mouth every 8 hours as needed for Pain for up to 30 days. 40 tablet 0    neomycin-polymyxin-hydrocortisone (CORTISPORIN) 3.5-20275-5 otic solution Place 4 drops in ear(s) 3 times daily for 10 days Instill into bulateral ears 1 each 5    celecoxib (CELEBREX) 200 MG capsule Take 1 capsule by mouth daily 90 capsule 3    lamoTRIgine (LAMICTAL) 200 MG tablet Take 1 tablet by mouth 2 times daily 60 tablet 3    omeprazole (PRILOSEC) 20 MG delayed release capsule Take 1 capsule by mouth Daily 90 capsule 3    loratadine (CLARITIN) 10 MG tablet Take 1 tablet by mouth daily 30 tablet 11    ofloxacin (OCUFLOX) 0.3 % solution Place 1 drop into both eyes 4 times daily for 10 days 1 each 2    medroxyPROGESTERone (DEPO-PROVERA) 150 MG/ML injection Inject 1 mL into the muscle once for 1 dose 1 mL 3    senna-docusate (STIMULANT LAXATIVE) 8.6-50 MG per tablet Take 2 tablets by mouth in the morning and at bedtime 120 tablet 11    ondansetron (ZOFRAN-ODT) 4 MG disintegrating tablet Place 1 tablet under the tongue every 8 hours as needed for Nausea or Vomiting 20 tablet 0    EPINEPHrine (EPIPEN 2-ADAN) 0.3 MG/0.3ML SOAJ injection Inject 0.3 mLs into the muscle once for 1 dose Use as directed for allergic reaction 0.3 mL 1    medroxyPROGESTERone (DEPO-PROVERA) 150 MG/ML injection Inject 150 mg into the muscle every 3 months 1 mL 3    Thickened Products (THICK-IT) LIQD Use with liquids to thicken as needed 2661 mL 3    nystatin (MYCOSTATIN) 733164 UNIT/GM cream Apply topically 2 times daily. 30 g 2     No current facility-administered medications for this visit.        Allergies: Latex, Bactrim [sulfamethoxazole-trimethoprim], Ciprofloxacin, Ciprofloxacin hcl, Depakote [divalproex sodium], Dilantin [phenytoin sodium extended], Dye [iodides], Keflex [cephalexin], Pcn [penicillins], Primaxin [imipenem], Unasyn [ampicillin-sulbactam sodium], and Wasp venom     Past Medical History:   Diagnosis Date    Arthritis     GERD (gastroesophageal reflux disease)     History of blood transfusion     Incontinence     Seizures ([address]        Family History   Problem Relation Age of Onset    High Blood Pressure Mother     High Blood Pressure Father        Past Surgical History:   Procedure Laterality Date    APPENDECTOMY      CHOLECYSTECTOMY      CSF SHUNT Right     DEEP BRAIN STIMULATOR PLACEMENT      tremor control it is off now    CA EXCIS CHALAZION,GEN ANESTHESIA Right 2018    EYE CHALAZION EXCISION performed by Myrtle Hand MD at [facility]    CA XCAPSL CTRC RMVL INSJ IO LENS PROSTH W/O ECP Left 6/15/2017    EYE CATARACT EMULSIFICATION IOL IMPLANT performed by Myrtle Hand MD at [facility] OR    CA XCAPSL CTR RMVL INSJ IO LENS PROSTH W/O ECP Right 2017    CATARCAT EXTRACTION EYE WITH IOL [facility]le [facility] at [facility]       Social History     Tobacco Use    Smoking status: Former Smoker     Packs/day: 1.00     Years: 19.00     Pack years: 19.00     Types: Cigarettes     Quit date: 2015     Years since quittin.3    Smokeless tobacco: Never Used    Tobacco comment: quit smoking  6 yrs ago   Substance Use Topics    Alcohol use: No        Review of Systems   Constitutional: Negative. HENT: Negative for congestion, ear pain, postnasal drip and rhinorrhea. Eyes: Negative. Respiratory: Negative. Cardiovascular: Negative. Gastrointestinal: Positive for nausea. Endocrine: Negative. Musculoskeletal: Positive for arthralgias (left knee getting worse). Skin: Negative for rash. Allergic/Immunologic: Negative. Neurological: Negative. Hematological: Negative.     Psychiatric/Behavioral: The patient Dispense: 40 tablet; Refill: 0    5. Wheelchair bound  The current medical regimen is effective;  continue present plan and medications.  - HYDROcodone-acetaminophen (NORCO) 7.5-325 MG per tablet; Take 1 tablet by mouth every 8 hours as needed for Pain for up to 30 days. Dispense: 40 tablet; Refill: 0    6. Anxiety  She still has some of her diazepam left. Hold off for now. 7. Otalgia of both ears  Will rx cortisporin otic.  - neomycin-polymyxin-hydrocortisone (CORTISPORIN) 3.5-91281-9 otic solution; Place 4 drops in ear(s) 3 times daily for 10 days Instill into bulateral ears  Dispense: 1 each; Refill: 5    8. Spastic quadriplegic cerebral palsy ([address]  She needs a wheelchair and roho cusion. She is in a transport chair now. - Wheelchair      Orders Placed This Encounter   Procedures    Wheelchair        Return in about 1 month (around 7/30/2022). Beth Hayward, was evaluated through a synchronous (real-time) audio-video encounter. The patient (or guardian if applicable) is aware that this is a billable service, which includes applicable co-pays. This Virtual Visit was conducted with patient's (and/or legal guardian's) consent. The visit was conducted pursuant to the emergency declaration under the [facility], [address] [place] [address] authority and the [employer] Act. Patient identification was verified, and a caregiver was present when appropriate. The patient was located at Home: [place] [phone]. Total time spent for this encounter: 30m    --IRA Kumar DO on 6/30/2022 at 6:32 PM    An electronic signature was used to authenticate this note.       27

## 2022-07-20 DIAGNOSIS — M87.00 AVASCULAR NECROSIS (HCC): ICD-10-CM

## 2022-07-20 DIAGNOSIS — M15.9 PRIMARY OSTEOARTHRITIS INVOLVING MULTIPLE JOINTS: ICD-10-CM

## 2022-07-20 DIAGNOSIS — Z99.3 WHEELCHAIR BOUND: ICD-10-CM

## 2022-07-20 DIAGNOSIS — R56.9 SEIZURES (HCC): ICD-10-CM

## 2022-07-20 DIAGNOSIS — G89.4 CHRONIC PAIN SYNDROME: ICD-10-CM

## 2022-07-20 DIAGNOSIS — R32 URINARY INCONTINENCE, UNSPECIFIED TYPE: ICD-10-CM

## 2022-07-20 DIAGNOSIS — G80.0 SPASTIC QUADRIPLEGIC CEREBRAL PALSY (HCC): Primary | ICD-10-CM

## 2022-07-20 DIAGNOSIS — R47.9 SPEECH DIFFICULT TO UNDERSTAND: ICD-10-CM

## 2022-07-20 NOTE — TELEPHONE ENCOUNTER
Order placed for [facility].     [facility] supplies to be sent to [facility] fax [phone]

## 2022-07-20 NOTE — TELEPHONE ENCOUNTER
Pts mom called, she would like to get her [facility] ordered to help with bathing and feeding. She states she also needs pull ups and chucks.

## 2022-07-22 ASSESSMENT — PATIENT HEALTH QUESTIONNAIRE - PHQ9
SUM OF ALL RESPONSES TO PHQ QUESTIONS 1-9: 0
1. LITTLE INTEREST OR PLEASURE IN DOING THINGS: 0
SUM OF ALL RESPONSES TO PHQ9 QUESTIONS 1 & 2: 0
2. FEELING DOWN, DEPRESSED OR HOPELESS: 0

## 2022-07-26 ENCOUNTER — OFFICE VISIT (OUTPATIENT)
Dept: PRIMARY CARE CLINIC | Age: 47
End: 2022-07-26
Payer: MEDICAID

## 2022-07-26 VITALS
SYSTOLIC BLOOD PRESSURE: 112 MMHG | DIASTOLIC BLOOD PRESSURE: 72 MMHG | BODY MASS INDEX: 22.41 KG/M2 | HEART RATE: 57 BPM | WEIGHT: 126.5 LBS | OXYGEN SATURATION: 95 % | TEMPERATURE: 97.4 F

## 2022-07-26 DIAGNOSIS — M15.9 PRIMARY OSTEOARTHRITIS INVOLVING MULTIPLE JOINTS: ICD-10-CM

## 2022-07-26 DIAGNOSIS — Z79.899 MEDICATION MANAGEMENT: Primary | ICD-10-CM

## 2022-07-26 DIAGNOSIS — Z99.3 WHEELCHAIR BOUND: ICD-10-CM

## 2022-07-26 DIAGNOSIS — G89.4 CHRONIC PAIN SYNDROME: ICD-10-CM

## 2022-07-26 DIAGNOSIS — M87.00 AVASCULAR NECROSIS (HCC): ICD-10-CM

## 2022-07-26 LAB
ALCOHOL URINE: NORMAL
AMPHETAMINE SCREEN, URINE: NEGATIVE
BARBITURATE SCREEN, URINE: NEGATIVE
BENZODIAZEPINE SCREEN, URINE: NEGATIVE
BUPRENORPHINE URINE: NEGATIVE
COCAINE METABOLITE SCREEN URINE: NEGATIVE
FENTANYL SCREEN, URINE: NORMAL
GABAPENTIN SCREEN, URINE: NORMAL
MDMA URINE: NEGATIVE
METHADONE SCREEN, URINE: NEGATIVE
METHAMPHETAMINE, URINE: NEGATIVE
OPIATE SCREEN URINE: NEGATIVE
OXYCODONE SCREEN URINE: NEGATIVE
PHENCYCLIDINE SCREEN URINE: NEGATIVE
PROPOXYPHENE SCREEN, URINE: NEGATIVE
SYNTHETIC CANNABINOIDS(K2) SCREEN, URINE: NORMAL
THC SCREEN, URINE: NEGATIVE
TRAMADOL SCREEN URINE: NORMAL
TRICYCLIC ANTIDEPRESSANTS, UR: NEGATIVE

## 2022-07-26 PROCEDURE — G8427 DOCREV CUR MEDS BY ELIG CLIN: HCPCS | Performed by: PEDIATRICS

## 2022-07-26 PROCEDURE — 99214 OFFICE O/P EST MOD 30 MIN: CPT | Performed by: PEDIATRICS

## 2022-07-26 PROCEDURE — G8420 CALC BMI NORM PARAMETERS: HCPCS | Performed by: PEDIATRICS

## 2022-07-26 PROCEDURE — 1036F TOBACCO NON-USER: CPT | Performed by: PEDIATRICS

## 2022-07-26 PROCEDURE — 80305 DRUG TEST PRSMV DIR OPT OBS: CPT | Performed by: PEDIATRICS

## 2022-07-26 RX ORDER — TRAMADOL HYDROCHLORIDE 50 MG/1
50 TABLET ORAL EVERY 6 HOURS PRN
Qty: 40 TABLET | Refills: 0 | Status: SHIPPED | OUTPATIENT
Start: 2022-07-26 | End: 2022-08-25

## 2022-07-26 RX ORDER — HYDROCODONE BITARTRATE AND ACETAMINOPHEN 7.5; 325 MG/1; MG/1
1 TABLET ORAL EVERY 8 HOURS PRN
Qty: 40 TABLET | Refills: 0 | Status: CANCELLED | OUTPATIENT
Start: 2022-07-26 | End: 2022-08-25

## 2022-07-26 SDOH — ECONOMIC STABILITY: FOOD INSECURITY: WITHIN THE PAST 12 MONTHS, YOU WORRIED THAT YOUR FOOD WOULD RUN OUT BEFORE YOU GOT MONEY TO BUY MORE.: NEVER TRUE

## 2022-07-26 SDOH — ECONOMIC STABILITY: FOOD INSECURITY: WITHIN THE PAST 12 MONTHS, THE FOOD YOU BOUGHT JUST DIDN'T LAST AND YOU DIDN'T HAVE MONEY TO GET MORE.: NEVER TRUE

## 2022-07-26 ASSESSMENT — SOCIAL DETERMINANTS OF HEALTH (SDOH): HOW HARD IS IT FOR YOU TO PAY FOR THE VERY BASICS LIKE FOOD, HOUSING, MEDICAL CARE, AND HEATING?: NOT VERY HARD

## 2022-07-26 ASSESSMENT — ENCOUNTER SYMPTOMS
RHINORRHEA: 0
ALLERGIC/IMMUNOLOGIC NEGATIVE: 1
NAUSEA: 1
EYES NEGATIVE: 1
RESPIRATORY NEGATIVE: 1

## 2022-07-26 NOTE — PROGRESS NOTES
[address],  [address]  Phone [phone]   Fax [phone]      OFFICE VISIT: 2022    Magnolia Shah-: 1975      HPI  Reason For Visit:  Bunny Anderson is a 41 y.o.     1 Month Follow-Up, Anxiety, Chronic Pain, and Medication Refill    Patient presents on follow-up for chronic pain and anxiety. Present concerns: He would like something else for her pain. He notes that hydrocodone makes her feel somewhat dizzy. We discussed possibly trying tramadol      Chronic pain:  She typically takes hydrocodone 7.5 mg on a when necessary basis. 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  When combined with lidocaine, it is pretty helpful     Pain Control: Average: 6/10             Best: 4/10                Worst: 10/10  (2022)     Pain Interfering with life:  100%  Pain Interfering with general activity:  100%     Her pain level varies in severity and location      Activities of daily living: She is completely wheelchair bound and unable to ambulate at all. She needs assistance with all ADLs. Even going to the bathroom requires assistance. She does have helped by her father as well as assistant who comes and meets her needs     Adverse effects: None  Aberrant behavior: None  Affect: Very good considering her multiple health issues     Alternative medications:              Celebrex 200 mg daily              Diclofenac gel 1% 2 g 4 times daily topically              Naproxen sodium 220 mg when necessary              She is intolerant of many medications.      Urine Drug screen: 10/23/2018 and appropriate              She is incontinent and unable to produce urine on demand. Risk Factors:              Illegal Drug use: no              History of substance use disorder: no              Mental health conditions: stable depression and anxiety              Sleep disordered breathing: no              Concurrent Benzodiazepine use: yes     Bowel regimen: She is not regularly adherent to her bowel regimen              We discussed this again and stressed the importance of this regimen. Senokot S,  1 tablets twice daily              MiraLAX 17 g daily     Bowel function: Chronic constipation     RORY was reviewed today per office protocol. Report shows No discrepancies. Fill pattern is consistent from single provider(s) at single pharmacy(s). Request # [id number]     Active cumulative morphine equivalent score is 10. Narx Report is completely empty        Anxiety:  She takes Diazepam 2mg on a prn basis. Last Rx for this medication was on  5/4/2022 for # 60 tablets  This regimen is effective at curtailing her breakthrough anxiety. She is not on any other SSRI medication as an anxiety prophylaxis other than Lamotrigine  She is needing a refill of this today as well        Hemiplegia affecting the left, nondominant side. This is a chronic process and she is compensated. Her family is working with her with ongoing PT to try to prevent contractures. She is in a wheelchair. weight is 126 lb 8 oz (57.4 kg). Her temporal temperature is 97.4 °F (36.3 °C). Her blood pressure is 112/72 and her pulse is 57. Her oxygen saturation is 95%. Body mass index is 22.41 kg/m². I have reviewed the following with the MsGaby  Alyssa   Lab Review  Nurse Only on 02/21/2022   Component Date Value    Preg Test, Ur 02/21/2022 Neg     Color, UA 02/21/2022 dark yellow     Clarity, UA 02/21/2022 cloudy     Glucose, UA POC 02/21/2022 Neg     Bilirubin, UA 02/21/2022 Neg     Ketones, UA 02/21/2022 Neg     Spec Grav, UA 02/21/2022 1.025     Blood, UA POC 02/21/2022 Neg     pH, UA 02/21/2022 7.5     Protein, UA POC 02/21/2022 Trace     Urobilinogen, UA 02/21/2022 4.0 E.U/ dL     Leukocytes, UA 02/21/2022 Neg     Nitrite, UA 02/21/2022 Neg     Appearance, Fluid 02/21/2022 Slightly Cloudy      Copies of these are in the chart. Current Outpatient Medications   Medication Sig Dispense Refill    traMADol (ULTRAM) 50 MG tablet Take 1 tablet by mouth every 6 hours as needed for Pain for up to 30 days. Take with tylenol 40 tablet 0    Incontinence Supplies MISC Pull ups, wipes, gloves, disposable min pads disp 1 month supply with 11  each 11    celecoxib (CELEBREX) 200 MG capsule Take 1 capsule by mouth daily 90 capsule 3    lamoTRIgine (LAMICTAL) 200 MG tablet Take 1 tablet by mouth 2 times daily 60 tablet 3    omeprazole (PRILOSEC) 20 MG delayed release capsule Take 1 capsule by mouth Daily 90 capsule 3    loratadine (CLARITIN) 10 MG tablet Take 1 tablet by mouth daily 30 tablet 11    senna-docusate (STIMULANT LAXATIVE) 8.6-50 MG per tablet Take 2 tablets by mouth in the morning and at bedtime 120 tablet 11    ondansetron (ZOFRAN-ODT) 4 MG disintegrating tablet Place 1 tablet under the tongue every 8 hours as needed for Nausea or Vomiting 20 tablet 0    EPINEPHrine (EPIPEN 2-ADAN) 0.3 MG/0.3ML SOAJ injection Inject 0.3 mLs into the muscle once for 1 dose Use as directed for allergic reaction 0.3 mL 1    medroxyPROGESTERone (DEPO-PROVERA) 150 MG/ML injection Inject 150 mg into the muscle every 3 months 1 mL 3    Thickened Products (THICK-IT) LIQD Use with liquids to thicken as needed 2661 mL 3    nystatin (MYCOSTATIN) 321366 UNIT/GM cream Apply topically 2 times daily. 30 g 2    HYDROcodone-acetaminophen (NORCO) 7.5-325 MG per tablet Take 1 tablet by mouth every 8 hours as needed for Pain for up to 30 days.  (Patient not taking: Reported on 7/26/2022) 40 tablet 0    medroxyPROGESTERone (DEPO-PROVERA) 150 MG/ML injection Inject 1 mL into the muscle once for 1 dose 1 mL 3     No current facility-administered medications for this visit. Allergies: Latex, Bactrim [sulfamethoxazole-trimethoprim], Ciprofloxacin, Ciprofloxacin hcl, Depakote [divalproex sodium], Dilantin [phenytoin sodium extended], Dye [iodides], Keflex [cephalexin], Pcn [penicillins], Primaxin [imipenem], Unasyn [ampicillin-sulbactam sodium], and Wasp venom     Past Medical History:   Diagnosis Date    Arthritis     GERD (gastroesophageal reflux disease)     History of blood transfusion     Incontinence     Seizures ([address]        Family History   Problem Relation Age of Onset    High Blood Pressure Mother     High Blood Pressure Father        Past Surgical History:   Procedure Laterality Date    APPENDECTOMY      CHOLECYSTECTOMY      CSF SHUNT Right     DEEP BRAIN STIMULATOR PLACEMENT      tremor control it is off now    ID EXCIS CHALAZION,GEN ANESTHESIA Right 2018    EYE CHALAZION EXCISION performed by Tani London MD at [facility] OR    ID XCAPSL [facility] RMVL INSJ IO LENS PROSTH W/O ECP Left 6/15/2017    EYE CATARACT EMULSIFICATION IOL IMPLANT performed by Tani London MD at [facility] OR    ID XCAPSL [facility] RMVL INSJ IO LENS PROSTH W/O ECP Right 2017    CATARCAT EXTRACTION EYE WITH IOL performed by Tani London MD at [facility] OR       Social History     Tobacco Use    Smoking status: Former     Packs/day: 1.00     Years: 19.00     Pack years: 19.00     Types: Cigarettes     Quit date: 2015     Years since quittin.4    Smokeless tobacco: Never    Tobacco comments:     quit smoking  6 yrs ago   Substance Use Topics    Alcohol use: No        Review of Systems   Constitutional: Negative. HENT:  Negative for congestion, ear pain, postnasal drip and rhinorrhea. Eyes: Negative. Respiratory: Negative. Cardiovascular: Negative. Gastrointestinal:  Positive for nausea. Endocrine: Negative. Musculoskeletal:  Positive for arthralgias (left knee getting worse).    Skin:  Negative for and dry. Findings: No rash. Neurological:      Mental Status: She is alert and oriented to person, place, and time. Cranial Nerves: No cranial nerve deficit (by gross examination). Sensory: No sensory deficit. Motor: No tremor or atrophy. Gait: Gait normal.      Comments: She has very limited mobility of her entire left side. She has limited mobility of her right lower extremities as well and has generalized weakness with inability to withstand her weight. She cannot transfer without being lifted. She does not contribute to any significant degree with mechanical transfers. Psychiatric:         Speech: Speech normal.         Behavior: Behavior normal. Behavior is cooperative. ASSESSMENT      ICD-10-CM    1. Medication management  Z79.899 POCT Rapid Drug Screen     POCT Rapid Drug Screen     traMADol (ULTRAM) 50 MG tablet      2. Chronic pain syndrome  G89.4 traMADol (ULTRAM) 50 MG tablet      3. Primary osteoarthritis involving multiple joints  M89.49 traMADol (ULTRAM) 50 MG tablet      4. Avascular necrosis (HCC)  M87.00 traMADol (ULTRAM) 50 MG tablet      5. Wheelchair bound  Z99.3 traMADol (ULTRAM) 50 MG tablet            PLAN    1. Medication management  This was negative as she was not taking hydrocodone as this was making her sick  - POCT Rapid Drug Screen  - POCT Rapid Drug Screen  - traMADol (ULTRAM) 50 MG tablet; Take 1 tablet by mouth every 6 hours as needed for Pain for up to 30 days. Take with tylenol  Dispense: 40 tablet; Refill: 0    2. Chronic pain syndrome  Will try tramadol  - traMADol (ULTRAM) 50 MG tablet; Take 1 tablet by mouth every 6 hours as needed for Pain for up to 30 days. Take with tylenol  Dispense: 40 tablet; Refill: 0    3. Primary osteoarthritis involving multiple joints  Tramadol will hopefully help with her pain  - traMADol (ULTRAM) 50 MG tablet; Take 1 tablet by mouth every 6 hours as needed for Pain for up to 30 days.  Take with tylenol Dispense: 40 tablet; Refill: 0    4. Avascular necrosis ([address]  This is painful for her. Will rx tramadol  - traMADol (ULTRAM) 50 MG tablet; Take 1 tablet by mouth every 6 hours as needed for Pain for up to 30 days. Take with tylenol  Dispense: 40 tablet; Refill: 0    5. Wheelchair bound  She has a lot of pain . - traMADol (ULTRAM) 50 MG tablet; Take 1 tablet by mouth every 6 hours as needed for Pain for up to 30 days. Take with tylenol  Dispense: 40 tablet; Refill: 0      Orders Placed This Encounter   Procedures    POCT Rapid Drug Screen    POCT Rapid Drug Screen        Return in about 1 month (around 8/26/2022) for 30. This was an in-house visit.

## 2022-07-26 NOTE — PROGRESS NOTES
Results for orders placed or performed in visit on 07/26/22   POCT Rapid Drug Screen   Result Value Ref Range    Alcohol, Urine      Amphetamine Screen, Urine Negative     Barbiturate Screen, Urine Negative     Benzodiazepine Screen, Urine Negative     Buprenorphine Urine Negative     Cocaine Metabolite Screen, Urine Negative     FENTANYL SCREEN, URINE      Gabapentin Screen, Urine      MDMA, Urine Negative     Methadone Screen, Urine Negative     Methamphetamine, Urine Negative     Opiate Scrn, Ur Negative     Oxycodone Screen, Ur Negative     PCP Screen, Urine Negative     Propoxyphene Screen, Urine Negative     Synthetic Cannabinoids (K2) Screen, Urine      THC Screen, Urine Negative     Tramadol Scrn, Ur      Tricyclic Antidepressants, Urine Negative

## 2022-08-03 RX ORDER — MEDROXYPROGESTERONE ACETATE 150 MG/ML
150 INJECTION, SUSPENSION INTRAMUSCULAR ONCE
Qty: 1 ML | Refills: 3 | Status: SHIPPED | OUTPATIENT
Start: 2022-08-03 | End: 2022-08-23

## 2022-08-03 NOTE — TELEPHONE ENCOUNTER
Received fax from pharmacy requesting refill on pts medication(s). Pt was last seen in office on 7/26/2022  and has a follow up scheduled for 8/29/2022. Will send request to  Dr. Gabrielle Larkin  for patient.      Requested Prescriptions     Pending Prescriptions Disp Refills    medroxyPROGESTERone (DEPO-PROVERA) 150 MG/ML injection 1 mL 3     Sig: Inject 1 mL into the muscle once for 1 dose

## 2022-08-23 ENCOUNTER — NURSE ONLY (OUTPATIENT)
Dept: PRIMARY CARE CLINIC | Age: 47
End: 2022-08-23
Payer: MEDICARE

## 2022-08-23 DIAGNOSIS — Z30.42 DEPOT CONTRACEPTION: Primary | ICD-10-CM

## 2022-08-23 DIAGNOSIS — Z99.3 WHEELCHAIR BOUND: ICD-10-CM

## 2022-08-23 PROCEDURE — 96372 THER/PROPH/DIAG INJ SC/IM: CPT | Performed by: PEDIATRICS

## 2022-08-23 RX ORDER — MEDROXYPROGESTERONE ACETATE 150 MG/ML
150 INJECTION, SUSPENSION INTRAMUSCULAR ONCE
Status: COMPLETED | OUTPATIENT
Start: 2022-08-23 | End: 2022-08-23

## 2022-08-23 RX ADMIN — MEDROXYPROGESTERONE ACETATE 150 MG: 150 INJECTION, SUSPENSION INTRAMUSCULAR at 10:39

## 2022-08-23 NOTE — PROGRESS NOTES
After obtaining consent, and per orders of Dr. Yumiko Hooker, injection of depo was given in the Right arm . Patient tolerated it well. Patient instructed to report any adverse reaction to me immediately.

## 2022-11-06 DIAGNOSIS — R56.9 SEIZURES (HCC): ICD-10-CM

## 2022-11-07 RX ORDER — LAMOTRIGINE 200 MG/1
200 TABLET ORAL 2 TIMES DAILY
Qty: 60 TABLET | Refills: 0 | Status: SHIPPED | OUTPATIENT
Start: 2022-11-07

## 2022-11-07 NOTE — TELEPHONE ENCOUNTER
Received fax from pharmacy requesting refill on pts medication(s). Pt was last seen in office on 7/26/2022  and has a follow up scheduled for Visit date not found. Will send request to  Dr. Odessa Hand  for authorization.      Requested Prescriptions     Pending Prescriptions Disp Refills    lamoTRIgine (LAMICTAL) 200 MG tablet [Pharmacy Med Name: lamoTRIgine 200 MG Oral Tablet] 60 tablet 0     Sig: Take 1 tablet by mouth 2 times daily

## 2022-11-09 ENCOUNTER — TELEPHONE (OUTPATIENT)
Dept: VASCULAR SURGERY | Age: 47
End: 2022-11-09

## 2022-11-09 NOTE — TELEPHONE ENCOUNTER
Spoke to pt's legal guardian/father and they wanted Laura's appts rescheduled. He is aware of her new appt times and locations for 11/15/22. I will also mail a reminder letter.

## 2022-11-15 ENCOUNTER — TELEPHONE (OUTPATIENT)
Dept: VASCULAR SURGERY | Age: 47
End: 2022-11-15

## 2022-11-15 NOTE — TELEPHONE ENCOUNTER
PATIENT WAS SCHEDULED FOR AN MATT TODAY. [facility] CALLED AND STATED THAT THERE WAS A FAMILY EMERGENCY SO IT HAD TO BE CANCELLED. WILL CALL THE PATIENT TOMORROW AND SEE IF IT IS A GOOD TIME TO RS.

## 2022-12-02 DIAGNOSIS — H00.019 HORDEOLUM EXTERNUM, UNSPECIFIED LATERALITY: Primary | ICD-10-CM

## 2022-12-02 RX ORDER — MEDROXYPROGESTERONE ACETATE 150 MG/ML
150 INJECTION, SUSPENSION INTRAMUSCULAR ONCE
Qty: 1 ML | Refills: 3 | Status: SHIPPED | OUTPATIENT
Start: 2022-12-02 | End: 2022-12-02

## 2022-12-02 NOTE — TELEPHONE ENCOUNTER
Tobramycin ophthalmic ointment.   Apply to affected eye 3 times daily until resolution:  Also recommend warm compresses to the eye as tolerated

## 2022-12-05 RX ORDER — TOBRAMYCIN 0.3 %
OINTMENT (GRAM) OPHTHALMIC (EYE)
Qty: 1 EACH | Refills: 0 | Status: SHIPPED | OUTPATIENT
Start: 2022-12-05

## 2022-12-06 ENCOUNTER — TELEPHONE (OUTPATIENT)
Dept: PRIMARY CARE CLINIC | Age: 47
End: 2022-12-06

## 2022-12-06 NOTE — TELEPHONE ENCOUNTER
Got a fax from pharmacy stating pts insurance will not cover Tobrex ointment and want to know if this can be changed to drops. Will send to provider for authorization.

## 2022-12-12 DIAGNOSIS — K21.9 GASTROESOPHAGEAL REFLUX DISEASE WITHOUT ESOPHAGITIS: ICD-10-CM

## 2022-12-12 RX ORDER — OMEPRAZOLE 20 MG/1
20 CAPSULE, DELAYED RELEASE ORAL DAILY
Qty: 90 CAPSULE | Refills: 3 | Status: SHIPPED | OUTPATIENT
Start: 2022-12-12

## 2022-12-12 NOTE — TELEPHONE ENCOUNTER
Received fax from pharmacy requesting refill on pts medication(s). Pt was last seen in office on 7/26/2022  and has a follow up scheduled for Visit date not found. Will send request to  Dr. Jagdeep Preston  for authorization.      Requested Prescriptions     Pending Prescriptions Disp Refills    omeprazole (PRILOSEC) 20 MG delayed release capsule [Pharmacy Med Name: OMEPRAZOLE 20MG CAPSULES] 90 capsule 3     Sig: TAKE 1 CAPSULE BY MOUTH DAILY

## 2022-12-13 ENCOUNTER — NURSE ONLY (OUTPATIENT)
Dept: PRIMARY CARE CLINIC | Age: 47
End: 2022-12-13
Payer: MEDICARE

## 2022-12-13 DIAGNOSIS — R30.9 URINARY PAIN: ICD-10-CM

## 2022-12-13 DIAGNOSIS — R30.9 URINARY PAIN: Primary | ICD-10-CM

## 2022-12-13 DIAGNOSIS — R56.9 SEIZURES (HCC): ICD-10-CM

## 2022-12-13 LAB
APPEARANCE FLUID: CLEAR
BILIRUBIN, POC: NORMAL
BLOOD URINE, POC: NORMAL
CLARITY, POC: NORMAL
COLOR, POC: NORMAL
GLUCOSE URINE, POC: NORMAL
KETONES, POC: NORMAL
LEUKOCYTE EST, POC: NORMAL
NITRITE, POC: NORMAL
PH, POC: 6.5
PROTEIN, POC: NORMAL
SPECIFIC GRAVITY, POC: 1.02
UROBILINOGEN, POC: 4

## 2022-12-13 PROCEDURE — 81002 URINALYSIS NONAUTO W/O SCOPE: CPT | Performed by: PEDIATRICS

## 2022-12-13 RX ORDER — LAMOTRIGINE 200 MG/1
200 TABLET ORAL 2 TIMES DAILY
Qty: 60 TABLET | Refills: 11 | Status: SHIPPED | OUTPATIENT
Start: 2022-12-13

## 2022-12-13 NOTE — PROGRESS NOTES
Depo injection administered in our office. Approved by Dr Mingo Graves. Patient stayed in the office for 15 min afterwards.   [address] [id number]  LOT: [id number]  Exp: 08/30/24

## 2022-12-13 NOTE — TELEPHONE ENCOUNTER
Received call/My Chart Message from patient requesting refill on medication(s). Pt was last seen in office on 7/26/2022  and has a follow up scheduled for Visit date not found. Will send request to provider for authorization.      Requested Prescriptions     Pending Prescriptions Disp Refills    lamoTRIgine (LAMICTAL) 200 MG tablet 60 tablet 11     Sig: Take 1 tablet by mouth 2 times daily

## 2022-12-14 ENCOUNTER — TELEPHONE (OUTPATIENT)
Dept: PRIMARY CARE CLINIC | Age: 47
End: 2022-12-14

## 2022-12-14 NOTE — TELEPHONE ENCOUNTER
----- Message from Lazara Brito DO sent at 12/13/2022  6:25 PM CST -----  Urine is negative for any evidence of infection.   We can send a culture to ensure negative

## 2022-12-15 ENCOUNTER — TELEPHONE (OUTPATIENT)
Dept: PRIMARY CARE CLINIC | Age: 47
End: 2022-12-15

## 2022-12-15 LAB
ORGANISM: ABNORMAL
URINE CULTURE, ROUTINE: ABNORMAL
URINE CULTURE, ROUTINE: ABNORMAL

## 2022-12-15 NOTE — TELEPHONE ENCOUNTER
----- Message from LOVELY Maldonado sent at 12/15/2022  1:25 PM CST -----  Please call patient and let them know results.    Negative urine culture

## 2022-12-16 NOTE — TELEPHONE ENCOUNTER
Left msg on mothers vm with normal results. Asked that they call back with any questions or concerns.

## 2022-12-27 DIAGNOSIS — J31.0 RHINITIS MEDICAMENTOSA: Primary | ICD-10-CM

## 2022-12-27 DIAGNOSIS — T48.5X5A RHINITIS MEDICAMENTOSA: Primary | ICD-10-CM

## 2022-12-27 RX ORDER — FLUTICASONE PROPIONATE 50 MCG
2 SPRAY, SUSPENSION (ML) NASAL DAILY
Qty: 48 G | Refills: 1 | Status: SHIPPED | OUTPATIENT
Start: 2022-12-27

## 2022-12-27 NOTE — PROGRESS NOTES
[address] [facility], [address]  Phone [phone]   Fax [phone]      OFFICE VISIT: 2022    Mathieu Shah-: 1975      HPI  Reason For Visit:  Viji Donaldson is a 41 y.o. No chief complaint on file. vitals were not taken for this visit. There is no height or weight on file to calculate BMI. I have reviewed the following with the Ms. Shah   Lab Review  Orders Only on 2022   Component Date Value    Urine Culture, Routine 2022  (A)                     Value:<50,000 CFU/ml  Mixed skin jasper present      Organism 2022 Gram negative jamaica (A)     Urine Culture, Routine 2022                      Value:Rare growth  No further workup     Nurse Only on 2022   Component Date Value    Glucose, UA POC 2022 neg     Bilirubin, UA 2022 small     Ketones, UA 2022 neg     Spec Grav, UA 2022 1.025     Blood, UA POC 2022 neg     pH, UA 2022 6.5     Protein, UA POC 2022 trace     Urobilinogen, UA 2022 4.0     Leukocytes, UA 2022 neg     Nitrite, UA 2022 neg     Appearance, Fluid 2022 Clear    Office Visit on 2022   Component Date Value    Amphetamine Screen, Urine 2022 Negative     Barbiturate Screen, Urine 2022 Negative     Benzodiazepine Screen, U* 2022 Negative     Buprenorphine Urine 2022 Negative     Cocaine Metabolite Scree* 2022 Negative     MDMA, Urine 2022 Negative     Methadone Screen, Urine 2022 Negative     Methamphetamine, Urine 2022 Negative     Opiate Scrn, Ur 2022 Negative     Oxycodone Screen, Ur 2022 Negative     PCP Screen, Urine 2022 Negative     Propoxyphene Screen, [place]* 2022 Negative     THC Screen, Urine 2022 Negative     Tricyclic Antidepressant*  Negative      Copies of these are in the chart.     Current Outpatient Medications   Medication Sig Dispense Refill    lamoTRIgine (LAMICTAL) 200 MG tablet Take 1 tablet by mouth 2 times daily 60 tablet 11    omeprazole (PRILOSEC) 20 MG delayed release capsule TAKE 1 CAPSULE BY MOUTH DAILY 90 capsule 3    tobramycin (TOBREX) 0.3 % ophthalmic ointment Apply to affected eye TID until cleared 1 each 0    medroxyPROGESTERone (DEPO-PROVERA) 150 MG/ML injection Inject 1 mL into the muscle once for 1 dose 1 mL 3    Incontinence Supplies MISC Pull ups, wipes, gloves, disposable min pads disp 1 month supply with 11  each 11    celecoxib (CELEBREX) 200 MG capsule Take 1 capsule by mouth daily 90 capsule 3    senna-docusate (STIMULANT LAXATIVE) 8.6-50 MG per tablet Take 2 tablets by mouth in the morning and at bedtime 120 tablet 11    ondansetron (ZOFRAN-ODT) 4 MG disintegrating tablet Place 1 tablet under the tongue every 8 hours as needed for Nausea or Vomiting 20 tablet 0    EPINEPHrine (EPIPEN 2-ADAN) 0.3 MG/0.3ML SOAJ injection Inject 0.3 mLs into the muscle once for 1 dose Use as directed for allergic reaction 0.3 mL 1    Thickened Products (THICK-IT) LIQD Use with liquids to thicken as needed 2661 mL 3    nystatin (MYCOSTATIN) 565905 UNIT/GM cream Apply topically 2 times daily. 30 g 2     No current facility-administered medications for this visit.        Allergies: Latex, Bactrim [sulfamethoxazole-trimethoprim], Ciprofloxacin, Ciprofloxacin hcl, Depakote [divalproex sodium], Dilantin [phenytoin sodium extended], Dye [iodides], Keflex [cephalexin], Pcn [penicillins], Primaxin [imipenem], Unasyn [ampicillin-sulbactam sodium], and Wasp venom     Past Medical History:   Diagnosis Date    Arthritis     GERD (gastroesophageal reflux disease)     History of blood transfusion     Incontinence     Seizures ([address]        Family History   Problem Relation Age of Onset    High Blood Pressure Mother     High Blood Pressure Father        Past Surgical History:   Procedure Laterality Date    APPENDECTOMY      CHOLECYSTECTOMY      CSF SHUNT Right     DEEP BRAIN STIMULATOR PLACEMENT      tremor control it is off now    NH EXCIS CHALAZION,GEN ANESTHESIA Right 2018    EYE CHALAZION EXCISION performed by Carolina Benites MD at [address] [place] [address] INS IO LENS PROSTH W/O ECP Left 6/15/2017    EYE CATARACT EMULSIFICATION IOL IMPLANT performed by Carolina Benites MD at [address] [place] [address] INS IO LENS PROSTH W/O ECP Right 2017    CATARCAT EXTRACTION EYE WITH IOL performed by Carolina Benites MD at [facility]       Social History     Tobacco Use    Smoking status: Former     Packs/day: 1.00     Years: 19.00     Pack years: 19.00     Types: Cigarettes     Quit date: 2015     Years since quittin.8    Smokeless tobacco: Never    Tobacco comments:     quit smoking  6 yrs ago   Substance Use Topics    Alcohol use: No        Review of Systems    Physical Exam        ASSESSMENT    No diagnosis found. PLAN    There are no diagnoses linked to this encounter. No orders of the defined types were placed in this encounter. No follow-ups on file.

## 2023-01-26 DIAGNOSIS — I73.9 PVD (PERIPHERAL VASCULAR DISEASE) (HCC): Primary | ICD-10-CM

## 2023-02-10 DIAGNOSIS — M15.9 PRIMARY OSTEOARTHRITIS INVOLVING MULTIPLE JOINTS: ICD-10-CM

## 2023-02-10 RX ORDER — CELECOXIB 200 MG/1
200 CAPSULE ORAL DAILY
Qty: 90 CAPSULE | Refills: 3 | Status: SHIPPED | OUTPATIENT
Start: 2023-02-10

## 2023-02-10 NOTE — TELEPHONE ENCOUNTER
Received fax from pharmacy requesting refill on pts medication(s). Pt was last seen in office on Visit date not found  and has a follow up scheduled for Visit date not found. Will send request to  Dr. Tung Schmid  for authorization.      Requested Prescriptions     Pending Prescriptions Disp Refills    celecoxib (CELEBREX) 200 MG capsule [Pharmacy Med Name: CELECOXIB 200MG CAPSULES] 90 capsule 3     Sig: TAKE 1 CAPSULE BY MOUTH DAILY

## 2023-03-06 NOTE — TELEPHONE ENCOUNTER
Pt guardians called stating pt was seen at [facility] ER last night due to bad cough and runny nose, they did COVID Swab and Strep both negative. They told them she has the virus going around and it has to take its course. They are requesting that pt gets and Albuterol inhaler to help with her breathing and cough medication since pt is allergic to so much they do not feel comfortable buying OTC.  Please advise

## 2023-03-08 ENCOUNTER — NURSE ONLY (OUTPATIENT)
Dept: FAMILY MEDICINE CLINIC | Age: 48
End: 2023-03-08

## 2023-03-08 DIAGNOSIS — G81.94 HEMIPLEGIA AFFECTING LEFT NONDOMINANT SIDE, UNSPECIFIED ETIOLOGY, UNSPECIFIED HEMIPLEGIA TYPE (HCC): Primary | ICD-10-CM

## 2023-03-08 RX ORDER — MEDROXYPROGESTERONE ACETATE 150 MG/ML
150 INJECTION, SUSPENSION INTRAMUSCULAR ONCE
Status: COMPLETED | OUTPATIENT
Start: 2023-03-08 | End: 2023-03-08

## 2023-03-08 RX ADMIN — MEDROXYPROGESTERONE ACETATE 150 MG: 150 INJECTION, SUSPENSION INTRAMUSCULAR at 10:32

## 2023-03-08 NOTE — PROGRESS NOTES
Pt came in for her depo provera injection. She was unable to give a urine specimen. I went and asked Jamee Albert if she had to give one as father said it might take her a few hours to obtain one. He said it was okay to go ahead and give her her injection without a urine pregnancy test this time. I advised father that she needs to be prepared to give a urine specimen before her next injection. After obtaining consent, and per orders of Dr. Ethan Rios, injection of Depo provera given in Right deltoid by Aram Stacy MA. Patient instructed to remain in clinic for 20 minutes afterwards, and to report any adverse reaction to me immediately.

## 2023-04-21 ENCOUNTER — TELEPHONE (OUTPATIENT)
Dept: VASCULAR SURGERY | Age: 48
End: 2023-04-21

## 2023-05-02 ENCOUNTER — HOSPITAL ENCOUNTER (OUTPATIENT)
Dept: VASCULAR LAB | Age: 48
Discharge: HOME | End: 2023-05-02
Payer: MEDICARE

## 2023-05-02 ENCOUNTER — OFFICE VISIT (OUTPATIENT)
Dept: VASCULAR SURGERY | Age: 48
End: 2023-05-02
Payer: MEDICARE

## 2023-05-02 DIAGNOSIS — I73.9 PVD (PERIPHERAL VASCULAR DISEASE) (HCC): Primary | ICD-10-CM

## 2023-05-02 DIAGNOSIS — I73.9 PVD (PERIPHERAL VASCULAR DISEASE) (HCC): ICD-10-CM

## 2023-05-02 PROCEDURE — 93923 UPR/LXTR ART STDY 3+ LVLS: CPT

## 2023-05-02 PROCEDURE — 99213 OFFICE O/P EST LOW 20 MIN: CPT | Performed by: PHYSICIAN ASSISTANT

## 2023-05-02 RX ORDER — METHYLPREDNISOLONE ACETATE 80 MG/ML
INJECTION, SUSPENSION INTRA-ARTICULAR; INTRALESIONAL; INTRAMUSCULAR; SOFT TISSUE
COMMUNITY

## 2023-05-02 RX ORDER — EPINEPHRINE 0.3 MG/.3ML
INJECTION SUBCUTANEOUS
COMMUNITY

## 2023-05-02 NOTE — PROGRESS NOTES
Patient Care Team:  Sheela Hanks as PCP - LOVELY Miller as Advanced Practice Nurse (Family Nurse Practitioner)      History and Physical (as of note her father is here who is also her POA and contributed to the history)  Ms. Sebastian Pompa is a 29-year-old female who presents today follow-up PVD. History includes traumatic brain injury with hemiparesis, seizures, s/p  shunt, arthritis, GERD and past tobacco abuse. She is wheelchair-bound. She denies any ischemic rest pain or nonhealing wounds of her lower extremities. She does report left knee pain and has had injections in the past.       (Chart reviewed including PMH, medications and allergies, previous imaging/tests for comparison, current imaging/tests, labs, other pertinent providers office/consult notes)     Corry Nieto is a 57 y.o. female with the following history reviewed and recorded in [facility]:  Patient Active Problem List    Diagnosis Date Noted    Avascular necrosis ([facility] 08/20/2018    Chronic seasonal allergic rhinitis 07/27/2018    PVD (peripheral vascular disease) ([facility] 06/29/2018    Anxiety 02/13/2018    Chronic pain syndrome 04/13/2017    Hemiplegia ([facility] 01/16/2017    Wheelchair bound 06/26/2015    Seizures (HCC)     GERD (gastroesophageal reflux disease)     Incontinence      Current Outpatient Medications   Medication Sig Dispense Refill    methylPREDNISolone acetate (DEPO-MEDROL) 80 MG/ML injection Depo-Medrol 80 mg/mL suspension for injection   Take 1 mL by injection route.       celecoxib (CELEBREX) 200 MG capsule TAKE 1 CAPSULE BY MOUTH DAILY 90 capsule 3    lamoTRIgine (LAMICTAL) 200 MG tablet Take 1 tablet by mouth 2 times daily 60 tablet 11    senna-docusate (STIMULANT LAXATIVE) 8.6-50 MG per tablet Take 2 tablets by mouth in the morning and at bedtime 120 tablet 11    ondansetron (ZOFRAN-ODT) 4 MG disintegrating tablet Place 1 tablet under the tongue every 8 hours as needed for Nausea or Vomiting 20 tablet 0

## 2023-05-03 DIAGNOSIS — I73.9 PVD (PERIPHERAL VASCULAR DISEASE) (HCC): Primary | ICD-10-CM

## 2023-12-08 ENCOUNTER — HOSPITAL ENCOUNTER (INPATIENT)
Age: 48
DRG: 871 | End: 2023-12-08
Attending: HOSPITALIST
Payer: MEDICARE

## 2023-12-08 ENCOUNTER — APPOINTMENT (OUTPATIENT)
Dept: CT IMAGING | Age: 48
DRG: 871 | End: 2023-12-08
Payer: MEDICARE

## 2023-12-08 DIAGNOSIS — Z87.820 HISTORY OF TRAUMATIC BRAIN INJURY: ICD-10-CM

## 2023-12-08 DIAGNOSIS — A41.9 SEPTIC SHOCK (HCC): ICD-10-CM

## 2023-12-08 DIAGNOSIS — R65.21 SEPTIC SHOCK (HCC): ICD-10-CM

## 2023-12-08 DIAGNOSIS — J18.9 PNEUMONIA OF BOTH LUNGS DUE TO INFECTIOUS ORGANISM, UNSPECIFIED PART OF LUNG: ICD-10-CM

## 2023-12-08 DIAGNOSIS — T68.XXXA HYPOTHERMIA, INITIAL ENCOUNTER: Primary | ICD-10-CM

## 2023-12-08 PROBLEM — G40.919 INTRACTABLE SEIZURE DISORDER (HCC): Status: ACTIVE | Noted: 2023-12-08

## 2023-12-08 PROBLEM — Z51.5 PALLIATIVE CARE PATIENT: Status: ACTIVE | Noted: 2023-12-08

## 2023-12-08 PROBLEM — Z98.2 S/P VP SHUNT: Status: ACTIVE | Noted: 2023-12-08

## 2023-12-08 PROBLEM — K59.09 CHRONIC CONSTIPATION: Status: ACTIVE | Noted: 2023-04-11

## 2023-12-08 PROBLEM — G93.41 METABOLIC ENCEPHALOPATHY: Status: ACTIVE | Noted: 2023-12-08

## 2023-12-08 PROBLEM — N76.4 ABSCESS OF LABIA: Status: ACTIVE | Noted: 2023-12-08

## 2023-12-08 PROBLEM — S06.9XAA TRAUMATIC BRAIN INJURY (HCC): Status: ACTIVE | Noted: 2023-10-10

## 2023-12-08 PROBLEM — M25.562 PAIN IN LEFT KNEE: Status: ACTIVE | Noted: 2021-09-23

## 2023-12-08 PROBLEM — E87.20 METABOLIC ACIDOSIS: Status: ACTIVE | Noted: 2023-12-08

## 2023-12-08 LAB
25(OH)D3 SERPL-MCNC: 10.2 NG/ML
ALBUMIN SERPL-MCNC: 4.3 G/DL (ref 3.5–5.2)
ALP SERPL-CCNC: 224 U/L (ref 35–104)
ALT SERPL-CCNC: 27 U/L (ref 5–33)
AMMONIA PLAS-SCNC: 28 UMOL/L (ref 11–51)
AMORPH SED URNS QL MICRO: ABNORMAL /HPF
ANION GAP SERPL CALCULATED.3IONS-SCNC: 20 MMOL/L (ref 7–19)
AST SERPL-CCNC: 19 U/L (ref 5–32)
B PARAP IS1001 DNA NPH QL NAA+NON-PROBE: NOT DETECTED
B PERT.PT PRMT NPH QL NAA+NON-PROBE: NOT DETECTED
BACTERIA #/AREA URNS HPF: ABNORMAL /HPF
BASE EXCESS ARTERIAL: -14.3 MMOL/L (ref -2–2)
BASOPHILS # BLD: 0.1 K/UL (ref 0–0.2)
BASOPHILS NFR BLD: 0.6 % (ref 0–1)
BILIRUB SERPL-MCNC: 0.8 MG/DL (ref 0.2–1.2)
BILIRUB UR QL STRIP: ABNORMAL
BUN SERPL-MCNC: 56 MG/DL (ref 6–20)
C PNEUM DNA NPH QL NAA+NON-PROBE: NOT DETECTED
CALCIUM SERPL-MCNC: 10.1 MG/DL (ref 8.6–10)
CARBOXYHEMOGLOBIN ARTERIAL: 2.3 % (ref 0–5)
CHLORIDE SERPL-SCNC: 108 MMOL/L (ref 98–111)
CK SERPL-CCNC: 61 U/L (ref 26–192)
CLARITY UR: ABNORMAL
CO2 SERPL-SCNC: 21 MMOL/L (ref 22–29)
COLOR UR: ABNORMAL
CREAT SERPL-MCNC: 2.5 MG/DL (ref 0.5–0.9)
CREAT UR-MCNC: 327.8 MG/DL (ref 28–217)
CRP SERPL HS-MCNC: 13.81 MG/DL (ref 0–0.5)
EOSINOPHIL # BLD: 0.2 K/UL (ref 0–0.6)
EOSINOPHIL NFR BLD: 1.1 % (ref 0–5)
EOSINOPHIL URNS QL MICRO: NORMAL
ERYTHROCYTE [DISTWIDTH] IN BLOOD BY AUTOMATED COUNT: 14.2 % (ref 11.5–14.5)
ERYTHROCYTE [SEDIMENTATION RATE] IN BLOOD BY WESTERGREN METHOD: 101 MM/HR (ref 0–20)
FIO2: 21 %
FLUAV RNA NPH QL NAA+NON-PROBE: NOT DETECTED
FLUBV RNA NPH QL NAA+NON-PROBE: NOT DETECTED
FOLATE SERPL-MCNC: 4.9 NG/ML (ref 4.8–37.3)
GLUCOSE BLD-MCNC: 137 MG/DL (ref 70–99)
GLUCOSE BLD-MCNC: 261 MG/DL (ref 70–99)
GLUCOSE BLD-MCNC: 283 MG/DL (ref 70–99)
GLUCOSE SERPL-MCNC: 289 MG/DL (ref 74–109)
GLUCOSE UR STRIP.AUTO-MCNC: NEGATIVE MG/DL
HADV DNA NPH QL NAA+NON-PROBE: NOT DETECTED
HBA1C MFR BLD: 4.9 % (ref 4–6)
HBA1C MFR BLD: 5.1 % (ref 4–6)
HCO3 ARTERIAL: 11.2 MMOL/L (ref 22–26)
HCOV 229E RNA NPH QL NAA+NON-PROBE: NOT DETECTED
HCOV HKU1 RNA NPH QL NAA+NON-PROBE: NOT DETECTED
HCOV NL63 RNA NPH QL NAA+NON-PROBE: NOT DETECTED
HCOV OC43 RNA NPH QL NAA+NON-PROBE: NOT DETECTED
HCT VFR BLD AUTO: 36.8 % (ref 37–47)
HEMOGLOBIN, ART, EXTENDED: 10.1 G/DL (ref 12–16)
HGB BLD-MCNC: 12.7 G/DL (ref 12–16)
HGB UR STRIP.AUTO-MCNC: ABNORMAL MG/L
HMPV RNA NPH QL NAA+NON-PROBE: NOT DETECTED
HPIV1 RNA NPH QL NAA+NON-PROBE: NOT DETECTED
HPIV2 RNA NPH QL NAA+NON-PROBE: NOT DETECTED
HPIV3 RNA NPH QL NAA+NON-PROBE: NOT DETECTED
HPIV4 RNA NPH QL NAA+NON-PROBE: NOT DETECTED
HYALINE CASTS #/AREA URNS LPF: ABNORMAL /LPF (ref 0–5)
IMM GRANULOCYTES # BLD: 0.3 K/UL
KETONES UR STRIP.AUTO-MCNC: ABNORMAL MG/DL
LACTATE BLDV-SCNC: 0.2 MMOL/L (ref 0.5–1.9)
LEGIONELLA AG UR QL: NORMAL
LEUKOCYTE ESTERASE UR QL STRIP.AUTO: ABNORMAL
LYMPHOCYTES # BLD: 2.9 K/UL (ref 1.1–4.5)
LYMPHOCYTES NFR BLD: 13.4 % (ref 20–40)
M PNEUMO DNA NPH QL NAA+NON-PROBE: NOT DETECTED
MAGNESIUM SERPL-MCNC: 3 MG/DL (ref 1.6–2.6)
MAGNESIUM SERPL-MCNC: 3 MG/DL (ref 1.6–2.6)
MCH RBC QN AUTO: 37 PG (ref 27–31)
MCHC RBC AUTO-ENTMCNC: 34.5 G/DL (ref 33–37)
MCV RBC AUTO: 107.3 FL (ref 81–99)
METHEMOGLOBIN ARTERIAL: 1.4 %
MONOCYTES # BLD: 0.4 K/UL (ref 0–0.9)
MONOCYTES NFR BLD: 1.6 % (ref 0–10)
NEUTROPHILS # BLD: 17.9 K/UL (ref 1.5–7.5)
NEUTS SEG NFR BLD: 82.1 % (ref 50–65)
NITRITE UR QL STRIP.AUTO: NEGATIVE
O2 CONTENT ARTERIAL: 13.6 ML/DL
O2 SAT, ARTERIAL: 94.9 %
O2 THERAPY: ABNORMAL
OSMOLALITY URINE: 688 MOSM/KG (ref 250–1200)
PCO2 ARTERIAL: 25 MMHG (ref 35–45)
PERFORMED ON: ABNORMAL
PH ARTERIAL: 7.26 (ref 7.35–7.45)
PH UR STRIP.AUTO: 5.5 [PH] (ref 5–8)
PHOSPHATE SERPL-MCNC: 2.5 MG/DL (ref 2.5–4.5)
PLATELET # BLD AUTO: 293 K/UL (ref 130–400)
PMV BLD AUTO: 10 FL (ref 9.4–12.3)
PO2 ARTERIAL: 92 MMHG (ref 80–100)
POTASSIUM BLD-SCNC: 4.1 MMOL/L
POTASSIUM SERPL-SCNC: 2.4 MMOL/L (ref 3.5–5)
POTASSIUM SERPL-SCNC: 2.8 MMOL/L (ref 3.5–5)
POTASSIUM SERPL-SCNC: 4.1 MMOL/L (ref 3.5–5)
PROCALCITONIN: 0.95 NG/ML (ref 0–0.09)
PROT SERPL-MCNC: 7.9 G/DL (ref 6.6–8.7)
PROT UR STRIP.AUTO-MCNC: 30 MG/DL
RBC # BLD AUTO: 3.43 M/UL (ref 4.2–5.4)
RBC #/AREA URNS HPF: ABNORMAL /HPF (ref 0–2)
RSV RNA NPH QL NAA+NON-PROBE: NOT DETECTED
RV+EV RNA NPH QL NAA+NON-PROBE: NOT DETECTED
S PNEUM AG SPEC QL: NORMAL
SARS-COV-2 RNA NPH QL NAA+NON-PROBE: NOT DETECTED
SODIUM SERPL-SCNC: 149 MMOL/L (ref 136–145)
SODIUM UR-SCNC: 50 MMOL/L
SP GR UR STRIP.AUTO: 1.02 (ref 1–1.03)
SQUAMOUS #/AREA URNS HPF: ABNORMAL /HPF
T4 FREE SERPL-MCNC: 1.46 NG/DL (ref 0.93–1.7)
TSH SERPL DL<=0.005 MIU/L-ACNC: 4.86 UIU/ML (ref 0.35–5.5)
UROBILINOGEN UR STRIP.AUTO-MCNC: 2 E.U./DL
VIT B12 SERPL-MCNC: 546 PG/ML (ref 211–946)
WBC # BLD AUTO: 21.8 K/UL (ref 4.8–10.8)
WBC #/AREA URNS HPF: ABNORMAL /HPF (ref 0–5)

## 2023-12-08 PROCEDURE — 6370000000 HC RX 637 (ALT 250 FOR IP): Performed by: PHYSICIAN ASSISTANT

## 2023-12-08 PROCEDURE — 82746 ASSAY OF FOLIC ACID SERUM: CPT

## 2023-12-08 PROCEDURE — 85652 RBC SED RATE AUTOMATED: CPT

## 2023-12-08 PROCEDURE — 95819 EEG AWAKE AND ASLEEP: CPT | Performed by: PSYCHIATRY & NEUROLOGY

## 2023-12-08 PROCEDURE — 87040 BLOOD CULTURE FOR BACTERIA: CPT

## 2023-12-08 PROCEDURE — 87086 URINE CULTURE/COLONY COUNT: CPT

## 2023-12-08 PROCEDURE — 82140 ASSAY OF AMMONIA: CPT

## 2023-12-08 PROCEDURE — 82570 ASSAY OF URINE CREATININE: CPT

## 2023-12-08 PROCEDURE — 6370000000 HC RX 637 (ALT 250 FOR IP): Performed by: INTERNAL MEDICINE

## 2023-12-08 PROCEDURE — 36600 WITHDRAWAL OF ARTERIAL BLOOD: CPT

## 2023-12-08 PROCEDURE — 2580000003 HC RX 258: Performed by: HOSPITALIST

## 2023-12-08 PROCEDURE — 87186 SC STD MICRODIL/AGAR DIL: CPT

## 2023-12-08 PROCEDURE — 2580000003 HC RX 258: Performed by: INTERNAL MEDICINE

## 2023-12-08 PROCEDURE — 85025 COMPLETE CBC W/AUTO DIFF WBC: CPT

## 2023-12-08 PROCEDURE — 84132 ASSAY OF SERUM POTASSIUM: CPT

## 2023-12-08 PROCEDURE — 84300 ASSAY OF URINE SODIUM: CPT

## 2023-12-08 PROCEDURE — 86140 C-REACTIVE PROTEIN: CPT

## 2023-12-08 PROCEDURE — 95819 EEG AWAKE AND ASLEEP: CPT

## 2023-12-08 PROCEDURE — 81001 URINALYSIS AUTO W/SCOPE: CPT

## 2023-12-08 PROCEDURE — 84439 ASSAY OF FREE THYROXINE: CPT

## 2023-12-08 PROCEDURE — 70450 CT HEAD/BRAIN W/O DYE: CPT

## 2023-12-08 PROCEDURE — 36556 INSERT NON-TUNNEL CV CATH: CPT

## 2023-12-08 PROCEDURE — 2000000000 HC ICU R&B

## 2023-12-08 PROCEDURE — 99221 1ST HOSP IP/OBS SF/LOW 40: CPT | Performed by: SURGERY

## 2023-12-08 PROCEDURE — 71250 CT THORAX DX C-: CPT

## 2023-12-08 PROCEDURE — 80053 COMPREHEN METABOLIC PANEL: CPT

## 2023-12-08 PROCEDURE — 6360000002 HC RX W HCPCS: Performed by: HOSPITALIST

## 2023-12-08 PROCEDURE — 2500000003 HC RX 250 WO HCPCS: Performed by: HOSPITALIST

## 2023-12-08 PROCEDURE — 83735 ASSAY OF MAGNESIUM: CPT

## 2023-12-08 PROCEDURE — 99285 EMERGENCY DEPT VISIT HI MDM: CPT

## 2023-12-08 PROCEDURE — 82803 BLOOD GASES ANY COMBINATION: CPT

## 2023-12-08 PROCEDURE — 02HV33Z INSERTION OF INFUSION DEVICE INTO SUPERIOR VENA CAVA, PERCUTANEOUS APPROACH: ICD-10-PCS | Performed by: HOSPITALIST

## 2023-12-08 PROCEDURE — 84145 PROCALCITONIN (PCT): CPT

## 2023-12-08 PROCEDURE — 0202U NFCT DS 22 TRGT SARS-COV-2: CPT

## 2023-12-08 PROCEDURE — 99223 1ST HOSP IP/OBS HIGH 75: CPT | Performed by: PSYCHIATRY & NEUROLOGY

## 2023-12-08 PROCEDURE — 36415 COLL VENOUS BLD VENIPUNCTURE: CPT

## 2023-12-08 PROCEDURE — 87205 SMEAR GRAM STAIN: CPT

## 2023-12-08 PROCEDURE — 87070 CULTURE OTHR SPECIMN AEROBIC: CPT

## 2023-12-08 PROCEDURE — 87449 NOS EACH ORGANISM AG IA: CPT

## 2023-12-08 PROCEDURE — 87075 CULTR BACTERIA EXCEPT BLOOD: CPT

## 2023-12-08 PROCEDURE — 82962 GLUCOSE BLOOD TEST: CPT

## 2023-12-08 PROCEDURE — 86403 PARTICLE AGGLUT ANTBDY SCRN: CPT

## 2023-12-08 PROCEDURE — 6360000002 HC RX W HCPCS: Performed by: INTERNAL MEDICINE

## 2023-12-08 PROCEDURE — 80175 DRUG SCREEN QUAN LAMOTRIGINE: CPT

## 2023-12-08 PROCEDURE — 82607 VITAMIN B-12: CPT

## 2023-12-08 PROCEDURE — 6370000000 HC RX 637 (ALT 250 FOR IP): Performed by: HOSPITALIST

## 2023-12-08 PROCEDURE — 83935 ASSAY OF URINE OSMOLALITY: CPT

## 2023-12-08 PROCEDURE — 84443 ASSAY THYROID STIM HORMONE: CPT

## 2023-12-08 PROCEDURE — 6360000002 HC RX W HCPCS: Performed by: PEDIATRICS

## 2023-12-08 PROCEDURE — 83605 ASSAY OF LACTIC ACID: CPT

## 2023-12-08 PROCEDURE — 99222 1ST HOSP IP/OBS MODERATE 55: CPT | Performed by: PHYSICIAN ASSISTANT

## 2023-12-08 PROCEDURE — 6370000000 HC RX 637 (ALT 250 FOR IP): Performed by: SURGERY

## 2023-12-08 PROCEDURE — 83036 HEMOGLOBIN GLYCOSYLATED A1C: CPT

## 2023-12-08 PROCEDURE — 82550 ASSAY OF CK (CPK): CPT

## 2023-12-08 PROCEDURE — 84100 ASSAY OF PHOSPHORUS: CPT

## 2023-12-08 PROCEDURE — 94760 N-INVAS EAR/PLS OXIMETRY 1: CPT

## 2023-12-08 PROCEDURE — 82306 VITAMIN D 25 HYDROXY: CPT

## 2023-12-08 PROCEDURE — 51702 INSERT TEMP BLADDER CATH: CPT

## 2023-12-08 PROCEDURE — 36592 COLLECT BLOOD FROM PICC: CPT

## 2023-12-08 PROCEDURE — 6360000002 HC RX W HCPCS: Performed by: PHYSICIAN ASSISTANT

## 2023-12-08 RX ORDER — METRONIDAZOLE 500 MG/100ML
500 INJECTION, SOLUTION INTRAVENOUS EVERY 8 HOURS
Status: DISCONTINUED | OUTPATIENT
Start: 2023-12-08 | End: 2023-12-09

## 2023-12-08 RX ORDER — ENOXAPARIN SODIUM 100 MG/ML
30 INJECTION SUBCUTANEOUS DAILY
Status: DISCONTINUED | OUTPATIENT
Start: 2023-12-08 | End: 2023-12-08 | Stop reason: ALTCHOICE

## 2023-12-08 RX ORDER — POLYETHYLENE GLYCOL 3350 17 G/17G
17 POWDER, FOR SOLUTION ORAL DAILY PRN
Status: DISCONTINUED | OUTPATIENT
Start: 2023-12-08 | End: 2023-12-22 | Stop reason: HOSPADM

## 2023-12-08 RX ORDER — 0.9 % SODIUM CHLORIDE 0.9 %
1365 INTRAVENOUS SOLUTION INTRAVENOUS ONCE
Status: COMPLETED | OUTPATIENT
Start: 2023-12-08 | End: 2023-12-08

## 2023-12-08 RX ORDER — POTASSIUM CHLORIDE 29.8 MG/ML
20 INJECTION INTRAVENOUS ONCE
Status: COMPLETED | OUTPATIENT
Start: 2023-12-08 | End: 2023-12-08

## 2023-12-08 RX ORDER — 0.9 % SODIUM CHLORIDE 0.9 %
1000 INTRAVENOUS SOLUTION INTRAVENOUS ONCE
Status: DISCONTINUED | OUTPATIENT
Start: 2023-12-08 | End: 2023-12-13

## 2023-12-08 RX ORDER — SODIUM CHLORIDE 0.9 % (FLUSH) 0.9 %
5-40 SYRINGE (ML) INJECTION PRN
Status: DISCONTINUED | OUTPATIENT
Start: 2023-12-08 | End: 2023-12-11 | Stop reason: SDUPTHER

## 2023-12-08 RX ORDER — POTASSIUM CHLORIDE 7.45 MG/ML
10 INJECTION INTRAVENOUS PRN
Status: DISCONTINUED | OUTPATIENT
Start: 2023-12-08 | End: 2023-12-08

## 2023-12-08 RX ORDER — DEXTROSE MONOHYDRATE 100 MG/ML
INJECTION, SOLUTION INTRAVENOUS CONTINUOUS PRN
Status: DISCONTINUED | OUTPATIENT
Start: 2023-12-08 | End: 2023-12-22 | Stop reason: HOSPADM

## 2023-12-08 RX ORDER — SODIUM CHLORIDE 9 MG/ML
INJECTION, SOLUTION INTRAVENOUS PRN
Status: DISCONTINUED | OUTPATIENT
Start: 2023-12-08 | End: 2023-12-11 | Stop reason: SDUPTHER

## 2023-12-08 RX ORDER — SODIUM CHLORIDE 9 MG/ML
INJECTION, SOLUTION INTRAVENOUS CONTINUOUS
Status: DISCONTINUED | OUTPATIENT
Start: 2023-12-08 | End: 2023-12-08

## 2023-12-08 RX ORDER — NOREPINEPHRINE BITARTRATE 0.06 MG/ML
1-100 INJECTION, SOLUTION INTRAVENOUS CONTINUOUS
Status: DISCONTINUED | OUTPATIENT
Start: 2023-12-08 | End: 2023-12-10

## 2023-12-08 RX ORDER — ONDANSETRON 2 MG/ML
4 INJECTION INTRAMUSCULAR; INTRAVENOUS EVERY 6 HOURS PRN
Status: DISCONTINUED | OUTPATIENT
Start: 2023-12-08 | End: 2023-12-22 | Stop reason: HOSPADM

## 2023-12-08 RX ORDER — ACETAMINOPHEN 500 MG
500 TABLET ORAL EVERY 4 HOURS PRN
Status: DISCONTINUED | OUTPATIENT
Start: 2023-12-08 | End: 2023-12-22 | Stop reason: HOSPADM

## 2023-12-08 RX ORDER — CHOLECALCIFEROL (VITAMIN D3) 1250 MCG
1.25 CAPSULE ORAL WEEKLY
Status: ON HOLD | COMMUNITY

## 2023-12-08 RX ORDER — POTASSIUM CHLORIDE 7.45 MG/ML
10 INJECTION INTRAVENOUS PRN
Status: DISCONTINUED | OUTPATIENT
Start: 2023-12-08 | End: 2023-12-22 | Stop reason: HOSPADM

## 2023-12-08 RX ORDER — SODIUM CHLORIDE 9 MG/ML
INJECTION, SOLUTION INTRAVENOUS CONTINUOUS
Status: DISCONTINUED | OUTPATIENT
Start: 2023-12-08 | End: 2023-12-09

## 2023-12-08 RX ORDER — BISACODYL 10 MG
10 SUPPOSITORY, RECTAL RECTAL DAILY PRN
Status: DISCONTINUED | OUTPATIENT
Start: 2023-12-08 | End: 2023-12-22 | Stop reason: HOSPADM

## 2023-12-08 RX ORDER — IBUPROFEN 200 MG
TABLET ORAL 2 TIMES DAILY
Status: DISCONTINUED | OUTPATIENT
Start: 2023-12-08 | End: 2023-12-22 | Stop reason: HOSPADM

## 2023-12-08 RX ORDER — SODIUM CHLORIDE 0.9 % (FLUSH) 0.9 %
5-40 SYRINGE (ML) INJECTION EVERY 12 HOURS SCHEDULED
Status: DISCONTINUED | OUTPATIENT
Start: 2023-12-08 | End: 2023-12-11 | Stop reason: SDUPTHER

## 2023-12-08 RX ORDER — SODIUM CHLORIDE 450 MG/100ML
INJECTION, SOLUTION INTRAVENOUS CONTINUOUS
Status: DISCONTINUED | OUTPATIENT
Start: 2023-12-08 | End: 2023-12-08

## 2023-12-08 RX ORDER — LAMOTRIGINE 100 MG/1
100 TABLET ORAL 2 TIMES DAILY
Status: DISCONTINUED | OUTPATIENT
Start: 2023-12-08 | End: 2023-12-22 | Stop reason: HOSPADM

## 2023-12-08 RX ORDER — HEPARIN SODIUM 5000 [USP'U]/ML
5000 INJECTION, SOLUTION INTRAVENOUS; SUBCUTANEOUS 2 TIMES DAILY
Status: DISCONTINUED | OUTPATIENT
Start: 2023-12-08 | End: 2023-12-10

## 2023-12-08 RX ORDER — POTASSIUM CHLORIDE 20 MEQ/1
40 TABLET, EXTENDED RELEASE ORAL PRN
Status: DISCONTINUED | OUTPATIENT
Start: 2023-12-08 | End: 2023-12-22 | Stop reason: HOSPADM

## 2023-12-08 RX ORDER — ACETAMINOPHEN 650 MG/1
650 SUPPOSITORY RECTAL EVERY 6 HOURS PRN
Status: DISCONTINUED | OUTPATIENT
Start: 2023-12-08 | End: 2023-12-22 | Stop reason: HOSPADM

## 2023-12-08 RX ORDER — INSULIN GLARGINE 100 [IU]/ML
0.15 INJECTION, SOLUTION SUBCUTANEOUS NIGHTLY
Status: DISCONTINUED | OUTPATIENT
Start: 2023-12-08 | End: 2023-12-10

## 2023-12-08 RX ORDER — LIDOCAINE 4 G/G
1 PATCH TOPICAL DAILY
Status: DISCONTINUED | OUTPATIENT
Start: 2023-12-08 | End: 2023-12-22 | Stop reason: HOSPADM

## 2023-12-08 RX ORDER — POTASSIUM CHLORIDE 29.8 MG/ML
20 INJECTION INTRAVENOUS PRN
Status: DISCONTINUED | OUTPATIENT
Start: 2023-12-08 | End: 2023-12-22 | Stop reason: HOSPADM

## 2023-12-08 RX ORDER — POTASSIUM CHLORIDE 20 MEQ/1
40 TABLET, EXTENDED RELEASE ORAL PRN
Status: DISCONTINUED | OUTPATIENT
Start: 2023-12-08 | End: 2023-12-08

## 2023-12-08 RX ADMIN — LAMOTRIGINE 100 MG: 100 TABLET ORAL at 21:18

## 2023-12-08 RX ADMIN — DOXYCYCLINE 100 MG: 100 INJECTION, POWDER, LYOPHILIZED, FOR SOLUTION INTRAVENOUS at 19:47

## 2023-12-08 RX ADMIN — POTASSIUM CHLORIDE 20 MEQ: 29.8 INJECTION, SOLUTION INTRAVENOUS at 08:04

## 2023-12-08 RX ADMIN — SODIUM CHLORIDE, PRESERVATIVE FREE 10 ML: 5 INJECTION INTRAVENOUS at 21:00

## 2023-12-08 RX ADMIN — ACETAMINOPHEN 500 MG: 500 TABLET ORAL at 21:19

## 2023-12-08 RX ADMIN — POTASSIUM CHLORIDE 20 MEQ: 29.8 INJECTION, SOLUTION INTRAVENOUS at 08:47

## 2023-12-08 RX ADMIN — POTASSIUM CHLORIDE 20 MEQ: 29.8 INJECTION, SOLUTION INTRAVENOUS at 09:53

## 2023-12-08 RX ADMIN — Medication 5 MCG/MIN: at 03:16

## 2023-12-08 RX ADMIN — SODIUM BICARBONATE: 84 INJECTION, SOLUTION INTRAVENOUS at 04:29

## 2023-12-08 RX ADMIN — SODIUM CHLORIDE 1000 ML: 9 INJECTION, SOLUTION INTRAVENOUS at 01:59

## 2023-12-08 RX ADMIN — ACETAMINOPHEN 500 MG: 500 TABLET ORAL at 05:31

## 2023-12-08 RX ADMIN — DOXYCYCLINE 100 MG: 100 INJECTION, POWDER, LYOPHILIZED, FOR SOLUTION INTRAVENOUS at 08:55

## 2023-12-08 RX ADMIN — SODIUM CHLORIDE: 4.5 INJECTION, SOLUTION INTRAVENOUS at 09:12

## 2023-12-08 RX ADMIN — SODIUM BICARBONATE 50 MEQ: 84 INJECTION, SOLUTION INTRAVENOUS at 04:25

## 2023-12-08 RX ADMIN — POTASSIUM CHLORIDE 20 MEQ: 29.8 INJECTION, SOLUTION INTRAVENOUS at 03:14

## 2023-12-08 RX ADMIN — Medication 11 MCG/MIN: at 23:03

## 2023-12-08 RX ADMIN — ONDANSETRON 4 MG: 2 INJECTION INTRAMUSCULAR; INTRAVENOUS at 17:45

## 2023-12-08 RX ADMIN — METRONIDAZOLE 500 MG: 500 INJECTION, SOLUTION INTRAVENOUS at 09:56

## 2023-12-08 RX ADMIN — BACITRACIN ZINC NEOMYCIN SULFATE POLYMYXIN B SULFATE: 400; 3.5; 5 OINTMENT TOPICAL at 14:38

## 2023-12-08 RX ADMIN — SODIUM CHLORIDE: 4.5 INJECTION, SOLUTION INTRAVENOUS at 09:13

## 2023-12-08 RX ADMIN — METRONIDAZOLE 500 MG: 500 INJECTION, SOLUTION INTRAVENOUS at 01:58

## 2023-12-08 RX ADMIN — HEPARIN SODIUM 5000 UNITS: 5000 INJECTION INTRAVENOUS; SUBCUTANEOUS at 21:19

## 2023-12-08 RX ADMIN — SODIUM CHLORIDE: 4.5 INJECTION, SOLUTION INTRAVENOUS at 16:19

## 2023-12-08 RX ADMIN — SODIUM CHLORIDE: 9 INJECTION, SOLUTION INTRAVENOUS at 16:19

## 2023-12-08 RX ADMIN — BACITRACIN ZINC NEOMYCIN SULFATE POLYMYXIN B SULFATE: 400; 3.5; 5 OINTMENT TOPICAL at 21:30

## 2023-12-08 RX ADMIN — METRONIDAZOLE 500 MG: 500 INJECTION, SOLUTION INTRAVENOUS at 17:36

## 2023-12-08 RX ADMIN — LAMOTRIGINE 100 MG: 100 TABLET ORAL at 08:45

## 2023-12-08 RX ADMIN — INSULIN GLARGINE 7 UNITS: 100 INJECTION, SOLUTION SUBCUTANEOUS at 22:06

## 2023-12-08 RX ADMIN — Medication 1000 MG: at 04:27

## 2023-12-08 RX ADMIN — HEPARIN SODIUM 5000 UNITS: 5000 INJECTION INTRAVENOUS; SUBCUTANEOUS at 08:45

## 2023-12-08 RX ADMIN — Medication 5000 UNITS: at 08:45

## 2023-12-08 RX ADMIN — ACETAMINOPHEN 500 MG: 500 TABLET ORAL at 14:38

## 2023-12-08 ASSESSMENT — ENCOUNTER SYMPTOMS
SHORTNESS OF BREATH: 1
PHOTOPHOBIA: 0
NAUSEA: 0
COUGH: 1
VOMITING: 0
WHEEZING: 1
DIARRHEA: 1
BACK PAIN: 1

## 2023-12-08 ASSESSMENT — PAIN DESCRIPTION - LOCATION
LOCATION: BACK;NECK
LOCATION: BACK

## 2023-12-08 ASSESSMENT — PAIN SCALES - GENERAL
PAINLEVEL_OUTOF10: 0
PAINLEVEL_OUTOF10: 3
PAINLEVEL_OUTOF10: 5

## 2023-12-08 ASSESSMENT — PAIN SCALES - WONG BAKER
WONGBAKER_NUMERICALRESPONSE: 0

## 2023-12-08 ASSESSMENT — PAIN DESCRIPTION - ORIENTATION: ORIENTATION: POSTERIOR;MID

## 2023-12-08 ASSESSMENT — PAIN - FUNCTIONAL ASSESSMENT
PAIN_FUNCTIONAL_ASSESSMENT: NONE - DENIES PAIN
PAIN_FUNCTIONAL_ASSESSMENT: ACTIVITIES ARE NOT PREVENTED

## 2023-12-08 ASSESSMENT — PAIN DESCRIPTION - DESCRIPTORS
DESCRIPTORS: ACHING
DESCRIPTORS: DISCOMFORT

## 2023-12-08 NOTE — ACP (ADVANCE CARE PLANNING)
Advance Care Planning     Advance Care Planning (ACP) Physician/NP/PA (Provider) Shira Clark      Date of ACP Conversation: 12/8/2023    Conversation Conducted with:    Healthcare Decision Maker: Named in Advance Directive or Healthcare Power of  (name) [facility] Decision Maker:    Current Designated Health Care Decision Maker:    Primary Decision Maker: Carlos Workman - Brother/Sister, Legal Guardian - [phone]    Primary Decision Maker: Heber Ling - Legal Guardian - [phone]    Care Preferences:    Ventilation:  Yes    Resuscitation:  Yes    Length of Voluntary ACP Conversation in minutes:  15 minutes included in total consult time     Sharon Pollock PA-C EBL not applicable due to use of cell saver    No qualified residents available to first assist.     I first assisted for the entirety of the procedure including re-do sternotomy, exploration, washout and closure. *EBL not applicable due to use of cell saver and cardiotomy suction while on CPB  *Cross Clamp Time 115minutes  I first assisted for the entirety of the case, including sternotomy, cardiopulmonary bypass, placement of coronary grafts and closing.

## 2023-12-08 NOTE — H&P
\"ALT\", \"BILIDIR\", \"BILITOT\", \"ALKPHOS\" in the last 72 hours. Coag Panel: No results for input(s): \"INR\", \"PROTIME\", \"APTT\" in the last 72 hours. Cardiac Enzymes:   Recent Labs     12/08/23  0115   CKTOTAL 61     Pro-BNP: No results for input(s): \"PROBNP\" in the last 72 hours. A1C: No results for input(s): \"LABA1C\" in the last 72 hours. TSH: Invalid input(s): \"LABTSH\"  Lipid Panel: Invalid input(s): \"LABLIP\"  ABG: No results for input(s): \"PHART\", \"EBK3VFQ\", \"PO2ART\", \"MKX7RWB\", \"BEART\", \"HGBAE\", \"G1HJSNZK\", \"CARBOXHGBART\" in the last 72 hours.     Urinalysis:   Lab Results   Component Value Date/Time    NITRU Negative 06/26/2021 05:10 PM    WBCUA 9 06/26/2021 05:10 PM    BACTERIA NEGATIVE 06/26/2021 05:10 PM    RBCUA 6 06/26/2021 05:10 PM    RBCUA > 900 06/09/2014 09:10 PM    BLOODU neg 12/13/2022 12:00 AM    BLOODU TRACE 06/26/2021 05:10 PM    SPECGRAV 1.025 12/13/2022 12:00 AM    SPECGRAV 1.026 06/26/2021 05:10 PM    GLUCOSEU neg 12/13/2022 12:00 AM    GLUCOSEU Negative 06/26/2021 05:10 PM      Latest Reference Range & Units 12/08/23 00:41   Influenza A by PCR Not Detected  Not Detected   Influenza B by PCR Not Detected  Not Detected   Adenovirus by PCR Not Detected  Not Detected   Coronavirus 229E by PCR Not Detected  Not Detected   Coronavirus HKU1 by PCR Not Detected  Not Detected   Coronavirus NL63 by PCR Not Detected  Not Detected   Coronavirus OC43 by PCR Not Detected  Not Detected   Human Metapneumovirus by PCR Not Detected  Not Detected   Human Rhinovirus/Enterovirus by PCR Not Detected  Not Detected   Parainfluenza Virus 1 by PCR Not Detected  Not Detected   Parainfluenza Virus 2 by PCR Not Detected  Not Detected   Parainfluenza Virus 3 by PCR Not Detected  Not Detected   Parainfluenza Virus 4 by PCR Not Detected  Not Detected   Respiratory Syncytial Virus by PCR Not Detected  Not Detected   Bordetella parapertussis by PCR Not Detected  Not Detected   Chlamydophilia pneumoniae by PCR Not Detected  Not

## 2023-12-08 NOTE — PROCEDURES
Consulted for or Contacted for need for a CVC by JOSE LUIS rodrigues  US Guided Vascular Access     Patient informed consent was obtained from [facility] ([facility]), prior to procedure as in all non life-threatening or limb-threatening emergencies. Patient was examined preprocedurally with US to determine the best site for CVC insertion. The patient was positioned. Sterile precautions were taken including but not necessarily limited to: cap, mask, sterile gown, gloves, probe-cover, and draping. The patient received local anesthetic with 2.5 cc of the lidocaine included in the CVC procedural tray as the patient was not under sedation and/or already being treated with systemic pain medications. The two side ports were flushed with sterile saline and locked prior to detatchment of the syringe from the ports. The target vessel was entered under direct US guidance with aspiration of dark venous blood, there was a slow dribble from the finder needle after the syringe was detatched from the needle. The guidewire was inserted through the finder needle. The hollow bore needle was removed while the guidewire was held in place. The guidewire was then traced again with the US from where it entered the skin down to the level of the clavicle with the US to ensure placement of the CVC in to the correct target vessel prior to dilation. The scalpel was used to make a small nick along the side of the guidewire so as to allow passage of the dilator over the guidewire. The dilator was then passed over the guidewire, folllowed by placement of the CVC over the guidewire. The CVC was then held in place while the guidwire was removed. Port was attached to the middle lumen of the CVC. The central port was then aspirated with return of blood. The port then flushed easily. The CVC was then sutured in to place on the first side. The biopatch was then applied. The CVC was then sutured in to place on the second side.   The sterile

## 2023-12-08 NOTE — ED NOTES
Manual BP with doppler reading 40 systolic. Tena Mcmahon MD made aware. Verbal order received for second IV bolus of NS and increase Levo.       John Cotton RN  12/08/23 0154
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
## 2023-12-08 NOTE — PROGRESS NOTES
Automatic Dose Adjustment of                Subcutaneous Anticoagulant for Prophylaxis    Arthyazan Marley is a 57 y.o. female. Recent Labs     12/08/23  0115   CREATININE 2.5*       Estimated Creatinine Clearance: 20 mL/min (A) (based on SCr of 2.5 mg/dL (H)). Weight:  Wt Readings from Last 1 Encounters:   12/08/23 45.5 kg (100 lb 3.2 oz)           Pharmacy has adjusted subcutaneous anticoagulant for prophylaxis to Heparin 5000 units SC twice daily based on the patient's weight and estimated CrCl per [facility] policy.                Electronically signed by [facility] on 12/8/2023 at 8:04 AM

## 2023-12-08 NOTE — ED NOTES
Pt IV infiltrated. Pt left arm swollen, red and cool. Leobardo Cunningham MD at bedside.       Alex Carmona RN  12/08/23 7698
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
## 2023-12-08 NOTE — ED NOTES
Report given to [facility], [address] [facility], [address] [place] [address],[address] Floor, [place], RN  12/08/23 8604

## 2023-12-08 NOTE — ED NOTES
Attempted to get manual BP, pt systolic 16S diastolic in 56U. MD Linwood Bray made aware.       Joshua Hill RN  12/08/23 7134
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
## 2023-12-09 LAB
ALBUMIN SERPL-MCNC: 2.6 G/DL (ref 3.5–5.2)
ALP SERPL-CCNC: 146 U/L (ref 35–104)
ALT SERPL-CCNC: 12 U/L (ref 5–33)
ANION GAP SERPL CALCULATED.3IONS-SCNC: 8 MMOL/L (ref 7–19)
AST SERPL-CCNC: 9 U/L (ref 5–32)
BASOPHILS # BLD: 0.1 K/UL (ref 0–0.2)
BASOPHILS NFR BLD: 0.4 % (ref 0–1)
BILIRUB SERPL-MCNC: 0.8 MG/DL (ref 0.2–1.2)
BUN SERPL-MCNC: 35 MG/DL (ref 6–20)
CALCIUM SERPL-MCNC: 7.9 MG/DL (ref 8.6–10)
CHLORIDE SERPL-SCNC: 118 MMOL/L (ref 98–111)
CO2 SERPL-SCNC: 23 MMOL/L (ref 22–29)
CREAT SERPL-MCNC: 1.4 MG/DL (ref 0.5–0.9)
EOSINOPHIL # BLD: 0.3 K/UL (ref 0–0.6)
EOSINOPHIL NFR BLD: 2.1 % (ref 0–5)
ERYTHROCYTE [DISTWIDTH] IN BLOOD BY AUTOMATED COUNT: 14.7 % (ref 11.5–14.5)
ERYTHROCYTE [DISTWIDTH] IN BLOOD BY AUTOMATED COUNT: 14.9 % (ref 11.5–14.5)
GLUCOSE BLD-MCNC: 110 MG/DL (ref 70–99)
GLUCOSE BLD-MCNC: 111 MG/DL (ref 70–99)
GLUCOSE SERPL-MCNC: 125 MG/DL (ref 74–109)
HCT VFR BLD AUTO: 21.8 % (ref 37–47)
HCT VFR BLD AUTO: 24.5 % (ref 37–47)
HGB BLD-MCNC: 7 G/DL (ref 12–16)
HGB BLD-MCNC: 7.9 G/DL (ref 12–16)
IMM GRANULOCYTES # BLD: 0.2 K/UL
LYMPHOCYTES # BLD: 3 K/UL (ref 1.1–4.5)
LYMPHOCYTES NFR BLD: 22 % (ref 20–40)
MAGNESIUM SERPL-MCNC: 2 MG/DL (ref 1.6–2.6)
MCH RBC QN AUTO: 36.5 PG (ref 27–31)
MCH RBC QN AUTO: 36.9 PG (ref 27–31)
MCHC RBC AUTO-ENTMCNC: 32.1 G/DL (ref 33–37)
MCHC RBC AUTO-ENTMCNC: 32.2 G/DL (ref 33–37)
MCV RBC AUTO: 113.5 FL (ref 81–99)
MCV RBC AUTO: 114.5 FL (ref 81–99)
MONOCYTES # BLD: 0.2 K/UL (ref 0–0.9)
MONOCYTES NFR BLD: 1.5 % (ref 0–10)
NEUTROPHILS # BLD: 9.9 K/UL (ref 1.5–7.5)
NEUTS SEG NFR BLD: 72.5 % (ref 50–65)
PERFORMED ON: ABNORMAL
PERFORMED ON: ABNORMAL
PHOSPHATE SERPL-MCNC: 2.4 MG/DL (ref 2.5–4.5)
PLATELET # BLD AUTO: 50 K/UL (ref 130–400)
PLATELET # BLD AUTO: 55 K/UL (ref 130–400)
PMV BLD AUTO: 9.1 FL (ref 9.4–12.3)
PMV BLD AUTO: 9.2 FL (ref 9.4–12.3)
POTASSIUM SERPL-SCNC: 3.4 MMOL/L (ref 3.5–5)
POTASSIUM SERPL-SCNC: 4 MMOL/L (ref 3.5–5)
PROT SERPL-MCNC: 5 G/DL (ref 6.6–8.7)
RBC # BLD AUTO: 1.92 M/UL (ref 4.2–5.4)
RBC # BLD AUTO: 2.14 M/UL (ref 4.2–5.4)
REASON FOR REJECTION: NORMAL
REJECTED TEST: NORMAL
SODIUM SERPL-SCNC: 149 MMOL/L (ref 136–145)
VANCOMYCIN TROUGH SERPL-MCNC: 12.1 UG/ML (ref 10–20)
WBC # BLD AUTO: 10 K/UL (ref 4.8–10.8)
WBC # BLD AUTO: 13.7 K/UL (ref 4.8–10.8)

## 2023-12-09 PROCEDURE — 6370000000 HC RX 637 (ALT 250 FOR IP): Performed by: HOSPITALIST

## 2023-12-09 PROCEDURE — 85025 COMPLETE CBC W/AUTO DIFF WBC: CPT

## 2023-12-09 PROCEDURE — 6360000002 HC RX W HCPCS: Performed by: INTERNAL MEDICINE

## 2023-12-09 PROCEDURE — 84100 ASSAY OF PHOSPHORUS: CPT

## 2023-12-09 PROCEDURE — 85027 COMPLETE CBC AUTOMATED: CPT

## 2023-12-09 PROCEDURE — 99232 SBSQ HOSP IP/OBS MODERATE 35: CPT | Performed by: PSYCHIATRY & NEUROLOGY

## 2023-12-09 PROCEDURE — 6360000002 HC RX W HCPCS: Performed by: PHYSICIAN ASSISTANT

## 2023-12-09 PROCEDURE — 2580000003 HC RX 258: Performed by: HOSPITALIST

## 2023-12-09 PROCEDURE — 6360000002 HC RX W HCPCS: Performed by: HOSPITALIST

## 2023-12-09 PROCEDURE — 84132 ASSAY OF SERUM POTASSIUM: CPT

## 2023-12-09 PROCEDURE — 2000000000 HC ICU R&B

## 2023-12-09 PROCEDURE — 80202 ASSAY OF VANCOMYCIN: CPT

## 2023-12-09 PROCEDURE — 6370000000 HC RX 637 (ALT 250 FOR IP): Performed by: INTERNAL MEDICINE

## 2023-12-09 PROCEDURE — 2580000003 HC RX 258: Performed by: INTERNAL MEDICINE

## 2023-12-09 PROCEDURE — 83735 ASSAY OF MAGNESIUM: CPT

## 2023-12-09 PROCEDURE — 2500000003 HC RX 250 WO HCPCS: Performed by: HOSPITALIST

## 2023-12-09 PROCEDURE — 6370000000 HC RX 637 (ALT 250 FOR IP): Performed by: PHYSICIAN ASSISTANT

## 2023-12-09 PROCEDURE — 80053 COMPREHEN METABOLIC PANEL: CPT

## 2023-12-09 PROCEDURE — 82962 GLUCOSE BLOOD TEST: CPT

## 2023-12-09 PROCEDURE — 36415 COLL VENOUS BLD VENIPUNCTURE: CPT

## 2023-12-09 RX ORDER — SODIUM CHLORIDE 450 MG/100ML
INJECTION, SOLUTION INTRAVENOUS CONTINUOUS
Status: DISCONTINUED | OUTPATIENT
Start: 2023-12-09 | End: 2023-12-13

## 2023-12-09 RX ADMIN — METRONIDAZOLE 500 MG: 500 INJECTION, SOLUTION INTRAVENOUS at 10:14

## 2023-12-09 RX ADMIN — POTASSIUM CHLORIDE 20 MEQ: 29.8 INJECTION, SOLUTION INTRAVENOUS at 06:25

## 2023-12-09 RX ADMIN — BACITRACIN ZINC NEOMYCIN SULFATE POLYMYXIN B SULFATE: 400; 3.5; 5 OINTMENT TOPICAL at 21:42

## 2023-12-09 RX ADMIN — BACITRACIN ZINC NEOMYCIN SULFATE POLYMYXIN B SULFATE: 400; 3.5; 5 OINTMENT TOPICAL at 13:02

## 2023-12-09 RX ADMIN — DOXYCYCLINE 100 MG: 100 INJECTION, POWDER, LYOPHILIZED, FOR SOLUTION INTRAVENOUS at 09:03

## 2023-12-09 RX ADMIN — SODIUM CHLORIDE: 4.5 INJECTION, SOLUTION INTRAVENOUS at 10:01

## 2023-12-09 RX ADMIN — VANCOMYCIN HYDROCHLORIDE 750 MG: 750 INJECTION, POWDER, LYOPHILIZED, FOR SOLUTION INTRAVENOUS at 05:02

## 2023-12-09 RX ADMIN — LAMOTRIGINE 100 MG: 100 TABLET ORAL at 10:18

## 2023-12-09 RX ADMIN — SODIUM CHLORIDE, PRESERVATIVE FREE 10 ML: 5 INJECTION INTRAVENOUS at 21:42

## 2023-12-09 RX ADMIN — LAMOTRIGINE 100 MG: 100 TABLET ORAL at 21:50

## 2023-12-09 RX ADMIN — SODIUM CHLORIDE: 9 INJECTION, SOLUTION INTRAVENOUS at 00:24

## 2023-12-09 RX ADMIN — METRONIDAZOLE 500 MG: 500 INJECTION, SOLUTION INTRAVENOUS at 01:35

## 2023-12-09 RX ADMIN — SODIUM CHLORIDE, PRESERVATIVE FREE 10 ML: 5 INJECTION INTRAVENOUS at 09:09

## 2023-12-09 RX ADMIN — METRONIDAZOLE 500 MG: 500 INJECTION, SOLUTION INTRAVENOUS at 17:02

## 2023-12-09 RX ADMIN — INSULIN GLARGINE 7 UNITS: 100 INJECTION, SOLUTION SUBCUTANEOUS at 21:50

## 2023-12-09 RX ADMIN — DOXYCYCLINE 100 MG: 100 INJECTION, POWDER, LYOPHILIZED, FOR SOLUTION INTRAVENOUS at 19:36

## 2023-12-09 RX ADMIN — POTASSIUM CHLORIDE 20 MEQ: 29.8 INJECTION, SOLUTION INTRAVENOUS at 09:05

## 2023-12-09 RX ADMIN — ONDANSETRON 4 MG: 2 INJECTION INTRAMUSCULAR; INTRAVENOUS at 19:29

## 2023-12-09 RX ADMIN — SODIUM CHLORIDE: 4.5 INJECTION, SOLUTION INTRAVENOUS at 21:39

## 2023-12-09 ASSESSMENT — ENCOUNTER SYMPTOMS
NAUSEA: 0
BACK PAIN: 0
VOICE CHANGE: 0
COLOR CHANGE: 0
SHORTNESS OF BREATH: 0
CONSTIPATION: 0
COUGH: 0
VOMITING: 0
RHINORRHEA: 0
DIARRHEA: 0

## 2023-12-09 NOTE — PROGRESS NOTES
Hospitalist Progress Note    Patient:  Ab Suggs  YOB: 1975  Date of Service: 12/9/2023  MRN: [id number]   Acct: [de-identified]   Primary Care Physician: Vivek Mata  Advance Directive: Full Code  Admit Date: 12/8/2023       Hospital Day: 1  Referring Provider: Earl Carlos DO    Patient Seen, Chart, Consults, Notes, Labs, Radiology studies reviewed. Subjective:  Ab Suggs is a 57 y.o. female  whom we are following for altered mental status, acute kidney injury, hypokalemia, hypernatremia. She is doing better. She is feeling better than she did when she came in. Her Levophed has been weaned off this afternoon. Hemodynamics are stable.     Allergies:  Latex, Carbapenems, Dye [iodides], Hydantoins, Penicillins, Wasp venom, Bactrim [sulfamethoxazole-trimethoprim], Ciprofloxacin, Ciprofloxacin hcl, Depakote [divalproex sodium], Dilantin [phenytoin sodium extended], Imipenem-cilastatin, Keflex [cephalexin], Primaxin [imipenem], and Unasyn [ampicillin-sulbactam sodium]    Medicines:  Current Facility-Administered Medications   Medication Dose Route Frequency Provider Last Rate Last Admin    0.45 % sodium chloride infusion   IntraVENous Continuous Earl Carlos  mL/hr at 12/09/23 1001 New Bag at 12/09/23 1001    metronidazole (FLAGYL) 500 mg in 0.9% NaCl 100 mL IVPB premix  500 mg IntraVENous Johnie Blizzard,  mL/hr at 12/09/23 1702 500 mg at 12/09/23 1702    sodium chloride flush 0.9 % injection 5-40 mL  5-40 mL IntraVENous 2 times per day Tena Johnson MD   10 mL at 12/09/23 0909    sodium chloride flush 0.9 % injection 5-40 mL  5-40 mL IntraVENous PRN Tena Johnson MD        0.9 % sodium chloride infusion   IntraVENous PRN Tena Johnson MD        acetaminophen (TYLENOL) tablet 500 mg  500 mg Oral Q4H PRN Tena Johnson MD   500 mg at 12/08/23 2119    Or    acetaminophen (TYLENOL) suppository 650 mg  650 mg Rectal Q6H PRN Tena Johnson MD

## 2023-12-09 NOTE — PROCEDURES
[facility] Neurology  [address] [facility], [facility] [address]  Phone [phone]  Fax [phone]       Electroencephalography    Information:   Patient Name: Ion Pelaez  :   1975  Age:   57 y.o. MRN:   [id number]  Account #:  [de-identified]  Today:  23    Referring Provider:  Lorena Nails M.D. Interpreting Provider: Lorena Nails M.D. HISTORY:   Ion Pelaez is a 57 y.o. woman  with a traumatic brain injury and intractable seizure disorder who has had altered mental status. SUMMARY OF THE RECORDING:   A symmetrical background rhythm of 5 Hertz was present in the posterior regions. Diffuse beta activity is noted. During the recording, the patient becomes drowsy and sleep is attained. Photic stimulation does not elicit a driving response. No lateralizing or epileptiform abnormalities were identified. IMPRESSION:   Delta grade 1;  generalized slowing. The findings are those of diffuse cortical dysfunction as si seen with encephalopathy, dementia, midline, and bilateral hemisphere lesions or injuries.         Electronically signed by Lorena Nails MD on 2023

## 2023-12-10 LAB
ALBUMIN SERPL-MCNC: 2.3 G/DL (ref 3.5–5.2)
ALP SERPL-CCNC: 140 U/L (ref 35–104)
ALT SERPL-CCNC: 9 U/L (ref 5–33)
ANION GAP SERPL CALCULATED.3IONS-SCNC: 7 MMOL/L (ref 7–19)
AST SERPL-CCNC: 7 U/L (ref 5–32)
BACTERIA UR CULT: NORMAL
BASOPHILS # BLD: 0.1 K/UL (ref 0–0.2)
BASOPHILS NFR BLD: 0.6 % (ref 0–1)
BILIRUB SERPL-MCNC: 0.3 MG/DL (ref 0.2–1.2)
BUN SERPL-MCNC: 18 MG/DL (ref 6–20)
CALCIUM SERPL-MCNC: 7.8 MG/DL (ref 8.6–10)
CHLORIDE SERPL-SCNC: 116 MMOL/L (ref 98–111)
CO2 SERPL-SCNC: 22 MMOL/L (ref 22–29)
CREAT SERPL-MCNC: 0.8 MG/DL (ref 0.5–0.9)
EOSINOPHIL # BLD: 0.3 K/UL (ref 0–0.6)
EOSINOPHIL NFR BLD: 2.7 % (ref 0–5)
ERYTHROCYTE [DISTWIDTH] IN BLOOD BY AUTOMATED COUNT: 14.6 % (ref 11.5–14.5)
GLUCOSE BLD-MCNC: 110 MG/DL (ref 70–99)
GLUCOSE BLD-MCNC: 72 MG/DL (ref 70–99)
GLUCOSE SERPL-MCNC: 117 MG/DL (ref 74–109)
HCT VFR BLD AUTO: 24 % (ref 37–47)
HGB BLD-MCNC: 7.8 G/DL (ref 12–16)
IMM GRANULOCYTES # BLD: 0.2 K/UL
LAMOTRIGINE SERPL-MCNC: 20.6 UG/ML (ref 3–15)
LYMPHOCYTES # BLD: 2.4 K/UL (ref 1.1–4.5)
LYMPHOCYTES NFR BLD: 20.2 % (ref 20–40)
MAGNESIUM SERPL-MCNC: 1.8 MG/DL (ref 1.6–2.6)
MCH RBC QN AUTO: 36.8 PG (ref 27–31)
MCHC RBC AUTO-ENTMCNC: 32.5 G/DL (ref 33–37)
MCV RBC AUTO: 113.2 FL (ref 81–99)
MONOCYTES # BLD: 0.2 K/UL (ref 0–0.9)
MONOCYTES NFR BLD: 1.8 % (ref 0–10)
NEUTROPHILS # BLD: 8.8 K/UL (ref 1.5–7.5)
NEUTS SEG NFR BLD: 73.3 % (ref 50–65)
PERFORMED ON: ABNORMAL
PERFORMED ON: NORMAL
PHOSPHATE SERPL-MCNC: 2.2 MG/DL (ref 2.5–4.5)
PLATELET # BLD AUTO: 74 K/UL (ref 130–400)
PMV BLD AUTO: 10 FL (ref 9.4–12.3)
POTASSIUM SERPL-SCNC: 3.3 MMOL/L (ref 3.5–5)
PROT SERPL-MCNC: 4.7 G/DL (ref 6.6–8.7)
RBC # BLD AUTO: 2.12 M/UL (ref 4.2–5.4)
SODIUM SERPL-SCNC: 145 MMOL/L (ref 136–145)
WBC # BLD AUTO: 12 K/UL (ref 4.8–10.8)

## 2023-12-10 PROCEDURE — 6370000000 HC RX 637 (ALT 250 FOR IP): Performed by: PHYSICIAN ASSISTANT

## 2023-12-10 PROCEDURE — 6370000000 HC RX 637 (ALT 250 FOR IP): Performed by: HOSPITALIST

## 2023-12-10 PROCEDURE — 2580000003 HC RX 258: Performed by: HOSPITALIST

## 2023-12-10 PROCEDURE — 1210000000 HC MED SURG R&B

## 2023-12-10 PROCEDURE — 99232 SBSQ HOSP IP/OBS MODERATE 35: CPT | Performed by: PSYCHIATRY & NEUROLOGY

## 2023-12-10 PROCEDURE — 6360000002 HC RX W HCPCS: Performed by: INTERNAL MEDICINE

## 2023-12-10 PROCEDURE — 2500000003 HC RX 250 WO HCPCS: Performed by: HOSPITALIST

## 2023-12-10 PROCEDURE — 84100 ASSAY OF PHOSPHORUS: CPT

## 2023-12-10 PROCEDURE — 6360000002 HC RX W HCPCS: Performed by: PHYSICIAN ASSISTANT

## 2023-12-10 PROCEDURE — 85025 COMPLETE CBC W/AUTO DIFF WBC: CPT

## 2023-12-10 PROCEDURE — 80053 COMPREHEN METABOLIC PANEL: CPT

## 2023-12-10 PROCEDURE — 83735 ASSAY OF MAGNESIUM: CPT

## 2023-12-10 PROCEDURE — 2580000003 HC RX 258: Performed by: INTERNAL MEDICINE

## 2023-12-10 PROCEDURE — 82962 GLUCOSE BLOOD TEST: CPT

## 2023-12-10 RX ADMIN — ONDANSETRON 4 MG: 2 INJECTION INTRAMUSCULAR; INTRAVENOUS at 09:11

## 2023-12-10 RX ADMIN — SODIUM CHLORIDE: 4.5 INJECTION, SOLUTION INTRAVENOUS at 18:48

## 2023-12-10 RX ADMIN — SODIUM CHLORIDE, PRESERVATIVE FREE 10 ML: 5 INJECTION INTRAVENOUS at 07:57

## 2023-12-10 RX ADMIN — SODIUM CHLORIDE, PRESERVATIVE FREE 10 ML: 5 INJECTION INTRAVENOUS at 20:45

## 2023-12-10 RX ADMIN — LAMOTRIGINE 100 MG: 100 TABLET ORAL at 20:45

## 2023-12-10 RX ADMIN — DOXYCYCLINE 100 MG: 100 INJECTION, POWDER, LYOPHILIZED, FOR SOLUTION INTRAVENOUS at 20:41

## 2023-12-10 RX ADMIN — BACITRACIN ZINC NEOMYCIN SULFATE POLYMYXIN B SULFATE: 400; 3.5; 5 OINTMENT TOPICAL at 08:00

## 2023-12-10 RX ADMIN — DOXYCYCLINE 100 MG: 100 INJECTION, POWDER, LYOPHILIZED, FOR SOLUTION INTRAVENOUS at 06:48

## 2023-12-10 RX ADMIN — POTASSIUM CHLORIDE 20 MEQ: 29.8 INJECTION, SOLUTION INTRAVENOUS at 07:54

## 2023-12-10 RX ADMIN — POTASSIUM CHLORIDE 20 MEQ: 29.8 INJECTION, SOLUTION INTRAVENOUS at 08:38

## 2023-12-10 RX ADMIN — SODIUM CHLORIDE: 4.5 INJECTION, SOLUTION INTRAVENOUS at 07:46

## 2023-12-10 RX ADMIN — BACITRACIN ZINC NEOMYCIN SULFATE POLYMYXIN B SULFATE: 400; 3.5; 5 OINTMENT TOPICAL at 20:45

## 2023-12-10 RX ADMIN — LAMOTRIGINE 100 MG: 100 TABLET ORAL at 08:40
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
## 2023-12-10 NOTE — PROGRESS NOTES
Report called to [address] [facility] fifth floor.     Electronically signed by Irvin Blackman RN on 12/10/2023 at 3:05 PM

## 2023-12-10 NOTE — PROGRESS NOTES
Hospitalist Progress Note    Patient:  Ion Pelaez  YOB: 1975  Date of Service: 12/10/2023  MRN: [id number]   Acct: [de-identified]   Primary Care Physician: Corbin Diaz  Advance Directive: Full Code  Admit Date: 12/8/2023       Hospital Day: 2  Referring Provider: Alex Johns DO    Patient Seen, Chart, Consults, Notes, Labs, Radiology studies reviewed. Subjective:  Ion Pelaez is a 57 y.o. female  whom we are following for altered mental status, acute kidney injury, hypokalemia, hypernatremia. She seems to be doing good from a hemodynamic and neurologic standpoint. I feel she is stable for transfer out of ICU. She has had normalization of her GFR. She is now off of pressors.     Allergies:  Latex, Carbapenems, Dye [iodides], Hydantoins, Penicillins, Wasp venom, Bactrim [sulfamethoxazole-trimethoprim], Ciprofloxacin, Ciprofloxacin hcl, Depakote [divalproex sodium], Dilantin [phenytoin sodium extended], Imipenem-cilastatin, Keflex [cephalexin], Primaxin [imipenem], and Unasyn [ampicillin-sulbactam sodium]    Medicines:  Current Facility-Administered Medications   Medication Dose Route Frequency Provider Last Rate Last Admin    0.45 % sodium chloride infusion   IntraVENous Continuous Alex Johns  mL/hr at 12/10/23 0746 New Bag at 12/10/23 0746    sodium chloride flush 0.9 % injection 5-40 mL  5-40 mL IntraVENous 2 times per day Sandra Ulrich MD   10 mL at 12/10/23 0757    sodium chloride flush 0.9 % injection 5-40 mL  5-40 mL IntraVENous PRN Sandra Ulrich MD        0.9 % sodium chloride infusion   IntraVENous PRN Sandra Ulrich MD        acetaminophen (TYLENOL) tablet 500 mg  500 mg Oral Q4H PRN Sandra Ulrich MD   500 mg at 12/08/23 2119    Or    acetaminophen (TYLENOL) suppository 650 mg  650 mg Rectal Q6H PRN Sandra Ulrich MD        lamoTRIgine (LAMICTAL) tablet 100 mg  100 mg Oral BID Sandra Ulirch MD   100 mg at 12/10/23 0840    vitamin D

## 2023-12-11 ENCOUNTER — APPOINTMENT (OUTPATIENT)
Dept: GENERAL RADIOLOGY | Age: 48
DRG: 871 | End: 2023-12-11
Payer: MEDICARE

## 2023-12-11 PROBLEM — T68.XXXA HYPOTHERMIA: Status: ACTIVE | Noted: 2023-12-11

## 2023-12-11 PROBLEM — D64.9 SEVERE ANEMIA: Status: ACTIVE | Noted: 2023-12-11

## 2023-12-11 PROBLEM — D69.6 THROMBOCYTOPENIA (HCC): Status: ACTIVE | Noted: 2023-12-11

## 2023-12-11 LAB
ABO/RH: NORMAL
ALBUMIN SERPL-MCNC: 2.5 G/DL (ref 3.5–5.2)
ALP SERPL-CCNC: 147 U/L (ref 35–104)
ALT SERPL-CCNC: 10 U/L (ref 5–33)
ANION GAP SERPL CALCULATED.3IONS-SCNC: 7 MMOL/L (ref 7–19)
ANTIBODY SCREEN: NORMAL
AST SERPL-CCNC: 12 U/L (ref 5–32)
BASOPHILS # BLD: 0 K/UL (ref 0–0.2)
BASOPHILS NFR BLD: 0.6 % (ref 0–1)
BILIRUB SERPL-MCNC: 0.4 MG/DL (ref 0.2–1.2)
BLOOD GROUP ANTIBODIES SERPL: NORMAL
BUN SERPL-MCNC: 11 MG/DL (ref 6–20)
CALCIUM SERPL-MCNC: 8.2 MG/DL (ref 8.6–10)
CHLORIDE SERPL-SCNC: 112 MMOL/L (ref 98–111)
CO2 SERPL-SCNC: 22 MMOL/L (ref 22–29)
CREAT SERPL-MCNC: 0.7 MG/DL (ref 0.5–0.9)
EOSINOPHIL # BLD: 0.3 K/UL (ref 0–0.6)
EOSINOPHIL NFR BLD: 3.8 % (ref 0–5)
ERYTHROCYTE [DISTWIDTH] IN BLOOD BY AUTOMATED COUNT: 14.3 % (ref 11.5–14.5)
FERRITIN SERPL-MCNC: 848 NG/ML (ref 13–150)
FOLATE SERPL-MCNC: 2.3 NG/ML (ref 4.8–37.3)
FYA ANTIGEN: NORMAL
GLUCOSE BLD-MCNC: 127 MG/DL (ref 70–99)
GLUCOSE SERPL-MCNC: 92 MG/DL (ref 74–109)
HAPTOGLOB SERPL-MCNC: 137 MG/DL (ref 30–200)
HCT VFR BLD AUTO: 21 % (ref 37–47)
HCT VFR BLD AUTO: 25.3 % (ref 37–47)
HEMOCCULT STL QL: NORMAL
HGB BLD-MCNC: 6.9 G/DL (ref 12–16)
IMM GRANULOCYTES # BLD: 0.1 K/UL
IRON SATN MFR SERPL: 38 % (ref 14–50)
IRON SERPL-MCNC: 56 UG/DL (ref 37–145)
LYMPHOCYTES # BLD: 1.8 K/UL (ref 1.1–4.5)
LYMPHOCYTES NFR BLD: 25.9 % (ref 20–40)
MAGNESIUM SERPL-MCNC: 1.5 MG/DL (ref 1.6–2.6)
MCH RBC QN AUTO: 36.1 PG (ref 27–31)
MCHC RBC AUTO-ENTMCNC: 32.9 G/DL (ref 33–37)
MCV RBC AUTO: 109.9 FL (ref 81–99)
MONOCYTES # BLD: 0.2 K/UL (ref 0–0.9)
MONOCYTES NFR BLD: 2.8 % (ref 0–10)
NEUTROPHILS # BLD: 4.4 K/UL (ref 1.5–7.5)
NEUTS SEG NFR BLD: 65.1 % (ref 50–65)
PERFORMED ON: ABNORMAL
PHOSPHATE SERPL-MCNC: 2.2 MG/DL (ref 2.5–4.5)
PLATELET # BLD AUTO: 70 K/UL (ref 130–400)
PMV BLD AUTO: 10 FL (ref 9.4–12.3)
POTASSIUM SERPL-SCNC: 3.6 MMOL/L (ref 3.5–5)
PROT SERPL-MCNC: 4.7 G/DL (ref 6.6–8.7)
RBC # BLD AUTO: 1.91 M/UL (ref 4.2–5.4)
RETICS # AUTO: 0.02 M/UL (ref 0.03–0.12)
RETICS/RBC NFR: 0.99 % (ref 0.5–1.5)
SODIUM SERPL-SCNC: 141 MMOL/L (ref 136–145)
TIBC SERPL-MCNC: 149 UG/DL (ref 250–400)
WBC # BLD AUTO: 6.8 K/UL (ref 4.8–10.8)

## 2023-12-11 PROCEDURE — 83010 ASSAY OF HAPTOGLOBIN QUANT: CPT

## 2023-12-11 PROCEDURE — 6370000000 HC RX 637 (ALT 250 FOR IP): Performed by: PHYSICIAN ASSISTANT

## 2023-12-11 PROCEDURE — 97110 THERAPEUTIC EXERCISES: CPT

## 2023-12-11 PROCEDURE — 6370000000 HC RX 637 (ALT 250 FOR IP): Performed by: INTERNAL MEDICINE

## 2023-12-11 PROCEDURE — 99221 1ST HOSP IP/OBS SF/LOW 40: CPT | Performed by: SURGERY

## 2023-12-11 PROCEDURE — 82272 OCCULT BLD FECES 1-3 TESTS: CPT

## 2023-12-11 PROCEDURE — P9016 RBC LEUKOCYTES REDUCED: HCPCS

## 2023-12-11 PROCEDURE — 80053 COMPREHEN METABOLIC PANEL: CPT

## 2023-12-11 PROCEDURE — 92610 EVALUATE SWALLOWING FUNCTION: CPT

## 2023-12-11 PROCEDURE — 86920 COMPATIBILITY TEST SPIN: CPT

## 2023-12-11 PROCEDURE — 99231 SBSQ HOSP IP/OBS SF/LOW 25: CPT | Performed by: SURGERY

## 2023-12-11 PROCEDURE — 36430 TRANSFUSION BLD/BLD COMPNT: CPT

## 2023-12-11 PROCEDURE — 6370000000 HC RX 637 (ALT 250 FOR IP): Performed by: HOSPITALIST

## 2023-12-11 PROCEDURE — 86905 BLOOD TYPING RBC ANTIGENS: CPT

## 2023-12-11 PROCEDURE — 85025 COMPLETE CBC W/AUTO DIFF WBC: CPT

## 2023-12-11 PROCEDURE — 84155 ASSAY OF PROTEIN SERUM: CPT

## 2023-12-11 PROCEDURE — 6360000002 HC RX W HCPCS: Performed by: PHYSICIAN ASSISTANT

## 2023-12-11 PROCEDURE — 36415 COLL VENOUS BLD VENIPUNCTURE: CPT

## 2023-12-11 PROCEDURE — 82728 ASSAY OF FERRITIN: CPT

## 2023-12-11 PROCEDURE — 97162 PT EVAL MOD COMPLEX 30 MIN: CPT

## 2023-12-11 PROCEDURE — 86922 COMPATIBILITY TEST ANTIGLOB: CPT

## 2023-12-11 PROCEDURE — 1210000000 HC MED SURG R&B

## 2023-12-11 PROCEDURE — 86900 BLOOD TYPING SEROLOGIC ABO: CPT

## 2023-12-11 PROCEDURE — 76937 US GUIDE VASCULAR ACCESS: CPT

## 2023-12-11 PROCEDURE — 97530 THERAPEUTIC ACTIVITIES: CPT

## 2023-12-11 PROCEDURE — 84100 ASSAY OF PHOSPHORUS: CPT

## 2023-12-11 PROCEDURE — 99223 1ST HOSP IP/OBS HIGH 75: CPT | Performed by: INTERNAL MEDICINE

## 2023-12-11 PROCEDURE — 86870 RBC ANTIBODY IDENTIFICATION: CPT

## 2023-12-11 PROCEDURE — 92522 EVALUATE SPEECH PRODUCTION: CPT

## 2023-12-11 PROCEDURE — 84165 PROTEIN E-PHORESIS SERUM: CPT

## 2023-12-11 PROCEDURE — 82746 ASSAY OF FOLIC ACID SERUM: CPT

## 2023-12-11 PROCEDURE — 86850 RBC ANTIBODY SCREEN: CPT

## 2023-12-11 PROCEDURE — 94760 N-INVAS EAR/PLS OXIMETRY 1: CPT

## 2023-12-11 PROCEDURE — 02HV33Z INSERTION OF INFUSION DEVICE INTO SUPERIOR VENA CAVA, PERCUTANEOUS APPROACH: ICD-10-PCS | Performed by: INTERNAL MEDICINE

## 2023-12-11 PROCEDURE — 2580000003 HC RX 258: Performed by: INTERNAL MEDICINE

## 2023-12-11 PROCEDURE — 82962 GLUCOSE BLOOD TEST: CPT

## 2023-12-11 PROCEDURE — 36569 INSJ PICC 5 YR+ W/O IMAGING: CPT

## 2023-12-11 PROCEDURE — 99232 SBSQ HOSP IP/OBS MODERATE 35: CPT | Performed by: PSYCHIATRY & NEUROLOGY

## 2023-12-11 PROCEDURE — 97165 OT EVAL LOW COMPLEX 30 MIN: CPT

## 2023-12-11 PROCEDURE — 2580000003 HC RX 258: Performed by: HOSPITALIST

## 2023-12-11 PROCEDURE — 83540 ASSAY OF IRON: CPT

## 2023-12-11 PROCEDURE — C1751 CATH, INF, PER/CENT/MIDLINE: HCPCS

## 2023-12-11 PROCEDURE — 86901 BLOOD TYPING SEROLOGIC RH(D): CPT

## 2023-12-11 PROCEDURE — 6360000002 HC RX W HCPCS: Performed by: INTERNAL MEDICINE

## 2023-12-11 PROCEDURE — 97535 SELF CARE MNGMENT TRAINING: CPT

## 2023-12-11 PROCEDURE — 2500000003 HC RX 250 WO HCPCS: Performed by: HOSPITALIST

## 2023-12-11 PROCEDURE — 83735 ASSAY OF MAGNESIUM: CPT

## 2023-12-11 PROCEDURE — 83550 IRON BINDING TEST: CPT

## 2023-12-11 PROCEDURE — 85045 AUTOMATED RETICULOCYTE COUNT: CPT

## 2023-12-11 PROCEDURE — 74018 RADEX ABDOMEN 1 VIEW: CPT

## 2023-12-11 RX ORDER — LIDOCAINE HYDROCHLORIDE 10 MG/ML
5 INJECTION, SOLUTION EPIDURAL; INFILTRATION; INTRACAUDAL; PERINEURAL ONCE
Status: DISCONTINUED | OUTPATIENT
Start: 2023-12-11 | End: 2023-12-13

## 2023-12-11 RX ORDER — SODIUM CHLORIDE 9 MG/ML
INJECTION, SOLUTION INTRAVENOUS PRN
Status: DISCONTINUED | OUTPATIENT
Start: 2023-12-11 | End: 2023-12-13

## 2023-12-11 RX ORDER — MAGNESIUM SULFATE IN WATER 40 MG/ML
4000 INJECTION, SOLUTION INTRAVENOUS ONCE
Status: COMPLETED | OUTPATIENT
Start: 2023-12-11 | End: 2023-12-12

## 2023-12-11 RX ORDER — SODIUM CHLORIDE 0.9 % (FLUSH) 0.9 %
5-40 SYRINGE (ML) INJECTION PRN
Status: DISCONTINUED | OUTPATIENT
Start: 2023-12-11 | End: 2023-12-22 | Stop reason: HOSPADM

## 2023-12-11 RX ORDER — SODIUM CHLORIDE 0.9 % (FLUSH) 0.9 %
5-40 SYRINGE (ML) INJECTION EVERY 12 HOURS SCHEDULED
Status: DISCONTINUED | OUTPATIENT
Start: 2023-12-11 | End: 2023-12-13

## 2023-12-11 RX ORDER — SODIUM CHLORIDE 9 MG/ML
25 INJECTION, SOLUTION INTRAVENOUS PRN
Status: DISCONTINUED | OUTPATIENT
Start: 2023-12-11 | End: 2023-12-22 | Stop reason: HOSPADM

## 2023-12-11 RX ADMIN — BACITRACIN ZINC NEOMYCIN SULFATE POLYMYXIN B SULFATE: 400; 3.5; 5 OINTMENT TOPICAL at 21:21

## 2023-12-11 RX ADMIN — SODIUM CHLORIDE: 4.5 INJECTION, SOLUTION INTRAVENOUS at 10:23

## 2023-12-11 RX ADMIN — Medication 500 MG: at 17:49

## 2023-12-11 RX ADMIN — LAMOTRIGINE 100 MG: 100 TABLET ORAL at 21:22

## 2023-12-11 RX ADMIN — MAGNESIUM SULFATE HEPTAHYDRATE 4000 MG: 40 INJECTION, SOLUTION INTRAVENOUS at 21:20

## 2023-12-11 RX ADMIN — SODIUM CHLORIDE, PRESERVATIVE FREE 10 ML: 5 INJECTION INTRAVENOUS at 21:23

## 2023-12-11 RX ADMIN — DOXYCYCLINE 100 MG: 100 INJECTION, POWDER, LYOPHILIZED, FOR SOLUTION INTRAVENOUS at 07:29

## 2023-12-11 RX ADMIN — ACETAMINOPHEN 500 MG: 500 TABLET ORAL at 12:37

## 2023-12-11 RX ADMIN — Medication 500 MG: at 22:22

## 2023-12-11 RX ADMIN — ONDANSETRON 4 MG: 2 INJECTION INTRAMUSCULAR; INTRAVENOUS at 12:21

## 2023-12-11 RX ADMIN — DOXYCYCLINE 100 MG: 100 INJECTION, POWDER, LYOPHILIZED, FOR SOLUTION INTRAVENOUS at 20:17

## 2023-12-11 RX ADMIN — POTASSIUM CHLORIDE 40 MEQ: 1500 TABLET, EXTENDED RELEASE ORAL at 07:38

## 2023-12-11 RX ADMIN — SODIUM CHLORIDE: 4.5 INJECTION, SOLUTION INTRAVENOUS at 20:14

## 2023-12-11 RX ADMIN — Medication 5000 UNITS: at 07:38

## 2023-12-11 RX ADMIN — LAMOTRIGINE 100 MG: 100 TABLET ORAL at 07:38

## 2023-12-11 RX ADMIN — BACITRACIN ZINC NEOMYCIN SULFATE POLYMYXIN B SULFATE: 400; 3.5; 5 OINTMENT TOPICAL at 07:39

## 2023-12-11 ASSESSMENT — PAIN DESCRIPTION - LOCATION: LOCATION: HEAD

## 2023-12-11 ASSESSMENT — ENCOUNTER SYMPTOMS
CONSTIPATION: 0
COLOR CHANGE: 0
VOICE CHANGE: 0
SHORTNESS OF BREATH: 0
COUGH: 0
RHINORRHEA: 0
VOMITING: 0
DIARRHEA: 0
BACK PAIN: 0
NAUSEA: 0

## 2023-12-11 ASSESSMENT — PAIN - FUNCTIONAL ASSESSMENT: PAIN_FUNCTIONAL_ASSESSMENT: ACTIVITIES ARE NOT PREVENTED

## 2023-12-11 ASSESSMENT — PAIN SCALES - WONG BAKER: WONGBAKER_NUMERICALRESPONSE: 0

## 2023-12-11 ASSESSMENT — PAIN DESCRIPTION - ORIENTATION: ORIENTATION: MID

## 2023-12-11 NOTE — PROGRESS NOTES
Notified Cassandra Servant of transfer to floor. Patient top and bottom denturs, glasses, and socks sent with patient to fifth floor.     Electronically signed by Kari Stallworth RN on 12/10/2023 at 6:42 PM

## 2023-12-11 NOTE — PROGRESS NOTES
Occupational Therapy  Facility/Department: [facility] [address] [facility] Initial Assessment    Name: Harriet Gonzalez  : 1975  MRN: [id number]  Date of Service: 2023    Discharge Recommendations:             Patient Diagnosis(es): The primary encounter diagnosis was Hypothermia, initial encounter. Diagnoses of Septic shock ([address]) and Pneumonia of both lungs due to infectious organism, unspecified part of lung were also pertinent to this visit. Past Medical History:  has a past medical history of Arthritis, GERD (gastroesophageal reflux disease), History of blood transfusion, Hydrocephalus ([address]), Incontinence, Seizures ([address]), and Status post deep brain stimulator placement. Past Surgical History:  has a past surgical history that includes Cholecystectomy (N/A); Appendectomy; csf shunt (Right); pr xcapsl ctrc rmvl insj io lens prosth w/o ecp (Left, 06/15/2017); pr xcapsl ctrc rmvl insj io lens prosth w/o ecp (Right, 2017); Deep brain stimulator placement (N/A); pr exc chalazion anes req hospization single/mult (Right, 2018); and Splenectomy (N/A, ). Treatment Diagnosis: Hypothermia, Pneumonia      Assessment   Performance deficits / Impairments: Decreased ADL status; Decreased functional mobility ; Decreased endurance;Decreased cognition;Decreased balance;Decreased ROM; Decreased safe awareness;Decreased strength  Assessment: Will progress as tolerated  Treatment Diagnosis: Hypothermia, Pneumonia  Prognosis: Good  Decision Making: Low Complexity  REQUIRES OT FOLLOW-UP: Yes  Activity Tolerance  Activity Tolerance: Patient Tolerated treatment well;Treatment limited secondary to decreased cognition        Plan   Occupational Therapy Plan  Times Per Week: 3-5x/week  Times Per Day: Once a day     Restrictions  Restrictions/Precautions  Restrictions/Precautions: Fall Risk    Subjective   General  Chart Reviewed:  Yes  Additional Pertinent Hx: Previous TBI, per chart used w/c at

## 2023-12-11 NOTE — PROGRESS NOTES
Exception  Comment: 2-/5  Strength LLE  Strength LLE: Exception  Comment: 2-/5           Bed mobility  Supine to Sit: 2 Person assistance;Maximum assistance  Sit to Supine: Maximum assistance;2 Person assistance  Scooting: Dependent/Total;2 Person assistance  Max A to roll R to L for bed mobility during complete bed and gown change due to incontinence. Transfers  Sit to Stand: Maximum Assistance;2 Person Assistance  Stand to Sit: Maximum Assistance;2 Person Assistance  Comment: pt unable to fully stand  Ambulation  Comments: pt unable to take steps     Balance  Posture: Poor  Sitting - Static: Poor;+  Sitting - Dynamic: Poor;+  Standing - Static: Poor;-  Standing - Dynamic: Poor;-           OutComes Score                                                  AM-PAC - Mobility              Tinneti Score       Goals  Short Term Goals  Time Frame for Short Term Goals: 2 wks  Short Term Goal 1: supine to sit SBA  Short Term Goal 2: sit to stand SBA  Short Term Goal 3: bed to chair Min A  Short Term Goal 4: amb. 10' with RW CGA  Patient Goals   Patient Goals : go home       Education  Patient Education  Education Given To: Patient; Family  Education Provided: Role of Therapy;Plan of Care;Transfer Training; Fall Prevention Strategies  Education Provided Comments: call light, staff A  Education Method: Demonstration;Verbal  Barriers to Learning: Cognition (prior TBI)  Education Outcome: Verbalized understanding;Demonstrated understanding;Continued education needed      Therapy Time   Individual Concurrent Group Co-treatment   Time In           Time Out           Minutes                   Robert Rose PT   Electronically signed by Robert Rose PT on 12/11/2023 at 11:13 AM

## 2023-12-11 NOTE — PROGRESS NOTES
The femoral line in the patients right groin has been removed per verbal order. A non-occlusive oil based dressing has been applied along with 4x4 gauze and tegaderm. Pressure has been held for 6 minutes. The patient is lying flat on their back and will continue to do so for 30 minutes.

## 2023-12-11 NOTE — PROGRESS NOTES
When attempting to draw blood from the patients femoral line, there was no blood return noted. Both lumens were flushed with no blood return noted. Lab has been made aware of the need for labs to be drawn. They have stated that they will try to get the patients labs drawn by 1300 today and that they are running behind.

## 2023-12-11 NOTE — PROGRESS NOTES
acetaminophen (TYLENOL) tablet 500 mg  500 mg Oral Q4H PRN Bernardo Juarez MD   500 mg at 12/11/23 1237    Or    acetaminophen (TYLENOL) suppository 650 mg  650 mg Rectal Q6H PRN Bernardo Juarez MD        lamoTRIgine (LAMICTAL) tablet 100 mg  100 mg Oral BID Bernardo Juarez MD   100 mg at 12/11/23 4559    vitamin D (CHOLECALCIFEROL) capsule 5,000 Units  5,000 Units Oral Daily Bernardo Juarez MD   5,000 Units at 12/11/23 0738    sodium chloride 0.9 % bolus 1,000 mL  1,000 mL IntraVENous Once Bernardo Juarez MD        doxycycline (VIBRAMYCIN) 100 mg in sodium chloride 0.9 % 100 mL IVPB ([id number])  100 mg IntraVENous Q12H Bernardo Juarez MD   Stopped at 12/11/23 0829    potassium chloride 20 mEq/50 mL IVPB (Central Line)  20 mEq IntraVENous PRN Cresenciano Stall, DO 50 mL/hr at 12/10/23 0838 20 mEq at 12/10/23 0838    potassium chloride (KLOR-CON M) extended release tablet 40 mEq  40 mEq Oral PRN Cresenciano Stall, DO   40 mEq at 12/11/23 3389    Or    potassium bicarb-citric acid (EFFER-K) effervescent tablet 40 mEq  40 mEq Oral PRN Cresenciano Stall, DO        Or    potassium chloride 10 mEq/100 mL IVPB (Peripheral Line)  10 mEq IntraVENous PRN Cresenciano Stall, DO        glucose chewable tablet 16 g  4 tablet Oral PRN Cresenciano Stall, DO        dextrose bolus 10% 125 mL  125 mL IntraVENous PRN Cresenciano Stall, DO        Or    dextrose bolus 10% 250 mL  250 mL IntraVENous PRN Cresenciano Stall, DO        glucagon injection 1 mg  1 mg SubCUTAneous PRN Cresenciano Stall, DO        dextrose 10 % infusion   IntraVENous Continuous PRN Cresenciano Stall, DO        ondansetron [facility]) injection 4 mg  4 mg IntraVENous Q6H PRN Cristina Mei PA-C   4 mg at 12/11/23 1221    lidocaine 4 % external patch 1 patch  1 patch TransDERmal Daily Cristina Mei PA-C   1 patch at 12/11/23 1791    neomycin-bacitracin-polymyxin (NEOSPORIN) ointment   Topical BID Randa Severin, MD   Given at results for input(s): \"CULTURE\" in the last 72 hours. No results for input(s): \"BC\", \"BLOODCULT2\" in the last 72 hours. No results for input(s): \"CXSURG\" in the last 72 hours. Radiology reports as per the Radiologist  Radiology: CT CHEST WO CONTRAST    Result Date: 12/8/2023  EXAM:  CT OF THE CHEST WITHOUT CONTRAST  History:  Shunt series. Technique:  Multiplanar CT images through the thorax were obtained without the administration of IV contrast  FINDINGS:  Heart size is normal.  No pericardial effusion. No thoracic aortic aneurysm. No enlarged intrathoracic lymph nodes. Some type of battery pack device is seen on the left with catheter projecting into the neck. Partially visualized right-sided shunt catheter tubing. Visualized portions of the shunt catheter tubing demonstrate no kinks or discontinuity. The shunt is incompletely visualized. There are numerous centrilobular micronodules with tree in bud opacities most notable in the lower lungs. No pleural fluid and no pneumothorax. Within the visualized upper abdomen, no acute findings. 2 mm calculus within the left kidney. Partially visualized IVC filter. No acute osseous abnormalities. Avascular necrosis of the bilateral humeral heads with subchondral collapse on the right. Synovial chondromatosis involving the right shoulder. Impression: 1. Bilateral bronchopneumonia. 2.  Partially visualized shunt catheter tubing with no kink or discontinuity. 3.  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 4.  Synovial chondromatosis involving the right shoulder  All CT scans are performed using dose optimization techniques as appropriate to the performed exam and include at least one of the following: Automated exposure control, adjustment of the mA and/or kV according to size, and the use of iterative reconstruction technique.   ______________________________________ Electronically signed by: Waylon Santamaria

## 2023-12-11 NOTE — PROGRESS NOTES
Nutrition Assessment     Type and Reason for Visit: Reassess    Nutrition Recommendations/Plan:   Continue current diet, modify ONS to [employer]. Consider RAPHAEL if po intake doesn't improve. Malnutrition Assessment:  Malnutrition Status: Severe malnutrition    Nutrition Assessment:  Diet advanced 12/9, however noted poor po intake per MD. Pt preferences updated in diet order 12/10 and fortified pudding ordered once daily. Will update ONS to [employer] d/t availability of ONS. Continue to monitor po intakes, recommend consider RAPHAEL if po intake does not improve. Estimated Daily Nutrient Needs:  Energy (kcal):  7005-5311 kcals (25-30 kcals/kg) Weight Used for Energy Requirements: Current     Protein (g):  77g Weight Used for Protein Requirements: Current        Fluid (ml/day):  7028-4493 ml Method Used for Fluid Requirements: 1 ml/kcal    Nutrition Related Findings:   generalized non-pitting edema Wound Type: None    Current Nutrition Therapies:    ADULT ORAL NUTRITION SUPPLEMENT; Dinner; Frozen Oral Supplement  ADULT ORAL NUTRITION SUPPLEMENT; Lunch; Fortified Pudding Oral Supplement  ADULT DIET;  Dysphagia - Minced and Moist; Moderately Thick (Honey); extra sauces and gravies; likes chocolate milk and sweat tea (send double portion drinks); likes cheeseburger with ketchup and howard; spaghettis; meatloaf, dislikes carrots    Anthropometric Measures:  Height: 162.6 cm (5' 4.02\")  Current Body Wt: 45.5 kg (100 lb 5 oz)   BMI: 17.2    Nutrition Diagnosis:   Inadequate oral intake, Unintended weight loss related to acute injury/trauma, cognitive or neurological impairment as evidenced by NPO or clear liquid status due to medical condition, weight loss greater than or equal to 5% in 1 month    Nutrition Interventions:   Food and/or Nutrient Delivery: Continue Current Diet, Modify Oral Nutrition Supplement  Nutrition Education/Counseling: No recommendation at this time  Coordination of Nutrition Care: Continue to monitor while inpatient       Goals:  Previous Goal Met: No Progress toward Goal(s)  Goals: Meet at least 75% of estimated needs, PO intake 50% or greater       Nutrition Monitoring and Evaluation:   Behavioral-Environmental Outcomes: None Identified  Food/Nutrient Intake Outcomes: Diet Advancement/Tolerance, Food and Nutrient Intake, Supplement Intake  Physical Signs/Symptoms Outcomes: Biochemical Data, Chewing or Swallowing, Fluid Status or Edema, Weight, Skin    Discharge Planning:     Too soon to determine     Uriel Mix MS, RD, LD  Contact: [phone]

## 2023-12-11 NOTE — PROGRESS NOTES
Facility/Department: [address] SERVICES   CLINICAL BEDSIDE SWALLOW EVALUATION  SPEECH PRODUCTION EVALUATION     NAME: Abril Delgado  : 1975  MRN: [id number]    ADMISSION DATE: 2023  ADMITTING DIAGNOSIS: has Seizures ([address]); GERD (gastroesophageal reflux disease); Incontinence; Wheelchair bound; Hemiplegia ([address]); Chronic pain syndrome; Anxiety; PVD (peripheral vascular disease) ([address]); Chronic seasonal allergic rhinitis; Avascular necrosis ([address]); Sepsis ([address]); Metabolic acidosis; Palliative care patient; Chronic constipation; Pain in left knee; Traumatic brain injury ([address]); History of traumatic brain injury; S/P  shunt; Septic shock ([address]); Metabolic encephalopathy; Pneumonia of both lower lobes due to infectious organism; Abscess of labia; and Intractable seizure disorder ([address]) on their problem list.    Date of Eval: 2023  Evaluating Therapist: SHRAVAN Moreno    Current Diet level:  Minced and moist consistency with moderately thick/honey thick liquids    Pain:  Pain Assessment  Pain Assessment: Noe-Baker FACES  Pain Level:  (DIXIE)  Noe-Baker Pain Rating: No hurt  Patient's Stated Pain Goal: 0 - No pain  Pain Location: Head  Pain Orientation: Mid  Pain Descriptors:  (DIXIE)  Functional Pain Assessment: Activities are not prevented  Non-Pharmaceutical Pain Intervention(s): Repositioned, Rest  Response to Pain Intervention: None (pain unchanged or no improvement)  Side Effects: No reported side effects    Reason for Referral  Abril Delgado was referred for a bedside swallow evaluation to assess the efficiency of her swallow function, identify signs and symptoms of aspiration and make recommendations regarding safe dietary consistencies, effective compensatory strategies, and safe eating environment. Impression  Assessed patient's swallowing function. Patient exhibited decreased oral prep of more solid consistencies (patient reported sore mouth).  Patient also exhibited slow oral transit of both

## 2023-12-11 NOTE — PROCEDURES
[facility] Vascular Access Team:  PICC Line Insertion Procedure Note      Patient: Nu Colmenares  YOB: 1975   MRN: [id number]  Room: [address] [place] [address]     Attending Physician - Sandrine Marquez DO  Ordering Physician - Sandrine Marquez DO    Diagnosis:   Hypothermia, initial encounter Carolyn Koyanagi. XXXA]  Septic shock ([address]) [A41.9, R65.21]  Sepsis ([address]) [A41.9]  Pneumonia of both lungs due to infectious organism, unspecified part of lung [J18.9]       Procedure(s): Insertion of a 5.5 [place] Double Lumen PICC    Indication(s):   Long Term Antibiotic Therapy and Poor Venous Access  Unable to locate any suitable veins distal enough from axilla for Midline. Patient has no use of left upper extremity. Date of Procedure: 12/11/2023   Start Time: 1520  End Time: 7440    Performed by: Fred Ramires RN    Anesthesia: Local Injection <=5 mL 1% Lidocaine without Epinephrine    Estimated Blood Loss (mL): Minimal    Complications: None    PICC Double Lumen 12/11/23 Right Brachial (Active)   Central Line Being Utilized Yes 12/11/23 1615   Criteria for Appropriate Use Limited/no vessel suitable for conventional peripheral access 12/11/23 1615   Site Assessment Clean, dry & intact 12/11/23 1615   Phlebitis Assessment No symptoms 12/11/23 1615   Infiltration Assessment 0 12/11/23 1615   External Catheter Length (cm) 1 cm 12/11/23 1615   Proximal Lumen Color/Status White;Flushed;Brisk blood return;Normal saline locked; Alcohol cap applied 12/11/23 1615   Distal Lumen Color/Status Pink;Flushed;Brisk blood return;Normal saline locked; Alcohol cap applied 12/11/23 1615   Alcohol Cap Used Yes 12/11/23 1615   Date of Last Dressing Change 12/11/23 12/11/23 1615   Dressing Type Transparent w/CHG gel;Securing device 12/11/23 1615   Dressing Status New dressing applied;Clean, dry & intact 12/11/23 1615   Dressing Intervention New 12/11/23 1615         Findings:   1. Successful insertion of PICC line.   2. PICC Line is ready to be used.   3. Please change tubing prior to using new PICC line. Make sure to label, date and use alcohol caps on new tubing and alcohol caps on unused ports. Detailed Description of Procedure:   Informed consent was obtained for the procedure. Risks including nerve injury, arterial puncture, bleeding, and adverse drug reaction were discussed. The Right Brachial vein was visualized using the ultrasound and deemed a suitable vessel. The area was prepped with Chlorhexidine and draped in sterile fashion using full max barrier draping. The area was anesthetized with 3 cc's of 1% Lidocaine. The vein was accessed using US guidance. The guidewire was advanced through the needle to secure the vein. The needle was removed and a peel-a-way Sheath was placed over the guidewire. Guidewire was removed with atraumatic tip intact. The 5.5 [place] Double Lumen PICC was trimmed at 28 cm and inserted into the Sheath. Using VPS technology, the PICC line was advanced until PICC tip was at the level of the cavoatrial junction/distal SVC. The peel-a-way sheath was removed the PICC was advanced until P wave deflection was captured. The PICC was withdrawn until the optimal P wave amplitude was obtained. Approximately 1 cm exposed. Internal components of the PICC were removed, all lumens had brisk blood return and was flushed with 10 cc of NS. The PICC was secured using an adhesive securement device. A 3M CHG Tegaderm was placed over the securement device and insertion site. Dressing was dated and initialed with external measurement marked. Patient did tolerate procedure well.             Electronically signed by Raymon Costa RN on 12/11/2023 at 4:22 PM

## 2023-12-11 NOTE — CARE COORDINATION
SW met with Pt/family in the room to discuss DC planning options. Pt family stated all they needed was an order for a bedside table. SW will request the DME order and give it to Pt family. Pt/family denied any other needs at this time.      Electronically signed by Fabiano Gomes on 12/11/2023 at 2:15 PM
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
## 2023-12-12 LAB
ALBUMIN SERPL-MCNC: 2.7 G/DL (ref 3.5–5.2)
ALP SERPL-CCNC: 228 U/L (ref 35–104)
ALT SERPL-CCNC: 30 U/L (ref 5–33)
ANION GAP SERPL CALCULATED.3IONS-SCNC: 8 MMOL/L (ref 7–19)
AST SERPL-CCNC: 49 U/L (ref 5–32)
BACTERIA SPEC ANAEROBE CULT: ABNORMAL
BACTERIA SPEC ANAEROBE+AEROBE CULT: ABNORMAL
BASOPHILS # BLD: 0 K/UL (ref 0–0.2)
BASOPHILS NFR BLD: 0.6 % (ref 0–1)
BILIRUB SERPL-MCNC: 0.6 MG/DL (ref 0.2–1.2)
BUN SERPL-MCNC: 9 MG/DL (ref 6–20)
CALCIUM SERPL-MCNC: 8.4 MG/DL (ref 8.6–10)
CHLORIDE SERPL-SCNC: 112 MMOL/L (ref 98–111)
CO2 SERPL-SCNC: 21 MMOL/L (ref 22–29)
CREAT SERPL-MCNC: 0.7 MG/DL (ref 0.5–0.9)
EOSINOPHIL # BLD: 0.3 K/UL (ref 0–0.6)
EOSINOPHIL NFR BLD: 4 % (ref 0–5)
ERYTHROCYTE [DISTWIDTH] IN BLOOD BY AUTOMATED COUNT: 15.6 % (ref 11.5–14.5)
GLUCOSE BLD-MCNC: 101 MG/DL (ref 70–99)
GLUCOSE BLD-MCNC: 103 MG/DL (ref 70–99)
GLUCOSE BLD-MCNC: 117 MG/DL (ref 70–99)
GLUCOSE BLD-MCNC: 157 MG/DL (ref 70–99)
GLUCOSE SERPL-MCNC: 116 MG/DL (ref 74–109)
GRAM STN SPEC: ABNORMAL
HCT VFR BLD AUTO: 28.5 % (ref 37–47)
HGB BLD-MCNC: 9.8 G/DL (ref 12–16)
IMM GRANULOCYTES # BLD: 0.2 K/UL
LDH SERPL-CCNC: 224 U/L (ref 91–215)
LYMPHOCYTES # BLD: 1.7 K/UL (ref 1.1–4.5)
LYMPHOCYTES NFR BLD: 23.7 % (ref 20–40)
MAGNESIUM SERPL-MCNC: 3.2 MG/DL (ref 1.6–2.6)
MCH RBC QN AUTO: 36.2 PG (ref 27–31)
MCHC RBC AUTO-ENTMCNC: 34.4 G/DL (ref 33–37)
MCV RBC AUTO: 105.2 FL (ref 81–99)
MONOCYTES # BLD: 0.3 K/UL (ref 0–0.9)
MONOCYTES NFR BLD: 4.3 % (ref 0–10)
NEUTROPHILS # BLD: 4.6 K/UL (ref 1.5–7.5)
NEUTS SEG NFR BLD: 65.3 % (ref 50–65)
ORGANISM: ABNORMAL
PERFORMED ON: ABNORMAL
PHOSPHATE SERPL-MCNC: 3.1 MG/DL (ref 2.5–4.5)
PLATELET # BLD AUTO: 70 K/UL (ref 130–400)
PMV BLD AUTO: 9.7 FL (ref 9.4–12.3)
POTASSIUM SERPL-SCNC: 4.2 MMOL/L (ref 3.5–5)
PROT SERPL-MCNC: 5.1 G/DL (ref 6.6–8.7)
RBC # BLD AUTO: 2.71 M/UL (ref 4.2–5.4)
SODIUM SERPL-SCNC: 141 MMOL/L (ref 136–145)
WBC # BLD AUTO: 7 K/UL (ref 4.8–10.8)

## 2023-12-12 PROCEDURE — 85025 COMPLETE CBC W/AUTO DIFF WBC: CPT

## 2023-12-12 PROCEDURE — 83735 ASSAY OF MAGNESIUM: CPT

## 2023-12-12 PROCEDURE — 80053 COMPREHEN METABOLIC PANEL: CPT

## 2023-12-12 PROCEDURE — 84100 ASSAY OF PHOSPHORUS: CPT

## 2023-12-12 PROCEDURE — 94760 N-INVAS EAR/PLS OXIMETRY 1: CPT

## 2023-12-12 PROCEDURE — 99232 SBSQ HOSP IP/OBS MODERATE 35: CPT | Performed by: PHYSICIAN ASSISTANT

## 2023-12-12 PROCEDURE — 99232 SBSQ HOSP IP/OBS MODERATE 35: CPT | Performed by: INTERNAL MEDICINE

## 2023-12-12 PROCEDURE — 2580000003 HC RX 258: Performed by: INTERNAL MEDICINE

## 2023-12-12 PROCEDURE — 6370000000 HC RX 637 (ALT 250 FOR IP): Performed by: INTERNAL MEDICINE

## 2023-12-12 PROCEDURE — 6370000000 HC RX 637 (ALT 250 FOR IP): Performed by: PHYSICIAN ASSISTANT

## 2023-12-12 PROCEDURE — 82962 GLUCOSE BLOOD TEST: CPT

## 2023-12-12 PROCEDURE — 2500000003 HC RX 250 WO HCPCS: Performed by: HOSPITALIST

## 2023-12-12 PROCEDURE — 83615 LACTATE (LD) (LDH) ENZYME: CPT

## 2023-12-12 PROCEDURE — 1210000000 HC MED SURG R&B

## 2023-12-12 PROCEDURE — 2580000003 HC RX 258: Performed by: HOSPITALIST

## 2023-12-12 PROCEDURE — 6370000000 HC RX 637 (ALT 250 FOR IP): Performed by: HOSPITALIST

## 2023-12-12 RX ADMIN — Medication 5000 UNITS: at 08:05

## 2023-12-12 RX ADMIN — SODIUM CHLORIDE, PRESERVATIVE FREE 10 ML: 5 INJECTION INTRAVENOUS at 21:00

## 2023-12-12 RX ADMIN — DOXYCYCLINE 100 MG: 100 INJECTION, POWDER, LYOPHILIZED, FOR SOLUTION INTRAVENOUS at 20:00

## 2023-12-12 RX ADMIN — BACITRACIN ZINC NEOMYCIN SULFATE POLYMYXIN B SULFATE: 400; 3.5; 5 OINTMENT TOPICAL at 08:14

## 2023-12-12 RX ADMIN — Medication 500 MG: at 16:59

## 2023-12-12 RX ADMIN — Medication 500 MG: at 08:05

## 2023-12-12 RX ADMIN — LAMOTRIGINE 100 MG: 100 TABLET ORAL at 20:05

## 2023-12-12 RX ADMIN — LAMOTRIGINE 100 MG: 100 TABLET ORAL at 08:05

## 2023-12-12 RX ADMIN — BACITRACIN ZINC NEOMYCIN SULFATE POLYMYXIN B SULFATE: 400; 3.5; 5 OINTMENT TOPICAL at 20:15

## 2023-12-12 RX ADMIN — Medication 500 MG: at 20:05

## 2023-12-12 RX ADMIN — Medication 500 MG: at 12:20

## 2023-12-12 RX ADMIN — DOXYCYCLINE 100 MG: 100 INJECTION, POWDER, LYOPHILIZED, FOR SOLUTION INTRAVENOUS at 08:09

## 2023-12-12 ASSESSMENT — PAIN SCALES - WONG BAKER: WONGBAKER_NUMERICALRESPONSE: 0
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
## 2023-12-12 NOTE — PROGRESS NOTES
Hospitalist Progress Note    Patient:  Will Denson  YOB: 1975  Date of Service: 12/12/2023  MRN: [id number]   Acct: [de-identified]   Primary Care Physician: Kendra Dupree  Advance Directive: Full Code  Admit Date: 12/8/2023       Hospital Day: 4  Referring Provider: Lola Rees DO    Patient Seen, Chart, Consults, Notes, Labs, Radiology studies reviewed. Subjective:  Will Denson is a 57 y.o. female  whom we are following for altered mental status, acute kidney injury, hypokalemia, hypernatremia. She continues to do well. Hematology would like to watch her counts 1 more day.     Allergies:  Latex, Carbapenems, Dye [iodides], Hydantoins, Penicillins, Wasp venom, Bactrim [sulfamethoxazole-trimethoprim], Ciprofloxacin, Ciprofloxacin hcl, Depakote [divalproex sodium], Dilantin [phenytoin sodium extended], Imipenem-cilastatin, Keflex [cephalexin], Primaxin [imipenem], and Unasyn [ampicillin-sulbactam sodium]    Medicines:  Current Facility-Administered Medications   Medication Dose Route Frequency Provider Last Rate Last Admin    0.9 % sodium chloride infusion   IntraVENous PRN Ruthie Jack MD        potassium phosphate (monobasic) (K-PHOS) tablet 500 mg  500 mg Oral 4x Daily WC Ada Payor J, DO   500 mg at 12/12/23 1220    lidocaine PF 1 % injection 5 mL  5 mL IntraDERmal Once Lola Rees DO        sodium chloride flush 0.9 % injection 5-40 mL  5-40 mL IntraVENous 2 times per day Lola Rees DO   10 mL at 12/11/23 2123    sodium chloride flush 0.9 % injection 5-40 mL  5-40 mL IntraVENous PRN Lola Rees DO        0.9 % sodium chloride infusion  25 mL IntraVENous PRN Lola Rees DO        0.45 % sodium chloride infusion   IntraVENous Continuous Lola Rees DO 75 mL/hr at 12/11/23 2014 New Bag at 12/11/23 2014    acetaminophen (TYLENOL) tablet 500 mg  500 mg Oral Q4H PRN Racquel Loco MD   500 mg at 12/11/23 1237    Or 4.  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 ______________________________________ Electronically signed by: Irena Burkett M.D. Date:     12/08/2023 Time:    01:33        Assessment     Sepsis. Clinically resolved. Acute kidney injury. Resolved. Hypernatremia. Improving with hypotonic fluids. Anemia and thrombocytopenia. Most likely dilution effect. Hematology following. Su-labial abscess. Local wound care.       Kobe Flynn DO
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
## 2023-12-13 PROBLEM — E43 SEVERE MALNUTRITION (HCC): Status: ACTIVE | Noted: 2023-12-13

## 2023-12-13 LAB
ALBUMIN SERPL-MCNC: 2.7 G/DL (ref 3.5–5.2)
ALP SERPL-CCNC: 239 U/L (ref 35–104)
ALT SERPL-CCNC: 29 U/L (ref 5–33)
ANION GAP SERPL CALCULATED.3IONS-SCNC: 8 MMOL/L (ref 7–19)
AST SERPL-CCNC: 37 U/L (ref 5–32)
BACTERIA BLD CULT ORG #2: NORMAL
BACTERIA BLD CULT: NORMAL
BASOPHILS # BLD: 0.1 K/UL (ref 0–0.2)
BASOPHILS NFR BLD: 0.5 % (ref 0–1)
BILIRUB SERPL-MCNC: 0.5 MG/DL (ref 0.2–1.2)
BUN SERPL-MCNC: 9 MG/DL (ref 6–20)
CALCIUM SERPL-MCNC: 8 MG/DL (ref 8.6–10)
CHLORIDE SERPL-SCNC: 109 MMOL/L (ref 98–111)
CO2 SERPL-SCNC: 23 MMOL/L (ref 22–29)
CREAT SERPL-MCNC: 0.5 MG/DL (ref 0.5–0.9)
EOSINOPHIL # BLD: 0.3 K/UL (ref 0–0.6)
EOSINOPHIL NFR BLD: 3.1 % (ref 0–5)
ERYTHROCYTE [DISTWIDTH] IN BLOOD BY AUTOMATED COUNT: 14.8 % (ref 11.5–14.5)
GLUCOSE BLD-MCNC: 115 MG/DL (ref 70–99)
GLUCOSE BLD-MCNC: 124 MG/DL (ref 70–99)
GLUCOSE BLD-MCNC: 125 MG/DL (ref 70–99)
GLUCOSE BLD-MCNC: 132 MG/DL (ref 70–99)
GLUCOSE SERPL-MCNC: 128 MG/DL (ref 74–109)
HCT VFR BLD AUTO: 27.8 % (ref 37–47)
HGB BLD-MCNC: 9.2 G/DL (ref 12–16)
IMM GRANULOCYTES # BLD: 0.1 K/UL
LYMPHOCYTES # BLD: 1.9 K/UL (ref 1.1–4.5)
LYMPHOCYTES NFR BLD: 20.4 % (ref 20–40)
MAGNESIUM SERPL-MCNC: 2 MG/DL (ref 1.6–2.6)
MCH RBC QN AUTO: 35.1 PG (ref 27–31)
MCHC RBC AUTO-ENTMCNC: 33.1 G/DL (ref 33–37)
MCV RBC AUTO: 106.1 FL (ref 81–99)
MONOCYTES # BLD: 0.4 K/UL (ref 0–0.9)
MONOCYTES NFR BLD: 4.7 % (ref 0–10)
NEUTROPHILS # BLD: 6.6 K/UL (ref 1.5–7.5)
NEUTS SEG NFR BLD: 69.9 % (ref 50–65)
PERFORMED ON: ABNORMAL
PHOSPHATE SERPL-MCNC: 3.1 MG/DL (ref 2.5–4.5)
PLATELET # BLD AUTO: 106 K/UL (ref 130–400)
PMV BLD AUTO: 10 FL (ref 9.4–12.3)
POTASSIUM SERPL-SCNC: 4 MMOL/L (ref 3.5–5)
PROT SERPL-MCNC: 5 G/DL (ref 6.6–8.7)
RBC # BLD AUTO: 2.62 M/UL (ref 4.2–5.4)
SODIUM SERPL-SCNC: 140 MMOL/L (ref 136–145)
WBC # BLD AUTO: 9.4 K/UL (ref 4.8–10.8)

## 2023-12-13 PROCEDURE — 84100 ASSAY OF PHOSPHORUS: CPT

## 2023-12-13 PROCEDURE — 83735 ASSAY OF MAGNESIUM: CPT

## 2023-12-13 PROCEDURE — APPSS15 APP SPLIT SHARED TIME 0-15 MINUTES: Performed by: NURSE PRACTITIONER

## 2023-12-13 PROCEDURE — 6370000000 HC RX 637 (ALT 250 FOR IP): Performed by: PHYSICIAN ASSISTANT

## 2023-12-13 PROCEDURE — 99231 SBSQ HOSP IP/OBS SF/LOW 25: CPT | Performed by: INTERNAL MEDICINE

## 2023-12-13 PROCEDURE — 99232 SBSQ HOSP IP/OBS MODERATE 35: CPT | Performed by: PHYSICIAN ASSISTANT

## 2023-12-13 PROCEDURE — 2580000003 HC RX 258: Performed by: HOSPITALIST

## 2023-12-13 PROCEDURE — 6370000000 HC RX 637 (ALT 250 FOR IP): Performed by: INTERNAL MEDICINE

## 2023-12-13 PROCEDURE — 82962 GLUCOSE BLOOD TEST: CPT

## 2023-12-13 PROCEDURE — 2500000003 HC RX 250 WO HCPCS: Performed by: HOSPITALIST

## 2023-12-13 PROCEDURE — 2580000003 HC RX 258: Performed by: INTERNAL MEDICINE

## 2023-12-13 PROCEDURE — 85025 COMPLETE CBC W/AUTO DIFF WBC: CPT

## 2023-12-13 PROCEDURE — 97530 THERAPEUTIC ACTIVITIES: CPT

## 2023-12-13 PROCEDURE — 6370000000 HC RX 637 (ALT 250 FOR IP): Performed by: HOSPITALIST

## 2023-12-13 PROCEDURE — 80053 COMPREHEN METABOLIC PANEL: CPT

## 2023-12-13 PROCEDURE — 94760 N-INVAS EAR/PLS OXIMETRY 1: CPT

## 2023-12-13 PROCEDURE — 1210000000 HC MED SURG R&B

## 2023-12-13 RX ORDER — SODIUM CHLORIDE, SODIUM LACTATE, POTASSIUM CHLORIDE, CALCIUM CHLORIDE 600; 310; 30; 20 MG/100ML; MG/100ML; MG/100ML; MG/100ML
INJECTION, SOLUTION INTRAVENOUS CONTINUOUS
Status: DISCONTINUED | OUTPATIENT
Start: 2023-12-13 | End: 2023-12-22 | Stop reason: HOSPADM

## 2023-12-13 RX ADMIN — SODIUM CHLORIDE, SODIUM LACTATE, POTASSIUM CHLORIDE, AND CALCIUM CHLORIDE: 600; 310; 30; 20 INJECTION, SOLUTION INTRAVENOUS at 13:31

## 2023-12-13 RX ADMIN — SODIUM CHLORIDE, PRESERVATIVE FREE 10 ML: 5 INJECTION INTRAVENOUS at 07:51

## 2023-12-13 RX ADMIN — Medication 5000 UNITS: at 07:50

## 2023-12-13 RX ADMIN — Medication 500 MG: at 21:52

## 2023-12-13 RX ADMIN — LAMOTRIGINE 100 MG: 100 TABLET ORAL at 21:52

## 2023-12-13 RX ADMIN — Medication 500 MG: at 11:21

## 2023-12-13 RX ADMIN — DOXYCYCLINE 100 MG: 100 INJECTION, POWDER, LYOPHILIZED, FOR SOLUTION INTRAVENOUS at 19:53

## 2023-12-13 RX ADMIN — Medication 500 MG: at 07:50

## 2023-12-13 RX ADMIN — BACITRACIN ZINC NEOMYCIN SULFATE POLYMYXIN B SULFATE: 400; 3.5; 5 OINTMENT TOPICAL at 07:51

## 2023-12-13 RX ADMIN — Medication 500 MG: at 17:32

## 2023-12-13 RX ADMIN — DOXYCYCLINE 100 MG: 100 INJECTION, POWDER, LYOPHILIZED, FOR SOLUTION INTRAVENOUS at 07:52

## 2023-12-13 RX ADMIN — ACETAMINOPHEN 500 MG: 500 TABLET ORAL at 21:53

## 2023-12-13 RX ADMIN — LAMOTRIGINE 100 MG: 100 TABLET ORAL at 07:50

## 2023-12-13 ASSESSMENT — PAIN SCALES - WONG BAKER
WONGBAKER_NUMERICALRESPONSE: 0
WONGBAKER_NUMERICALRESPONSE: 4
WONGBAKER_NUMERICALRESPONSE: 2

## 2023-12-13 ASSESSMENT — PAIN DESCRIPTION - ORIENTATION: ORIENTATION: LEFT

## 2023-12-13 ASSESSMENT — PAIN DESCRIPTION - PAIN TYPE: TYPE: ACUTE PAIN

## 2023-12-13 ASSESSMENT — ENCOUNTER SYMPTOMS
DIARRHEA: 1
TROUBLE SWALLOWING: 1
COUGH: 0
SHORTNESS OF BREATH: 1
VOMITING: 1

## 2023-12-13 ASSESSMENT — PAIN - FUNCTIONAL ASSESSMENT: PAIN_FUNCTIONAL_ASSESSMENT: ACTIVITIES ARE NOT PREVENTED

## 2023-12-13 ASSESSMENT — PAIN DESCRIPTION - FREQUENCY: FREQUENCY: CONTINUOUS

## 2023-12-13 ASSESSMENT — PAIN DESCRIPTION - LOCATION: LOCATION: KNEE

## 2023-12-13 ASSESSMENT — PAIN DESCRIPTION - ONSET: ONSET: ON-GOING

## 2023-12-13 ASSESSMENT — PAIN DESCRIPTION - DESCRIPTORS: DESCRIPTORS: OTHER (COMMENT)
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
## 2023-12-13 NOTE — PROGRESS NOTES
Occupational Therapy     12/13/23 1030   Subjective   Subjective Pt in bed upon arrival for therapy. Pt agreeable to participate. Pain Assessment   Pain Assessment None - Denies Pain   Cognition   Overall Cognitive Status Exceptions   Orientation   Overall Orientation Status X   Orientation Level Oriented to person;Oriented to situation;Disoriented to time   Bed Mobility Training   Bed Mobility Training Yes   Overall Level of Assistance Moderate assistance;Maximum assistance   Interventions Verbal cues; Tactile cues;Manual cues   Supine to Sit Moderate assistance;Maximum assistance   Sit to Supine Maximum assistance   Scooting Moderate assistance;Maximum assistance   Transfer Training   Transfer Training No   Balance   Sitting Impaired   Sitting - Static Occasional;Poor (constant support)   Sitting - Dynamic Occasional;Poor (constant support)   Standing Impaired   Standing - Static Not tested   Standing - Dynamic Not tested   ADL   Feeding Maximum assistance   Grooming Maximum assistance   UE Bathing Maximum assistance   LE Bathing Dependent/Total   UE Dressing Maximum assistance   LE Dressing Dependent/Total   Toileting Dependent/Total   Additional Comments Able to assist with RUE. Assessment   Assessment Tx focused on sitting EOB to work on trunk strength and postural control. Pt limited by fatigue but tolerated sitting EOB for 10 mins. Pt assisted back to bed.    Activity Tolerance Patient limited by fatigue   Discharge Recommendations Patient would benefit from continued therapy after discharge;24 hour supervision or assist   Occupational Therapy Plan   Times Per Week 3-5x/week   Times Per Day Once a day   OT Plan of Care   Wednesday X     Electronically signed by STEVE Tejada on 12/13/2023 at 1:04 PM

## 2023-12-13 NOTE — PROGRESS NOTES
Physical Therapy  Name: Shanta Current  MRN:  [id number]  Date of service:  12/13/2023 12/13/23 1103   Restrictions/Precautions   Restrictions/Precautions Fall Risk   General   Family / Caregiver Present No   Subjective   Subjective Pt. willing to try. \"I Clearance Ho go home. \"   Oxygen Therapy   O2 Device None (Room air)   Bed Mobility   Supine to Sit Dependent/Total   Sit to Supine Dependent/Total   Other Activities   Comment sat EOB working on sitting balance and core strength x 10 minutes   Patient Goals    Patient Goals  go home   Short Term Goals   Time Frame for Short Term Goals 2 wks   Short Term Goal 1 supine to sit SBA   Short Term Goal 2 sit to stand SBA   Short Term Goal 3 bed to chair Min A   Short Term Goal 4 amb. 10' with RW CGA   Conditions Requiring Skilled Therapeutic Intervention   Body Structures, Functions, Activity Limitations Requiring Skilled Therapeutic Intervention Decreased functional mobility ; Decreased ADL status; Decreased ROM; Decreased body mechanics; Decreased cognition;Decreased safe awareness;Decreased endurance;Decreased balance;Decreased strength; Increased pain;Decreased posture;Decreased coordination   Treatment Diagnosis impaired mobility   Barriers to Learning cognition due to prior TBI   Activity Tolerance   Activity Tolerance Patient limited by fatigue;Patient limited by endurance   Physical Therapy Plan   General Plan 6-7 times per week   Therapy Duration 2 Weeks   Current Treatment Recommendations Strengthening;ROM;Balance training;Functional mobility training;Transfer training;Gait training; Endurance training;Patient/Caregiver education & training; Safety education & training;Positioning; Therapeutic activities; Equipment evaluation, education, & procurement   PT Plan of Care   Wednesday X   Safety Devices   Type of Devices Bed alarm in place;Call light within reach; Left in bed         Electronically signed by Catarino Padilla PTA on 12/13/2023 at 11:06 AM

## 2023-12-13 NOTE — PROGRESS NOTES
Comprehensive Nutrition Assessment    Type and Reason for Visit:  Reassess    Nutrition Recommendations/Plan:   Continue POC     Malnutrition Assessment:  Malnutrition Status:  Severe malnutrition (12/13/23 1344)    Context:  Acute Illness     Findings of the 6 clinical characteristics of malnutrition:  Energy Intake:  No significant decrease in energy intake  Weight Loss:  No significant weight loss     Body Fat Loss: Moderate body fat loss Orbital, Buccal region   Muscle Mass Loss: Moderate muscle mass loss Clavicles (pectoralis & deltoids)  Fluid Accumulation:  No significant fluid accumulation Extremities   Strength:  Not Performed    Nutrition Assessment:    Pt meets the criteria for Severe Malnutrition AEB moderate fat and muscle loss. PO intake has improved and currently meets nutritional needs. Last BM noted 12/12. Continue POC. Current Nutrition Intake & Therapies:    Average Meal Intake: %  ADULT DIET; Dysphagia - Minced and Moist; Moderately Thick (Honey); extra sauces and gravies; likes chocolate milk and sweat tea (send double portion drinks); likes cheeseburger with ketchup and howard; spaghettis; meatloaf, dislikes carrots  ADULT ORAL NUTRITION SUPPLEMENT; Dinner, Lunch; Frozen Oral Supplement    Anthropometric Measures:  Height: 162.6 cm (5' 4.02\")  Ideal Body Weight (IBW): 120 lbs (55 kg)    Admission Body Weight: 45.4 kg (100 lb)  Current Body Weight: 45.5 kg (100 lb 5 oz), 83.6 % IBW.     Current BMI (kg/m2): 17.2  Usual Body Weight: 68 kg (149 lb 14.6 oz) (11/2023)  % Weight Change (Calculated): -33.1  BMI Categories: Underweight (BMI less than 18.5)    Estimated Daily Nutrient Needs:  Energy Requirements Based On: Kcal/kg  Weight Used for Energy Requirements: Current  Energy (kcal/day): 8769-7833 kcals/day  Weight Used for Protein Requirements: Current  Protein (g/day): 68-91 g/protein/day  Method Used for Fluid Requirements: 1 ml/kcal  Fluid (ml/day): 9995-9607 mL/day    Nutrition

## 2023-12-14 ENCOUNTER — APPOINTMENT (OUTPATIENT)
Dept: CT IMAGING | Age: 48
DRG: 871 | End: 2023-12-14
Payer: MEDICARE

## 2023-12-14 LAB
ANION GAP SERPL CALCULATED.3IONS-SCNC: 8 MMOL/L (ref 7–19)
BASOPHILS # BLD: 0 K/UL (ref 0–0.2)
BASOPHILS NFR BLD: 0.6 % (ref 0–1)
BUN SERPL-MCNC: 8 MG/DL (ref 6–20)
CALCIUM SERPL-MCNC: 8.2 MG/DL (ref 8.6–10)
CHLORIDE SERPL-SCNC: 110 MMOL/L (ref 98–111)
CO2 SERPL-SCNC: 21 MMOL/L (ref 22–29)
CREAT SERPL-MCNC: 0.5 MG/DL (ref 0.5–0.9)
EOSINOPHIL # BLD: 0.2 K/UL (ref 0–0.6)
EOSINOPHIL NFR BLD: 3.2 % (ref 0–5)
ERYTHROCYTE [DISTWIDTH] IN BLOOD BY AUTOMATED COUNT: 14.5 % (ref 11.5–14.5)
GLUCOSE BLD-MCNC: 101 MG/DL (ref 70–99)
GLUCOSE BLD-MCNC: 125 MG/DL (ref 70–99)
GLUCOSE BLD-MCNC: 149 MG/DL (ref 70–99)
GLUCOSE SERPL-MCNC: 104 MG/DL (ref 74–109)
HCT VFR BLD AUTO: 25.1 % (ref 37–47)
HGB BLD-MCNC: 8.2 G/DL (ref 12–16)
IMM GRANULOCYTES # BLD: 0.1 K/UL
LYMPHOCYTES # BLD: 2.2 K/UL (ref 1.1–4.5)
LYMPHOCYTES NFR BLD: 31.1 % (ref 20–40)
MAGNESIUM SERPL-MCNC: 1.8 MG/DL (ref 1.6–2.6)
MCH RBC QN AUTO: 35 PG (ref 27–31)
MCHC RBC AUTO-ENTMCNC: 32.7 G/DL (ref 33–37)
MCV RBC AUTO: 107.3 FL (ref 81–99)
MONOCYTES # BLD: 0.3 K/UL (ref 0–0.9)
MONOCYTES NFR BLD: 4.6 % (ref 0–10)
NEUTROPHILS # BLD: 4.2 K/UL (ref 1.5–7.5)
NEUTS SEG NFR BLD: 59 % (ref 50–65)
PERFORMED ON: ABNORMAL
PLATELET # BLD AUTO: 109 K/UL (ref 130–400)
PMV BLD AUTO: 10.2 FL (ref 9.4–12.3)
POTASSIUM SERPL-SCNC: 4 MMOL/L (ref 3.5–5)
RBC # BLD AUTO: 2.34 M/UL (ref 4.2–5.4)
SODIUM SERPL-SCNC: 139 MMOL/L (ref 136–145)
WBC # BLD AUTO: 7.1 K/UL (ref 4.8–10.8)

## 2023-12-14 PROCEDURE — 6370000000 HC RX 637 (ALT 250 FOR IP): Performed by: PHYSICIAN ASSISTANT

## 2023-12-14 PROCEDURE — 99232 SBSQ HOSP IP/OBS MODERATE 35: CPT | Performed by: PHYSICIAN ASSISTANT

## 2023-12-14 PROCEDURE — 94760 N-INVAS EAR/PLS OXIMETRY 1: CPT

## 2023-12-14 PROCEDURE — 6370000000 HC RX 637 (ALT 250 FOR IP): Performed by: INTERNAL MEDICINE

## 2023-12-14 PROCEDURE — 74176 CT ABD & PELVIS W/O CONTRAST: CPT

## 2023-12-14 PROCEDURE — 1210000000 HC MED SURG R&B

## 2023-12-14 PROCEDURE — 83735 ASSAY OF MAGNESIUM: CPT

## 2023-12-14 PROCEDURE — 85025 COMPLETE CBC W/AUTO DIFF WBC: CPT

## 2023-12-14 PROCEDURE — 6370000000 HC RX 637 (ALT 250 FOR IP): Performed by: HOSPITALIST

## 2023-12-14 PROCEDURE — 82962 GLUCOSE BLOOD TEST: CPT

## 2023-12-14 PROCEDURE — 2500000003 HC RX 250 WO HCPCS: Performed by: HOSPITALIST

## 2023-12-14 PROCEDURE — 99232 SBSQ HOSP IP/OBS MODERATE 35: CPT | Performed by: INTERNAL MEDICINE

## 2023-12-14 PROCEDURE — 2580000003 HC RX 258: Performed by: HOSPITALIST

## 2023-12-14 PROCEDURE — 6360000002 HC RX W HCPCS: Performed by: PHYSICIAN ASSISTANT

## 2023-12-14 PROCEDURE — 80048 BASIC METABOLIC PNL TOTAL CA: CPT

## 2023-12-14 RX ORDER — MIRTAZAPINE 7.5 MG/1
7.5 TABLET, FILM COATED ORAL NIGHTLY
Status: DISCONTINUED | OUTPATIENT
Start: 2023-12-14 | End: 2023-12-19

## 2023-12-14 RX ADMIN — LAMOTRIGINE 100 MG: 100 TABLET ORAL at 09:12

## 2023-12-14 RX ADMIN — Medication 500 MG: at 16:38

## 2023-12-14 RX ADMIN — BACITRACIN ZINC NEOMYCIN SULFATE POLYMYXIN B SULFATE: 400; 3.5; 5 OINTMENT TOPICAL at 09:11

## 2023-12-14 RX ADMIN — Medication 500 MG: at 09:12

## 2023-12-14 RX ADMIN — MIRTAZAPINE 7.5 MG: 7.5 TABLET ORAL at 20:58

## 2023-12-14 RX ADMIN — Medication 500 MG: at 11:58

## 2023-12-14 RX ADMIN — Medication 500 MG: at 20:58

## 2023-12-14 RX ADMIN — Medication 5000 UNITS: at 09:12

## 2023-12-14 RX ADMIN — BACITRACIN ZINC NEOMYCIN SULFATE POLYMYXIN B SULFATE: 400; 3.5; 5 OINTMENT TOPICAL at 20:56

## 2023-12-14 RX ADMIN — DOXYCYCLINE 100 MG: 100 INJECTION, POWDER, LYOPHILIZED, FOR SOLUTION INTRAVENOUS at 09:13

## 2023-12-14 RX ADMIN — ACETAMINOPHEN 500 MG: 500 TABLET ORAL at 09:36

## 2023-12-14 RX ADMIN — LAMOTRIGINE 100 MG: 100 TABLET ORAL at 20:56

## 2023-12-14 RX ADMIN — ONDANSETRON 4 MG: 2 INJECTION INTRAMUSCULAR; INTRAVENOUS at 14:01

## 2023-12-14 RX ADMIN — DOXYCYCLINE 100 MG: 100 INJECTION, POWDER, LYOPHILIZED, FOR SOLUTION INTRAVENOUS at 18:38

## 2023-12-14 ASSESSMENT — PAIN SCALES - GENERAL: PAINLEVEL_OUTOF10: 5

## 2023-12-14 ASSESSMENT — PAIN DESCRIPTION - ORIENTATION: ORIENTATION: RIGHT;LEFT

## 2023-12-14 ASSESSMENT — PAIN DESCRIPTION - DESCRIPTORS: DESCRIPTORS: ACHING

## 2023-12-14 ASSESSMENT — PAIN DESCRIPTION - LOCATION: LOCATION: KNEE
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
## 2023-12-14 NOTE — PROGRESS NOTES
Physician Progress Note      Matt Gold  CSN #:                  [id number]  :                       1975  ADMIT DATE:       2023 12:09 AM  DISCH DATE:  RESPONDING  PROVIDER #:        Gerard Wild MD          QUERY TEXT:    Patient admitted with Sepsis and Septic Shock . Noted to have Severe   Malnutrition documented by Dietary on , , and . If possible,   please document in progress notes and discharge summary if you are evaluating   and /or treating any of the following: The medical record reflects the following:  Risk Factors: Sepsis, PNA  Clinical Indicators: Noted trouble swallowing since admission, Dietary has 3   consult notes for severe malnutrition. \"moderate body fat loss orbital, Buccal   Regions\", \"moderate muscle mass loss\". Treatment: Dysphagia diet, Adult ONS added      ASPEN Criteria:    https://aspenjournals. onlinelibrary. levine. com/doi/full/10.1177/374520291495110  [address] [facility] [address], RN-BSN, [facility]  Clinical   O. 85 99 60. [email or web address] [email or web address]  [employer]  Options provided:  -- Protein calorie malnutrition severe  -- Other - I will add my own diagnosis  -- Disagree - Not applicable / Not valid  -- Disagree - Clinically unable to determine / Unknown  -- Refer to Clinical Documentation Reviewer    PROVIDER RESPONSE TEXT:    This patient has severe protein calorie malnutrition.     Query created by: Khari Levy on 2023 1:36 PM      Electronically signed by:  Gerard Wild MD 2023 3:35 PM

## 2023-12-15 LAB
ALBUMIN SERPL-MCNC: 2.51 G/DL (ref 3.75–5.01)
ALPHA1 GLOB SERPL ELPH-MCNC: 0.52 G/DL (ref 0.19–0.46)
ALPHA2 GLOB SERPL ELPH-MCNC: 0.55 G/DL (ref 0.48–1.05)
ANION GAP SERPL CALCULATED.3IONS-SCNC: 13 MMOL/L (ref 7–19)
B-GLOBULIN SERPL ELPH-MCNC: 0.68 G/DL (ref 0.48–1.1)
BASOPHILS # BLD: 0 K/UL (ref 0–0.2)
BASOPHILS NFR BLD: 0.5 % (ref 0–1)
BLOOD BANK DISPENSE STATUS: NORMAL
BLOOD BANK PRODUCT CODE: NORMAL
BPU ID: NORMAL
BUN SERPL-MCNC: 7 MG/DL (ref 6–20)
CALCIUM SERPL-MCNC: 8.6 MG/DL (ref 8.6–10)
CHLORIDE SERPL-SCNC: 108 MMOL/L (ref 98–111)
CO2 SERPL-SCNC: 23 MMOL/L (ref 22–29)
CREAT SERPL-MCNC: 0.6 MG/DL (ref 0.5–0.9)
DESCRIPTION BLOOD BANK: NORMAL
EOSINOPHIL # BLD: 0.2 K/UL (ref 0–0.6)
EOSINOPHIL NFR BLD: 2.9 % (ref 0–5)
ERYTHROCYTE [DISTWIDTH] IN BLOOD BY AUTOMATED COUNT: 14.4 % (ref 11.5–14.5)
GAMMA GLOB SERPL ELPH-MCNC: 0.76 G/DL (ref 0.62–1.51)
GLUCOSE BLD-MCNC: 101 MG/DL (ref 70–99)
GLUCOSE BLD-MCNC: 101 MG/DL (ref 70–99)
GLUCOSE BLD-MCNC: 117 MG/DL (ref 70–99)
GLUCOSE SERPL-MCNC: 94 MG/DL (ref 74–109)
HCT VFR BLD AUTO: 25.5 % (ref 37–47)
HGB BLD-MCNC: 8.2 G/DL (ref 12–16)
IMM GRANULOCYTES # BLD: 0.1 K/UL
INTERPRETATION SERPL IFE-IMP: ABNORMAL
LYMPHOCYTES # BLD: 2.3 K/UL (ref 1.1–4.5)
LYMPHOCYTES NFR BLD: 29.1 % (ref 20–40)
MAGNESIUM SERPL-MCNC: 1.6 MG/DL (ref 1.6–2.6)
MCH RBC QN AUTO: 34.6 PG (ref 27–31)
MCHC RBC AUTO-ENTMCNC: 32.2 G/DL (ref 33–37)
MCV RBC AUTO: 107.6 FL (ref 81–99)
MONOCYTES # BLD: 0.4 K/UL (ref 0–0.9)
MONOCYTES NFR BLD: 4.8 % (ref 0–10)
NEUTROPHILS # BLD: 4.9 K/UL (ref 1.5–7.5)
NEUTS SEG NFR BLD: 61.6 % (ref 50–65)
PERFORMED ON: ABNORMAL
PLATELET # BLD AUTO: 180 K/UL (ref 130–400)
PMV BLD AUTO: 10.1 FL (ref 9.4–12.3)
POTASSIUM SERPL-SCNC: 4 MMOL/L (ref 3.5–5)
PROT SERPL-MCNC: 5 G/DL (ref 6.3–8.2)
PROTEIN ELECTROPHORESIS, SERUM: ABNORMAL
RBC # BLD AUTO: 2.37 M/UL (ref 4.2–5.4)
SODIUM SERPL-SCNC: 144 MMOL/L (ref 136–145)
WBC # BLD AUTO: 7.9 K/UL (ref 4.8–10.8)

## 2023-12-15 PROCEDURE — 2580000003 HC RX 258: Performed by: INTERNAL MEDICINE

## 2023-12-15 PROCEDURE — 6370000000 HC RX 637 (ALT 250 FOR IP): Performed by: PHYSICIAN ASSISTANT

## 2023-12-15 PROCEDURE — 82962 GLUCOSE BLOOD TEST: CPT

## 2023-12-15 PROCEDURE — 97535 SELF CARE MNGMENT TRAINING: CPT

## 2023-12-15 PROCEDURE — 85025 COMPLETE CBC W/AUTO DIFF WBC: CPT

## 2023-12-15 PROCEDURE — 83735 ASSAY OF MAGNESIUM: CPT

## 2023-12-15 PROCEDURE — 80048 BASIC METABOLIC PNL TOTAL CA: CPT

## 2023-12-15 PROCEDURE — 97530 THERAPEUTIC ACTIVITIES: CPT

## 2023-12-15 PROCEDURE — 6370000000 HC RX 637 (ALT 250 FOR IP): Performed by: INTERNAL MEDICINE

## 2023-12-15 PROCEDURE — 1210000000 HC MED SURG R&B

## 2023-12-15 PROCEDURE — 94760 N-INVAS EAR/PLS OXIMETRY 1: CPT

## 2023-12-15 PROCEDURE — 36415 COLL VENOUS BLD VENIPUNCTURE: CPT

## 2023-12-15 PROCEDURE — 6370000000 HC RX 637 (ALT 250 FOR IP): Performed by: HOSPITALIST

## 2023-12-15 RX ADMIN — Medication 500 MG: at 12:38

## 2023-12-15 RX ADMIN — LAMOTRIGINE 100 MG: 100 TABLET ORAL at 20:25

## 2023-12-15 RX ADMIN — MIRTAZAPINE 7.5 MG: 7.5 TABLET ORAL at 20:25

## 2023-12-15 RX ADMIN — LAMOTRIGINE 100 MG: 100 TABLET ORAL at 12:04

## 2023-12-15 RX ADMIN — SODIUM CHLORIDE, PRESERVATIVE FREE 10 ML: 5 INJECTION INTRAVENOUS at 20:25

## 2023-12-15 NOTE — PROGRESS NOTES
Physical Therapy  Karmen Gold  [id number]                                                        2 attempts made. Unable to arouse pt. Will follow up and progress as pt tolerates.      Electronically signed by Erum Arshad PTA on 12/15/2023 at 11:20 AM
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
## 2023-12-15 NOTE — PROGRESS NOTES
tested         Patient Education  Education Given To: Patient  Education Provided: Role of Therapy; ADL Adaptive Strategies  Education Method: Demonstration  Barriers to Learning: None  Education Outcome: Verbalized understanding    Overall Orientation Status: Impaired    Cognition  Overall Cognitive Status: Exceptions  Following Commands: Follows one step commands with repetition; Follows one step commands with increased time  Attention Span: Attends with cues to redirect; Difficulty attending to directions  Memory: Decreased short term memory;Decreased recall of biographical Information;Decreased recall of precautions;Decreased recall of recent events  Safety Judgement: Decreased awareness of need for assistance;Decreased awareness of need for safety  Problem Solving: Assistance required to generate solutions;Assistance required to implement solutions;Decreased awareness of errors  Insights: Decreased awareness of deficits  Initiation: Requires cues for some  Sequencing: Requires cues for some          Plan:   Times Per Week: 3-5x/week    Goals (On-Going):  Short Term Goals  Time Frame for Short Term Goals: 1 week  Short Term Goal 1: Tolerate standing 30 seconds with Idalia to help with self care  Short Term Goal 2: Idalia of 2 with transfers to toilet or recliner  Short Term Goal 3: Supervision with sitting balance on EOB  Long Term Goals  Long Term Goal 1: Return to [facility]    Electronically signed by Joesphine Schilder, OT on 12/15/2023 at 2:32 PM

## 2023-12-16 LAB
ANION GAP SERPL CALCULATED.3IONS-SCNC: 11 MMOL/L (ref 7–19)
BASOPHILS # BLD: 0.1 K/UL (ref 0–0.2)
BASOPHILS NFR BLD: 0.5 % (ref 0–1)
BUN SERPL-MCNC: 7 MG/DL (ref 6–20)
CALCIUM SERPL-MCNC: 8.3 MG/DL (ref 8.6–10)
CHLORIDE SERPL-SCNC: 101 MMOL/L (ref 98–111)
CO2 SERPL-SCNC: 22 MMOL/L (ref 22–29)
CREAT SERPL-MCNC: 0.6 MG/DL (ref 0.5–0.9)
EOSINOPHIL # BLD: 0.2 K/UL (ref 0–0.6)
EOSINOPHIL NFR BLD: 2.6 % (ref 0–5)
ERYTHROCYTE [DISTWIDTH] IN BLOOD BY AUTOMATED COUNT: 14 % (ref 11.5–14.5)
GLUCOSE BLD-MCNC: 102 MG/DL (ref 70–99)
GLUCOSE BLD-MCNC: 109 MG/DL (ref 70–99)
GLUCOSE BLD-MCNC: 154 MG/DL (ref 70–99)
GLUCOSE BLD-MCNC: 96 MG/DL (ref 70–99)
GLUCOSE SERPL-MCNC: 98 MG/DL (ref 74–109)
HCT VFR BLD AUTO: 26.5 % (ref 37–47)
HGB BLD-MCNC: 8.9 G/DL (ref 12–16)
IMM GRANULOCYTES # BLD: 0.1 K/UL
LYMPHOCYTES # BLD: 2.1 K/UL (ref 1.1–4.5)
LYMPHOCYTES NFR BLD: 22.8 % (ref 20–40)
MAGNESIUM SERPL-MCNC: 1.6 MG/DL (ref 1.6–2.6)
MCH RBC QN AUTO: 35.5 PG (ref 27–31)
MCHC RBC AUTO-ENTMCNC: 33.6 G/DL (ref 33–37)
MCV RBC AUTO: 105.6 FL (ref 81–99)
MONOCYTES # BLD: 0.3 K/UL (ref 0–0.9)
MONOCYTES NFR BLD: 3.7 % (ref 0–10)
NEUTROPHILS # BLD: 6.5 K/UL (ref 1.5–7.5)
NEUTS SEG NFR BLD: 69.9 % (ref 50–65)
PERFORMED ON: ABNORMAL
PERFORMED ON: NORMAL
PLATELET # BLD AUTO: 218 K/UL (ref 130–400)
PMV BLD AUTO: 9.8 FL (ref 9.4–12.3)
POTASSIUM SERPL-SCNC: 3.4 MMOL/L (ref 3.5–5)
RBC # BLD AUTO: 2.51 M/UL (ref 4.2–5.4)
SODIUM SERPL-SCNC: 134 MMOL/L (ref 136–145)
WBC # BLD AUTO: 9.3 K/UL (ref 4.8–10.8)

## 2023-12-16 PROCEDURE — 80048 BASIC METABOLIC PNL TOTAL CA: CPT

## 2023-12-16 PROCEDURE — 83735 ASSAY OF MAGNESIUM: CPT

## 2023-12-16 PROCEDURE — 6370000000 HC RX 637 (ALT 250 FOR IP): Performed by: HOSPITALIST

## 2023-12-16 PROCEDURE — 6370000000 HC RX 637 (ALT 250 FOR IP): Performed by: INTERNAL MEDICINE

## 2023-12-16 PROCEDURE — 94760 N-INVAS EAR/PLS OXIMETRY 1: CPT

## 2023-12-16 PROCEDURE — 99231 SBSQ HOSP IP/OBS SF/LOW 25: CPT | Performed by: INTERNAL MEDICINE

## 2023-12-16 PROCEDURE — 6370000000 HC RX 637 (ALT 250 FOR IP): Performed by: PHYSICIAN ASSISTANT

## 2023-12-16 PROCEDURE — 1210000000 HC MED SURG R&B

## 2023-12-16 PROCEDURE — 85025 COMPLETE CBC W/AUTO DIFF WBC: CPT

## 2023-12-16 PROCEDURE — 82962 GLUCOSE BLOOD TEST: CPT

## 2023-12-16 RX ADMIN — BACITRACIN ZINC NEOMYCIN SULFATE POLYMYXIN B SULFATE: 400; 3.5; 5 OINTMENT TOPICAL at 09:27

## 2023-12-16 RX ADMIN — Medication 500 MG: at 08:27

## 2023-12-16 RX ADMIN — Medication 500 MG: at 18:13

## 2023-12-16 RX ADMIN — Medication 5000 UNITS: at 08:27

## 2023-12-16 RX ADMIN — Medication 500 MG: at 14:52

## 2023-12-16 RX ADMIN — LAMOTRIGINE 100 MG: 100 TABLET ORAL at 08:27

## 2023-12-16 NOTE — PROGRESS NOTES
recognition program. Efforts were made to edit the dictations but occasionally words are mis-transcribed.)      Joana Mejia MD    12/16/23  7:16 AM
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
## 2023-12-17 VITALS
DIASTOLIC BLOOD PRESSURE: 70 MMHG | SYSTOLIC BLOOD PRESSURE: 102 MMHG | BODY MASS INDEX: 21.37 KG/M2 | OXYGEN SATURATION: 100 % | WEIGHT: 125.19 LBS | HEART RATE: 103 BPM | HEIGHT: 64 IN | TEMPERATURE: 97 F | RESPIRATION RATE: 17 BRPM

## 2023-12-17 LAB
ANION GAP SERPL CALCULATED.3IONS-SCNC: 10 MMOL/L (ref 7–19)
BASOPHILS # BLD: 0 K/UL (ref 0–0.2)
BASOPHILS NFR BLD: 0.3 % (ref 0–1)
BUN SERPL-MCNC: 6 MG/DL (ref 6–20)
CALCIUM SERPL-MCNC: 8.2 MG/DL (ref 8.6–10)
CHLORIDE SERPL-SCNC: 103 MMOL/L (ref 98–111)
CO2 SERPL-SCNC: 23 MMOL/L (ref 22–29)
CREAT SERPL-MCNC: 0.6 MG/DL (ref 0.5–0.9)
EOSINOPHIL # BLD: 0.3 K/UL (ref 0–0.6)
EOSINOPHIL NFR BLD: 2.9 % (ref 0–5)
ERYTHROCYTE [DISTWIDTH] IN BLOOD BY AUTOMATED COUNT: 13.9 % (ref 11.5–14.5)
GLUCOSE BLD-MCNC: 100 MG/DL (ref 70–99)
GLUCOSE BLD-MCNC: 130 MG/DL (ref 70–99)
GLUCOSE BLD-MCNC: 151 MG/DL (ref 70–99)
GLUCOSE BLD-MCNC: 173 MG/DL (ref 70–99)
GLUCOSE SERPL-MCNC: 96 MG/DL (ref 74–109)
HCT VFR BLD AUTO: 25.9 % (ref 37–47)
HGB BLD-MCNC: 8.8 G/DL (ref 12–16)
IMM GRANULOCYTES # BLD: 0.1 K/UL
LYMPHOCYTES # BLD: 2.3 K/UL (ref 1.1–4.5)
LYMPHOCYTES NFR BLD: 23.3 % (ref 20–40)
MAGNESIUM SERPL-MCNC: 1.6 MG/DL (ref 1.6–2.6)
MCH RBC QN AUTO: 35.8 PG (ref 27–31)
MCHC RBC AUTO-ENTMCNC: 34 G/DL (ref 33–37)
MCV RBC AUTO: 105.3 FL (ref 81–99)
MONOCYTES # BLD: 0.3 K/UL (ref 0–0.9)
MONOCYTES NFR BLD: 3.1 % (ref 0–10)
NEUTROPHILS # BLD: 6.8 K/UL (ref 1.5–7.5)
NEUTS SEG NFR BLD: 69.8 % (ref 50–65)
PERFORMED ON: ABNORMAL
PLATELET # BLD AUTO: 221 K/UL (ref 130–400)
PMV BLD AUTO: 9.6 FL (ref 9.4–12.3)
POTASSIUM SERPL-SCNC: 3.3 MMOL/L (ref 3.5–5)
RBC # BLD AUTO: 2.46 M/UL (ref 4.2–5.4)
SODIUM SERPL-SCNC: 136 MMOL/L (ref 136–145)
WBC # BLD AUTO: 9.8 K/UL (ref 4.8–10.8)

## 2023-12-17 PROCEDURE — 1210000000 HC MED SURG R&B

## 2023-12-17 PROCEDURE — 80048 BASIC METABOLIC PNL TOTAL CA: CPT

## 2023-12-17 PROCEDURE — 6370000000 HC RX 637 (ALT 250 FOR IP): Performed by: HOSPITALIST

## 2023-12-17 PROCEDURE — 85025 COMPLETE CBC W/AUTO DIFF WBC: CPT

## 2023-12-17 PROCEDURE — 82962 GLUCOSE BLOOD TEST: CPT

## 2023-12-17 PROCEDURE — 6370000000 HC RX 637 (ALT 250 FOR IP): Performed by: INTERNAL MEDICINE

## 2023-12-17 PROCEDURE — 6370000000 HC RX 637 (ALT 250 FOR IP): Performed by: PHYSICIAN ASSISTANT

## 2023-12-17 PROCEDURE — 2580000003 HC RX 258: Performed by: INTERNAL MEDICINE

## 2023-12-17 PROCEDURE — 94760 N-INVAS EAR/PLS OXIMETRY 1: CPT

## 2023-12-17 PROCEDURE — 83735 ASSAY OF MAGNESIUM: CPT

## 2023-12-17 RX ADMIN — LAMOTRIGINE 100 MG: 100 TABLET ORAL at 08:06

## 2023-12-17 RX ADMIN — Medication 500 MG: at 08:05

## 2023-12-17 RX ADMIN — Medication 5000 UNITS: at 08:06

## 2023-12-17 RX ADMIN — LAMOTRIGINE 100 MG: 100 TABLET ORAL at 20:47

## 2023-12-17 RX ADMIN — Medication 500 MG: at 17:25

## 2023-12-17 RX ADMIN — BACITRACIN ZINC NEOMYCIN SULFATE POLYMYXIN B SULFATE: 400; 3.5; 5 OINTMENT TOPICAL at 20:47

## 2023-12-17 RX ADMIN — POTASSIUM CHLORIDE 40 MEQ: 1500 TABLET, EXTENDED RELEASE ORAL at 06:23

## 2023-12-17 RX ADMIN — SODIUM CHLORIDE, PRESERVATIVE FREE 10 ML: 5 INJECTION INTRAVENOUS at 20:48

## 2023-12-17 RX ADMIN — BACITRACIN ZINC NEOMYCIN SULFATE POLYMYXIN B SULFATE: 400; 3.5; 5 OINTMENT TOPICAL at 08:09

## 2023-12-17 RX ADMIN — MIRTAZAPINE 7.5 MG: 7.5 TABLET ORAL at 20:48

## 2023-12-17 NOTE — PROGRESS NOTES
Frequency Provider Last Rate Last Admin    mirtazapine (REMERON) tablet 7.5 mg  7.5 mg Oral Nightly Lali Leger PA-C   7.5 mg at 12/15/23 2025    lactated ringers IV soln infusion   IntraVENous Continuous Don Mtz MD 30 mL/hr at 12/16/23 0318 Rate Change at 12/16/23 0318    potassium phosphate (monobasic) (K-PHOS) tablet 500 mg  500 mg Oral 4x Daily WC Guerrero Nadeen, DO   500 mg at 12/17/23 0805    sodium chloride flush 0.9 % injection 5-40 mL  5-40 mL IntraVENous PRN Guerrero Nadeen, DO   10 mL at 12/15/23 2025    0.9 % sodium chloride infusion  25 mL IntraVENous PRN Guerrero Nadeen, DO        acetaminophen (TYLENOL) tablet 500 mg  500 mg Oral Q4H PRN Jack Villarreal MD   500 mg at 12/14/23 4089    Or    acetaminophen (TYLENOL) suppository 650 mg  650 mg Rectal Q6H PRN Jack Villarreal MD        lamoTRIgine (LAMICTAL) tablet 100 mg  100 mg Oral BID Jack Villarreal MD   100 mg at 12/17/23 9998    vitamin D (CHOLECALCIFEROL) capsule 5,000 Units  5,000 Units Oral Daily Jack Villarreal MD   5,000 Units at 12/17/23 0806    potassium chloride 20 mEq/50 mL IVPB (Central Line)  20 mEq IntraVENous PRN Guerrero Nadeen, DO 50 mL/hr at 12/10/23 0838 20 mEq at 12/10/23 0838    potassium chloride (KLOR-CON M) extended release tablet 40 mEq  40 mEq Oral PRN Guerrero Nadeen, DO   40 mEq at 12/17/23 6966    Or    potassium bicarb-citric acid (EFFER-K) effervescent tablet 40 mEq  40 mEq Oral PRN Guerrero Nadeen, DO        Or    potassium chloride 10 mEq/100 mL IVPB (Peripheral Line)  10 mEq IntraVENous PRN Guerrero Nadeen, DO        glucose chewable tablet 16 g  4 tablet Oral PRN Guerrero Nadeen, DO        dextrose bolus 10% 125 mL  125 mL IntraVENous PRN Guerrero Nadeen, DO        Or    dextrose bolus 10% 250 mL  250 mL IntraVENous PRN Guerrero Nadeen, DO        glucagon injection 1 mg  1 mg SubCUTAneous PRN Guerrero Nadeen, DO        dextrose 10 % infusion

## 2023-12-17 NOTE — PROGRESS NOTES
Went to check on pt. Pt sleeping. She woke up when I said her name and touched her hand. I asked if she was doing ok and she shook her head yes. I offered her her night time meds and shook her head no. Apple juice offered, pt took a sip. She said she just wanted to sleep. Pt repositioned at this time. Will offer meds again later.

## 2023-12-18 LAB
ANION GAP SERPL CALCULATED.3IONS-SCNC: 11 MMOL/L (ref 7–19)
BASOPHILS # BLD: 0.1 K/UL (ref 0–0.2)
BASOPHILS NFR BLD: 0.4 % (ref 0–1)
BUN SERPL-MCNC: 4 MG/DL (ref 6–20)
CALCIUM SERPL-MCNC: 8.1 MG/DL (ref 8.6–10)
CHLORIDE SERPL-SCNC: 107 MMOL/L (ref 98–111)
CO2 SERPL-SCNC: 20 MMOL/L (ref 22–29)
CREAT SERPL-MCNC: 0.5 MG/DL (ref 0.5–0.9)
EOSINOPHIL # BLD: 0.2 K/UL (ref 0–0.6)
EOSINOPHIL NFR BLD: 1.9 % (ref 0–5)
ERYTHROCYTE [DISTWIDTH] IN BLOOD BY AUTOMATED COUNT: 14 % (ref 11.5–14.5)
GLUCOSE BLD-MCNC: 109 MG/DL (ref 70–99)
GLUCOSE BLD-MCNC: 120 MG/DL (ref 70–99)
GLUCOSE BLD-MCNC: 124 MG/DL (ref 70–99)
GLUCOSE BLD-MCNC: 124 MG/DL (ref 70–99)
GLUCOSE SERPL-MCNC: 102 MG/DL (ref 74–109)
HCT VFR BLD AUTO: 25.7 % (ref 37–47)
HGB BLD-MCNC: 8.4 G/DL (ref 12–16)
IMM GRANULOCYTES # BLD: 0.1 K/UL
LYMPHOCYTES # BLD: 2.5 K/UL (ref 1.1–4.5)
LYMPHOCYTES NFR BLD: 20.6 % (ref 20–40)
MAGNESIUM SERPL-MCNC: 1.5 MG/DL (ref 1.6–2.6)
MCH RBC QN AUTO: 35.4 PG (ref 27–31)
MCHC RBC AUTO-ENTMCNC: 32.7 G/DL (ref 33–37)
MCV RBC AUTO: 108.4 FL (ref 81–99)
MONOCYTES # BLD: 0.4 K/UL (ref 0–0.9)
MONOCYTES NFR BLD: 3.2 % (ref 0–10)
NEUTROPHILS # BLD: 9 K/UL (ref 1.5–7.5)
NEUTS SEG NFR BLD: 73.4 % (ref 50–65)
PERFORMED ON: ABNORMAL
PLATELET # BLD AUTO: 256 K/UL (ref 130–400)
PMV BLD AUTO: 10.1 FL (ref 9.4–12.3)
POTASSIUM SERPL-SCNC: 3.3 MMOL/L (ref 3.5–5)
POTASSIUM SERPL-SCNC: 4.7 MMOL/L (ref 3.5–5)
RBC # BLD AUTO: 2.37 M/UL (ref 4.2–5.4)
SODIUM SERPL-SCNC: 138 MMOL/L (ref 136–145)
WBC # BLD AUTO: 12.3 K/UL (ref 4.8–10.8)

## 2023-12-18 PROCEDURE — 6370000000 HC RX 637 (ALT 250 FOR IP): Performed by: PHYSICIAN ASSISTANT

## 2023-12-18 PROCEDURE — 97530 THERAPEUTIC ACTIVITIES: CPT

## 2023-12-18 PROCEDURE — 6360000002 HC RX W HCPCS: Performed by: INTERNAL MEDICINE

## 2023-12-18 PROCEDURE — 84132 ASSAY OF SERUM POTASSIUM: CPT

## 2023-12-18 PROCEDURE — 6370000000 HC RX 637 (ALT 250 FOR IP): Performed by: HOSPITALIST

## 2023-12-18 PROCEDURE — 82962 GLUCOSE BLOOD TEST: CPT

## 2023-12-18 PROCEDURE — 6370000000 HC RX 637 (ALT 250 FOR IP): Performed by: INTERNAL MEDICINE

## 2023-12-18 PROCEDURE — 85025 COMPLETE CBC W/AUTO DIFF WBC: CPT

## 2023-12-18 PROCEDURE — 80048 BASIC METABOLIC PNL TOTAL CA: CPT

## 2023-12-18 PROCEDURE — 1210000000 HC MED SURG R&B

## 2023-12-18 PROCEDURE — 94760 N-INVAS EAR/PLS OXIMETRY 1: CPT

## 2023-12-18 PROCEDURE — 83735 ASSAY OF MAGNESIUM: CPT

## 2023-12-18 RX ORDER — MAGNESIUM SULFATE IN WATER 40 MG/ML
2000 INJECTION, SOLUTION INTRAVENOUS ONCE
Status: COMPLETED | OUTPATIENT
Start: 2023-12-18 | End: 2023-12-18

## 2023-12-18 RX ADMIN — LAMOTRIGINE 100 MG: 100 TABLET ORAL at 09:22

## 2023-12-18 RX ADMIN — Medication 500 MG: at 12:40

## 2023-12-18 RX ADMIN — BACITRACIN ZINC NEOMYCIN SULFATE POLYMYXIN B SULFATE: 400; 3.5; 5 OINTMENT TOPICAL at 09:22

## 2023-12-18 RX ADMIN — Medication 500 MG: at 17:00

## 2023-12-18 RX ADMIN — MIRTAZAPINE 7.5 MG: 7.5 TABLET ORAL at 19:56

## 2023-12-18 RX ADMIN — Medication 5000 UNITS: at 09:22

## 2023-12-18 RX ADMIN — MAGNESIUM SULFATE HEPTAHYDRATE 2000 MG: 40 INJECTION, SOLUTION INTRAVENOUS at 14:32

## 2023-12-18 RX ADMIN — BACITRACIN ZINC NEOMYCIN SULFATE POLYMYXIN B SULFATE: 400; 3.5; 5 OINTMENT TOPICAL at 19:56

## 2023-12-18 RX ADMIN — Medication 500 MG: at 09:22

## 2023-12-18 RX ADMIN — LAMOTRIGINE 100 MG: 100 TABLET ORAL at 19:56

## 2023-12-18 RX ADMIN — POTASSIUM CHLORIDE 20 MEQ: 29.8 INJECTION, SOLUTION INTRAVENOUS at 17:01

## 2023-12-18 RX ADMIN — POTASSIUM CHLORIDE 20 MEQ: 29.8 INJECTION, SOLUTION INTRAVENOUS at 12:41

## 2023-12-18 RX ADMIN — Medication 500 MG: at 19:56

## 2023-12-18 NOTE — PROGRESS NOTES
Primary nurse and I see no rash on patient. Reorder WOCN prn.     Electronically signed by Shiv Luciano RN, [facility] on 12/18/2023 at 11:05 AM

## 2023-12-18 NOTE — PROGRESS NOTES
in the last 72 hours. No results for input(s): \"LACTA\" in the last 72 hours. Intake/Output Summary (Last 24 hours) at 12/18/2023 1415  Last data filed at 12/17/2023 1807  Gross per 24 hour   Intake --   Output 500 ml   Net -500 ml       No results found. Assessment and Plan:   51-year-old female with history of TBI and seizure disorder admitted for CAP, acute metabolic encephalopathy, and LORELEI. Completed course of antibiotics  Remains on room air  Tolerating diet  Neurology evaluated patient  Mental status back to baseline  Seizure precautions  Creatinine back to baseline  Replacing electrolytes  Heme-onc recs on board for bicytopenia  Follow CBC  Heme-onc since signed off  Outpatient f/u for bilateral avascular necrosis of humeral heads per CT imaging 12/8/2023  SCDs for DVT prophylaxis  Discussed treatment plan with patient/RN    The above note was generated using voice recognition software. Inadvertent typographical errors in transcription may have occurred.     Ruddy Babinski, MD   12/18/2023 2:15 PM

## 2023-12-18 NOTE — PROGRESS NOTES
Physical Therapy     12/18/23 1407   Subjective   Subjective Pt agrees to therapy   Pain Assessment   Pain Assessment None - Denies Pain   Bed Mobility   Supine to Sit Dependent/Total   Sit to Supine Dependent/Total   Scooting Dependent/Total   Comment BSS, requires assistance to maintain sitting balance   Patient Goals    Patient Goals  go home   Short Term Goals   Time Frame for Short Term Goals 2 wks   Short Term Goal 1 supine to sit SBA   Short Term Goal 2 sit to stand SBA   Short Term Goal 3 bed to chair Min A   Short Term Goal 4 amb. 10' with RW CGA   Conditions Requiring Skilled Therapeutic Intervention   Body Structures, Functions, Activity Limitations Requiring Skilled Therapeutic Intervention Decreased functional mobility ; Decreased ADL status; Decreased ROM; Decreased body mechanics; Decreased cognition;Decreased safe awareness;Decreased endurance;Decreased balance;Decreased strength; Increased pain;Decreased posture;Decreased coordination   Assessment Pt able to sit bedside with assistance for prolonged period.    Treatment Diagnosis impaired mobility   Therapy Prognosis Guarded   Decision Making Medium Complexity   Barriers to Learning cognition due to prior TBI   Treatment Initiated  gait, transfers, bed mobility   Activity Tolerance   Activity Tolerance Patient limited by fatigue;Patient limited by endurance   PT Plan of Care   Monday X   Safety Devices   Type of Devices Left in bed;Bed alarm in place;Call light within reach

## 2023-12-19 LAB
ANION GAP SERPL CALCULATED.3IONS-SCNC: 12 MMOL/L (ref 7–19)
BASOPHILS # BLD: 0.1 K/UL (ref 0–0.2)
BASOPHILS NFR BLD: 0.4 % (ref 0–1)
BUN SERPL-MCNC: 5 MG/DL (ref 6–20)
CALCIUM SERPL-MCNC: 8.8 MG/DL (ref 8.6–10)
CHLORIDE SERPL-SCNC: 105 MMOL/L (ref 98–111)
CO2 SERPL-SCNC: 21 MMOL/L (ref 22–29)
CREAT SERPL-MCNC: 0.5 MG/DL (ref 0.5–0.9)
EOSINOPHIL # BLD: 0.3 K/UL (ref 0–0.6)
EOSINOPHIL NFR BLD: 2.1 % (ref 0–5)
ERYTHROCYTE [DISTWIDTH] IN BLOOD BY AUTOMATED COUNT: 14.3 % (ref 11.5–14.5)
GLUCOSE BLD-MCNC: 122 MG/DL (ref 70–99)
GLUCOSE BLD-MCNC: 133 MG/DL (ref 70–99)
GLUCOSE BLD-MCNC: 172 MG/DL (ref 70–99)
GLUCOSE BLD-MCNC: 173 MG/DL (ref 70–99)
GLUCOSE SERPL-MCNC: 111 MG/DL (ref 74–109)
HCT VFR BLD AUTO: 27.6 % (ref 37–47)
HGB BLD-MCNC: 9 G/DL (ref 12–16)
IMM GRANULOCYTES # BLD: 0.1 K/UL
LYMPHOCYTES # BLD: 2.7 K/UL (ref 1.1–4.5)
LYMPHOCYTES NFR BLD: 22 % (ref 20–40)
MAGNESIUM SERPL-MCNC: 2.8 MG/DL (ref 1.6–2.6)
MCH RBC QN AUTO: 35.2 PG (ref 27–31)
MCHC RBC AUTO-ENTMCNC: 32.6 G/DL (ref 33–37)
MCV RBC AUTO: 107.8 FL (ref 81–99)
MONOCYTES # BLD: 0.4 K/UL (ref 0–0.9)
MONOCYTES NFR BLD: 3.5 % (ref 0–10)
NEUTROPHILS # BLD: 8.7 K/UL (ref 1.5–7.5)
NEUTS SEG NFR BLD: 71.4 % (ref 50–65)
PERFORMED ON: ABNORMAL
PLATELET # BLD AUTO: 295 K/UL (ref 130–400)
PMV BLD AUTO: 10.2 FL (ref 9.4–12.3)
POTASSIUM SERPL-SCNC: 4.4 MMOL/L (ref 3.5–5)
RBC # BLD AUTO: 2.56 M/UL (ref 4.2–5.4)
SODIUM SERPL-SCNC: 138 MMOL/L (ref 136–145)
WBC # BLD AUTO: 12.2 K/UL (ref 4.8–10.8)

## 2023-12-19 PROCEDURE — 99232 SBSQ HOSP IP/OBS MODERATE 35: CPT | Performed by: PHYSICIAN ASSISTANT

## 2023-12-19 PROCEDURE — 82962 GLUCOSE BLOOD TEST: CPT

## 2023-12-19 PROCEDURE — 85025 COMPLETE CBC W/AUTO DIFF WBC: CPT

## 2023-12-19 PROCEDURE — 6370000000 HC RX 637 (ALT 250 FOR IP): Performed by: INTERNAL MEDICINE

## 2023-12-19 PROCEDURE — 2580000003 HC RX 258: Performed by: INTERNAL MEDICINE

## 2023-12-19 PROCEDURE — 6370000000 HC RX 637 (ALT 250 FOR IP): Performed by: HOSPITALIST

## 2023-12-19 PROCEDURE — 97530 THERAPEUTIC ACTIVITIES: CPT

## 2023-12-19 PROCEDURE — 36592 COLLECT BLOOD FROM PICC: CPT

## 2023-12-19 PROCEDURE — 6370000000 HC RX 637 (ALT 250 FOR IP): Performed by: PHYSICIAN ASSISTANT

## 2023-12-19 PROCEDURE — 1210000000 HC MED SURG R&B

## 2023-12-19 PROCEDURE — 83735 ASSAY OF MAGNESIUM: CPT

## 2023-12-19 PROCEDURE — 80048 BASIC METABOLIC PNL TOTAL CA: CPT

## 2023-12-19 RX ORDER — MIRTAZAPINE 15 MG/1
15 TABLET, FILM COATED ORAL NIGHTLY
Status: DISCONTINUED | OUTPATIENT
Start: 2023-12-19 | End: 2023-12-22 | Stop reason: HOSPADM

## 2023-12-19 RX ADMIN — BACITRACIN ZINC NEOMYCIN SULFATE POLYMYXIN B SULFATE: 400; 3.5; 5 OINTMENT TOPICAL at 08:15

## 2023-12-19 RX ADMIN — Medication 500 MG: at 11:48

## 2023-12-19 RX ADMIN — LAMOTRIGINE 100 MG: 100 TABLET ORAL at 20:23

## 2023-12-19 RX ADMIN — Medication 500 MG: at 20:23

## 2023-12-19 RX ADMIN — SODIUM CHLORIDE, PRESERVATIVE FREE 10 ML: 5 INJECTION INTRAVENOUS at 08:15

## 2023-12-19 RX ADMIN — BACITRACIN ZINC NEOMYCIN SULFATE POLYMYXIN B SULFATE: 400; 3.5; 5 OINTMENT TOPICAL at 20:23

## 2023-12-19 RX ADMIN — MIRTAZAPINE 15 MG: 15 TABLET, FILM COATED ORAL at 20:27
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
## 2023-12-19 NOTE — PROGRESS NOTES
Physical Therapy        OBJECTIVE                   ASSESSMENT   Less than 8 minutes spent with patient for physical therapy screen. Per nursing patient is supervision in their room. Patient states they have no therapy concerns at this time for returning home. Patient to ambulate during remainder of stay and physical therapy will follow up if mobility concerns arise.         Documented in the chart in the following areas: Assessment/Plan.        Therapy procedure time and total treatment time can be found documented on the Time Entry flowsheet   Physical Therapy  Name: Marcela Campa  MRN:  [id number]  Date of service:  12/19/2023 12/19/23 1428   Restrictions/Precautions   Restrictions/Precautions Fall Risk   Required Braces or Orthoses? No   General   Family / Caregiver Present No   Subjective   Subjective Pt agrees to therapy   Oxygen Therapy   O2 Device None (Room air)   Bed Mobility   Rolling Dependent/Total   Other Activities   Comment mechanical lift to recliner with assist of nursing. Short Term Goals   Time Frame for Short Term Goals 2 wks   Short Term Goal 1 supine to sit SBA   Short Term Goal 2 sit to stand SBA   Short Term Goal 3 bed to chair Min A   Short Term Goal 4 amb. 10' with RW CGA   Conditions Requiring Skilled Therapeutic Intervention   Body Structures, Functions, Activity Limitations Requiring Skilled Therapeutic Intervention Decreased functional mobility ; Decreased ADL status; Decreased ROM; Decreased body mechanics; Decreased cognition;Decreased safe awareness;Decreased endurance;Decreased balance;Decreased strength; Increased pain;Decreased posture;Decreased coordination   Assessment patient sitting up in recliner and positioned for comfort. Pillows placed on right side to correct hard lean. Patient stated that she liked sitting up. Treatment Diagnosis impaired mobility   Discharge Recommendations Continue to assess pending progress   Activity Tolerance   Activity Tolerance Treatment limited secondary to medical complications   Physical Therapy Plan   General Plan 6-7 times per week   Therapy Duration 2 Weeks   Current Treatment Recommendations Strengthening;ROM;Balance training;Functional mobility training;Transfer training;Gait training; Endurance training;Patient/Caregiver education & training; Safety education & training;Positioning; Therapeutic activities; Equipment evaluation, education, & procurement   PT Plan of Care   Tuesday X   Safety Devices   Type of Devices Nurse notified; Left in chair;Call light within reach

## 2023-12-19 NOTE — PROGRESS NOTES
Needs:  Energy Requirements Based On: Kcal/kg  Weight Used for Energy Requirements: Current  Energy (kcal/day): 1433-6342 kcals/day  Weight Used for Protein Requirements: Current  Protein (g/day):  g/protein/day  Method Used for Fluid Requirements: 1 ml/kcal  Fluid (ml/day): 7072-1588 mL/day    Nutrition Diagnosis:   Severe malnutrition, In context of acute illness or injury related to inadequate protein-energy intake as evidenced by Criteria as identified in malnutrition assessment    Nutrition Interventions:   Food and/or Nutrient Delivery: Continue Current Diet, Continue Oral Nutrition Supplement  Coordination of Nutrition Care: Continue to monitor while inpatient    Goals:  Previous Goal Met: No Progress toward Goal(s)  Goals: PO intake 50% or greater    Nutrition Monitoring and Evaluation:   Food/Nutrient Intake Outcomes: Diet Advancement/Tolerance, Food and Nutrient Intake, Supplement Intake  Physical Signs/Symptoms Outcomes: Biochemical Data, Chewing or Swallowing, Nutrition Focused Physical Findings, Weight    Karla Christine MS, RD, LD  Contact: [phone]

## 2023-12-19 NOTE — PROGRESS NOTES
Occupational Therapy     12/19/23 1600   Subjective   Subjective Pt in bed upon arrival for therapy. Pt agreeable to recliner this date using a Maxi Move lift. Nursing agreeable to be present. Pain Assessment   Pain Assessment None - Denies Pain   Cognition   Overall Cognitive Status Exceptions   Orientation   Overall Orientation Status X   Bed Mobility Training   Bed Mobility Training Yes   Overall Level of Assistance Minimum assistance; Moderate assistance   Interventions Verbal cues; Tactile cues;Manual cues   Rolling Minimum assistance; Moderate assistance   Transfer Training   Transfer Training Yes   Overall Level of Assistance Total assistance  (lift transfer)   Balance   Sitting Impaired   Sitting - Static Occasional;Poor (constant support)   Sitting - Dynamic Occasional;Poor (constant support)   Standing Impaired   Standing - Static Not tested   Standing - Dynamic Not tested   ADL   Feeding Moderate assistance   Grooming Moderate assistance   UE Bathing Maximum assistance   LE Bathing Dependent/Total   UE Dressing Maximum assistance   LE Dressing Dependent/Total   Toileting Dependent/Total   Functional Mobility Dependent/Total   Additional Comments Able to assist with RUE. Assessment   Assessment Tx focused on bed mobility during brief change and clean up. Pt instructed on rolling side to side to get lift pad under patient. Lift used for bed to recliner transfer this date. Pillows used for repositioning and to promote upright sitting in recliner. Call light in reach with table and items in reach. Nursing confident to get patient back to bed.    Activity Tolerance Patient tolerated treatment well   Discharge Recommendations Patient would benefit from continued therapy after discharge;24 hour supervision or assist   Occupational Therapy Plan   Times Per Week 3-5x/week   Times Per Day Once a day   OT Plan of Care   Tuesday X     Electronically signed by STEVE Garcia on 12/19/2023 at 4:20 PM

## 2023-12-20 LAB
ANION GAP SERPL CALCULATED.3IONS-SCNC: 9 MMOL/L (ref 7–19)
BASOPHILS # BLD: 0 K/UL (ref 0–0.2)
BASOPHILS NFR BLD: 0.4 % (ref 0–1)
BUN SERPL-MCNC: 5 MG/DL (ref 6–20)
CALCIUM SERPL-MCNC: 8.6 MG/DL (ref 8.6–10)
CHLORIDE SERPL-SCNC: 104 MMOL/L (ref 98–111)
CO2 SERPL-SCNC: 24 MMOL/L (ref 22–29)
CREAT SERPL-MCNC: 0.6 MG/DL (ref 0.5–0.9)
EOSINOPHIL # BLD: 0.2 K/UL (ref 0–0.6)
EOSINOPHIL NFR BLD: 2 % (ref 0–5)
ERYTHROCYTE [DISTWIDTH] IN BLOOD BY AUTOMATED COUNT: 14.4 % (ref 11.5–14.5)
GLUCOSE BLD-MCNC: 105 MG/DL (ref 70–99)
GLUCOSE BLD-MCNC: 131 MG/DL (ref 70–99)
GLUCOSE BLD-MCNC: 97 MG/DL (ref 70–99)
GLUCOSE BLD-MCNC: 98 MG/DL (ref 70–99)
GLUCOSE SERPL-MCNC: 108 MG/DL (ref 74–109)
HCT VFR BLD AUTO: 27 % (ref 37–47)
HGB BLD-MCNC: 8.8 G/DL (ref 12–16)
IMM GRANULOCYTES # BLD: 0.1 K/UL
LYMPHOCYTES # BLD: 2.8 K/UL (ref 1.1–4.5)
LYMPHOCYTES NFR BLD: 24.2 % (ref 20–40)
MAGNESIUM SERPL-MCNC: 2.3 MG/DL (ref 1.6–2.6)
MCH RBC QN AUTO: 34.9 PG (ref 27–31)
MCHC RBC AUTO-ENTMCNC: 32.6 G/DL (ref 33–37)
MCV RBC AUTO: 107.1 FL (ref 81–99)
MONOCYTES # BLD: 0.4 K/UL (ref 0–0.9)
MONOCYTES NFR BLD: 3.3 % (ref 0–10)
NEUTROPHILS # BLD: 7.9 K/UL (ref 1.5–7.5)
NEUTS SEG NFR BLD: 69.2 % (ref 50–65)
PERFORMED ON: ABNORMAL
PERFORMED ON: ABNORMAL
PERFORMED ON: NORMAL
PERFORMED ON: NORMAL
PLATELET # BLD AUTO: 256 K/UL (ref 130–400)
PMV BLD AUTO: 9.7 FL (ref 9.4–12.3)
POTASSIUM SERPL-SCNC: 4.1 MMOL/L (ref 3.5–5)
RBC # BLD AUTO: 2.52 M/UL (ref 4.2–5.4)
SODIUM SERPL-SCNC: 137 MMOL/L (ref 136–145)
WBC # BLD AUTO: 11.4 K/UL (ref 4.8–10.8)

## 2023-12-20 PROCEDURE — 80048 BASIC METABOLIC PNL TOTAL CA: CPT

## 2023-12-20 PROCEDURE — 97530 THERAPEUTIC ACTIVITIES: CPT

## 2023-12-20 PROCEDURE — 85025 COMPLETE CBC W/AUTO DIFF WBC: CPT

## 2023-12-20 PROCEDURE — 94760 N-INVAS EAR/PLS OXIMETRY 1: CPT

## 2023-12-20 PROCEDURE — 6370000000 HC RX 637 (ALT 250 FOR IP): Performed by: HOSPITALIST

## 2023-12-20 PROCEDURE — 99232 SBSQ HOSP IP/OBS MODERATE 35: CPT | Performed by: PHYSICIAN ASSISTANT

## 2023-12-20 PROCEDURE — 6370000000 HC RX 637 (ALT 250 FOR IP): Performed by: PHYSICIAN ASSISTANT

## 2023-12-20 PROCEDURE — 36592 COLLECT BLOOD FROM PICC: CPT

## 2023-12-20 PROCEDURE — 83735 ASSAY OF MAGNESIUM: CPT

## 2023-12-20 PROCEDURE — 82962 GLUCOSE BLOOD TEST: CPT

## 2023-12-20 PROCEDURE — 1210000000 HC MED SURG R&B

## 2023-12-20 PROCEDURE — 92610 EVALUATE SWALLOWING FUNCTION: CPT

## 2023-12-20 PROCEDURE — 6370000000 HC RX 637 (ALT 250 FOR IP): Performed by: INTERNAL MEDICINE

## 2023-12-20 RX ADMIN — Medication 500 MG: at 08:57

## 2023-12-20 RX ADMIN — BACITRACIN ZINC NEOMYCIN SULFATE POLYMYXIN B SULFATE: 400; 3.5; 5 OINTMENT TOPICAL at 08:53

## 2023-12-20 RX ADMIN — MIRTAZAPINE 15 MG: 15 TABLET, FILM COATED ORAL at 20:59

## 2023-12-20 RX ADMIN — Medication 500 MG: at 17:44

## 2023-12-20 RX ADMIN — Medication 5000 UNITS: at 08:54

## 2023-12-20 RX ADMIN — LAMOTRIGINE 100 MG: 100 TABLET ORAL at 20:59

## 2023-12-20 RX ADMIN — Medication 500 MG: at 20:59

## 2023-12-20 RX ADMIN — BACITRACIN ZINC NEOMYCIN SULFATE POLYMYXIN B SULFATE: 400; 3.5; 5 OINTMENT TOPICAL at 21:00

## 2023-12-20 RX ADMIN — LAMOTRIGINE 100 MG: 100 TABLET ORAL at 08:54

## 2023-12-20 RX ADMIN — Medication 500 MG: at 12:40

## 2023-12-20 NOTE — PROGRESS NOTES
12/21/2023-12/25/2023. Please contact LOVELY Laguerre via [employer] if any further palliative needs arise. Recommendations:      Palliative Care - GOC unchanged, family request repeat Neurology eval for shunt for peace of mind, barium swallow study if appropriate, Prolong life, improve oral intake,  DC home when medically stable. Lives with her brother and sister-in-law who are HCS  Code status: Full code  Sepsis 2/2 pneumonia-broad spectrum abx, mgmt per Hospitalist, blood cultures negative, resolved   N/V-resolved  Dysphagia/poor appetite-will request SLP re-eval appropriateness for barium swallow study at family request, continue Remeron, family indicate interest in FT for RAPHAEL if indicated    AMS w/ Hx TBI/seizures/hemiplegia/wheel chair bound -family requesting shunt be re-evaluated by Neurology-will defer this to Hospitalist   Bicytopenia-improving, evaluated by Oncology, suspected hemoconcentrated on initial presentation, suspect anemia 2/2 infection/hemodilution/antiseizure medications  and thrombocytopenia 2/2 severe sepsis   Chronic pain-continue lidocaine patch for left knee pain, continue prn tylenol, controlled   Pustule right side mons pubis-evaluated by Gen sx, no interventions indicated     Thank you for consulting Palliative Care and allowing us to participate in the care of this patient.      Total Time Spent with patient assessment, interview of independent historian/HCS, workup/treatment review, discussion with medical team, review of current and home medications, and placement of orders/preparation of this note:35 minutes                               Electronically signed by Jair Do PA-C on 12/20/2023 at 11:25 AM    (Please note that portions of this note were completed with a voice recognition program.  Gregory Mateo made to edit the dictations but occasionally words are mis-transcribed.)

## 2023-12-20 NOTE — PROGRESS NOTES
Physical Therapy  Name: Tati Sevilla  MRN:  [id number]  Date of service:  12/20/2023 12/20/23 1438   Restrictions/Precautions   Restrictions/Precautions Fall Risk   Required Braces or Orthoses? No   General   Family / Caregiver Present No   Subjective   Subjective ready to return to bed. Oxygen Therapy   O2 Device None (Room air)   Exercises   Hip Flexion seated marching AAROM x 10   Knee Long Arc Quad AAROM x 10   Ankle Pumps x 10   Comments in recliner   Other Activities   Comment mechanical lift to bed with assist of OT. Short Term Goals   Time Frame for Short Term Goals 2 wks   Short Term Goal 1 supine to sit SBA   Short Term Goal 2 sit to stand SBA   Short Term Goal 3 bed to chair Min A   Short Term Goal 4 amb. 10' with RW CGA   Conditions Requiring Skilled Therapeutic Intervention   Body Structures, Functions, Activity Limitations Requiring Skilled Therapeutic Intervention Decreased functional mobility ; Decreased ADL status; Decreased ROM; Decreased body mechanics; Decreased cognition;Decreased safe awareness;Decreased endurance;Decreased balance;Decreased strength; Increased pain;Decreased posture;Decreased coordination   Treatment Diagnosis impaired mobility   Barriers to Learning cognition due to prior TBI   Discharge Recommendations Continue to assess pending progress   Activity Tolerance   Activity Tolerance Patient tolerated treatment well   Physical Therapy Plan   General Plan 6-7 times per week   Therapy Duration 2 Weeks   Current Treatment Recommendations Strengthening;ROM;Balance training;Functional mobility training;Transfer training;Gait training; Endurance training;Patient/Caregiver education & training; Safety education & training;Positioning; Therapeutic activities; Equipment evaluation, education, & procurement   PT Plan of Care   Wednesday X   Safety Devices   Type of Devices Nurse notified; Left in bed;Call light within reach         Electronically signed by Gloria Horta PTA on 12/20/2023

## 2023-12-20 NOTE — PROGRESS NOTES
herself breakfast with her right hand. She provided history (family was not present to confirm the information). She stated she has been on moderately honey thick liquids for years. She did not believe she had an MBSS in the past. (no history of MBSS was located in the electronic medical record). Behavior/Cognition: Alert; Cooperative;Pleasant mood  Temperature Spikes Noted: No  Respiratory Status: Room air  Dentition: Dentures top;Dentures bottom  Patient Positioning: Upright in bed  Baseline Vocal Quality: Weak  Volitional Cough: Weak        Oral Motor Deficits  Labial: Right droop; Decreased rate;Decreased seal  Dentition: Upper dentures; Lower dentures  Lingual: Decreased rate;Decreased strength    P. O Trials:  She was drinking moderately honey thick liquids via straw when the therapist arrived. No overt s/s of aspiration observed with several sips. She was feeding herself breakfast and initially tolerated bites of minced and moist and pureed foods without difficulty. Mildly nectar thick liquids via cup were also tolerated without any overt s/s of aspiration. Thin liquids via cup resulted in an immediate cough response. After she exhibited coughing with thin liquids, a coughing episode was observed after a bite of minced and moist foods. Additional bites were tolerated without coughing. Moderate oropharyngeal dysphagia is suspected due to moderately delayed pharyngeal swallow responses, decreased hyolaryngeal elevation, overt s/s of aspiration with thin liquid trials, and overt s/s of aspiration with minced and moist foods. She reports consistent coughing with oral intake for several weeks. Prognosis  good    Education  Patient Education: Skilled education was provided regarding swallowing anatomy and physiology. Discussed bedside swallowing evaluation results, swallowing precautions, diet recommendations, and recommendation for MBSS.   Patient Education Response: Verbalizes understanding and she was

## 2023-12-20 NOTE — PROGRESS NOTES
Occupational Therapy     12/20/23 1503   Subjective   Subjective Pt sitting up in recliner upon arrival for therapy. Pt wants to get back to bed and needs to be changed. Pain Assessment   Pain Assessment 0-10   Noe-Baker Pain Rating 4   Pain Location Back   Pain Orientation Mid   Pain Descriptors Aching;Discomfort   Functional Pain Assessment Prevents or interferes some active activities and ADLs   Pain Type Chronic pain   Pain Frequency Continuous   Non-Pharmaceutical Pain Intervention(s) Ambulation/Increased Activity;Repositioned; Rest   Response to Pain Intervention Patient satisfied   Cognition   Overall Cognitive Status Exceptions   Orientation   Overall Orientation Status X   Bed Mobility Training   Bed Mobility Training Yes   Overall Level of Assistance Minimum assistance; Moderate assistance   Interventions Verbal cues; Tactile cues;Manual cues   Rolling Minimum assistance; Moderate assistance   Transfer Training   Transfer Training Yes   Overall Level of Assistance Total assistance   Balance   Sitting Impaired   Sitting - Static Occasional;Poor (constant support)   Sitting - Dynamic Occasional;Poor (constant support)   Standing Impaired   Standing - Static Not tested   Standing - Dynamic Not tested   ADL   Feeding Moderate assistance   Grooming Moderate assistance   UE Bathing Maximum assistance   LE Bathing Dependent/Total   UE Dressing Maximum assistance   LE Dressing Dependent/Total   Toileting Dependent/Total   Functional Mobility Dependent/Total   Additional Comments Able to assist with RUE. Skin Care Chlorhexidine solution; Other (comment)  (Central line care)   Assessment   Assessment Tx focused on lift transfer back to bed. Pt instructed on bed mobility including rolling side to side in bed during brief change and clean up.    Activity Tolerance Patient tolerated treatment well   Discharge Recommendations Patient would benefit from continued therapy after discharge;24 hour supervision or assist

## 2023-12-21 ENCOUNTER — APPOINTMENT (OUTPATIENT)
Dept: GENERAL RADIOLOGY | Age: 48
DRG: 871 | End: 2023-12-21
Payer: MEDICARE

## 2023-12-21 LAB
GLUCOSE BLD-MCNC: 113 MG/DL (ref 70–99)
GLUCOSE BLD-MCNC: 95 MG/DL (ref 70–99)
GLUCOSE BLD-MCNC: 95 MG/DL (ref 70–99)
PERFORMED ON: ABNORMAL
PERFORMED ON: NORMAL
PERFORMED ON: NORMAL

## 2023-12-21 PROCEDURE — 82962 GLUCOSE BLOOD TEST: CPT

## 2023-12-21 PROCEDURE — 94760 N-INVAS EAR/PLS OXIMETRY 1: CPT

## 2023-12-21 PROCEDURE — 92611 MOTION FLUOROSCOPY/SWALLOW: CPT

## 2023-12-21 PROCEDURE — 92526 ORAL FUNCTION THERAPY: CPT

## 2023-12-21 PROCEDURE — 6370000000 HC RX 637 (ALT 250 FOR IP): Performed by: PHYSICIAN ASSISTANT

## 2023-12-21 PROCEDURE — 6370000000 HC RX 637 (ALT 250 FOR IP): Performed by: INTERNAL MEDICINE

## 2023-12-21 PROCEDURE — 74230 X-RAY XM SWLNG FUNCJ C+: CPT

## 2023-12-21 PROCEDURE — 97530 THERAPEUTIC ACTIVITIES: CPT

## 2023-12-21 PROCEDURE — 1210000000 HC MED SURG R&B

## 2023-12-21 PROCEDURE — 6370000000 HC RX 637 (ALT 250 FOR IP): Performed by: HOSPITALIST

## 2023-12-21 RX ADMIN — LAMOTRIGINE 100 MG: 100 TABLET ORAL at 07:41

## 2023-12-21 RX ADMIN — Medication 500 MG: at 14:07

## 2023-12-21 RX ADMIN — BACITRACIN ZINC NEOMYCIN SULFATE POLYMYXIN B SULFATE: 400; 3.5; 5 OINTMENT TOPICAL at 20:31

## 2023-12-21 RX ADMIN — LAMOTRIGINE 100 MG: 100 TABLET ORAL at 20:31

## 2023-12-21 RX ADMIN — MIRTAZAPINE 15 MG: 15 TABLET, FILM COATED ORAL at 20:30

## 2023-12-21 RX ADMIN — Medication 5000 UNITS: at 07:41

## 2023-12-21 RX ADMIN — Medication 500 MG: at 07:44

## 2023-12-21 RX ADMIN — Medication 500 MG: at 20:31

## 2023-12-21 RX ADMIN — BACITRACIN ZINC NEOMYCIN SULFATE POLYMYXIN B SULFATE: 400; 3.5; 5 OINTMENT TOPICAL at 07:42

## 2023-12-21 RX ADMIN — Medication 500 MG: at 17:45

## 2023-12-21 NOTE — PROGRESS NOTES
hours.  Troponin: No results for input(s): \"CKTOTAL\", \"CKMB\", \"TROPONINT\" in the last 72 hours. ABGs: No results for input(s): \"PH\", \"PCO2\", \"PO2\", \"HCO3\", \"O2SAT\" in the last 72 hours. CRP:  No results for input(s): \"CRP\" in the last 72 hours. Sed Rate:  No results for input(s): \"SEDRATE\" in the last 72 hours. Cultures:   No results for input(s): \"CULTURE\" in the last 72 hours. No results for input(s): \"BC\", \"BLOODCULT2\" in the last 72 hours. No results for input(s): \"CXSURG\" in the last 72 hours. Radiology reports as per the Radiologist  Radiology: CT CHEST WO CONTRAST    Result Date: 12/8/2023  EXAM:  CT OF THE CHEST WITHOUT CONTRAST  History:  Shunt series. Technique:  Multiplanar CT images through the thorax were obtained without the administration of IV contrast  FINDINGS:  Heart size is normal.  No pericardial effusion. No thoracic aortic aneurysm. No enlarged intrathoracic lymph nodes. Some type of battery pack device is seen on the left with catheter projecting into the neck. Partially visualized right-sided shunt catheter tubing. Visualized portions of the shunt catheter tubing demonstrate no kinks or discontinuity. The shunt is incompletely visualized. There are numerous centrilobular micronodules with tree in bud opacities most notable in the lower lungs. No pleural fluid and no pneumothorax. Within the visualized upper abdomen, no acute findings. 2 mm calculus within the left kidney. Partially visualized IVC filter. No acute osseous abnormalities. Avascular necrosis of the bilateral humeral heads with subchondral collapse on the right. Synovial chondromatosis involving the right shoulder. Impression: 1. Bilateral bronchopneumonia. 2.  Partially visualized shunt catheter tubing with no kink or discontinuity. 3.  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  4.  Synovial chondromatosis involving the right shoulder  All CT scans

## 2023-12-21 NOTE — PROGRESS NOTES
Physical Therapy     12/21/23 1134   Restrictions/Precautions   Restrictions/Precautions Fall Risk   Required Braces or Orthoses? No   General   Diagnosis sepsis, LORELEI, PNA, metabolic encephalopathy   Subjective   Subjective nursing stated pt was not going to be be moved to chair via lift due to possible DC today. okayed tx in bed with pt agreeable to bed ex and repositioning for comfort. Vitals   Pulse 88   Respirations 20   SpO2 97 %   O2 Device None (Room air)   Bed mobility   Rolling to Right Maximum assistance   Scooting Maximal assistance;2 Person assistance   Bed Mobility Comments maxA x 2 for scooting towards HOB to reposition pt onto R sidelying for pressure relief. PT Exercises   Exercise Treatment AAROM BLE in supine with HOB elevated 2 x 10 each   Short Term Goals   Time Frame for Short Term Goals 2 wks   Short Term Goal 1 supine to sit SBA   Short Term Goal 2 sit to stand SBA   Short Term Goal 3 bed to chair Min A   Short Term Goal 4 amb. 10' with RW CGA   Activity Tolerance   Activity Tolerance Patient tolerated treatment well   Assessment   Assessment pt left positioned with pillows on R side lying post bed ex per nursing request to offload pressure on bottom. left with nurse aide for post meal clean up.    PT Plan of Care   Thursday X     Electronically signed by Yumi Carey PTA on 12/21/2023 at 11:53 AM

## 2023-12-21 NOTE — PROGRESS NOTES
1.  Bilateral bronchopneumonia. 2.  Partially visualized shunt catheter tubing with no kink or discontinuity. 3.  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 4.  Synovial chondromatosis involving the right shoulder  All CT scans are performed using dose optimization techniques as appropriate to the performed exam and include at least one of the following: Automated exposure control, adjustment of the mA and/or kV according to size, and the use of iterative reconstruction technique. ______________________________________ Electronically signed by: Samantha Fairbanks M.D. Date:     12/08/2023 Time:    01:43     CT HEAD WO CONTRAST    Result Date: 12/8/2023  EXAM:  CT OF THE HEAD WITHOUT CONTRAST  History: Altered mental status. Comparison:  Head CT 11/19/2021  Technique:  Multiplanar CT images through the head were obtained without the administration of IV contrast  FINDINGS:  The visualized paranasal sinuses and mastoid air cells are clear in general.  No acute calvarial abnormalities. Stable right frontal approach ventriculostomy catheter. Stable left frontal approach ventricular stimulator device. 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 No acute intracranial hemorrhage or abnormal extraaxial fluid collections. Impression: 1. No acute intracranial process. 2.  Stable ventriculostomy catheter and stable stimulator wire. 3.  Stable central atrophy. 4.  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 ______________________________________ Electronically signed by: Samantha Fairbanks M.D.  Date:     12/08/2023 Time:    01:33

## 2023-12-21 NOTE — PROCEDURES
INSTRUMENTAL SWALLOW REPORT  MODIFIED BARIUM SWALLOW    NAME: Claudine Wallis     : 1975  MRN: [id number]     Date of Eval: 2023     Ordering Physician: Dr. Pramod Vieira  Radiologist: Dr. Katy Lewis    Referring Diagnosis(es):   Dysphagia, unspecified R13.10  Seizures R56.9  GERD (gastroesophageal reflux disease) K21.9  History of traumatic brain injury Z87.820  Severe anemia D64.9  Severe malnutrition E43    Past Medical History:  has a past medical history of Arthritis, GERD (gastroesophageal reflux disease), History of blood transfusion, Hydrocephalus ([address]), Incontinence, Seizures ([address]), and Status post deep brain stimulator placement. Past Surgical History:  has a past surgical history that includes Cholecystectomy (N/A); Appendectomy; csf shunt (Right); pr xcapsl ctrc rmvl insj io lens prosth w/o ecp (Left, 06/15/2017); pr xcapsl ctrc rmvl insj io lens prosth w/o ecp (Right, 2017); Deep brain stimulator placement (N/A); pr exc chalazion anes req hospization single/mult (Right, 2018); and Splenectomy (N/A, ). Clinical Impression  A MBSS was completed on this date. No aspiration observed throughout this study. Do note laryngeal penetration with mildly (nectar) thick liquids and thin liquids with no detection. Minimal amounts of pharyngeal residue noted with all trials that did clear with independent dry swallows. Also observed significantly prolonged mastication timing. Do note mildly decreased anterior laryngeal movement, hyolaryngeal elevation, and epiglottic inversion. Would recommend trial minced and moist consistencies and mildly (nectar) thick liquids. Recommend medications crushed in applesauce/pudding. Would recommend TOTAL FEED ASSISTANCE. Ensure upright positioning and adequate level of alertness. OK for straws with mildly (nectar) thick liquids. CHECK FOR ORAL POCKETING AFTER MEALS/MEDS. Would recommend dysphagia treatment and repeat MBSS if dysphagia worsens.     SLP will

## 2023-12-21 NOTE — PLAN OF CARE
Problem: Discharge Planning  Goal: Discharge to home or other facility with appropriate resources  Outcome: Progressing  Flowsheets (Taken 12/18/2023 1956)  Discharge to home or other facility with appropriate resources: Identify barriers to discharge with patient and caregiver     Problem: Safety - Adult  Goal: Free from fall injury  Outcome: Progressing     Problem: Skin/Tissue Integrity  Goal: Absence of new skin breakdown  Description: 1. Monitor for areas of redness and/or skin breakdown  2. Assess vascular access sites hourly  3. Every 4-6 hours minimum:  Change oxygen saturation probe site  4. Every 4-6 hours:  If on nasal continuous positive airway pressure, respiratory therapy assess nares and determine need for appliance change or resting period. Outcome: Progressing     Problem: Pain  Goal: Verbalizes/displays adequate comfort level or baseline comfort level  Outcome: Progressing     Problem: ABCDS Injury Assessment  Goal: Absence of physical injury  Outcome: Progressing     Problem: Nutrition Deficit:  Goal: Optimize nutritional status  Outcome: Progressing     Problem: Neurosensory - Adult  Goal: Achieves stable or improved neurological status  Outcome: Progressing  Flowsheets (Taken 12/18/2023 1956)  Achieves stable or improved neurological status: Assess for and report changes in neurological status  Goal: Absence of seizures  Outcome: Progressing  Flowsheets (Taken 12/18/2023 1956)  Absence of seizures:   Monitor for seizure activity.   If seizure occurs, document type and location of movements and any associated apnea   If seizure occurs, turn head to side and suction secretions as needed  Goal: Remains free of injury related to seizures activity  Outcome: Progressing  Flowsheets (Taken 12/18/2023 1956)  Remains free of injury related to seizure activity:   Maintain airway, patient safety  and administer oxygen as ordered   Monitor patient for seizure activity, document and report duration and
Problem: Discharge Planning  Goal: Discharge to home or other facility with appropriate resources  Outcome: Progressing  Flowsheets (Taken 12/19/2023 2024)  Discharge to home or other facility with appropriate resources: Identify barriers to discharge with patient and caregiver     Problem: Safety - Adult  Goal: Free from fall injury  Outcome: Progressing     Problem: Skin/Tissue Integrity  Goal: Absence of new skin breakdown  Description: 1. Monitor for areas of redness and/or skin breakdown  2. Assess vascular access sites hourly  3. Every 4-6 hours minimum:  Change oxygen saturation probe site  4. Every 4-6 hours:  If on nasal continuous positive airway pressure, respiratory therapy assess nares and determine need for appliance change or resting period. Outcome: Progressing     Problem: Pain  Goal: Verbalizes/displays adequate comfort level or baseline comfort level  Outcome: Progressing     Problem: ABCDS Injury Assessment  Goal: Absence of physical injury  Outcome: Progressing     Problem: Nutrition Deficit:  Goal: Optimize nutritional status  Outcome: Progressing     Problem: Neurosensory - Adult  Goal: Achieves stable or improved neurological status  Outcome: Progressing  Flowsheets (Taken 12/19/2023 2024)  Achieves stable or improved neurological status: Assess for and report changes in neurological status  Goal: Absence of seizures  Outcome: Progressing  Flowsheets (Taken 12/19/2023 2024)  Absence of seizures: Monitor for seizure activity.   If seizure occurs, document type and location of movements and any associated apnea  Goal: Remains free of injury related to seizures activity  Outcome: Progressing  Flowsheets (Taken 12/19/2023 2024)  Remains free of injury related to seizure activity:   Maintain airway, patient safety  and administer oxygen as ordered   Monitor patient for seizure activity, document and report duration and description of seizure to Licensed Independent Practitioner  Goal:
Problem: Discharge Planning  Goal: Discharge to home or other facility with appropriate resources  Outcome: Progressing  Flowsheets (Taken 12/21/2023 0730)  Discharge to home or other facility with appropriate resources:   Identify barriers to discharge with patient and caregiver   Arrange for needed discharge resources and transportation as appropriate   Identify discharge learning needs (meds, wound care, etc)   Arrange for interpreters to assist at discharge as needed     Problem: Safety - Adult  Goal: Free from fall injury  Outcome: Progressing  Flowsheets (Taken 12/21/2023 0730)  Free From Fall Injury:   Instruct family/caregiver on patient safety   Based on caregiver fall risk screen, instruct family/caregiver to ask for assistance with transferring infant if caregiver noted to have fall risk factors     Problem: Skin/Tissue Integrity  Goal: Absence of new skin breakdown  Description: 1. Monitor for areas of redness and/or skin breakdown  2. Assess vascular access sites hourly  3. Every 4-6 hours minimum:  Change oxygen saturation probe site  4. Every 4-6 hours:  If on nasal continuous positive airway pressure, respiratory therapy assess nares and determine need for appliance change or resting period.   Outcome: Progressing     Problem: Pain  Goal: Verbalizes/displays adequate comfort level or baseline comfort level  Outcome: Progressing     Problem: ABCDS Injury Assessment  Goal: Absence of physical injury  Outcome: Progressing  Flowsheets (Taken 12/21/2023 0730)  Absence of Physical Injury: Implement safety measures based on patient assessment     Problem: Nutrition Deficit:  Goal: Optimize nutritional status  Outcome: Progressing     Problem: Neurosensory - Adult  Goal: Achieves stable or improved neurological status  Outcome: Progressing  Flowsheets (Taken 12/21/2023 0730)  Achieves stable or improved neurological status:   Assess for and report changes in neurological status   Initiate measures to
Problem: Safety - Medical Restraint  Goal: Remains free of injury from restraints (Restraint for Interference with Medical Device)  12/9/2023 0153 by Vangie Braun RN  Outcome: Progressing  12/8/2023 1515 by Marilynn Montiel RN  Outcome: Progressing     Problem: Discharge Planning  Goal: Discharge to home or other facility with appropriate resources  12/9/2023 0153 by Vangie Braun RN  Outcome: Progressing  12/8/2023 1515 by Marilynn Montiel RN  Outcome: Progressing  Flowsheets (Taken 12/8/2023 0405 by Selvin Linda RN)  Discharge to home or other facility with appropriate resources: Arrange for needed discharge resources and transportation as appropriate     Problem: Safety - Adult  Goal: Free from fall injury  12/9/2023 0153 by Vangie Braun RN  Outcome: Progressing  12/8/2023 1515 by Marilynn Montiel RN  Outcome: Progressing     Problem: Skin/Tissue Integrity  Goal: Absence of new skin breakdown  12/9/2023 0153 by Vangie Braun RN  Outcome: Progressing  12/8/2023 1515 by Marilynn Montiel RN  Outcome: Progressing     Problem: Pain  Goal: Verbalizes/displays adequate comfort level or baseline comfort level  12/9/2023 0153 by Vangie Braun RN  Outcome: Progressing  12/8/2023 1515 by Marilynn Montiel RN  Outcome: Progressing     Problem: ABCDS Injury Assessment  Goal: Absence of physical injury  Outcome: Progressing
Problem: Safety - Medical Restraint  Goal: Remains free of injury from restraints (Restraint for Interference with Medical Device)  Description: INTERVENTIONS:  1. Determine that other, less restrictive measures have been tried or would not be effective before applying the restraint  2. Evaluate the patient's condition at the time of restraint application  3. Inform patient/family regarding the reason for restraint  4. Q2H: Monitor safety, psychosocial status, comfort, nutrition and hydration  Outcome: Progressing     Problem: Discharge Planning  Goal: Discharge to home or other facility with appropriate resources  Outcome: Progressing  Flowsheets (Taken 12/8/2023 7415 by Horace Nguyen RN)  Discharge to home or other facility with appropriate resources: Arrange for needed discharge resources and transportation as appropriate     Problem: Safety - Adult  Goal: Free from fall injury  Outcome: Progressing     Problem: Skin/Tissue Integrity  Goal: Absence of new skin breakdown  Description: 1. Monitor for areas of redness and/or skin breakdown  2. Assess vascular access sites hourly  3. Every 4-6 hours minimum:  Change oxygen saturation probe site  4. Every 4-6 hours:  If on nasal continuous positive airway pressure, respiratory therapy assess nares and determine need for appliance change or resting period.   Outcome: Progressing     Problem: Pain  Goal: Verbalizes/displays adequate comfort level or baseline comfort level  Outcome: Progressing
Problem: Safety - Medical Restraint  Goal: Remains free of injury from restraints (Restraint for Interference with Medical Device)  Outcome: Progressing     Problem: Discharge Planning  Goal: Discharge to home or other facility with appropriate resources  Outcome: Progressing     Problem: Safety - Adult  Goal: Free from fall injury  Outcome: Progressing     Problem: Skin/Tissue Integrity  Goal: Absence of new skin breakdown  Outcome: Progressing     Problem: Pain  Goal: Verbalizes/displays adequate comfort level or baseline comfort level  Outcome: Progressing     Problem: ABCDS Injury Assessment  Goal: Absence of physical injury  Outcome: Progressing
provide reality reorientation, refocusing and direction   Decrease environmental stimuli, including noise as appropriate   Monitor and intervene to maintain adequate nutrition, hydration, elimination, sleep and activity

## 2023-12-21 NOTE — PROGRESS NOTES
Did review diet recommendations, general aspiration precautions, swallowing compensatory strategies, and MBSS results with patient, brother, sister in law, and nursing. All verbalize understanding. Will continue to follow.     Electronically signed by SHRAVAN Dobbins on 12/21/2023 at 11:04 AM

## 2023-12-22 VITALS
RESPIRATION RATE: 16 BRPM | WEIGHT: 116.19 LBS | TEMPERATURE: 97.5 F | HEART RATE: 101 BPM | HEIGHT: 64 IN | BODY MASS INDEX: 19.84 KG/M2 | OXYGEN SATURATION: 100 % | SYSTOLIC BLOOD PRESSURE: 97 MMHG | DIASTOLIC BLOOD PRESSURE: 56 MMHG

## 2023-12-22 PROBLEM — J18.9 PNEUMONIA OF BOTH LOWER LOBES DUE TO INFECTIOUS ORGANISM: Status: RESOLVED | Noted: 2023-12-08 | Resolved: 2023-12-22

## 2023-12-22 PROBLEM — D64.9 SEVERE ANEMIA: Status: RESOLVED | Noted: 2023-12-11 | Resolved: 2023-12-22

## 2023-12-22 PROBLEM — G89.4 CHRONIC PAIN SYNDROME: Status: RESOLVED | Noted: 2017-04-13 | Resolved: 2023-12-22

## 2023-12-22 PROBLEM — G93.41 METABOLIC ENCEPHALOPATHY: Status: RESOLVED | Noted: 2023-12-08 | Resolved: 2023-12-22

## 2023-12-22 PROBLEM — N76.4 ABSCESS OF LABIA: Status: RESOLVED | Noted: 2023-12-08 | Resolved: 2023-12-22

## 2023-12-22 PROBLEM — E43 SEVERE MALNUTRITION (HCC): Status: RESOLVED | Noted: 2023-12-13 | Resolved: 2023-12-22

## 2023-12-22 PROBLEM — D69.6 THROMBOCYTOPENIA (HCC): Status: RESOLVED | Noted: 2023-12-11 | Resolved: 2023-12-22

## 2023-12-22 PROBLEM — T68.XXXA HYPOTHERMIA: Status: RESOLVED | Noted: 2023-12-11 | Resolved: 2023-12-22

## 2023-12-22 PROBLEM — Z51.5 PALLIATIVE CARE PATIENT: Status: RESOLVED | Noted: 2023-12-08 | Resolved: 2023-12-22

## 2023-12-22 PROBLEM — E87.20 METABOLIC ACIDOSIS: Status: RESOLVED | Noted: 2023-12-08 | Resolved: 2023-12-22

## 2023-12-22 PROBLEM — G81.90 HEMIPLEGIA (HCC): Status: RESOLVED | Noted: 2017-01-16 | Resolved: 2023-12-22

## 2023-12-22 PROBLEM — Z87.820 HISTORY OF TRAUMATIC BRAIN INJURY: Status: RESOLVED | Noted: 2023-12-08 | Resolved: 2023-12-22

## 2023-12-22 PROBLEM — R65.21 SEPTIC SHOCK (HCC): Status: RESOLVED | Noted: 2023-12-08 | Resolved: 2023-12-22

## 2023-12-22 PROBLEM — A41.9 SEPSIS (HCC): Status: RESOLVED | Noted: 2023-12-08 | Resolved: 2023-12-22

## 2023-12-22 PROBLEM — A41.9 SEPTIC SHOCK (HCC): Status: RESOLVED | Noted: 2023-12-08 | Resolved: 2023-12-22

## 2023-12-22 PROBLEM — G40.919 INTRACTABLE SEIZURE DISORDER (HCC): Status: RESOLVED | Noted: 2023-12-08 | Resolved: 2023-12-22

## 2023-12-22 PROBLEM — K59.09 CHRONIC CONSTIPATION: Status: RESOLVED | Noted: 2023-04-11 | Resolved: 2023-12-22

## 2023-12-22 PROBLEM — F41.9 ANXIETY: Status: RESOLVED | Noted: 2018-02-13 | Resolved: 2023-12-22

## 2023-12-22 LAB
ALBUMIN SERPL-MCNC: 2.9 G/DL (ref 3.5–5.2)
ALP SERPL-CCNC: 274 U/L (ref 35–104)
ALT SERPL-CCNC: 13 U/L (ref 5–33)
ANION GAP SERPL CALCULATED.3IONS-SCNC: 8 MMOL/L (ref 7–19)
AST SERPL-CCNC: 15 U/L (ref 5–32)
BASOPHILS # BLD: 0 K/UL (ref 0–0.2)
BASOPHILS NFR BLD: 0.5 % (ref 0–1)
BILIRUB SERPL-MCNC: 0.4 MG/DL (ref 0.2–1.2)
BUN SERPL-MCNC: 3 MG/DL (ref 6–20)
CALCIUM SERPL-MCNC: 8.6 MG/DL (ref 8.6–10)
CHLORIDE SERPL-SCNC: 105 MMOL/L (ref 98–111)
CO2 SERPL-SCNC: 23 MMOL/L (ref 22–29)
CREAT SERPL-MCNC: 0.5 MG/DL (ref 0.5–0.9)
EOSINOPHIL # BLD: 0.2 K/UL (ref 0–0.6)
EOSINOPHIL NFR BLD: 2.2 % (ref 0–5)
ERYTHROCYTE [DISTWIDTH] IN BLOOD BY AUTOMATED COUNT: 14.4 % (ref 11.5–14.5)
GLUCOSE BLD-MCNC: 102 MG/DL (ref 70–99)
GLUCOSE BLD-MCNC: 179 MG/DL (ref 70–99)
GLUCOSE BLD-MCNC: 97 MG/DL (ref 70–99)
GLUCOSE SERPL-MCNC: 100 MG/DL (ref 74–109)
HCT VFR BLD AUTO: 22.9 % (ref 37–47)
HGB BLD-MCNC: 7.4 G/DL (ref 12–16)
IMM GRANULOCYTES # BLD: 0.1 K/UL
LYMPHOCYTES # BLD: 2.3 K/UL (ref 1.1–4.5)
LYMPHOCYTES NFR BLD: 29.3 % (ref 20–40)
MAGNESIUM SERPL-MCNC: 1.9 MG/DL (ref 1.6–2.6)
MCH RBC QN AUTO: 35.6 PG (ref 27–31)
MCHC RBC AUTO-ENTMCNC: 32.3 G/DL (ref 33–37)
MCV RBC AUTO: 110.1 FL (ref 81–99)
MONOCYTES # BLD: 0.4 K/UL (ref 0–0.9)
MONOCYTES NFR BLD: 4.6 % (ref 0–10)
NEUTROPHILS # BLD: 4.8 K/UL (ref 1.5–7.5)
NEUTS SEG NFR BLD: 62.6 % (ref 50–65)
PERFORMED ON: ABNORMAL
PERFORMED ON: ABNORMAL
PERFORMED ON: NORMAL
PHOSPHATE SERPL-MCNC: 3.5 MG/DL (ref 2.5–4.5)
PLATELET # BLD AUTO: 208 K/UL (ref 130–400)
PMV BLD AUTO: 9.8 FL (ref 9.4–12.3)
POTASSIUM SERPL-SCNC: 3.9 MMOL/L (ref 3.5–5)
PROT SERPL-MCNC: 5.9 G/DL (ref 6.6–8.7)
RBC # BLD AUTO: 2.08 M/UL (ref 4.2–5.4)
SODIUM SERPL-SCNC: 136 MMOL/L (ref 136–145)
WBC # BLD AUTO: 7.7 K/UL (ref 4.8–10.8)

## 2023-12-22 PROCEDURE — 85025 COMPLETE CBC W/AUTO DIFF WBC: CPT

## 2023-12-22 PROCEDURE — 6370000000 HC RX 637 (ALT 250 FOR IP): Performed by: PHYSICIAN ASSISTANT

## 2023-12-22 PROCEDURE — 82962 GLUCOSE BLOOD TEST: CPT

## 2023-12-22 PROCEDURE — 6370000000 HC RX 637 (ALT 250 FOR IP): Performed by: INTERNAL MEDICINE

## 2023-12-22 PROCEDURE — 94760 N-INVAS EAR/PLS OXIMETRY 1: CPT

## 2023-12-22 PROCEDURE — 2580000003 HC RX 258: Performed by: INTERNAL MEDICINE

## 2023-12-22 PROCEDURE — 6370000000 HC RX 637 (ALT 250 FOR IP): Performed by: HOSPITALIST

## 2023-12-22 PROCEDURE — 80053 COMPREHEN METABOLIC PANEL: CPT

## 2023-12-22 PROCEDURE — 84100 ASSAY OF PHOSPHORUS: CPT

## 2023-12-22 PROCEDURE — 83735 ASSAY OF MAGNESIUM: CPT

## 2023-12-22 RX ORDER — LAMOTRIGINE 100 MG/1
100 TABLET ORAL 2 TIMES DAILY
Qty: 30 TABLET | Refills: 3 | Status: ON HOLD | OUTPATIENT
Start: 2023-12-22

## 2023-12-22 RX ORDER — MIRTAZAPINE 15 MG/1
15 TABLET, FILM COATED ORAL NIGHTLY
Qty: 30 TABLET | Refills: 3 | Status: ON HOLD | OUTPATIENT
Start: 2023-12-22

## 2023-12-22 RX ADMIN — BACITRACIN ZINC NEOMYCIN SULFATE POLYMYXIN B SULFATE: 400; 3.5; 5 OINTMENT TOPICAL at 09:37

## 2023-12-22 RX ADMIN — SODIUM CHLORIDE, SODIUM LACTATE, POTASSIUM CHLORIDE, AND CALCIUM CHLORIDE: 600; 310; 30; 20 INJECTION, SOLUTION INTRAVENOUS at 09:45

## 2023-12-22 RX ADMIN — Medication 500 MG: at 09:33

## 2023-12-22 RX ADMIN — LAMOTRIGINE 100 MG: 100 TABLET ORAL at 09:33

## 2023-12-22 RX ADMIN — Medication 500 MG: at 13:17

## 2023-12-22 RX ADMIN — Medication 5000 UNITS: at 09:33

## 2023-12-22 RX ADMIN — Medication 500 MG: at 17:56

## 2023-12-22 NOTE — PROGRESS NOTES
Notified Raysa Sandoval of patient DC. States he is in [place] and he will be late today getting her.

## 2023-12-22 NOTE — PROGRESS NOTES
Spoke to hematology and patient cannot get into office until January 19, will see PCP on December 29.

## 2023-12-22 NOTE — CARE COORDINATION
[facility] notified of patient discharge today. DC Summary and DC med list faxed.   Electronically signed by Glenn Galeano on 12/22/23 at 1:12 PM CST

## 2023-12-22 NOTE — DISCHARGE INSTR - DIET
Good nutrition is important when healing from an illness, injury, or surgery. Follow any nutrition recommendations given to you during your hospital stay. If you were given an oral nutrition supplement while in the hospital, continue to take this supplement at home. You can take it with meals, in-between meals, and/or before bedtime. These supplements can be purchased at most local grocery stores, pharmacies, and chain [employer]-stores. If you have any questions about your diet or nutrition, call the hospital and ask for the dietitian.   Primary Diet Dysphagia - Minced and Moist   Liquid Consistency Mildly Thick (Nectar)   Follow aspiration precautions : assistance with feeding sitting upright and alertness check for pocketing of food in the mouth after eating

## 2023-12-22 NOTE — PROGRESS NOTES
Physical Therapy     12/22/23 1400   Subjective   Subjective attempted AM tx; nursing stated pt sleeping heavily and to be DC home today with family     Electronically signed by Ilana Kaufman PTA on 12/22/2023 at 2:36 PM

## 2023-12-22 NOTE — CARE COORDINATION
MARIA R discussed IMM letter verbally with cora Oakes. CM reviewed contents of IMM letter, and answered all questions/concerns. CM placed signed copy of IMM in patient chart. kareem Llamas p/u a copy of IMM letter at the bedside, when he picks up patient.  Family waives 4 hours appeal interval  Electronically signed by Lord Isaias RN on 12/22/2023 at 2:33 PM

## 2023-12-23 NOTE — PROGRESS NOTES
Dc education given at bedside to kerline. Educated on follow-up and medications.  No questions at this time

## 2023-12-31 ENCOUNTER — APPOINTMENT (OUTPATIENT)
Dept: GENERAL RADIOLOGY | Age: 48
DRG: 872 | End: 2023-12-31
Payer: MEDICARE

## 2023-12-31 ENCOUNTER — HOSPITAL ENCOUNTER (INPATIENT)
Age: 48
LOS: 4 days | Discharge: HOME HEALTH | DRG: 872 | End: 2024-01-04
Attending: PEDIATRICS | Admitting: HOSPITALIST
Payer: MEDICARE

## 2023-12-31 DIAGNOSIS — I95.9 HYPOTENSION, UNSPECIFIED HYPOTENSION TYPE: Primary | ICD-10-CM

## 2023-12-31 DIAGNOSIS — J10.1 INFLUENZA A: ICD-10-CM

## 2023-12-31 PROBLEM — A41.9 SEPSIS (HCC): Status: ACTIVE | Noted: 2023-12-31

## 2023-12-31 LAB
ALBUMIN SERPL-MCNC: 4.1 G/DL (ref 3.5–5.2)
ALP SERPL-CCNC: 285 U/L (ref 35–104)
ALT SERPL-CCNC: 28 U/L (ref 5–33)
ANION GAP SERPL CALCULATED.3IONS-SCNC: 14 MMOL/L (ref 7–19)
AST SERPL-CCNC: 31 U/L (ref 5–32)
B PARAP IS1001 DNA NPH QL NAA+NON-PROBE: NOT DETECTED
B PERT.PT PRMT NPH QL NAA+NON-PROBE: NOT DETECTED
BASOPHILS # BLD: 0.1 K/UL (ref 0–0.2)
BASOPHILS NFR BLD: 0.6 % (ref 0–1)
BILIRUB SERPL-MCNC: 0.6 MG/DL (ref 0.2–1.2)
BUN SERPL-MCNC: 16 MG/DL (ref 6–20)
C PNEUM DNA NPH QL NAA+NON-PROBE: NOT DETECTED
CALCIUM SERPL-MCNC: 10 MG/DL (ref 8.6–10)
CHLORIDE SERPL-SCNC: 98 MMOL/L (ref 98–111)
CO2 SERPL-SCNC: 26 MMOL/L (ref 22–29)
CREAT SERPL-MCNC: 1 MG/DL (ref 0.5–0.9)
EOSINOPHIL # BLD: 0 K/UL (ref 0–0.6)
EOSINOPHIL NFR BLD: 0.4 % (ref 0–5)
ERYTHROCYTE [DISTWIDTH] IN BLOOD BY AUTOMATED COUNT: 17.2 % (ref 11.5–14.5)
FLUAV H1 2009 PAN RNA NPH NAA+NON-PROBE: DETECTED
FLUBV RNA NPH QL NAA+NON-PROBE: NOT DETECTED
GLUCOSE SERPL-MCNC: 175 MG/DL (ref 74–109)
HADV DNA NPH QL NAA+NON-PROBE: NOT DETECTED
HCOV 229E RNA NPH QL NAA+NON-PROBE: NOT DETECTED
HCOV HKU1 RNA NPH QL NAA+NON-PROBE: NOT DETECTED
HCOV NL63 RNA NPH QL NAA+NON-PROBE: NOT DETECTED
HCOV OC43 RNA NPH QL NAA+NON-PROBE: NOT DETECTED
HCT VFR BLD AUTO: 29.1 % (ref 37–47)
HGB BLD-MCNC: 9.3 G/DL (ref 12–16)
HMPV RNA NPH QL NAA+NON-PROBE: NOT DETECTED
HPIV1 RNA NPH QL NAA+NON-PROBE: NOT DETECTED
HPIV2 RNA NPH QL NAA+NON-PROBE: NOT DETECTED
HPIV3 RNA NPH QL NAA+NON-PROBE: NOT DETECTED
HPIV4 RNA NPH QL NAA+NON-PROBE: NOT DETECTED
IMM GRANULOCYTES # BLD: 0.2 K/UL
LACTATE BLDV-SCNC: 2.8 MMOL/L (ref 0.5–1.9)
LYMPHOCYTES # BLD: 1.2 K/UL (ref 1.1–4.5)
LYMPHOCYTES NFR BLD: 15.4 % (ref 20–40)
M PNEUMO DNA NPH QL NAA+NON-PROBE: NOT DETECTED
MCH RBC QN AUTO: 36.2 PG (ref 27–31)
MCHC RBC AUTO-ENTMCNC: 32 G/DL (ref 33–37)
MCV RBC AUTO: 113.2 FL (ref 81–99)
MONOCYTES # BLD: 0.4 K/UL (ref 0–0.9)
MONOCYTES NFR BLD: 4.7 % (ref 0–10)
NEUTROPHILS # BLD: 6 K/UL (ref 1.5–7.5)
NEUTS SEG NFR BLD: 76.5 % (ref 50–65)
PLATELET # BLD AUTO: 238 K/UL (ref 130–400)
PMV BLD AUTO: 10 FL (ref 9.4–12.3)
POTASSIUM SERPL-SCNC: 3.7 MMOL/L (ref 3.5–5)
PROCALCITONIN: 0.41 NG/ML (ref 0–0.09)
PROT SERPL-MCNC: 8.5 G/DL (ref 6.6–8.7)
RBC # BLD AUTO: 2.57 M/UL (ref 4.2–5.4)
RSV RNA NPH QL NAA+NON-PROBE: NOT DETECTED
RV+EV RNA NPH QL NAA+NON-PROBE: NOT DETECTED
SARS-COV-2 RNA NPH QL NAA+NON-PROBE: NOT DETECTED
SODIUM SERPL-SCNC: 138 MMOL/L (ref 136–145)
TROPONIN, HIGH SENSITIVITY: 17 NG/L (ref 0–14)
WBC # BLD AUTO: 7.8 K/UL (ref 4.8–10.8)

## 2023-12-31 PROCEDURE — 2000000000 HC ICU R&B

## 2023-12-31 PROCEDURE — 36415 COLL VENOUS BLD VENIPUNCTURE: CPT

## 2023-12-31 PROCEDURE — 85025 COMPLETE CBC W/AUTO DIFF WBC: CPT

## 2023-12-31 PROCEDURE — 2500000003 HC RX 250 WO HCPCS: Performed by: HOSPITALIST

## 2023-12-31 PROCEDURE — 6370000000 HC RX 637 (ALT 250 FOR IP): Performed by: HOSPITALIST

## 2023-12-31 PROCEDURE — 83605 ASSAY OF LACTIC ACID: CPT

## 2023-12-31 PROCEDURE — 80053 COMPREHEN METABOLIC PANEL: CPT

## 2023-12-31 PROCEDURE — 6360000002 HC RX W HCPCS: Performed by: HOSPITALIST

## 2023-12-31 PROCEDURE — 2580000003 HC RX 258: Performed by: PEDIATRICS

## 2023-12-31 PROCEDURE — 51798 US URINE CAPACITY MEASURE: CPT

## 2023-12-31 PROCEDURE — 71045 X-RAY EXAM CHEST 1 VIEW: CPT

## 2023-12-31 PROCEDURE — 84145 PROCALCITONIN (PCT): CPT

## 2023-12-31 PROCEDURE — 99285 EMERGENCY DEPT VISIT HI MDM: CPT

## 2023-12-31 PROCEDURE — 84484 ASSAY OF TROPONIN QUANT: CPT

## 2023-12-31 PROCEDURE — 2580000003 HC RX 258: Performed by: HOSPITALIST

## 2023-12-31 PROCEDURE — 0202U NFCT DS 22 TRGT SARS-COV-2: CPT

## 2023-12-31 PROCEDURE — 87040 BLOOD CULTURE FOR BACTERIA: CPT

## 2023-12-31 RX ORDER — SODIUM CHLORIDE 9 MG/ML
INJECTION, SOLUTION INTRAVENOUS CONTINUOUS
Status: DISCONTINUED | OUTPATIENT
Start: 2023-12-31 | End: 2024-01-01

## 2023-12-31 RX ORDER — SODIUM CHLORIDE 0.9 % (FLUSH) 0.9 %
5-40 SYRINGE (ML) INJECTION EVERY 12 HOURS SCHEDULED
Status: DISCONTINUED | OUTPATIENT
Start: 2023-12-31 | End: 2024-01-01

## 2023-12-31 RX ORDER — 0.9 % SODIUM CHLORIDE 0.9 %
30 INTRAVENOUS SOLUTION INTRAVENOUS ONCE
Status: COMPLETED | OUTPATIENT
Start: 2023-12-31 | End: 2023-12-31

## 2023-12-31 RX ORDER — CELECOXIB 200 MG/1
200 CAPSULE ORAL DAILY
Status: DISCONTINUED | OUTPATIENT
Start: 2024-01-01 | End: 2024-01-04 | Stop reason: HOSPADM

## 2023-12-31 RX ORDER — MECOBALAMIN 5000 MCG
5 TABLET,DISINTEGRATING ORAL NIGHTLY PRN
Status: DISCONTINUED | OUTPATIENT
Start: 2023-12-31 | End: 2024-01-04 | Stop reason: HOSPADM

## 2023-12-31 RX ORDER — ACETAMINOPHEN 650 MG/1
650 SUPPOSITORY RECTAL EVERY 4 HOURS PRN
Status: DISCONTINUED | OUTPATIENT
Start: 2023-12-31 | End: 2024-01-04 | Stop reason: HOSPADM

## 2023-12-31 RX ORDER — ACETAMINOPHEN 325 MG/1
650 TABLET ORAL EVERY 4 HOURS PRN
Status: DISCONTINUED | OUTPATIENT
Start: 2023-12-31 | End: 2024-01-04 | Stop reason: HOSPADM

## 2023-12-31 RX ORDER — MIRTAZAPINE 15 MG/1
15 TABLET, FILM COATED ORAL NIGHTLY
Status: DISCONTINUED | OUTPATIENT
Start: 2023-12-31 | End: 2024-01-04 | Stop reason: HOSPADM

## 2023-12-31 RX ORDER — CALCIUM CARBONATE 500 MG/1
500 TABLET, CHEWABLE ORAL 3 TIMES DAILY PRN
Status: DISCONTINUED | OUTPATIENT
Start: 2023-12-31 | End: 2024-01-04 | Stop reason: HOSPADM

## 2023-12-31 RX ORDER — POLYETHYLENE GLYCOL 3350 17 G/17G
17 POWDER, FOR SOLUTION ORAL DAILY PRN
Status: DISCONTINUED | OUTPATIENT
Start: 2023-12-31 | End: 2024-01-04 | Stop reason: HOSPADM

## 2023-12-31 RX ORDER — SODIUM CHLORIDE 0.9 % (FLUSH) 0.9 %
5-40 SYRINGE (ML) INJECTION PRN
Status: DISCONTINUED | OUTPATIENT
Start: 2023-12-31 | End: 2024-01-04 | Stop reason: HOSPADM

## 2023-12-31 RX ORDER — LAMOTRIGINE 25 MG/1
100 TABLET ORAL 2 TIMES DAILY
Status: DISCONTINUED | OUTPATIENT
Start: 2023-12-31 | End: 2024-01-04 | Stop reason: HOSPADM

## 2023-12-31 RX ORDER — ENOXAPARIN SODIUM 100 MG/ML
40 INJECTION SUBCUTANEOUS DAILY
Status: DISCONTINUED | OUTPATIENT
Start: 2024-01-01 | End: 2024-01-01

## 2023-12-31 RX ORDER — NOREPINEPHRINE BITARTRATE 0.06 MG/ML
1-100 INJECTION, SOLUTION INTRAVENOUS CONTINUOUS
Status: DISCONTINUED | OUTPATIENT
Start: 2023-12-31 | End: 2024-01-02

## 2023-12-31 RX ORDER — SODIUM CHLORIDE 9 MG/ML
INJECTION, SOLUTION INTRAVENOUS PRN
Status: DISCONTINUED | OUTPATIENT
Start: 2023-12-31 | End: 2024-01-04 | Stop reason: HOSPADM

## 2023-12-31 RX ADMIN — VANCOMYCIN HYDROCHLORIDE 1250 MG: 10 INJECTION, POWDER, LYOPHILIZED, FOR SOLUTION INTRAVENOUS at 20:56

## 2023-12-31 RX ADMIN — SODIUM CHLORIDE: 9 INJECTION, SOLUTION INTRAVENOUS at 21:56

## 2023-12-31 RX ADMIN — DOXYCYCLINE 100 MG: 100 INJECTION, POWDER, LYOPHILIZED, FOR SOLUTION INTRAVENOUS at 19:46

## 2023-12-31 RX ADMIN — SODIUM CHLORIDE 1578 ML: 9 INJECTION, SOLUTION INTRAVENOUS at 19:40

## 2023-12-31 RX ADMIN — Medication 2 MCG/MIN: at 23:08

## 2023-12-31 RX ADMIN — MIRTAZAPINE 15 MG: 15 TABLET, FILM COATED ORAL at 23:01

## 2023-12-31 RX ADMIN — LAMOTRIGINE 100 MG: 100 TABLET ORAL at 23:01

## 2024-01-01 LAB
ANION GAP SERPL CALCULATED.3IONS-SCNC: 10 MMOL/L (ref 7–19)
BASOPHILS # BLD: 0 K/UL (ref 0–0.2)
BASOPHILS NFR BLD: 0.7 % (ref 0–1)
BILIRUB UR QL STRIP: NEGATIVE
BUN SERPL-MCNC: 11 MG/DL (ref 6–20)
CALCIUM SERPL-MCNC: 8 MG/DL (ref 8.6–10)
CHLORIDE SERPL-SCNC: 112 MMOL/L (ref 98–111)
CLARITY UR: ABNORMAL
CO2 SERPL-SCNC: 17 MMOL/L (ref 22–29)
COLOR UR: YELLOW
CREAT SERPL-MCNC: 0.5 MG/DL (ref 0.5–0.9)
EOSINOPHIL # BLD: 0.1 K/UL (ref 0–0.6)
EOSINOPHIL NFR BLD: 2 % (ref 0–5)
ERYTHROCYTE [DISTWIDTH] IN BLOOD BY AUTOMATED COUNT: 17 % (ref 11.5–14.5)
GLUCOSE SERPL-MCNC: 88 MG/DL (ref 74–109)
GLUCOSE UR STRIP.AUTO-MCNC: NEGATIVE MG/DL
HCG UR QL: NEGATIVE
HCT VFR BLD AUTO: 24.6 % (ref 37–47)
HGB BLD-MCNC: 7.7 G/DL (ref 12–16)
HGB UR STRIP.AUTO-MCNC: NEGATIVE MG/L
IMM GRANULOCYTES # BLD: 0.1 K/UL
KETONES UR STRIP.AUTO-MCNC: NEGATIVE MG/DL
LACTATE BLDV-SCNC: 2.6 MMOL/L (ref 0.5–1.9)
LACTATE BLDV-SCNC: 2.8 MMOL/L (ref 0.5–1.9)
LACTATE BLDV-SCNC: 3.9 MMOL/L (ref 0.5–1.9)
LEGIONELLA AG UR QL: NORMAL
LEUKOCYTE ESTERASE UR QL STRIP.AUTO: NEGATIVE
LYMPHOCYTES # BLD: 1.3 K/UL (ref 1.1–4.5)
LYMPHOCYTES NFR BLD: 32 % (ref 20–40)
MCH RBC QN AUTO: 36.5 PG (ref 27–31)
MCHC RBC AUTO-ENTMCNC: 31.3 G/DL (ref 33–37)
MCV RBC AUTO: 116.6 FL (ref 81–99)
MONOCYTES # BLD: 0.3 K/UL (ref 0–0.9)
MONOCYTES NFR BLD: 6.7 % (ref 0–10)
MRSA DNA SPEC QL NAA+PROBE: NOT DETECTED
NEUTROPHILS # BLD: 2.3 K/UL (ref 1.5–7.5)
NEUTS SEG NFR BLD: 56.1 % (ref 50–65)
NITRITE UR QL STRIP.AUTO: NEGATIVE
PH UR STRIP.AUTO: 5.5 [PH] (ref 5–8)
PLATELET # BLD AUTO: 173 K/UL (ref 130–400)
PMV BLD AUTO: 9.4 FL (ref 9.4–12.3)
POTASSIUM SERPL-SCNC: 3.9 MMOL/L (ref 3.5–5)
PROT UR STRIP.AUTO-MCNC: NEGATIVE MG/DL
RBC # BLD AUTO: 2.11 M/UL (ref 4.2–5.4)
S PNEUM AG SPEC QL: NORMAL
SODIUM SERPL-SCNC: 139 MMOL/L (ref 136–145)
SP GR UR STRIP.AUTO: 1.02 (ref 1–1.03)
UROBILINOGEN UR STRIP.AUTO-MCNC: 0.2 E.U./DL
VANCOMYCIN SERPL-MCNC: 15.7 UG/ML
WBC # BLD AUTO: 4.1 K/UL (ref 4.8–10.8)

## 2024-01-01 PROCEDURE — 87449 NOS EACH ORGANISM AG IA: CPT

## 2024-01-01 PROCEDURE — 2000000000 HC ICU R&B

## 2024-01-01 PROCEDURE — 6370000000 HC RX 637 (ALT 250 FOR IP): Performed by: INTERNAL MEDICINE

## 2024-01-01 PROCEDURE — 83605 ASSAY OF LACTIC ACID: CPT

## 2024-01-01 PROCEDURE — 2500000003 HC RX 250 WO HCPCS: Performed by: HOSPITALIST

## 2024-01-01 PROCEDURE — 2580000003 HC RX 258: Performed by: INTERNAL MEDICINE

## 2024-01-01 PROCEDURE — 80175 DRUG SCREEN QUAN LAMOTRIGINE: CPT

## 2024-01-01 PROCEDURE — 6370000000 HC RX 637 (ALT 250 FOR IP): Performed by: HOSPITALIST

## 2024-01-01 PROCEDURE — 36415 COLL VENOUS BLD VENIPUNCTURE: CPT

## 2024-01-01 PROCEDURE — 94760 N-INVAS EAR/PLS OXIMETRY 1: CPT

## 2024-01-01 PROCEDURE — 80048 BASIC METABOLIC PNL TOTAL CA: CPT

## 2024-01-01 PROCEDURE — 2580000003 HC RX 258: Performed by: HOSPITALIST

## 2024-01-01 PROCEDURE — 51702 INSERT TEMP BLADDER CATH: CPT

## 2024-01-01 PROCEDURE — 80202 ASSAY OF VANCOMYCIN: CPT

## 2024-01-01 PROCEDURE — 84703 CHORIONIC GONADOTROPIN ASSAY: CPT

## 2024-01-01 PROCEDURE — 92610 EVALUATE SWALLOWING FUNCTION: CPT

## 2024-01-01 PROCEDURE — 81003 URINALYSIS AUTO W/O SCOPE: CPT

## 2024-01-01 PROCEDURE — 94150 VITAL CAPACITY TEST: CPT

## 2024-01-01 PROCEDURE — 85025 COMPLETE CBC W/AUTO DIFF WBC: CPT

## 2024-01-01 PROCEDURE — 6360000002 HC RX W HCPCS: Performed by: HOSPITALIST

## 2024-01-01 PROCEDURE — 87641 MR-STAPH DNA AMP PROBE: CPT

## 2024-01-01 RX ORDER — ENOXAPARIN SODIUM 100 MG/ML
30 INJECTION SUBCUTANEOUS DAILY
Status: DISCONTINUED | OUTPATIENT
Start: 2024-01-01 | End: 2024-01-03

## 2024-01-01 RX ORDER — SODIUM CHLORIDE, SODIUM LACTATE, POTASSIUM CHLORIDE, CALCIUM CHLORIDE 600; 310; 30; 20 MG/100ML; MG/100ML; MG/100ML; MG/100ML
INJECTION, SOLUTION INTRAVENOUS CONTINUOUS
Status: DISCONTINUED | OUTPATIENT
Start: 2024-01-01 | End: 2024-01-03

## 2024-01-01 RX ORDER — OSELTAMIVIR PHOSPHATE 6 MG/ML
75 FOR SUSPENSION ORAL DAILY
Status: DISCONTINUED | OUTPATIENT
Start: 2024-01-01 | End: 2024-01-01

## 2024-01-01 RX ORDER — OSELTAMIVIR PHOSPHATE 6 MG/ML
75 FOR SUSPENSION ORAL 2 TIMES DAILY
Status: DISCONTINUED | OUTPATIENT
Start: 2024-01-01 | End: 2024-01-04 | Stop reason: HOSPADM

## 2024-01-01 RX ADMIN — OSELTAMIVIR PHOSPHATE 75 MG: 6 FOR SUSPENSION ORAL at 10:50

## 2024-01-01 RX ADMIN — OSELTAMIVIR PHOSPHATE 75 MG: 6 FOR SUSPENSION ORAL at 20:23

## 2024-01-01 RX ADMIN — DOXYCYCLINE 100 MG: 100 INJECTION, POWDER, LYOPHILIZED, FOR SOLUTION INTRAVENOUS at 08:01

## 2024-01-01 RX ADMIN — DOXYCYCLINE 100 MG: 100 INJECTION, POWDER, LYOPHILIZED, FOR SOLUTION INTRAVENOUS at 20:17

## 2024-01-01 RX ADMIN — SODIUM CHLORIDE, POTASSIUM CHLORIDE, SODIUM LACTATE AND CALCIUM CHLORIDE: 600; 310; 30; 20 INJECTION, SOLUTION INTRAVENOUS at 10:49

## 2024-01-01 RX ADMIN — LAMOTRIGINE 100 MG: 100 TABLET ORAL at 08:00

## 2024-01-01 RX ADMIN — MIRTAZAPINE 15 MG: 15 TABLET, FILM COATED ORAL at 20:22

## 2024-01-01 RX ADMIN — CELECOXIB 200 MG: 200 CAPSULE ORAL at 08:00

## 2024-01-01 RX ADMIN — SODIUM CHLORIDE, PRESERVATIVE FREE 10 ML: 5 INJECTION INTRAVENOUS at 08:00

## 2024-01-01 RX ADMIN — LAMOTRIGINE 100 MG: 100 TABLET ORAL at 20:22

## 2024-01-01 RX ADMIN — VANCOMYCIN HYDROCHLORIDE 750 MG: 750 INJECTION, POWDER, LYOPHILIZED, FOR SOLUTION INTRAVENOUS at 10:12

## 2024-01-01 RX ADMIN — Medication 5000 UNITS: at 08:00

## 2024-01-01 RX ADMIN — VANCOMYCIN HYDROCHLORIDE 750 MG: 750 INJECTION, POWDER, LYOPHILIZED, FOR SOLUTION INTRAVENOUS at 18:40

## 2024-01-01 RX ADMIN — ENOXAPARIN SODIUM 30 MG: 100 INJECTION SUBCUTANEOUS at 08:00

## 2024-01-01 ASSESSMENT — ENCOUNTER SYMPTOMS
ABDOMINAL PAIN: 0
COUGH: 1
VOMITING: 0
SHORTNESS OF BREATH: 0
BACK PAIN: 0
RHINORRHEA: 0
NAUSEA: 1
COLOR CHANGE: 0

## 2024-01-01 NOTE — PROGRESS NOTES
[facility]   Pharmacy Pharmacokinetic Monitoring Service - Vancomycin     Laura Shah is a 48 y.o. female starting on vancomycin therapy for Sepsis. Pharmacy consulted by Dr. Carrion for monitoring and adjustment.    Target Concentration:  Goal trough of 15-20 mg/L switch to -600 once renal function stable    Additional Antimicrobials: Doxycycline     Pertinent Laboratory Values:   Wt Readings from Last 1 Encounters:   12/31/23 52.6 kg (116 lb)     Temp Readings from Last 1 Encounters:   12/31/23 98.2 °F (36.8 °C)     Estimated Creatinine Clearance: 58 mL/min (A) (based on SCr of 1 mg/dL (H)).  Recent Labs     12/31/23  1857   CREATININE 1.0*   BUN 16   WBC 7.8     Procalcitonin: No level    Pertinent Cultures:  Culture Date Source Results   12/31/23 Blood x 2  Sent    MRSA Nasal Swab:  pharmacy has placed order    Plan:  Concentration-guided dosing due to renal impairment/insufficiency  Start vancomycin 1250 mg IV x 1 dose   Vancomycin concentration ordered for 01/01 @ 0400   Pharmacy will continue to monitor patient and adjust therapy as indicated    Thank you for the consult,  Nathalie Mejía CRISTA  12/31/2023 8:14 PM

## 2024-01-01 NOTE — PROGRESS NOTES
[facility]   Pharmacy Pharmacokinetic Monitoring Service - Vancomycin    Consulting Provider: Dr. Carrion   Indication: Sepsis  Target Concentration: Goal AUC/-600 mg*hr/L  Day of Therapy: 2  Additional Antimicrobials: doxycycline    Pertinent Laboratory Values:   Wt Readings from Last 1 Encounters:   12/31/23 45.7 kg (100 lb 12.8 oz)     Temp Readings from Last 1 Encounters:   01/01/24 97.1 °F (36.2 °C) (Temporal)     Estimated Creatinine Clearance: 101 mL/min (based on SCr of 0.5 mg/dL).  Recent Labs     12/31/23  1857 01/01/24  0651   CREATININE 1.0* 0.5   BUN 16 11   WBC 7.8 4.1*     Procalcitonin: 0.41 (12/31/2023)    Pertinent Cultures:  Culture Date Source Results   12/31/23 Blood x 2 No growth to date   MRSA Nasal Swab: N/A. Non-respiratory infection.    Recent vancomycin administrations                     vancomycin (VANCOCIN) 1,250 mg in sodium chloride 0.9 % 250 mL IVPB (mg) 1,250 mg New Bag 12/31/23 2056                    Assessment:  Date/Time Current Dose Concentration Timing of Concentration (h) AUC   01/01/24 Pulse dose (1250 mg x 1) 15.7 9 H 55 M    Note: Serum concentrations collected for AUC dosing may appear elevated if collected in close proximity to the dose administered, this is not necessarily an indication of toxicity    Plan:  Current dosing regimen is therapeutic  Start Vancomycin 750 mg IV Q 8 hours  Repeat vancomycin concentration ordered for 01/02/2024 @ 0130   Pharmacy will continue to monitor patient and adjust therapy as indicated    Thank you for the consult,  López Barajas RPH  1/1/2024 7:29 AM

## 2024-01-01 NOTE — PROGRESS NOTES
Pt is very lethargic and her last blood pressure was 74/49 with a MAP of 55. This nurse spoke with Dr. Carrion by phone and he wants to start a central line and possibly an arterial line. I went ahead and called and spoke to the patient's legal guardian (Yokasta Shah) and received consent by phone with a second nurse verification for both of these procedures. Consents have been signed.     Electronically signed by Raegan Grubbs RN on 12/31/2023 at 10:47 PM

## 2024-01-01 NOTE — PROGRESS NOTES
Automatic Dose Adjustment of                Subcutaneous Anticoagulant for Prophylaxis    Laura Shah is a 48 y.o. female.     Recent Labs     12/31/23  1857   CREATININE 1.0*       Estimated Creatinine Clearance: 51 mL/min (A) (based on SCr of 1 mg/dL (H)).    Weight:  Wt Readings from Last 1 Encounters:   12/31/23 45.7 kg (100 lb 12.8 oz)           Pharmacy has adjusted subcutaneous anticoagulant for prophylaxis to Enoxaparin 30 mg SC daily based on the patient's weight and estimated CrCl per [facility] policy.               Electronically signed by Nathalie Mejía RPH on 1/1/2024 at 6:16 AM

## 2024-01-01 NOTE — CARE COORDINATION
01/01/24 0822   Readmission Assessment   Number of Days since last admission? 8-30 days   Previous Disposition Home with Home Health   Who is being Interviewed Caregiver  (from chart)   What was the patient's/caregiver's perception as to why they think they needed to return back to the hospital? Other (Comment)  (\"dizzy when getting up)   Did you visit your Primary Care Physician after you left the hospital, before you returned this time? Yes   Did you see a specialist, such as Cardiac, Pulmonary, Orthopedic Physician, etc. after you left the hospital? No   Who advised the patient to return to the hospital? Self-referral   Does the patient report anything that got in the way of taking their medications? No   In our efforts to provide the best possible care to you and others like you, can you think of anything that we could have done to help you after you left the hospital the first time, so that you might not have needed to return so soon? Other (Comment)  (from chart)
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
## 2024-01-01 NOTE — PROGRESS NOTES
SLP ALL NOTES  Facility/Department: [facility] ICU   CLINICAL BEDSIDE SWALLOW EVALUATION    NAME: Laura Shah  : 1975  MRN: [id number]    ADMISSION DATE: 2023  ADMITTING DIAGNOSIS: has Wheelchair bound; PVD (peripheral vascular disease) (HCC); Chronic seasonal allergic rhinitis; Avascular necrosis (HCC); Pain in left knee; Traumatic brain injury (HCC); S/P  shunt; and Sepsis (HCC) on their problem list.  ONSET DATE: 23    Recent Chest Xray/CT of Chest: (23)    Date of Eval: 2024  Evaluating Therapist: SHRAVAN Dolan    Current Diet level:  Current Diet : NPO  Current Liquid Diet : NPO    Primary Complaint  Pt verbalized no complaints.    Pain:  Pain Assessment  Pain Assessment: Face, Legs, Activity, Cry, and Consolability (FLACC)  Faces, Legs, Activity, Cry, and Consolability (FLACC)  Face (F): occasional grimace or frown, withdrawn, disinterested  Legs (L): normal position or relaxed  Activity (A): lying quietly, normal position, moves easily  Cry (C): moans or whimpers, occasional complaint  Consolability (C): reassured by occasional touch, hug or being talked to  FLACC Score : 3    Reason for Referral  Laura Shah was referred for a bedside swallow evaluation to assess the efficiency of her swallow function, identify signs and symptoms of aspiration and make recommendations regarding safe dietary consistencies, effective compensatory strategies, and safe eating environment.    Impression  Dysphagia Diagnosis: Mild oral stage dysphagia;Mild pharyngeal stage dysphagia  Dysphagia Impression : Pt demo mild oral and pharyngeal stage dysphagia. Pt is at risk for aspiration. Results of MBSS on 23 indicated laryngeal penetration without detection with thin and mildly thickened liquids. Pt demo slightly decreased and sluggish laryngeal elevation with all PO trials. Pt did not demo coughing or throat clearing with any PO trial. Pt did demo wet vocal quality following thin liquid
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
## 2024-01-02 LAB
ANION GAP SERPL CALCULATED.3IONS-SCNC: 12 MMOL/L (ref 7–19)
ANISOCYTOSIS BLD QL SMEAR: ABNORMAL
BASOPHILS # BLD: 0 K/UL (ref 0–0.2)
BASOPHILS NFR BLD: 0.4 % (ref 0–1)
BUN SERPL-MCNC: 4 MG/DL (ref 6–20)
CALCIUM SERPL-MCNC: 8 MG/DL (ref 8.6–10)
CHLORIDE SERPL-SCNC: 110 MMOL/L (ref 98–111)
CO2 SERPL-SCNC: 16 MMOL/L (ref 22–29)
CREAT SERPL-MCNC: 0.5 MG/DL (ref 0.5–0.9)
EOSINOPHIL # BLD: 0.1 K/UL (ref 0–0.6)
EOSINOPHIL NFR BLD: 2.3 % (ref 0–5)
ERYTHROCYTE [DISTWIDTH] IN BLOOD BY AUTOMATED COUNT: 16.5 % (ref 11.5–14.5)
GLUCOSE SERPL-MCNC: 94 MG/DL (ref 74–109)
HCT VFR BLD AUTO: 26.6 % (ref 37–47)
HGB BLD-MCNC: 8.3 G/DL (ref 12–16)
IMM GRANULOCYTES # BLD: 0.1 K/UL
LACTATE BLDV-SCNC: 0.9 MMOL/L (ref 0.5–1.9)
LACTATE BLDV-SCNC: 2.2 MMOL/L (ref 0.5–1.9)
LYMPHOCYTES # BLD: 1.2 K/UL (ref 1.1–4.5)
LYMPHOCYTES NFR BLD: 23.5 % (ref 20–40)
MAGNESIUM SERPL-MCNC: 2 MG/DL (ref 1.6–2.6)
MCH RBC QN AUTO: 36.9 PG (ref 27–31)
MCHC RBC AUTO-ENTMCNC: 31.2 G/DL (ref 33–37)
MCV RBC AUTO: 118.2 FL (ref 81–99)
MONOCYTES # BLD: 0.4 K/UL (ref 0–0.9)
MONOCYTES NFR BLD: 8 % (ref 0–10)
NEUTROPHILS # BLD: 3.3 K/UL (ref 1.5–7.5)
NEUTS SEG NFR BLD: 64.4 % (ref 50–65)
PLATELET # BLD AUTO: 180 K/UL (ref 130–400)
PLATELET SLIDE REVIEW: ADEQUATE
PMV BLD AUTO: 9.6 FL (ref 9.4–12.3)
POIKILOCYTOSIS BLD QL SMEAR: ABNORMAL
POTASSIUM SERPL-SCNC: 3.5 MMOL/L (ref 3.5–5)
RBC # BLD AUTO: 2.25 M/UL (ref 4.2–5.4)
SODIUM SERPL-SCNC: 138 MMOL/L (ref 136–145)
VANCOMYCIN TROUGH SERPL-MCNC: 29.9 UG/ML (ref 10–20)
WBC # BLD AUTO: 5.1 K/UL (ref 4.8–10.8)

## 2024-01-02 PROCEDURE — 1210000000 HC MED SURG R&B

## 2024-01-02 PROCEDURE — 2500000003 HC RX 250 WO HCPCS: Performed by: HOSPITALIST

## 2024-01-02 PROCEDURE — 85025 COMPLETE CBC W/AUTO DIFF WBC: CPT

## 2024-01-02 PROCEDURE — 83735 ASSAY OF MAGNESIUM: CPT

## 2024-01-02 PROCEDURE — 2580000003 HC RX 258: Performed by: HOSPITALIST

## 2024-01-02 PROCEDURE — 36415 COLL VENOUS BLD VENIPUNCTURE: CPT

## 2024-01-02 PROCEDURE — 6370000000 HC RX 637 (ALT 250 FOR IP): Performed by: HOSPITALIST

## 2024-01-02 PROCEDURE — 80202 ASSAY OF VANCOMYCIN: CPT

## 2024-01-02 PROCEDURE — 83605 ASSAY OF LACTIC ACID: CPT

## 2024-01-02 PROCEDURE — 94760 N-INVAS EAR/PLS OXIMETRY 1: CPT

## 2024-01-02 PROCEDURE — 80048 BASIC METABOLIC PNL TOTAL CA: CPT

## 2024-01-02 PROCEDURE — 6360000002 HC RX W HCPCS: Performed by: HOSPITALIST

## 2024-01-02 RX ADMIN — ENOXAPARIN SODIUM 30 MG: 100 INJECTION SUBCUTANEOUS at 10:56

## 2024-01-02 RX ADMIN — SODIUM CHLORIDE, PRESERVATIVE FREE 10 ML: 5 INJECTION INTRAVENOUS at 10:57

## 2024-01-02 RX ADMIN — Medication 5000 UNITS: at 10:57

## 2024-01-02 RX ADMIN — LAMOTRIGINE 100 MG: 100 TABLET ORAL at 10:57

## 2024-01-02 RX ADMIN — OSELTAMIVIR PHOSPHATE 75 MG: 6 FOR SUSPENSION ORAL at 11:25

## 2024-01-02 RX ADMIN — DOXYCYCLINE 100 MG: 100 INJECTION, POWDER, LYOPHILIZED, FOR SOLUTION INTRAVENOUS at 21:04

## 2024-01-02 RX ADMIN — OSELTAMIVIR PHOSPHATE 75 MG: 6 FOR SUSPENSION ORAL at 20:54

## 2024-01-02 RX ADMIN — VANCOMYCIN HYDROCHLORIDE 750 MG: 750 INJECTION, POWDER, LYOPHILIZED, FOR SOLUTION INTRAVENOUS at 11:23

## 2024-01-02 RX ADMIN — CELECOXIB 200 MG: 200 CAPSULE ORAL at 10:57

## 2024-01-02 RX ADMIN — MIRTAZAPINE 15 MG: 15 TABLET, FILM COATED ORAL at 20:54

## 2024-01-02 RX ADMIN — LAMOTRIGINE 100 MG: 100 TABLET ORAL at 20:54

## 2024-01-02 RX ADMIN — DOXYCYCLINE 100 MG: 100 INJECTION, POWDER, LYOPHILIZED, FOR SOLUTION INTRAVENOUS at 11:21

## 2024-01-02 ASSESSMENT — PAIN SCALES - WONG BAKER: WONGBAKER_NUMERICALRESPONSE: 0

## 2024-01-02 ASSESSMENT — PAIN SCALES - GENERAL: PAINLEVEL_OUTOF10: 0

## 2024-01-02 NOTE — PROGRESS NOTES
SUBJECTIVE:    Improving septic shock, had transiently been on pressor the night of admission and has been off sice      OBJECTIVE:    BP (!) 103/59   Pulse (!) 104   Temp 98.3 °F (36.8 °C) (Temporal)   Resp 24   Wt 45.7 kg (100 lb 12.8 oz)   SpO2 98%   BMI 17.29 kg/m²      Latest Reference Range & Units 12/31/23 18:57 01/01/24 10:25 01/01/24 16:16 01/01/24 22:23 01/02/24 01:00   Lactic Acid 0.5 - 1.9 mmol/L 2.8 (HH) 2.6 (HH) 3.9 (HH) 2.8 (HH) 2.2 (HH)   (HH): Data is critically high     Latest Reference Range & Units 12/31/23 18:57 01/01/24 06:51 01/02/24 00:39   WBC 4.8 - 10.8 K/uL 7.8 4.1 (L) 5.1   Neutrophils % 50.0 - 65.0 % 76.5 (H) 56.1 64.4   Neutrophils Absolute 1.5 - 7.5 K/uL 6.0 2.3 3.3   Immature Granulocytes # K/uL 0.2 0.1 0.1   (L): Data is abnormally low  (H): Data is abnormally high      ASSESSMENTS & PLANS:    Septic Shock Improving  Not needing any pressor support or other critical care interventions  Transfer to [facility]

## 2024-01-02 NOTE — PROGRESS NOTES
Laura Shah transferred to 422 from ICU via bed.    Reason for transfer: Lower level of care   Explained reason for transfer to Patient.   Belongings: None with patient at bedside .   Soft chart transferred with patient: No.  Telemetry box number [id number] transferred with patient: started tele when pt reached the floor.  Report given to: Hunter Luis, via telephone.      Electronically signed by Hunter Luis RN on 1/2/2024 at 2:50 AM

## 2024-01-02 NOTE — PROGRESS NOTES
[facility]   Pharmacy Pharmacokinetic Monitoring Service - Vancomycin    Consulting Provider: Dr. Carrion   Indication: Sepsis   Target Concentration: Goal AUC/-600 mg*hr/L  Day of Therapy: 3  Additional Antimicrobials: Doxycycline, Oseltamivir    Pertinent Laboratory Values:   Wt Readings from Last 1 Encounters:   01/02/24 52.6 kg (116 lb 1 oz)     Temp Readings from Last 1 Encounters:   01/02/24 96.8 °F (36 °C) (Temporal)     Estimated Creatinine Clearance: 117 mL/min (based on SCr of 0.5 mg/dL).  Recent Labs     01/01/24  0651 01/02/24  0039   CREATININE 0.5 0.5   BUN 11 4*   WBC 4.1* 5.1     Procalcitonin: 0.41 on 12/31/23    Pertinent Cultures:  Culture Date Source Results   12/3123 Blood x 2  No growth to date   MRSA Nasal Swab:  was negative on 01/01/24    Recent vancomycin administrations                     vancomycin (VANCOCIN) 750 mg in sodium chloride 0.9 % 250 mL IVPB (mg) 750 mg New Bag 01/01/24 1840     750 mg New Bag  1012    vancomycin (VANCOCIN) 1,250 mg in sodium chloride 0.9 % 250 mL IVPB (mg) 1,250 mg New Bag 12/31/23 2056                    Assessment:  Date/Time Current Dose Concentration Timing of Concentration (h) AUC   01/02 at 0039 Vancomycin 750 mg IV every 8 hours  29.9 5 hours 59 minutes  777   Note: Serum concentrations collected for AUC dosing may appear elevated if collected in close proximity to the dose administered, this is not necessarily an indication of toxicity    Plan:  Current dosing regimen is supra-therapeutic  \"Decrease dose to 750 mg IV every 12 hours  Repeat vancomycin concentration ordered for 01/03 @ 0730   Pharmacy will continue to monitor patient and adjust therapy as indicated    Thank you for the consult,  Nathalie Mejía RPH  1/2/2024 3:57 AM

## 2024-01-02 NOTE — PROGRESS NOTES
Daily Progress Note    Date:2024  Patient: Laura Shah  : 1975  MRN:[id number]  CODE:Full Code No additional code details  PCP:Princess Carrasquillo MD    Admit Date: 2023  6:26 PM   LOS: 2 days     Chief Complaint   Patient presents with    Hypotension     BP dropped after getting up today, recent sepsis admission         Subjective: Pt transferred to medical floor overnight. She repeatedly states that she wants to go home. Denies any acute issues. No family at bedside.         Hospital Summary: 47 yo F with TBI, seizure disorder who presented to [facility] ED due to hypotension. She was recently discharged after admission for pneumonia and possible wound infection. Completed antibiotics a couple of days prior.   Viral panel positive for Influenza A. CXR unremarkable.  Pt admitted to ICU hospitalist for further management. She was resuscitated with IVF and intermittently required pressor support initially.   Started on Vancomycin and Doxycycline empirically for bacterial superinfection. Antibiotic choices were limited based on allergy list.   Vancomycin dced based on negative MRSA nares.         Review of Systems   All other systems reviewed and are negative.      Objective:      Vital signs in last 24 hours:  Patient Vitals for the past 24 hrs:   BP Temp Temp src Pulse Resp SpO2 Weight   24 1128 -- -- -- -- -- 96 % --   24 0830 99/66 97.5 °F (36.4 °C) Temporal (!) 103 20 100 % --   24 0257 123/85 96.8 °F (36 °C) Temporal 98 18 100 % 52.6 kg (116 lb 1 oz)   24 0200 (!) 96/56 -- -- 99 23 97 % --   24 0100 (!) 99/58 -- -- (!) 104 21 98 % --   24 0000 (!) 103/59 98.3 °F (36.8 °C) Temporal (!) 104 24 98 % --   24 2300 106/70 -- -- (!) 104 21 94 % --   24 2200 103/64 -- -- (!) 107 19 95 % --   24 2100 103/66 -- -- (!) 108 20 100 % --   24 113/68 98.6 °F (37 °C) Temporal (!) 110 22 100 % --   24 1900 (!) 100/46 -- -- (!) 104 18 100 % --   24

## 2024-01-02 NOTE — CARE COORDINATION
Case Management Assessment  Initial Evaluation    Date/Time of Evaluation: 1/2/2024 11:14 AM  Assessment Completed by: Maya Barnes RN    If patient is discharged prior to next notation, then this note serves as note for discharge by case management.    Patient Name: Laura Shah                   YOB: 1975  Diagnosis: Sepsis (HCC) [A41.9]  Hypotension, unspecified hypotension type [I95.9]                   Date / Time: 12/31/2023  6:26 PM    Patient Admission Status: Inpatient   Readmission Risk (Low < 19, Mod (19-27), High > 27): Readmission Risk Score: 19.3    Current PCP: Princess Carrasquillo MD  PCP verified by CM? Yes    Chart Reviewed: Yes      History Provided by: Child/Family  Patient Orientation: Other (see comment) (Called patient's Guardian to complete Assessment)    Patient Cognition: Alert    Hospitalization in the last 30 days (Readmission):  No    If yes, Readmission Assessment in  Navigator will be completed.    Advance Directives:      Code Status: Full Code   Patient's Primary Decision Maker is:      Primary Decision Maker: Yokasta Shah - Brother/Sister, Legal Guardian - [phone]    Primary Decision Maker: AlyssaTiki - Legal Guardian - [phone]    Discharge Planning:    Patient lives with: Family Members Type of Home: House  Primary Care Giver: Family  Patient Support Systems include: Family Members   Current Financial resources: Medicaid, Medicare  Current community resources: None  Current services prior to admission: Durable Medical Equipment            Current DME: Hospital Bed, Wheelchair            Type of Home Care services:  Aide Services, OT, PT, Nursing Services, Skilled Therapy    ADLS  Prior functional level: Assistance with the following:, Bathing, Dressing, Toileting, Feeding, Cooking, Housework, Shopping, Mobility  Current functional level: Assistance with the following:, Bathing, Dressing, Toileting, Feeding, Cooking, Housework, Shopping, Mobility    PT
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
## 2024-01-03 LAB — LAMOTRIGINE SERPL-MCNC: 8.7 UG/ML (ref 3–15)

## 2024-01-03 PROCEDURE — 1210000000 HC MED SURG R&B

## 2024-01-03 PROCEDURE — 97165 OT EVAL LOW COMPLEX 30 MIN: CPT

## 2024-01-03 PROCEDURE — 94760 N-INVAS EAR/PLS OXIMETRY 1: CPT

## 2024-01-03 PROCEDURE — 6370000000 HC RX 637 (ALT 250 FOR IP): Performed by: HOSPITALIST

## 2024-01-03 PROCEDURE — 97530 THERAPEUTIC ACTIVITIES: CPT

## 2024-01-03 PROCEDURE — 6370000000 HC RX 637 (ALT 250 FOR IP): Performed by: INTERNAL MEDICINE

## 2024-01-03 PROCEDURE — 97535 SELF CARE MNGMENT TRAINING: CPT

## 2024-01-03 PROCEDURE — 97161 PT EVAL LOW COMPLEX 20 MIN: CPT

## 2024-01-03 PROCEDURE — 6360000002 HC RX W HCPCS: Performed by: HOSPITALIST

## 2024-01-03 PROCEDURE — 2500000003 HC RX 250 WO HCPCS: Performed by: HOSPITALIST

## 2024-01-03 PROCEDURE — 2580000003 HC RX 258: Performed by: HOSPITALIST

## 2024-01-03 RX ORDER — FAMOTIDINE 20 MG/1
20 TABLET, FILM COATED ORAL 2 TIMES DAILY
Status: DISCONTINUED | OUTPATIENT
Start: 2024-01-03 | End: 2024-01-04 | Stop reason: HOSPADM

## 2024-01-03 RX ORDER — DOXYCYCLINE HYCLATE 100 MG/1
100 CAPSULE ORAL EVERY 12 HOURS SCHEDULED
Status: DISCONTINUED | OUTPATIENT
Start: 2024-01-03 | End: 2024-01-04 | Stop reason: HOSPADM

## 2024-01-03 RX ORDER — ENOXAPARIN SODIUM 100 MG/ML
40 INJECTION SUBCUTANEOUS DAILY
Status: DISCONTINUED | OUTPATIENT
Start: 2024-01-04 | End: 2024-01-04 | Stop reason: HOSPADM

## 2024-01-03 RX ADMIN — FAMOTIDINE 20 MG: 20 TABLET ORAL at 20:57

## 2024-01-03 RX ADMIN — Medication 5 MG: at 20:57

## 2024-01-03 RX ADMIN — OSELTAMIVIR PHOSPHATE 75 MG: 6 FOR SUSPENSION ORAL at 10:38

## 2024-01-03 RX ADMIN — LAMOTRIGINE 100 MG: 100 TABLET ORAL at 10:37

## 2024-01-03 RX ADMIN — DOXYCYCLINE 100 MG: 100 INJECTION, POWDER, LYOPHILIZED, FOR SOLUTION INTRAVENOUS at 10:47

## 2024-01-03 RX ADMIN — MIRTAZAPINE 15 MG: 15 TABLET, FILM COATED ORAL at 20:57

## 2024-01-03 RX ADMIN — SODIUM CHLORIDE, PRESERVATIVE FREE 10 ML: 5 INJECTION INTRAVENOUS at 10:38

## 2024-01-03 RX ADMIN — ENOXAPARIN SODIUM 30 MG: 100 INJECTION SUBCUTANEOUS at 10:38

## 2024-01-03 RX ADMIN — DOXYCYCLINE HYCLATE 100 MG: 100 CAPSULE ORAL at 20:57

## 2024-01-03 RX ADMIN — CELECOXIB 200 MG: 200 CAPSULE ORAL at 10:37

## 2024-01-03 RX ADMIN — OSELTAMIVIR PHOSPHATE 75 MG: 6 FOR SUSPENSION ORAL at 21:02

## 2024-01-03 RX ADMIN — LAMOTRIGINE 100 MG: 100 TABLET ORAL at 20:57

## 2024-01-03 RX ADMIN — Medication 5000 UNITS: at 10:37

## 2024-01-03 NOTE — PROGRESS NOTES
Occupational Therapy Initial Assessment  Date: 1/3/2024   Patient Name: Laura Shah  MRN: [id number]     : 1975    Date of Service: 1/3/2024    Discharge Recommendations:  Patient would benefit from continued therapy after discharge       Assessment   Assessment: Evaluation completed and tx initiated.  The patient would benefit from further skilled therapy to upgrade safety and functional independence and prevent any loss of previous gains. Likely close to baseline  REQUIRES OT FOLLOW-UP: Yes  Activity Tolerance  Activity Tolerance: Patient Tolerated treatment well           Patient Diagnosis(es): The primary encounter diagnosis was Hypotension, unspecified hypotension type. A diagnosis of Influenza A was also pertinent to this visit.   has a past medical history of Arthritis, GERD (gastroesophageal reflux disease), History of blood transfusion, Hydrocephalus (HCC), Incontinence, Seizures (HCC), and Status post deep brain stimulator placement.   has a past surgical history that includes Cholecystectomy (N/A); Appendectomy; csf shunt (Right); pr xcapsl ctrc rmvl insj io lens prosth w/o ecp (Left, 06/15/2017); pr xcapsl ctrc rmvl insj io lens prosth w/o ecp (Right, 2017); Deep brain stimulator placement (N/A); pr exc chalazion anes req hospization single/mult (Right, 2018); and Splenectomy (N/A, ).           Restrictions  Restrictions/Precautions  Restrictions/Precautions: Fall Risk  Position Activity Restriction  Other position/activity restrictions: Contact, droplet    Subjective   General  Chart Reviewed: Yes  Patient assessed for rehabilitation services?: Yes  Family / Caregiver Present: No  Pre Treatment Pain Screening  Pain at present: 0  Scale Used: Numeric Score  Intervention List: Patient able to continue with treatment  Comments / Details: no pain reported during tx  Vital Signs  Temp: 98.4 °F (36.9 °C)  Temp Source: Temporal  Pulse: 92  Heart Rate Source: Monitor  BP: 98/71  MAP  Per Week: 3-5    Goals  Short Term Goals  Short Term Goal 1: Perform transfers with moderate assist  Short Term Goal 2: Perform LB dressing with moderate assist  Short Term Goal 3: Perform toileting with moderate assist  Short Term Goal 4: Demo ability to sustain paced activity EOB in unsupported sitting with supervision for 5 mins  Short Term Goal 5: Independent with therapeutic activity recommendations  Long Term Goals  Long Term Goal 1: Upgrade as tolerated         Tx initiated:  Emphasis on facilitating sitting and standing balance.  Set up for eating.  Needs further tx  (15 mins)          Gabrielle Coley OT/L  Electronically signed by Gabrielle Coley OT/L on 1/3/2024 at 10:11 AM.

## 2024-01-03 NOTE — PROGRESS NOTES
Pharmacy Intravenous to Oral Protocol    Medication changed per [facility] IV to PO protocol: doxycycline    Patient meets the following inclusion criteria and none of the exclusion criteria:    Inclusion criteria:  - IV therapy > 24 hours (antibiotics only)  - Tolerating diet more advanced than clear liquids  - Tolerating PO medications  - No vasopressor blood pressure support (ie no signs of shock)  - Patient hasn't had a seizure for 72 hrs (antiepileptic medications only)    Exclusion criteria:  - Infections requiring IV therapy (ie meningitis, endocarditis, osteomyelitis, pancreatitis)   - Nausea and/or vomiting or severe diarrhea within past 24 hours   - Has gastrectomy, ileus, gastric outlet or bowel obstruction, or malabsorption syndromes   - Has significant painful oral ulceration   - TPN with an NPO order   - Active GI bleed   - Unable to swallow   - NPO   - Febrile in the last 24 hours (antibiotics only)   - Clinical deteriorating or unstable (antibiotics only)   - Pediatric patients and patients who are not euthyroid (not on oral levothyroxine/not stabilized on oral levothyroxine)- Levothyroxine only     RONNY CLEMENTS, PHARM D, 1/3/2024, 2:38 PM

## 2024-01-03 NOTE — PROGRESS NOTES
Daily Progress Note    Date:1/3/2024  Patient: Laura Shah  : 1975  MRN:[id number]  CODE:Full Code No additional code details  PCP:Princess Carrasquillo MD    Admit Date: 2023  6:26 PM   LOS: 3 days     Chief Complaint   Patient presents with    Hypotension     BP dropped after getting up today, recent sepsis admission         Subjective:     Feels well today.   Overall in good spirits and denies new complaint today.         Hospital Summary: 47 yo F with TBI, seizure disorder who presented to [facility] ED due to hypotension. She was recently discharged after admission for pneumonia and possible wound infection. Completed antibiotics a couple of days prior.   Viral panel positive for Influenza A. CXR unremarkable.  Pt admitted to ICU hospitalist for further management. She was resuscitated with IVF and intermittently required pressor support initially.   Started on Vancomycin and Doxycycline empirically for bacterial superinfection. Antibiotic choices were limited based on allergy list.   Vancomycin dced based on negative MRSA nares.         Review of Systems   All other systems reviewed and are negative.      Objective:      Vital signs in last 24 hours:  Patient Vitals for the past 24 hrs:   BP Temp Temp src Pulse Resp SpO2   24 1301 -- -- -- 88 18 96 %   24 0915 98/71 98.4 °F (36.9 °C) Temporal 92 -- 99 %   24 1936 97/69 98.1 °F (36.7 °C) -- 97 16 99 %         I/O last 3 completed shifts:  In: 514.9 [P.O.:12; I.V.:487.1; IV Piggyback:15.8]  Out: 2925 [Urine:2925]  I/O this shift:  In: 240 [P.O.:240]  Out: -     Physical Exam  Constitutional:       General: She is not in acute distress.     Appearance: She is not toxic-appearing.      Comments: Appears chronically ill   Cardiovascular:      Rate and Rhythm: Normal rate and regular rhythm.      Pulses: Normal pulses.      Heart sounds: Normal heart sounds.   Pulmonary:      Effort: Pulmonary effort is normal. No respiratory distress.       Comments: Diminished throughout  Abdominal:      General: Bowel sounds are normal. There is no distension.      Palpations: Abdomen is soft.      Tenderness: There is no abdominal tenderness.             Lab Review   Recent Results (from the past 24 hour(s))   Lactic Acid    Collection Time: 01/02/24  4:44 PM   Result Value Ref Range    Lactic Acid 0.9 0.5 - 1.9 mmol/L             Current Facility-Administered Medications:     famotidine (PEPCID) tablet 20 mg, 20 mg, Oral, BID, Dianna De La Cruz MD    enoxaparin Sodium (LOVENOX) injection 30 mg, 30 mg, SubCUTAneous, Daily, Hunter Carrion MD, 30 mg at 01/03/24 1038    oseltamivir 6mg/ml (TAMIFLU) suspension 75 mg, 75 mg, Oral, BID, Hunter Carrion MD, 75 mg at 01/03/24 1038    doxycycline (VIBRAMYCIN) 100 mg in sodium chloride 0.9 % 100 mL IVPB ([id number]), 100 mg, IntraVENous, Q12H, Hunter Carrion MD, Stopped at 01/03/24 1147    polyethylene glycol (GLYCOLAX) packet 17 g, 17 g, Oral, Daily PRN, Hunter Carrion MD    melatonin disintegrating tablet 5 mg, 5 mg, Oral, Nightly PRN, Hunter Carrion MD    calcium carbonate (TUMS) chewable tablet 500 mg, 500 mg, Oral, TID PRN, Hunter Carrion MD    sodium chloride flush 0.9 % injection 5-40 mL, 5-40 mL, IntraVENous, PRN, Hunter Carrion MD, 10 mL at 01/03/24 1038    0.9 % sodium chloride infusion, , IntraVENous, PRN, Hunter Carrion MD    acetaminophen (TYLENOL) tablet 650 mg, 650 mg, Oral, Q4H PRN **OR** acetaminophen (TYLENOL) suppository 650 mg, 650 mg, Rectal, Q4H PRN, Hunter Carrion MD    lamoTRIgine (LAMICTAL) tablet 100 mg, 100 mg, Oral, BID, Hunter Carrion MD, 100 mg at 01/03/24 1037    mirtazapine (REMERON) tablet 15 mg, 15 mg, Oral, Nightly, Hunter Carrion MD, 15 mg at 01/02/24 2054    celecoxib (CELEBREX) capsule 200 mg, 200 mg, Oral, Daily, Hunter Carrion MD, 200 mg at 01/03/24 1037    vitamin D (CHOLECALCIFEROL) capsule 5,000 Units, 5,000 Units, Oral, Daily, Hunter Carrion MD, 5,000 Units at

## 2024-01-03 NOTE — PROGRESS NOTES
Physical Therapy  Facility/Department: [facility] ONCOLOGY UNIT  Physical Therapy Initial Assessment    Name: Laura Shah  : 1975  MRN: [id number]  Date of Service: 1/3/2024    Discharge Recommendations:  Continue to assess pending progress, 24 hour supervision or assist          Patient Diagnosis(es): The primary encounter diagnosis was Hypotension, unspecified hypotension type. A diagnosis of Influenza A was also pertinent to this visit.  Past Medical History:  has a past medical history of Arthritis, GERD (gastroesophageal reflux disease), History of blood transfusion, Hydrocephalus (HCC), Incontinence, Seizures (HCC), and Status post deep brain stimulator placement.  Past Surgical History:  has a past surgical history that includes Cholecystectomy (N/A); Appendectomy; csf shunt (Right); pr xcapsl ctrc rmvl insj io lens prosth w/o ecp (Left, 06/15/2017); pr xcapsl ctrc rmvl insj io lens prosth w/o ecp (Right, 2017); Deep brain stimulator placement (N/A); pr exc chalazion anes req hospization single/mult (Right, 2018); and Splenectomy (N/A, ).    Assessment   Body Structures, Functions, Activity Limitations Requiring Skilled Therapeutic Intervention: Decreased functional mobility ;Decreased ADL status;Decreased ROM;Decreased strength;Decreased safe awareness;Decreased cognition;Decreased balance;Decreased posture  Assessment: Pt HAS ACTIVE MOVEMENT IN EXTREMETIES BUT REQUIRES ASSIST FOR ALL ASPECTS OF MOBILITY. WILL PROGRESS AS ABLE.  Requires PT Follow-Up: Yes  Activity Tolerance  Activity Tolerance: Patient tolerated evaluation without incident     Plan   Physical Therapy Plan  General Plan: 5-7 times per week  Current Treatment Recommendations: Strengthening, Balance training, Functional mobility training, ROM, Transfer training, Safety education & training, Patient/Caregiver education & training  Safety Devices  Type of Devices: Call light within reach, Heels elevated for pressure relief,  Bed alarm in place, Nurse notified     Restrictions  Restrictions/Precautions  Restrictions/Precautions: Fall Risk, Isolation, Contact Precautions  Position Activity Restriction  Other position/activity restrictions: Contact, droplet     Subjective   Pain: DENIES  General  Patient assessed for rehabilitation services?: Yes  Diagnosis: SEPSIS, hx TBI/SEIZURES  Subjective  Subjective: Pt WILLING TO PARTICIPATE         Social/Functional History  Social/Functional History  Lives With: Family  Type of Home: House  Home Layout: One level  Home Access: Ramped entrance  Bathroom Shower/Tub: Tub/Shower unit  Bathroom Equipment: Tub transfer bench  Home Equipment: Wheelchair-manual, Hospital bed  Receives Help From: Family  ADL Assistance: Needs assistance  Ambulation Assistance: Non-ambulatory  Transfer Assistance: Needs assistance  Active : No  Patient's  Info: Family  Occupation: On disability  Vision/Hearing  Hearing  Hearing: Within functional limits    Cognition   Orientation  Orientation Level: Oriented to person  Cognition  Cognition Comment: Awake, alert, following directions     Objective   Pulse: 92  Heart Rate Source: Monitor  BP: 98/71  BP Location: Right upper arm  MAP (Calculated): 80  SpO2: 99 %  O2 Device: None (Room air)  Temp: 98.4 °F (36.9 °C)     Observation/Palpation  Posture: Poor  Gross Assessment  AROM: Generally decreased, functional  Strength: Generally decreased, functional                    Bed mobility  Supine to Sit: Moderate assistance;Maximum assistance  Sit to Supine: Moderate assistance;Maximum assistance  Bed Mobility Comments: requires near constant support for sitting balance, can sit briefly with RUE propping with CGA for static tasks  Transfers  Sit to Stand: Maximum Assistance  Stand to Sit: Maximum Assistance  Comment: COULD NOT TAKE A TAKE BUT ATTEMPTED TO SCOOT FOOT, UNABLE TO MAINTAIN BALANCE        Balance  Sitting - Dynamic: Poor;+  Standing - Dynamic: Poor        Yes
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
## 2024-01-03 NOTE — PROGRESS NOTES
Automatic Dose Adjustment of                Subcutaneous Anticoagulant for Prophylaxis    Laura Shah is a 48 y.o. female.     Recent Labs     01/01/24  0651 01/02/24  0039   CREATININE 0.5 0.5       Estimated Creatinine Clearance: 117 mL/min (based on SCr of 0.5 mg/dL).    Weight:  Wt Readings from Last 1 Encounters:   01/02/24 52.6 kg (116 lb 1 oz)           Pharmacy has adjusted subcutaneous anticoagulant for prophylaxis to Enoxaparin 40 mg SC daily based on the patient's weight and estimated CrCl per [facility] policy.             RONNY CLEMENTS, PHARM D, 1/3/2024, 2:43 PM

## 2024-01-04 VITALS
TEMPERATURE: 97 F | DIASTOLIC BLOOD PRESSURE: 76 MMHG | WEIGHT: 100.13 LBS | RESPIRATION RATE: 18 BRPM | HEART RATE: 93 BPM | OXYGEN SATURATION: 100 % | SYSTOLIC BLOOD PRESSURE: 102 MMHG | BODY MASS INDEX: 17.18 KG/M2

## 2024-01-04 LAB
ALBUMIN SERPL-MCNC: 3 G/DL (ref 3.5–5.2)
ANION GAP SERPL CALCULATED.3IONS-SCNC: 14 MMOL/L (ref 7–19)
ANISOCYTOSIS BLD QL SMEAR: ABNORMAL
BASOPHILS # BLD: 0 K/UL (ref 0–0.2)
BASOPHILS NFR BLD: 0.5 % (ref 0–1)
BUN SERPL-MCNC: 5 MG/DL (ref 6–20)
CALCIUM SERPL-MCNC: 8.5 MG/DL (ref 8.6–10)
CHLORIDE SERPL-SCNC: 108 MMOL/L (ref 98–111)
CO2 SERPL-SCNC: 19 MMOL/L (ref 22–29)
CREAT SERPL-MCNC: 0.5 MG/DL (ref 0.5–0.9)
DACRYOCYTES BLD QL SMEAR: ABNORMAL
EOSINOPHIL # BLD: 0.1 K/UL (ref 0–0.6)
EOSINOPHIL NFR BLD: 3 % (ref 0–5)
ERYTHROCYTE [DISTWIDTH] IN BLOOD BY AUTOMATED COUNT: 16.2 % (ref 11.5–14.5)
GLUCOSE SERPL-MCNC: 83 MG/DL (ref 74–109)
HCT VFR BLD AUTO: 27.5 % (ref 37–47)
HGB BLD-MCNC: 8.1 G/DL (ref 12–16)
HYPOCHROMIA BLD QL SMEAR: ABNORMAL
IMM GRANULOCYTES # BLD: 0 K/UL
LACTATE BLDV-SCNC: 1.3 MMOL/L (ref 0.5–1.9)
LYMPHOCYTES # BLD: 1.5 K/UL (ref 1.1–4.5)
LYMPHOCYTES NFR BLD: 41.7 % (ref 20–40)
MACROCYTES BLD QL SMEAR: ABNORMAL
MCH RBC QN AUTO: 36.2 PG (ref 27–31)
MCHC RBC AUTO-ENTMCNC: 29.5 G/DL (ref 33–37)
MCV RBC AUTO: 122.8 FL (ref 81–99)
MONOCYTES # BLD: 0.2 K/UL (ref 0–0.9)
MONOCYTES NFR BLD: 4.9 % (ref 0–10)
NEUTROPHILS # BLD: 1.8 K/UL (ref 1.5–7.5)
NEUTS SEG NFR BLD: 49.6 % (ref 50–65)
OVALOCYTES BLD QL SMEAR: ABNORMAL
PHOSPHATE SERPL-MCNC: 3 MG/DL (ref 2.5–4.5)
PLATELET # BLD AUTO: 165 K/UL (ref 130–400)
PLATELET SLIDE REVIEW: ADEQUATE
PMV BLD AUTO: 9.6 FL (ref 9.4–12.3)
POLYCHROMASIA BLD QL SMEAR: ABNORMAL
POTASSIUM SERPL-SCNC: 3.4 MMOL/L (ref 3.5–5)
RBC # BLD AUTO: 2.24 M/UL (ref 4.2–5.4)
SODIUM SERPL-SCNC: 141 MMOL/L (ref 136–145)
WBC # BLD AUTO: 3.7 K/UL (ref 4.8–10.8)

## 2024-01-04 PROCEDURE — 92522 EVALUATE SPEECH PRODUCTION: CPT

## 2024-01-04 PROCEDURE — 36415 COLL VENOUS BLD VENIPUNCTURE: CPT

## 2024-01-04 PROCEDURE — 6370000000 HC RX 637 (ALT 250 FOR IP): Performed by: HOSPITALIST

## 2024-01-04 PROCEDURE — 83605 ASSAY OF LACTIC ACID: CPT

## 2024-01-04 PROCEDURE — 85025 COMPLETE CBC W/AUTO DIFF WBC: CPT

## 2024-01-04 PROCEDURE — 92526 ORAL FUNCTION THERAPY: CPT

## 2024-01-04 PROCEDURE — 6360000002 HC RX W HCPCS: Performed by: HOSPITALIST

## 2024-01-04 PROCEDURE — 6370000000 HC RX 637 (ALT 250 FOR IP): Performed by: INTERNAL MEDICINE

## 2024-01-04 PROCEDURE — 80069 RENAL FUNCTION PANEL: CPT

## 2024-01-04 RX ORDER — DOXYCYCLINE HYCLATE 100 MG/1
100 CAPSULE ORAL EVERY 12 HOURS SCHEDULED
Qty: 6 CAPSULE | Refills: 0 | Status: SHIPPED | OUTPATIENT
Start: 2024-01-04 | End: 2024-01-07

## 2024-01-04 RX ORDER — OSELTAMIVIR PHOSPHATE 6 MG/ML
75 FOR SUSPENSION ORAL 2 TIMES DAILY
Qty: 50 ML | Refills: 0 | Status: SHIPPED | OUTPATIENT
Start: 2024-01-04 | End: 2024-01-06

## 2024-01-04 RX ORDER — SODIUM BICARBONATE 650 MG/1
1300 TABLET ORAL ONCE
Status: COMPLETED | OUTPATIENT
Start: 2024-01-04 | End: 2024-01-04

## 2024-01-04 RX ADMIN — CELECOXIB 200 MG: 200 CAPSULE ORAL at 11:37

## 2024-01-04 RX ADMIN — DOXYCYCLINE HYCLATE 100 MG: 100 CAPSULE ORAL at 11:37

## 2024-01-04 RX ADMIN — Medication 5000 UNITS: at 11:37

## 2024-01-04 RX ADMIN — POTASSIUM BICARBONATE 40 MEQ: 782 TABLET, EFFERVESCENT ORAL at 16:04

## 2024-01-04 RX ADMIN — ENOXAPARIN SODIUM 40 MG: 100 INJECTION SUBCUTANEOUS at 11:37

## 2024-01-04 RX ADMIN — FAMOTIDINE 20 MG: 20 TABLET ORAL at 11:37

## 2024-01-04 RX ADMIN — LAMOTRIGINE 100 MG: 100 TABLET ORAL at 11:37

## 2024-01-04 RX ADMIN — SODIUM BICARBONATE 1300 MG: 650 TABLET ORAL at 11:37

## 2024-01-04 NOTE — CARE COORDINATION
HH referral received.  Patient is current with [facility].  Notified of dc today and faxed dc summary and AVS  P. [phone]  F. [phone]  Electronically signed by Malia Slade on 1/4/24 at 12:39 PM CST

## 2024-01-04 NOTE — CARE COORDINATION
Update: ALBERTO Mosquera attempted to call Pt brother listed in Pt chart, Yokasta Shah CONCHITA (Power of )  at 11:21 AM to discuss the IMM Important Message from Medicare letter. This writer left a voice message  due to no answer at that time for the Pt brother, POA (Power of )  to return her call. This writer checked the Pt room and Pt brother was not present at that time. ALBERTO/ will need to follow up with Pt legal Guardian to discuss the IMM letter.     This writer spoke to Pt brother at 1:20 PM and he reported he would be coming up to the hospital and would sign the IMM (2nd) letter once he arrived.

## 2024-01-04 NOTE — DISCHARGE SUMMARY
Hospital Medicine Discharge Summary    Patient ID: Laura Shah      Patient's PCP: Princess Carrasquillo MD    Admit Date: 12/31/2023     Discharge Date:   1/4/2024    Admitting Provider: No admitting provider for patient encounter.     Discharge Provider: Dianna De La Cruz MD     Discharge Diagnoses:       Active Hospital Problems    Diagnosis     Sepsis (HCC) [A41.9]        The patient was seen and examined on day of discharge and this discharge summary is in conjunction with any daily progress note from day of discharge.    Hospital Course: 47 yo F with TBI, seizure disorder who presented to [facility] ED due to hypotension. She was recently discharged after admission for pneumonia and possible wound infection. Completed antibiotics a couple of days prior.   Viral panel positive for Influenza A. CXR unremarkable.  Pt admitted to ICU hospitalist for further management. She was resuscitated with IVF and intermittently required pressor support initially.   Started on Vancomycin and Doxycycline empirically for bacterial superinfection. Antibiotic choices were limited based on allergy list.   Vancomycin dced based on negative MRSA nares.     Sepsis due to Influenza A infection resolved.   -- Continue Tamiflu and doxycycline to complete the empiric course.   -- MRSA nares negative DC vanc  - BP doing well now and off IVF.         Seizure disorder  -- Continue home AEDs      Physical Exam Performed:     /76   Pulse 93   Temp 97 °F (36.1 °C)   Resp 18   Wt 45.4 kg (100 lb 2 oz)   SpO2 100%   BMI 17.18 kg/m²       General appearance:  No apparent distress, appears stated age and cooperative.  HEENT:  Normal cephalic, atraumatic without obvious deformity. Pupils equal, round, and reactive to light.  Extra ocular muscles intact. Conjunctivae/corneas clear.  Neck: Supple, with full range of motion. No jugular venous distention. Trachea midline.  Respiratory:  Normal respiratory effort. Clear to auscultation,  Details   oseltamivir 6mg/ml (TAMIFLU) 6 MG/ML SUSR suspension Take 12.5 mLs by mouth 2 times daily for 4 doses  Qty: 50 mL, Refills: 0      doxycycline hyclate (VIBRAMYCIN) 100 MG capsule Take 1 capsule by mouth every 12 hours for 3 days  Qty: 6 capsule, Refills: 0                Details   lamoTRIgine (LAMICTAL) 100 MG tablet Take 1 tablet by mouth 2 times daily  Qty: 30 tablet, Refills: 3      mirtazapine (REMERON) 15 MG tablet Take 1 tablet by mouth nightly  Qty: 30 tablet, Refills: 3      Cholecalciferol (VITAMIN D3) 1.25 MG (74103 UT) CAPS Take 1.25 capsules by mouth once a week      celecoxib (CELEBREX) 200 MG capsule TAKE 1 CAPSULE BY MOUTH DAILY  Qty: 90 capsule, Refills: 3    Associated Diagnoses: Primary osteoarthritis involving multiple joints      medroxyPROGESTERone (DEPO-PROVERA) 150 MG/ML injection Inject 1 mL into the muscle once for 1 dose  Qty: 1 mL, Refills: 3      Incontinence Supplies MISC Pull ups, wipes, gloves, disposable min pads disp 1 month supply with 11 RF  Qty: 100 each, Refills: 11    Associated Diagnoses: Spastic quadriplegic cerebral palsy (HCC); Urinary incontinence, unspecified type      EPINEPHrine (EPIPEN 2-ADAN) 0.3 MG/0.3ML SOAJ injection Inject 0.3 mLs into the muscle once for 1 dose Use as directed for allergic reaction  Qty: 0.3 mL, Refills: 1    Associated Diagnoses: Anaphylaxis, subsequent encounter      Thickened Products (THICK-IT) LIQD Use with liquids to thicken as needed  Qty: 2661 mL, Refills: 3    Associated Diagnoses: Hemiplegia affecting left nondominant side, unspecified etiology, unspecified hemiplegia type (HCC)             Time Spent on discharge: 35min in the examination, evaluation, counseling and review of medications and discharge plan.      Signed:    Dianna De La Cruz MD   1/4/2024      Thank you Princess Carrasquillo MD for the opportunity to be involved in this patient's care. If you have any questions or concerns, please feel free to contact me at [phone]

## 2024-01-04 NOTE — CARE COORDINATION
ALBERTO Mosquera met with Pt brother POA to discuss the Pt IMM (2nd) letter and to discuss any questions/concerns. The Pt brother asked for additional information on the appeal process, but reported he did not want to do that and felt comfortable for her to be D/C at this time. The Pt brother waived the four hour wait period and signed the Pt IMM (2nd) letter this was filed in the Pt soft chart and the IMM (2nd) letter was provided to the Pt brother for their personal records for the Pt.    01/04/24 9717   IMM Letter   IMM Letter given to Patient/Family/Significant other/Guardian/POA/by: Iza James    IMM Letter date given: 01/04/24   IMM Letter time given: 1000

## 2024-01-04 NOTE — PROGRESS NOTES
Facility/Department: [facility] 4 ONCOLOGY UNIT   SWALLOW THERAPY  SPEECH PRODUCTION EVALUATION     NAME: Laura Shah  : 1975  MRN: [id number]    ADMISSION DATE: 2023  ADMITTING DIAGNOSIS: has Wheelchair bound; PVD (peripheral vascular disease) (HCC); Chronic seasonal allergic rhinitis; Avascular necrosis (HCC); Pain in left knee; Traumatic brain injury (HCC); S/P  shunt; and Sepsis (HCC) on their problem list.    Date of Treat/Eval: 2024  Evaluating Therapist: SHRAVAN Contreras    Current Diet level:  Minced and moist consistency with mildly thick/nectar thick liquids    Primary Complaint  Pt verbalized no complaints.    Pain:  Pain Assessment  Pain Assessment: 0-10  Pain Level: 0  Noe-Baker Pain Rating: No hurt  Faces, Legs, Activity, Cry, and Consolability (FLACC)  Face (F): occasional grimace or frown, withdrawn, disinterested  Legs (L): normal position or relaxed  Activity (A): lying quietly, normal position, moves easily  Cry (C): moans or whimpers, occasional complaint  Consolability (C): reassured by occasional touch, hug or being talked to  FLACC Score : 3    Reason for Referral  Laura Shah was referred for a bedside swallow evaluation to assess the efficiency of her swallow function, identify signs and symptoms of aspiration and make recommendations regarding safe dietary consistencies, effective compensatory strategies, and safe eating environment.    Impression  Patient exhibited decreased oral prep and decreased oral transit of more solid consistencies, inconsistently fast oral transit and suspected swallow delay with both blended and more consistencies, and sluggish, inconsistently mildly decreased laryngeal elevation for swallow airway protection. Even so, just mild delayed coughs were noted with puree consistency presentations, minced and moist consistency presentations, and nectar thick liquid presentations administered during treatment session this date.     At this time, would  Phase  Mastication: Puree;Minced and moist (Patient exhibited decreased rotary jaw movement during oral prep of puree consistency presentations and minced and moist consistency presentation administered by SLP.)  Suspected Premature Bolus Loss: Puree;Minced and moist (Oral transit of puree consistency presentations and minced and moist consistency presentations, administered by SLP, varied from fast-3 seconds in length.)  Decreased Oral Transit: Minced and moist (Min-moderate oral cavity residue was noted post swallows; residue cleared from the mouth with additional dry swallows.)  Oral Phase - Comment: Min puree consistency residue was observed post swallows; residue cleared from the mouth with additional dry swallows. Oral transit of nectar thick liquid presentations, administered via cup by SLP, primarily measured 1-2 seconds in length.      Pharyngeal Phase  Suspected Swallow Delay: Puree;Minced and moist (Suspect secondary to oral transit times.)  Laryngeal Elevation: (Patient exhibited sluggish, inconsistently mildly decreased laryngeal elevation for swallow airway protection.)  Delayed Cough: Puree;Minced and moist;Nectar - cup   Pharyngeal Phase - Comment: Just mild delayed coughs were noted with puree consistency presentations, minced and moist consistency presentations, and nectar thick liquid presentations administered during treatment session this date.     At this time, would continue less restrictive minced and moist consistency with mildly thick/nectar thick liquids. Recommend meds in pudding/applesauce. If patient receives mouth care prior to intake, okay for ice chips IN BETWEEN MEALS for comfort. Will continue to follow.    Electronically signed by SHRAVAN Contreras on 1/4/2024 at 11:50 AM

## 2024-01-05 LAB
BACTERIA BLD CULT ORG #2: NORMAL
BACTERIA BLD CULT: NORMAL

## 2024-01-16 ENCOUNTER — TELEPHONE (OUTPATIENT)
Dept: HEMATOLOGY | Age: 49
End: 2024-01-16

## 2024-01-16 NOTE — TELEPHONE ENCOUNTER
Called pt. to remind them of appointment on 1/19/2024 and had to leave a detailed voicemail with appointment date and time.

## 2024-01-19 DIAGNOSIS — D69.6 THROMBOCYTOPENIA (HCC): Primary | ICD-10-CM

## 2024-02-09 ENCOUNTER — TELEPHONE (OUTPATIENT)
Dept: HEMATOLOGY | Age: 49
End: 2024-02-09

## 2024-02-09 NOTE — TELEPHONE ENCOUNTER
Called patient and reminded patient of their appointment on 2/13/2024 and patient confirmed they would be here.

## 2024-03-04 ENCOUNTER — TELEPHONE (OUTPATIENT)
Dept: HEMATOLOGY | Age: 49
End: 2024-03-04

## 2024-03-04 NOTE — TELEPHONE ENCOUNTER
Called pt to remind them of their appt on 03/07/2024 but was unable to leave message or speak to the patient due to invalid number or number was disconnected.

## 2024-03-07 ENCOUNTER — TELEPHONE (OUTPATIENT)
Dept: INFUSION THERAPY | Age: 49
End: 2024-03-07

## 2024-04-03 ENCOUNTER — TELEPHONE (OUTPATIENT)
Dept: HEMATOLOGY | Age: 49
End: 2024-04-03

## 2024-04-03 NOTE — TELEPHONE ENCOUNTER
Called pt. to remind them of appointment on 04/05/2024 and had to leave a detailed voicemail with appointment date and time.

## 2024-05-13 ENCOUNTER — TELEPHONE (OUTPATIENT)
Dept: HEMATOLOGY | Age: 49
End: 2024-05-13

## 2024-07-02 ENCOUNTER — TELEPHONE (OUTPATIENT)
Dept: HEMATOLOGY | Age: 49
End: 2024-07-02

## 2024-07-02 NOTE — TELEPHONE ENCOUNTER
I called and reminded pt. of their appt. on 07/05/2024. Made pt. aware to arrive at appt. time & not lab appt. time. I also advised pt. to arrive no sooner than appt time. Pt voiced understanding and confirmed appt. date and time.

## (undated) DEVICE — MASK LG ADLT ANES MEDICHOICE

## (undated) DEVICE — GLOVE SURG SZ 7 CRM LTX FREE POLYISOPRENE POLYMER BEAD ANTI

## (undated) DEVICE — AIRWAY CIRCUIT: Brand: DEROYAL

## (undated) DEVICE — AMBU AURAONCE U SIZE 3: Brand: AURAONCE

## (undated) DEVICE — GLOVE SURG SZ 75 CRM LTX FREE POLYISOPRENE POLYMER BEAD ANTI

## (undated) DEVICE — TOWEL,OR,DSP,ST,BLUE,STD,4/PK,20PK/CS: Brand: MEDLINE

## (undated) DEVICE — SURGICAL PROCEDURE PACK LT TBNG CUST LF DISP

## (undated) DEVICE — NO USE 18 MONTHS PACKS SURG OPTHLM CATARACT

## (undated) DEVICE — GLOVE SURG SZ 65 CRM LTX FREE POLYISOPRENE POLYMER BEAD ANTI

## (undated) DEVICE — X-RAY DETECTABLE SPONGES,16 PLY: Brand: VISTEC